# Patient Record
Sex: FEMALE | Race: BLACK OR AFRICAN AMERICAN | Employment: FULL TIME | URBAN - METROPOLITAN AREA
[De-identification: names, ages, dates, MRNs, and addresses within clinical notes are randomized per-mention and may not be internally consistent; named-entity substitution may affect disease eponyms.]

---

## 2017-01-05 ENCOUNTER — APPOINTMENT (OUTPATIENT)
Dept: PHYSICAL THERAPY | Age: 52
End: 2017-01-05

## 2017-03-24 ENCOUNTER — HOSPITAL ENCOUNTER (OUTPATIENT)
Dept: PHYSICAL THERAPY | Age: 52
Discharge: HOME | End: 2017-03-24
Payer: COMMERCIAL

## 2017-03-24 PROCEDURE — 97110 THERAPEUTIC EXERCISES: CPT

## 2017-03-24 PROCEDURE — 97162 PT EVAL MOD COMPLEX 30 MIN: CPT

## 2017-03-24 PROCEDURE — 97016 VASOPNEUMATIC DEVICE THERAPY: CPT

## 2017-03-24 NOTE — PROGRESS NOTES
Ambulatory/Rehab Services H2 Model Falls Risk Assessment    Risk Factor Pts. ·   Confusion/Disorientation/Impulsivity  []    4 ·   Symptomatic Depression  []   2 ·   Altered Elimination  []   1 ·   Dizziness/Vertigo  []   1 ·   Gender (Male)  []   1 ·   Any administered antiepileptics (anticonvulsants):  []   2 ·   Any administered benzodiazepines:  []   1 ·   Visual Impairment (specify):  []   1 ·   Portable Oxygen Use  []   1 ·   Orthostatic ? BP  []   1 ·   History of Recent Falls (within 3 mos.)  []   5     Ability to Rise from Chair (choose one) Pts. ·   Ability to rise in a single movement  []   0 ·   Pushes up, successful in one attempt  [x]   1 ·   Multiple attempts, but successful  []   3 ·   Unable to rise without assistance  []   4   Total: (5 or greater = High Risk) 1     Falls Prevention Plan:   []                Physical Limitations to Exercise (specify):   []                Mobility Assistance Device (type):   []                Exercise/Equipment Adaptation (specify):    ©2010 [facility] [employer] [address] [place] [address] Patent #[id number].  Federal Law prohibits the replication, distribution or use without written permission from [facility] [employer]

## 2017-03-24 NOTE — PROGRESS NOTES
Maria Alejandra Martinez  : 1965 [facility] [address], [address] [place] [address] Street  [phone]   [phone]         OUTPATIENT PHYSICAL THERAPY:Initial Assessment 3/24/2017    ICD-10: Treatment Diagnosis 1: right knee pain (M25.561)              Treatment Diagnosis 2: gait dysfunction (R26.89)              Treatment Diagnosis 3: right knee primary localized osteoarthritis (M17.11)  Precautions: Type II Diabetes  Allergies:   Review of patient's allergies indicates not on file. Fall Risk Score: 1 (? 5 = High Risk)  MD Orders: Evaluate and Treat MEDICAL/REFERRING DIAGNOSIS:  Pain in right knee [M25.561]   DATE OF ONSET: 8/15/2016 when patient fell at work when she tripped over a pallet and landed directly on knee, then underwent right arthroscopic knee surgery on 3/1/2017   REFERRING PHYSICIAN: Kelly Bennett, [address] [facility] [address]: 2017     INITIAL ASSESSMENT:  Ms. Madelin Aase is a 46 y.o. female presenting s/p right arthroscopic knee surgery performed on 3/1/2017 after injuring her knee 2016 when she fell at work and tripped over a pallet and landed directly on her knee. She has reported continued pain in the knee since surgery, as well as weakness and swelling. She is currently full-time, light-duty and is not aware when she is supposed to return full-duty, full-time. She is eager to improve overall strength, ROM, pain, and stamina of the knee with work and with daily activities. She is a good candidate for skilled intervention with services to include manual therapy, modalities as needed, therapeutic exercises, gait/stair/transfer/balance training, activity modification, and return to work simulation  . PROBLEM LIST (Impacting functional limitations):  1. Decreased Strength  2. Decreased ADL/Functional Activities  3. Decreased Transfer Abilities  4. Decreased Ambulation Ability/Technique  5. Decreased Balance  6. Increased Pain  7.  Decreased Activity Tolerance  8. Decreased Pacing Skills  9. Increased Fatigue  10. Decreased Flexibility/Joint Mobility INTERVENTIONS PLANNED:  1. Balance Exercise  2. Cold  3. Cryotherapy  4. Electrical Stimulation  5. Gait Training  6. Heat  7. Home Exercise Program (HEP)  8. Manual Therapy  9. Neuromuscular Re-education/Strengthening  10. Range of Motion (ROM)  11. Therapeutic Exercise/Strengthening  12. Transfer Training   TREATMENT PLAN:  Effective Dates: 3/24/2017 TO 4/21/2017. Frequency/Duration: 3 times a week for 4 weeks  GOALS: (Goals have been discussed and agreed upon with patient.)  Short-Term Functional Goals: Time Frame: 3/24/2017 to 4/7/2017  1. Patient demonstrates independence with home exercise program without verbal cueing provided by therapist.  2. Improve ROM to 0-120 degrees in order to normalize gait, transfers, and stairs. 3. Improve flexibility of gastrocnemius and hamstrings with SLR to 80 degrees in order to decrease pain with sleeping and sitting. Discharge Goals: Time Frame: 3/24/2017 to 4/21/2017  1. Improve pain to 3/10 with work, extended walking, extended weightbearing, squatting, stooping, sleeping, and sitting. 2. Improve gross lower extremity strength to at least 4+/5 in order to return patient to work full-time, full-duty. 3. Improve gait without use of crutches without significant antalgia and appropriate heel to toe gait pattern. 4. Improve stair navigation with reciprocal ascend and descend with use of hand rail. 5. Improve transfers with use of hands 0-50% of the time into and out of a chair. 6. Improve Lower Extremity Functional Index score to 40/80 from 4/80. Rehabilitation Potential For Stated Goals: Good  Regarding Latesha Perea's therapy, I certify that the treatment plan above will be carried out by a therapist or under their direction.   Thank you for this referral,  Artem Olsen, JEFF     Referring Physician Signature: Kelly Bennett,               Date The information in this section was collected on 3/24/2017 (except where otherwise noted). HISTORY:   History of Present Injury/Illness (Reason for Referral):  Ms. Jeffy Hobbs is a 46 y.o. female presenting s/p right arthroscopic knee surgery performed on 3/1/2017 after injuring her knee 8/5/2016 when she fell at work and tripped over a pallet and landed directly on her knee. She has reported continued pain in the knee since surgery, as well as weakness and swelling. She is currently full-time, light-duty and is not aware when she is supposed to return full-duty, full-time. She is eager to improve overall strength, ROM, pain, and stamina of the knee with work and with daily activities. Past Medical History/Comorbidities: Patient reported hypertension that is well controlled with lisinopril, Type II Diabetes controlled with proglitazone, metformin, and glipizide, and she has been taking naproxen for inflammation of the knee. She just had a benign tumor removed from her abdomen which required a hysterectomy in 2016. Social History/Living Environment:    Patient lives at home with sons and reports assistance from them with any heavy household chores, ADLs, heavy lifting, and heavy carrying. Prior Level of Function/Work/Activity:  Independent without dysfunction. Patient is currently light-duty, full-time with her job as a . 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 Dominant Side:         RIGHT  Current Medications:     No current outpatient prescriptions on file. Date Last Reviewed:  3/24/2017   Number of Personal Factors/Comorbidities that affect the Plan of Care (Type II Diabetes and high job demand tasks : 1-2: MODERATE COMPLEXITY   EXAMINATION:     Observation/Postural and Gait Assessment:  Patient ambulates with bilateral crutches with step to gait on her toes.   She is able to ambulate with 1 crutch safely, independently, and with minimal gait deviation with verbal and visual cuing. Gait without crutch antalgic with foot flat throughout stance. Medial and lateral joint line portal holes completely approximated with scar tissue and scabbing. No active drainage is present, as well as no signs or symptoms of infection. Anthropometric measurements (cm) Left Right   Knee joint line 42 43     Palpation: Gross tenderness to palpation of medial and lateral portal holes. Flexibility severely limited of gastrocnemius. Hamstrings limited moderately to 70 degree SLR.    ROM:     AROM(PROM) Left Right   Knee flexion 122° 100°   Knee extension 2° flexion contracture 5° flexion contracture     Passive Accessory Mobility Testing: Mild to moderate limitations of patellofemoral joint in all planes. Strength:     Manual Muscle Test (out of 5) Left Right   Knee extension 5 3+, 0 degree lag with SLR   Knee flexion 5 4   Hip flexion 5 4   Hip extension 3 3   Hip abduction 3 3   Ankle DF 4+ 4   Ankle PF 4+ 4     Special Tests:  No special tests due to acuity of surgery. No signs or symptoms of infection or deep vein thrombosis at this time. Neurological Screen:  Myotomes: Key muscle strength testing for bilateral LE is WNL. Dermatomes: Sensation testing through bilateral LE for light touch is WNL. Functional Mobility:  Patient reports difficulty with ambulation longer than a few minutes but safe and independent with and without crutches with step to gait. Transfers with use of hands 100% of the time into and out of a chair. Stairs performed non-reciprocally with left leg as dominant leg with use of crutches. Body Structures Involved:  1. Bones  2. Joints  3. Muscles Body Functions Affected:  1. Sensory/Pain  2. Neuromusculoskeletal Activities and Participation Affected:  1. General Tasks and Demands  2. Mobility  3.  Ability to work   Number of elements (examined above) that affect the Plan of Care: 3: MODERATE COMPLEXITY   CLINICAL PRESENTATION: Presentation: Evolving clinical presentation with changing clinical characteristics: MODERATE COMPLEXITY   CLINICAL DECISION MAKING:   Outcome Measure: Tool Used: Lower Extremity Functional Scale (LEFS)  Score  Initial: 4/80 Most Recent: /80   Interpretation of Score: 20 questions each scored on a 5 point scale with 0 representing \"extreme difficulty or unable to perform\" and 4 representing \"no difficulty\". The lower the score, the greater the functional disability. 80/80 represents no disability. Minimal detectable change is 9 points. Medical Necessity:   · Patient is expected to demonstrate progress in strength, range of motion, balance, coordination and functional technique to improve tolerance to daily activities, transfers sit to and from stand, ability to walk and stand un-aided for longer periods of time, and ability to work. · Skilled intervention continues to be required due to weakness, decreased ROM, decreased flexibility, pain, and functional limitations. Reason for Services/Other Comments:  · Patient continues to require skilled intervention due to increasing complexity of exercises. Use of outcome tool(s) and clinical judgement create a POC that gives a (due to long standing history of knee pain and goal of return to work with high level job tasks): Questionable prediction of patient's progress: MODERATE COMPLEXITY            TREATMENT:   (In addition to Assessment/Re-Assessment sessions the following treatments were rendered)    Pre-treatment Symptoms/Complaints:  Patient reported ongoing pain in the knee since surgery and is currently light duty, full time at work. Pain: Initial:   Pain Intensity 1: 6  Pain Location 1: Knee  Pain Orientation 1: Right  Post Session:  6/10      Therapeutic Exercise: (15 Minutes):  Exercises per grid below to improve mobility, strength, balance and coordination.   Required minimal visual, verbal and manual cues to promote proper body alignment, promote proper body posture, promote proper body mechanics and promote proper body breathing techniques. Progressed resistance, range, repetitions and complexity of movement as indicated. (used abbreviations: BET-back education training) Date:  3/24/2017 Date:   Date:     Activity/Exercise Parameters Parameters Parameters   Supine knee extension hang 6 minutes     Calf stretch NWB strap 3 x 30 sec     Hamstring stretch Strap 3 x 30 sec     Supine SLR flexion 3 x 10     Heel raises 2 x 10     Heel slides 5 sec x 20           Gait training with 1 crutch step through gait in the left hand as well as gait training without crutches with cuing to minimize antalgia and increased heel to toe gait pattern  Self-scar mobilization with manual instruction to help desensitize the incisions- 5 min    Manual Therapy (     ): improve joint and soft tissue mobility  · None    (Used abbreviations: MET - muscle energy technique; PNF - proprioceptive neuromuscular facilitation; NMR - neuromuscular re-education; a/p - anterior to posterior; p/a - posterior to anterior; NT - not tested)    Therapeutic Modalities: for edema and pain                      Right Knee Cold  Type:  (vasopneumatic)  Duration: 15 minutes  Patient Position: Supine                                                                        HEP: As above; handouts given to patient for all exercises. Treatment/Session Assessment:    · Response to Treatment:  Patient reported status quo pain at the end of session but overall improve mobility at the end of session. She also reported no significant complaints overall with exercises and also reported a good understanding of HEP. · Compliance with Program/Exercises: compliant most of the time. · Recommendations/Intent for next treatment session: \"Next visit will focus on advancements to more challenging activities\".     Total Treatment Duration: 60 minutes; 30 minutes; 15 vasopneumatic, 15 exercises  PT Patient Time In/Time Out  Time In: 9064  Time Out: [address], [place]

## 2017-03-27 ENCOUNTER — HOSPITAL ENCOUNTER (OUTPATIENT)
Dept: PHYSICAL THERAPY | Age: 52
Discharge: HOME | End: 2017-03-27
Payer: COMMERCIAL

## 2017-03-27 PROCEDURE — 97110 THERAPEUTIC EXERCISES: CPT

## 2017-03-27 PROCEDURE — 97016 VASOPNEUMATIC DEVICE THERAPY: CPT

## 2017-03-27 NOTE — PROGRESS NOTES
Marguerite Houser  : 1965 [facility] [place] [facility] [address], Jose torres, [address] [facility]  [phone]   [phone]         OUTPATIENT PHYSICAL THERAPY:Daily Note 3/27/2017    ICD-10: Treatment Diagnosis 1: right knee pain (M25.561)              Treatment Diagnosis 2: gait dysfunction (R26.89)              Treatment Diagnosis 3: right knee primary localized osteoarthritis (M17.11)  Precautions: Type II Diabetes  Allergies:   Review of patient's allergies indicates not on file. Fall Risk Score: 1 (? 5 = High Risk)  MD Orders: Evaluate and Treat MEDICAL/REFERRING DIAGNOSIS:  Pain in right knee [M25.561]   DATE OF ONSET: 8/15/2016 when patient fell at work when she tripped over a pallet and landed directly on knee, then underwent right arthroscopic knee surgery on 3/1/2017   REFERRING PHYSICIAN: Dontae Araujo, [address] [place] [address] Avenue: 2017     INITIAL ASSESSMENT:  Ms. Cesar Marin is a 46 y.o. female presenting s/p right arthroscopic knee surgery performed on 3/1/2017 after injuring her knee 2016 when she fell at work and tripped over a pallet and landed directly on her knee. She has reported continued pain in the knee since surgery, as well as weakness and swelling. She is currently full-time, light-duty and is not aware when she is supposed to return full-duty, full-time. She is eager to improve overall strength, ROM, pain, and stamina of the knee with work and with daily activities. She is a good candidate for skilled intervention with services to include manual therapy, modalities as needed, therapeutic exercises, gait/stair/transfer/balance training, activity modification, and return to work simulation  . PROBLEM LIST (Impacting functional limitations):  1. Decreased Strength  2. Decreased ADL/Functional Activities  3. Decreased Transfer Abilities  4. Decreased Ambulation Ability/Technique  5. Decreased Balance  6. Increased Pain  7.  Decreased Activity Tolerance  8. Decreased Pacing Skills  9. Increased Fatigue  10. Decreased Flexibility/Joint Mobility INTERVENTIONS PLANNED:  1. Balance Exercise  2. Cold  3. Cryotherapy  4. Electrical Stimulation  5. Gait Training  6. Heat  7. Home Exercise Program (HEP)  8. Manual Therapy  9. Neuromuscular Re-education/Strengthening  10. Range of Motion (ROM)  11. Therapeutic Exercise/Strengthening  12. Transfer Training   TREATMENT PLAN:  Effective Dates: 3/24/2017 TO 4/21/2017. Frequency/Duration: 3 times a week for 4 weeks  GOALS: (Goals have been discussed and agreed upon with patient.)  Short-Term Functional Goals: Time Frame: 3/24/2017 to 4/7/2017  1. Patient demonstrates independence with home exercise program without verbal cueing provided by therapist.  2. Improve ROM to 0-120 degrees in order to normalize gait, transfers, and stairs. 3. Improve flexibility of gastrocnemius and hamstrings with SLR to 80 degrees in order to decrease pain with sleeping and sitting. Discharge Goals: Time Frame: 3/24/2017 to 4/21/2017  1. Improve pain to 3/10 with work, extended walking, extended weightbearing, squatting, stooping, sleeping, and sitting. 2. Improve gross lower extremity strength to at least 4+/5 in order to return patient to work full-time, full-duty. 3. Improve gait without use of crutches without significant antalgia and appropriate heel to toe gait pattern. 4. Improve stair navigation with reciprocal ascend and descend with use of hand rail. 5. Improve transfers with use of hands 0-50% of the time into and out of a chair. 6. Improve Lower Extremity Functional Index score to 40/80 from 4/80. Rehabilitation Potential For Stated Goals: Good  Regarding Latesha Eliazar's therapy, I certify that the treatment plan above will be carried out by a therapist or under their direction.   Thank you for this referral,  Olga Juarez, PTA     Referring Physician Signature: Camilo Wilkerson, DO              Date                    The information in this section was collected on 3/24/2017 (except where otherwise noted). HISTORY:   History of Present Injury/Illness (Reason for Referral):  Ms. Manuel Blue is a 46 y.o. female presenting s/p right arthroscopic knee surgery performed on 3/1/2017 after injuring her knee 8/5/2016 when she fell at work and tripped over a pallet and landed directly on her knee. She has reported continued pain in the knee since surgery, as well as weakness and swelling. She is currently full-time, light-duty and is not aware when she is supposed to return full-duty, full-time. She is eager to improve overall strength, ROM, pain, and stamina of the knee with work and with daily activities. Past Medical History/Comorbidities: Patient reported hypertension that is well controlled with lisinopril, Type II Diabetes controlled with proglitazone, metformin, and glipizide, and she has been taking naproxen for inflammation of the knee. She just had a benign tumor removed from her abdomen which required a hysterectomy in 2016. Social History/Living Environment:    Patient lives at home with sons and reports assistance from them with any heavy household chores, ADLs, heavy lifting, and heavy carrying. Prior Level of Function/Work/Activity:  Independent without dysfunction. Patient is currently light-duty, full-time with her job as a . 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 Dominant Side:         RIGHT  Current Medications:     No current outpatient prescriptions on file. Date Last Reviewed:  3/27/2017   Number of Personal Factors/Comorbidities that affect the Plan of Care (Type II Diabetes and high job demand tasks : 1-2: MODERATE COMPLEXITY   EXAMINATION:     Observation/Postural and Gait Assessment:  Patient ambulates with bilateral crutches with step to gait on her toes.   She is able to ambulate with 1 crutch safely, independently, and with minimal gait deviation with verbal and visual cuing. Gait without crutch antalgic with foot flat throughout stance. Medial and lateral joint line portal holes completely approximated with scar tissue and scabbing. No active drainage is present, as well as no signs or symptoms of infection. Anthropometric measurements (cm) Left Right   Knee joint line 42 43     Palpation: Gross tenderness to palpation of medial and lateral portal holes. Flexibility severely limited of gastrocnemius. Hamstrings limited moderately to 70 degree SLR.    ROM:     AROM(PROM) Left Right   Knee flexion 122° 100°   Knee extension 2° flexion contracture 5° flexion contracture     Passive Accessory Mobility Testing: Mild to moderate limitations of patellofemoral joint in all planes. Strength:     Manual Muscle Test (out of 5) Left Right   Knee extension 5 3+, 0 degree lag with SLR   Knee flexion 5 4   Hip flexion 5 4   Hip extension 3 3   Hip abduction 3 3   Ankle DF 4+ 4   Ankle PF 4+ 4     Special Tests:  No special tests due to acuity of surgery. No signs or symptoms of infection or deep vein thrombosis at this time. Neurological Screen:  Myotomes: Key muscle strength testing for bilateral LE is WNL. Dermatomes: Sensation testing through bilateral LE for light touch is WNL. Functional Mobility:  Patient reports difficulty with ambulation longer than a few minutes but safe and independent with and without crutches with step to gait. Transfers with use of hands 100% of the time into and out of a chair. Stairs performed non-reciprocally with left leg as dominant leg with use of crutches. Body Structures Involved:  1. Bones  2. Joints  3. Muscles Body Functions Affected:  1. Sensory/Pain  2. Neuromusculoskeletal Activities and Participation Affected:  1. General Tasks and Demands  2. Mobility  3.  Ability to work   Number of elements (examined above) that affect the Plan of Care: 3: MODERATE COMPLEXITY   CLINICAL PRESENTATION:   Presentation: Evolving clinical presentation with changing clinical characteristics: MODERATE COMPLEXITY   CLINICAL DECISION MAKING:   Outcome Measure: Tool Used: Lower Extremity Functional Scale (LEFS)  Score  Initial: 4/80 Most Recent: /80   Interpretation of Score: 20 questions each scored on a 5 point scale with 0 representing \"extreme difficulty or unable to perform\" and 4 representing \"no difficulty\". The lower the score, the greater the functional disability. 80/80 represents no disability. Minimal detectable change is 9 points. Medical Necessity:   · Patient is expected to demonstrate progress in strength, range of motion, balance, coordination and functional technique to improve tolerance to daily activities, transfers sit to and from stand, ability to walk and stand un-aided for longer periods of time, and ability to work. · Skilled intervention continues to be required due to weakness, decreased ROM, decreased flexibility, pain, and functional limitations. Reason for Services/Other Comments:  · Patient continues to require skilled intervention due to increasing complexity of exercises. Use of outcome tool(s) and clinical judgement create a POC that gives a (due to long standing history of knee pain and goal of return to work with high level job tasks): Questionable prediction of patient's progress: MODERATE COMPLEXITY            TREATMENT:   (In addition to Assessment/Re-Assessment sessions the following treatments were rendered)    Pre-treatment Symptoms/Complaints:  Patient reported trying to ambulate with crutch but stated it was too painful. Pt. Was 10 mins late to therapy today. Pain: Initial:   Pain Intensity 1: 6  Pain Location 1: Knee  Pain Orientation 1: Right  Post Session:  Pt. Reported pain level on the right knee medially were the incision is. Pain level 5/10 after session. Therapeutic Exercise: ( ):x 30 mins Exercises per grid below to improve mobility, strength, balance and coordination. Required minimal visual, verbal and manual cues to promote proper body alignment, promote proper body posture, promote proper body mechanics and promote proper body breathing techniques. Progressed resistance, range, repetitions and complexity of movement as indicated. (used abbreviations: BET-back education training) Date:  3/24/2017 Date:  3/27/17   Date:     Activity/Exercise Parameters Parameters Parameters   Supine knee extension hang 6 minutes X 6 mins with 1/2 roll under right ankle    Calf stretch NWB strap 3 x 30 sec On incline 4x30 sec hold     Hamstring stretch Strap 3 x 30 sec Strap 4x30  Sec hold     Supine SLR flexion 3 x 10 3x10     Heel raises 2 x 10 2x10     Heel slides 5 sec x 20 X 20 reps 5 sec hold     Nu step   Level 4 x 8 mins     Chair slides   X 10 reps 30 sec hold     Heel toe raises  X 20 reps     Standing knee flexion  X 20 reps     Standing hamstring curls   x20 reps    Thigh squeezes   X 10 reps x 10 sec hold yellow ball    bridging  X 10 reps x 5 sec hold           Gait training with single point cane a community distance with verbal cues for proper gait sequencing. Manual Therapy (     ): improve joint and soft tissue mobility  · None    (Used abbreviations: MET - muscle energy technique; PNF - proprioceptive neuromuscular facilitation; NMR - neuromuscular re-education; a/p - anterior to posterior; p/a - posterior to anterior; NT - not tested)    Therapeutic Modalities: for edema and pain Pt. Received vaso pneumatic compression device x 15 mins to right knee in supine with elevation on incline. HEP: As above; handouts given to patient for all exercises. Treatment/Session Assessment:    · Response to Treatment:  Patient was compliant with encouragement. Pt. Ambulated with less antalgic gait and pain.    · Compliance with Program/Exercises: compliant most of the time.  · Recommendations/Intent for next treatment session: \"Next visit will focus on advancements to more challenging activities\".     Total Treatment Duration:   PT Patient Time In/Time Out  Time In: 0810  Time Out: 0900    SHELLY Nunez DPT

## 2017-03-29 ENCOUNTER — HOSPITAL ENCOUNTER (OUTPATIENT)
Dept: PHYSICAL THERAPY | Age: 52
Discharge: HOME | End: 2017-03-29
Payer: COMMERCIAL

## 2017-03-29 PROCEDURE — 97140 MANUAL THERAPY 1/> REGIONS: CPT

## 2017-03-29 PROCEDURE — 97110 THERAPEUTIC EXERCISES: CPT

## 2017-03-29 PROCEDURE — 97016 VASOPNEUMATIC DEVICE THERAPY: CPT

## 2017-03-29 NOTE — PROGRESS NOTES
Walter Al  : 1965 [facility] [place] [address], [address] [place] [address]  [phone]   [phone]         OUTPATIENT PHYSICAL THERAPY:Daily Note 3/29/2017    ICD-10: Treatment Diagnosis 1: right knee pain (M25.561)              Treatment Diagnosis 2: gait dysfunction (R26.89)              Treatment Diagnosis 3: right knee primary localized osteoarthritis (M17.11)  Precautions: Type II Diabetes  Allergies:   Review of patient's allergies indicates not on file. Fall Risk Score: 1 (? 5 = High Risk)  MD Orders: Evaluate and Treat MEDICAL/REFERRING DIAGNOSIS:  Pain in right knee [M25.561]   DATE OF ONSET: 8/15/2016 when patient fell at work when she tripped over a pallet and landed directly on knee, then underwent right arthroscopic knee surgery on 3/1/2017   REFERRING PHYSICIAN: Florecita Balderrama, [address] [place] [address] Avenue: 2017     INITIAL ASSESSMENT:  Ms. Silvia Wade is a 46 y.o. female presenting s/p right arthroscopic knee surgery performed on 3/1/2017 after injuring her knee 2016 when she fell at work and tripped over a pallet and landed directly on her knee. She has reported continued pain in the knee since surgery, as well as weakness and swelling. She is currently full-time, light-duty and is not aware when she is supposed to return full-duty, full-time. She is eager to improve overall strength, ROM, pain, and stamina of the knee with work and with daily activities. She is a good candidate for skilled intervention with services to include manual therapy, modalities as needed, therapeutic exercises, gait/stair/transfer/balance training, activity modification, and return to work simulation  . PROBLEM LIST (Impacting functional limitations):  1. Decreased Strength  2. Decreased ADL/Functional Activities  3. Decreased Transfer Abilities  4. Decreased Ambulation Ability/Technique  5. Decreased Balance  6. Increased Pain  7.  Decreased Activity Tolerance  8. Decreased Pacing Skills  9. Increased Fatigue  10. Decreased Flexibility/Joint Mobility INTERVENTIONS PLANNED:  1. Balance Exercise  2. Cold  3. Cryotherapy  4. Electrical Stimulation  5. Gait Training  6. Heat  7. Home Exercise Program (HEP)  8. Manual Therapy  9. Neuromuscular Re-education/Strengthening  10. Range of Motion (ROM)  11. Therapeutic Exercise/Strengthening  12. Transfer Training   TREATMENT PLAN:  Effective Dates: 3/24/2017 TO 4/21/2017. Frequency/Duration: 3 times a week for 4 weeks  GOALS: (Goals have been discussed and agreed upon with patient.)  Short-Term Functional Goals: Time Frame: 3/24/2017 to 4/7/2017  1. Patient demonstrates independence with home exercise program without verbal cueing provided by therapist. - GOAL MET  2. Improve ROM to 0-120 degrees in order to normalize gait, transfers, and stairs. - ONGOING  3. Improve flexibility of gastrocnemius and hamstrings with SLR to 80 degrees in order to decrease pain with sleeping and sitting. - ONGOING  Discharge Goals: Time Frame: 3/24/2017 to 4/21/2017  1. Improve pain to 3/10 with work, extended walking, extended weightbearing, squatting, stooping, sleeping, and sitting. 2. Improve gross lower extremity strength to at least 4+/5 in order to return patient to work full-time, full-duty. 3. Improve gait without use of crutches without significant antalgia and appropriate heel to toe gait pattern. 4. Improve stair navigation with reciprocal ascend and descend with use of hand rail. 5. Improve transfers with use of hands 0-50% of the time into and out of a chair. 6. Improve Lower Extremity Functional Index score to 40/80 from 4/80. Rehabilitation Potential For Stated Goals: Good  Regarding Latesha Perea's therapy, I certify that the treatment plan above will be carried out by a therapist or under their direction.   Thank you for this referral,  Venita Brenner PT     Referring Physician Signature: Galindo Johnson DO Date                    The information in this section was collected on 3/24/2017 (except where otherwise noted). HISTORY:   History of Present Injury/Illness (Reason for Referral):  Ms. Vero Gifford is a 46 y.o. female presenting s/p right arthroscopic knee surgery performed on 3/1/2017 after injuring her knee 8/5/2016 when she fell at work and tripped over a pallet and landed directly on her knee. She has reported continued pain in the knee since surgery, as well as weakness and swelling. She is currently full-time, light-duty and is not aware when she is supposed to return full-duty, full-time. She is eager to improve overall strength, ROM, pain, and stamina of the knee with work and with daily activities. Past Medical History/Comorbidities: Patient reported hypertension that is well controlled with lisinopril, Type II Diabetes controlled with proglitazone, metformin, and glipizide, and she has been taking naproxen for inflammation of the knee. She just had a benign tumor removed from her abdomen which required a hysterectomy in 2016. Social History/Living Environment:    Patient lives at home with sons and reports assistance from them with any heavy household chores, ADLs, heavy lifting, and heavy carrying. Prior Level of Function/Work/Activity:  Independent without dysfunction. Patient is currently light-duty, full-time with her job as a . 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 Dominant Side:         RIGHT  Current Medications:     No current outpatient prescriptions on file. Date Last Reviewed:  3/29/2017   Number of Personal Factors/Comorbidities that affect the Plan of Care (Type II Diabetes and high job demand tasks : 1-2: MODERATE COMPLEXITY   EXAMINATION:     Observation/Postural and Gait Assessment:  Patient ambulates with bilateral crutches with step to gait on her toes.   She is able to ambulate with 1 crutch safely, independently, and with minimal gait deviation with verbal and visual cuing. Gait without crutch antalgic with foot flat throughout stance. Medial and lateral joint line portal holes completely approximated with scar tissue and scabbing. No active drainage is present, as well as no signs or symptoms of infection. Anthropometric measurements (cm) Left Right   Knee joint line 42 43     Palpation: Gross tenderness to palpation of medial and lateral portal holes. Flexibility severely limited of gastrocnemius. Hamstrings limited moderately to 70 degree SLR.    ROM:     AROM(PROM) Left Right   Knee flexion 122° 110 (From 100°)   Knee extension 2° flexion contracture 0 (From 5° flexion contracture)     Passive Accessory Mobility Testing: Mild to moderate limitations of patellofemoral joint in all planes. Strength:     Manual Muscle Test (out of 5) Left Right   Knee extension 5 3+, 0 degree lag with SLR   Knee flexion 5 4   Hip flexion 5 4   Hip extension 3 3   Hip abduction 3 3   Ankle DF 4+ 4   Ankle PF 4+ 4     Special Tests:  No special tests due to acuity of surgery. No signs or symptoms of infection or deep vein thrombosis at this time. Neurological Screen:  Myotomes: Key muscle strength testing for bilateral LE is WNL. Dermatomes: Sensation testing through bilateral LE for light touch is WNL. Functional Mobility:  Patient reports difficulty with ambulation longer than a few minutes but safe and independent with and without crutches with step to gait. Transfers with use of hands 100% of the time into and out of a chair. Stairs performed non-reciprocally with left leg as dominant leg with use of crutches. Body Structures Involved:  1. Bones  2. Joints  3. Muscles Body Functions Affected:  1. Sensory/Pain  2. Neuromusculoskeletal Activities and Participation Affected:  1. General Tasks and Demands  2. Mobility  3.  Ability to work   Number of elements (examined above) that affect the Plan of Care: 3: MODERATE COMPLEXITY   CLINICAL PRESENTATION:   Presentation: Evolving clinical presentation with changing clinical characteristics: MODERATE COMPLEXITY   CLINICAL DECISION MAKING:   Outcome Measure: Tool Used: Lower Extremity Functional Scale (LEFS)  Score  Initial: 4/80 Most Recent: /80   Interpretation of Score: 20 questions each scored on a 5 point scale with 0 representing \"extreme difficulty or unable to perform\" and 4 representing \"no difficulty\". The lower the score, the greater the functional disability. 80/80 represents no disability. Minimal detectable change is 9 points. Medical Necessity:   · Patient is expected to demonstrate progress in strength, range of motion, balance, coordination and functional technique to improve tolerance to daily activities, transfers sit to and from stand, ability to walk and stand un-aided for longer periods of time, and ability to work. · Skilled intervention continues to be required due to weakness, decreased ROM, decreased flexibility, pain, and functional limitations. Reason for Services/Other Comments:  · Patient continues to require skilled intervention due to increasing complexity of exercises. Use of outcome tool(s) and clinical judgement create a POC that gives a (due to long standing history of knee pain and goal of return to work with high level job tasks): Questionable prediction of patient's progress: MODERATE COMPLEXITY            TREATMENT:   (In addition to Assessment/Re-Assessment sessions the following treatments were rendered)    Pre-treatment Symptoms/Complaints:  Patient reported improvements overall and only intermittent sharp pains with weightbearing. Pain: Initial:   Pain Intensity 1: 4  Pain Location 1: Knee  Pain Orientation 1: Right  Post Session:  4/10       Therapeutic Exercise: (35 Minutes): Exercises per grid below to improve mobility, strength, balance and coordination.   Required minimal visual, verbal and manual cues to promote proper body alignment, promote proper body posture, promote proper body mechanics and promote proper body breathing techniques. Progressed resistance, range, repetitions and complexity of movement as indicated. (used abbreviations: BET-back education training) Date:  3/24/2017 Date:  3/27/17   Date:  3/29/2017   Activity/Exercise Parameters Parameters Parameters   Supine knee extension hang 6 minutes X 6 mins with 1/2 roll under right ankle 6 min   Calf stretch - angle board NWB strap 3 x 30 sec On incline 4x30 sec hold  3 x 30 sec   Hamstring stretch Strap 3 x 30 sec Strap 4x30  Sec hold  3 x 30 sec   Supine SLR flexion 3 x 10 3x10  2 lbs 2 x 10   Heel slides 5 sec x 20 X 20 reps 5 sec hold  20 x 5 sec   Nu step   Level 4 x 8 mins  level 2 10 min   Chair slides   X 10 reps 30 sec hold  5 x 20 sec   Heel toe raises  X 20 reps  3 x 10   Standing knee flexion  X 20 reps  2 x 10   Standing hip abduction --- --- 2 x 10   Standing hamstring curls   x20 reps 2 x 10   bridging  X 10 reps x 5 sec hold  2 x 10         Gait training with no assistive device with verbal cues for proper gait sequencing - 5 min    Manual Therapy (    Soft Tissue Mobilization Duration  Duration: 10 Minutes): improve joint and soft tissue mobility  · Supine deep tissue mobilization of incisions and medial knee joint  (Used abbreviations: MET - muscle energy technique; PNF - proprioceptive neuromuscular facilitation; NMR - neuromuscular re-education; a/p - anterior to posterior; p/a - posterior to anterior; NT - not tested)    Therapeutic Modalities: for edema and pain                      Right Knee Cold  Type:  (vasopneumatic)  Duration: 10 minutes  Patient Position: Supine                                                                        HEP: As above; handouts given to patient for all exercises. Treatment/Session Assessment:    · Response to Treatment:  Patient tolerated session well.   Reported difficulty with standing 3 way kick but she was advised to continue doing it and to take her time and take breaks. Otherwise, progress as tolerated with an emphasis on gait, function, ROM and strengthening. · Compliance with Program/Exercises: compliant most of the time. · Recommendations/Intent for next treatment session: \"Next visit will focus on advancements to more challenging activities\".     Total Treatment Duration: 55 minutes  PT Patient Time In/Time Out  Time In: 0900  Time Out: [address], PT

## 2017-03-30 ENCOUNTER — HOSPITAL ENCOUNTER (OUTPATIENT)
Dept: PHYSICAL THERAPY | Age: 52
Discharge: HOME | End: 2017-03-30
Payer: COMMERCIAL

## 2017-03-30 PROCEDURE — 97140 MANUAL THERAPY 1/> REGIONS: CPT

## 2017-03-30 PROCEDURE — 97016 VASOPNEUMATIC DEVICE THERAPY: CPT

## 2017-03-30 PROCEDURE — 97110 THERAPEUTIC EXERCISES: CPT

## 2017-03-30 NOTE — PROGRESS NOTES
Lynell Kanner  : 1965 [facility] [address], [address] [place] [address] Street  [phone]   [phone]         OUTPATIENT PHYSICAL THERAPY:Daily Note 3/30/2017    ICD-10: Treatment Diagnosis 1: right knee pain (M25.561)              Treatment Diagnosis 2: gait dysfunction (R26.89)              Treatment Diagnosis 3: right knee primary localized osteoarthritis (M17.11)  Precautions: Type II Diabetes  Allergies:   Review of patient's allergies indicates not on file. Fall Risk Score: 1 (? 5 = High Risk)  MD Orders: Evaluate and Treat MEDICAL/REFERRING DIAGNOSIS:  Pain in right knee [M25.561]   DATE OF ONSET: 8/15/2016 when patient fell at work when she tripped over a pallet and landed directly on knee, then underwent right arthroscopic knee surgery on 3/1/2017   REFERRING PHYSICIAN: Shweta Ron, [facility] [address]: 2017     INITIAL ASSESSMENT:  Ms. Alicia Lane is a 46 y.o. female presenting s/p right arthroscopic knee surgery performed on 3/1/2017 after injuring her knee 2016 when she fell at work and tripped over a pallet and landed directly on her knee. She has reported continued pain in the knee since surgery, as well as weakness and swelling. She is currently full-time, light-duty and is not aware when she is supposed to return full-duty, full-time. She is eager to improve overall strength, ROM, pain, and stamina of the knee with work and with daily activities. She is a good candidate for skilled intervention with services to include manual therapy, modalities as needed, therapeutic exercises, gait/stair/transfer/balance training, activity modification, and return to work simulation  . PROBLEM LIST (Impacting functional limitations):  1. Decreased Strength  2. Decreased ADL/Functional Activities  3. Decreased Transfer Abilities  4. Decreased Ambulation Ability/Technique  5. Decreased Balance  6. Increased Pain  7.  Decreased Activity Tolerance  8. Decreased Pacing Skills  9. Increased Fatigue  10. Decreased Flexibility/Joint Mobility INTERVENTIONS PLANNED:  1. Balance Exercise  2. Cold  3. Cryotherapy  4. Electrical Stimulation  5. Gait Training  6. Heat  7. Home Exercise Program (HEP)  8. Manual Therapy  9. Neuromuscular Re-education/Strengthening  10. Range of Motion (ROM)  11. Therapeutic Exercise/Strengthening  12. Transfer Training   TREATMENT PLAN:  Effective Dates: 3/24/2017 TO 4/21/2017. Frequency/Duration: 3 times a week for 4 weeks  GOALS: (Goals have been discussed and agreed upon with patient.)  Short-Term Functional Goals: Time Frame: 3/24/2017 to 4/7/2017  1. Patient demonstrates independence with home exercise program without verbal cueing provided by therapist. - GOAL MET  2. Improve ROM to 0-120 degrees in order to normalize gait, transfers, and stairs. - ONGOING  3. Improve flexibility of gastrocnemius and hamstrings with SLR to 80 degrees in order to decrease pain with sleeping and sitting. - ONGOING  Discharge Goals: Time Frame: 3/24/2017 to 4/21/2017  1. Improve pain to 3/10 with work, extended walking, extended weightbearing, squatting, stooping, sleeping, and sitting. 2. Improve gross lower extremity strength to at least 4+/5 in order to return patient to work full-time, full-duty. 3. Improve gait without use of crutches without significant antalgia and appropriate heel to toe gait pattern. 4. Improve stair navigation with reciprocal ascend and descend with use of hand rail. 5. Improve transfers with use of hands 0-50% of the time into and out of a chair. 6. Improve Lower Extremity Functional Index score to 40/80 from 4/80. Rehabilitation Potential For Stated Goals: Good  Regarding Latesha Perea's therapy, I certify that the treatment plan above will be carried out by a therapist or under their direction.   Thank you for this referral,  Melanie Fernandez PTA     Referring Physician Signature: Chichi Grewal,  Date                    The information in this section was collected on 3/24/2017 (except where otherwise noted). HISTORY:   History of Present Injury/Illness (Reason for Referral):  Ms. Angela Lee is a 46 y.o. female presenting s/p right arthroscopic knee surgery performed on 3/1/2017 after injuring her knee 8/5/2016 when she fell at work and tripped over a pallet and landed directly on her knee. She has reported continued pain in the knee since surgery, as well as weakness and swelling. She is currently full-time, light-duty and is not aware when she is supposed to return full-duty, full-time. She is eager to improve overall strength, ROM, pain, and stamina of the knee with work and with daily activities. Past Medical History/Comorbidities: Patient reported hypertension that is well controlled with lisinopril, Type II Diabetes controlled with proglitazone, metformin, and glipizide, and she has been taking naproxen for inflammation of the knee. She just had a benign tumor removed from her abdomen which required a hysterectomy in 2016. Social History/Living Environment:    Patient lives at home with sons and reports assistance from them with any heavy household chores, ADLs, heavy lifting, and heavy carrying. Prior Level of Function/Work/Activity:  Independent without dysfunction. Patient is currently light-duty, full-time with her job as a . 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 Dominant Side:         RIGHT  Current Medications:     No current outpatient prescriptions on file. Date Last Reviewed:  3/30/2017   Number of Personal Factors/Comorbidities that affect the Plan of Care (Type II Diabetes and high job demand tasks : 1-2: MODERATE COMPLEXITY   EXAMINATION:     Observation/Postural and Gait Assessment:  Patient ambulates with bilateral crutches with step to gait on her toes.   She is able to ambulate with 1 crutch safely, independently, and with minimal gait deviation with verbal and visual cuing. Gait without crutch antalgic with foot flat throughout stance. Medial and lateral joint line portal holes completely approximated with scar tissue and scabbing. No active drainage is present, as well as no signs or symptoms of infection. Anthropometric measurements (cm) Left Right   Knee joint line 42 43     Palpation: Gross tenderness to palpation of medial and lateral portal holes. Flexibility severely limited of gastrocnemius. Hamstrings limited moderately to 70 degree SLR.    ROM:     AROM(PROM) Left Right   Knee flexion 122° 110 (From 100°)   Knee extension 2° flexion contracture 0 (From 5° flexion contracture)     Passive Accessory Mobility Testing: Mild to moderate limitations of patellofemoral joint in all planes. Strength:     Manual Muscle Test (out of 5) Left Right   Knee extension 5 3+, 0 degree lag with SLR   Knee flexion 5 4   Hip flexion 5 4   Hip extension 3 3   Hip abduction 3 3   Ankle DF 4+ 4   Ankle PF 4+ 4     Special Tests:  No special tests due to acuity of surgery. No signs or symptoms of infection or deep vein thrombosis at this time. Neurological Screen:  Myotomes: Key muscle strength testing for bilateral LE is WNL. Dermatomes: Sensation testing through bilateral LE for light touch is WNL. Functional Mobility:  Patient reports difficulty with ambulation longer than a few minutes but safe and independent with and without crutches with step to gait. Transfers with use of hands 100% of the time into and out of a chair. Stairs performed non-reciprocally with left leg as dominant leg with use of crutches. Body Structures Involved:  1. Bones  2. Joints  3. Muscles Body Functions Affected:  1. Sensory/Pain  2. Neuromusculoskeletal Activities and Participation Affected:  1. General Tasks and Demands  2. Mobility  3.  Ability to work   Number of elements (examined above) that affect the Plan of Care: 3: MODERATE COMPLEXITY   CLINICAL PRESENTATION:   Presentation: Evolving clinical presentation with changing clinical characteristics: MODERATE COMPLEXITY   CLINICAL DECISION MAKING:   Outcome Measure: Tool Used: Lower Extremity Functional Scale (LEFS)  Score  Initial: 4/80 Most Recent: /80   Interpretation of Score: 20 questions each scored on a 5 point scale with 0 representing \"extreme difficulty or unable to perform\" and 4 representing \"no difficulty\". The lower the score, the greater the functional disability. 80/80 represents no disability. Minimal detectable change is 9 points. Medical Necessity:   · Patient is expected to demonstrate progress in strength, range of motion, balance, coordination and functional technique to improve tolerance to daily activities, transfers sit to and from stand, ability to walk and stand un-aided for longer periods of time, and ability to work. · Skilled intervention continues to be required due to weakness, decreased ROM, decreased flexibility, pain, and functional limitations. Reason for Services/Other Comments:  · Patient continues to require skilled intervention due to increasing complexity of exercises. Use of outcome tool(s) and clinical judgement create a POC that gives a (due to long standing history of knee pain and goal of return to work with high level job tasks): Questionable prediction of patient's progress: MODERATE COMPLEXITY            TREATMENT:   (In addition to Assessment/Re-Assessment sessions the following treatments were rendered)    Pre-treatment Symptoms/Complaints:  Patient  Stated she was still in a lot of pain and work was hard. Pain: Initial:   Pain Intensity 1: 5  Pain Location 1: Knee  Pain Orientation 1: Right  Post Session:  Pt. Stated the pain level was still 5/10 after session today. Therapeutic Exercise: ( ):x 30 mins  Exercises per grid below to improve mobility, strength, balance and coordination.   Required minimal visual, verbal and manual cues to promote proper body alignment, promote proper body posture, promote proper body mechanics and promote proper body breathing techniques. Progressed resistance, range, repetitions and complexity of movement as indicated. (used abbreviations: BET-back education training) Date:  3/30/17 Date:  3/27/17   Date:  3/29/2017   Activity/Exercise Parameters Parameters Parameters   Supine knee extension hang 6 minutes X 6 mins with 1/2 roll under right ankle 6 min   Calf stretch - angle board 4 x 30 sec On incline 4x30 sec hold  3 x 30 sec   Hamstring stretch Strap 3 x 30 sec Strap 4x30  Sec hold  3 x 30 sec   Supine SLR flexion 3 x 10 3x10  2 lbs 2 x 10   Heel slides 5 x 20 sec hold  X 20 reps 5 sec hold  20 x 5 sec   Nu step  Level 4 x 10 mins  Level 4 x 8 mins  level 2 10 min   Chair slides  5x20 sec hold  X 10 reps 30 sec hold  5 x 20 sec   Heel toe raises X 30 reps  X 20 reps  3 x 10   Standing knee flexion X 20 reps  X 20 reps  2 x 10   Standing hip abduction X 20 reps  --- 2 x 10   Standing hamstring curls  X 20 reps  x20 reps 2 x 10   bridging X 10 reps 5 sec hold  X 10 reps x 5 sec hold  2 x 10         G    Manual Therapy (     ): x 10 mins improve joint and soft tissue mobility  · Supine deep tissue mobilization of incisions and medial knee joint  (Used abbreviations: MET - muscle energy technique; PNF - proprioceptive neuromuscular facilitation; NMR - neuromuscular re-education; a/p - anterior to posterior; p/a - posterior to anterior; NT - not tested)    Therapeutic Modalities: for edema and pain x 15 mins Pt. Received vaso pneumatic compression device to right knee x 15 mins in supine. HEP: As above; handouts given to patient for all exercises. Treatment/Session Assessment:    · Response to Treatment:  Pt. Was compliant with all exercises but still reported pain.    · Compliance with Program/Exercises: compliant most of the time. · Recommendations/Intent for next treatment session: \"Next visit will focus on advancements to more challenging activities\".     Total Treatment Duration: 55 minutes  PT Patient Time In/Time Out  Time In: 0800  Time Out: 0900    SHELLY Adams, NANIT

## 2017-04-03 ENCOUNTER — HOSPITAL ENCOUNTER (OUTPATIENT)
Dept: PHYSICAL THERAPY | Age: 52
Discharge: HOME | End: 2017-04-03
Payer: COMMERCIAL

## 2017-04-03 PROCEDURE — 97016 VASOPNEUMATIC DEVICE THERAPY: CPT

## 2017-04-03 PROCEDURE — 97110 THERAPEUTIC EXERCISES: CPT

## 2017-04-03 NOTE — PROGRESS NOTES
Mariela Winter  : 1965 [facility] [address], [address]  [phone]   [phone]         OUTPATIENT PHYSICAL THERAPY:Daily Note 4/3/2017    ICD-10: Treatment Diagnosis 1: right knee pain (M25.561)              Treatment Diagnosis 2: gait dysfunction (R26.89)              Treatment Diagnosis 3: right knee primary localized osteoarthritis (M17.11)  Precautions: Type II Diabetes  Allergies:   Review of patient's allergies indicates not on file. Fall Risk Score: 1 (? 5 = High Risk)  MD Orders: Evaluate and Treat MEDICAL/REFERRING DIAGNOSIS:  Pain in right knee [M25.561]   DATE OF ONSET: 8/15/2016 when patient fell at work when she tripped over a pallet and landed directly on knee, then underwent right arthroscopic knee surgery on 3/1/2017   REFERRING PHYSICIAN: Charito Mcdaniel, [facility] [address]: 2017     INITIAL ASSESSMENT:  Ms. Ethan Kingsley is a 46 y.o. female presenting s/p right arthroscopic knee surgery performed on 3/1/2017 after injuring her knee 2016 when she fell at work and tripped over a pallet and landed directly on her knee. She has reported continued pain in the knee since surgery, as well as weakness and swelling. She is currently full-time, light-duty and is not aware when she is supposed to return full-duty, full-time. She is eager to improve overall strength, ROM, pain, and stamina of the knee with work and with daily activities. She is a good candidate for skilled intervention with services to include manual therapy, modalities as needed, therapeutic exercises, gait/stair/transfer/balance training, activity modification, and return to work simulation  . PROBLEM LIST (Impacting functional limitations):  1. Decreased Strength  2. Decreased ADL/Functional Activities  3. Decreased Transfer Abilities  4. Decreased Ambulation Ability/Technique  5. Decreased Balance  6. Increased Pain  7.  Decreased Activity Tolerance  8. Decreased Pacing Skills  9. Increased Fatigue  10. Decreased Flexibility/Joint Mobility INTERVENTIONS PLANNED:  1. Balance Exercise  2. Cold  3. Cryotherapy  4. Electrical Stimulation  5. Gait Training  6. Heat  7. Home Exercise Program (HEP)  8. Manual Therapy  9. Neuromuscular Re-education/Strengthening  10. Range of Motion (ROM)  11. Therapeutic Exercise/Strengthening  12. Transfer Training   TREATMENT PLAN:  Effective Dates: 3/24/2017 TO 4/21/2017. Frequency/Duration: 3 times a week for 4 weeks  GOALS: (Goals have been discussed and agreed upon with patient.)  Short-Term Functional Goals: Time Frame: 3/24/2017 to 4/7/2017  1. Patient demonstrates independence with home exercise program without verbal cueing provided by therapist. - GOAL MET  2. Improve ROM to 0-120 degrees in order to normalize gait, transfers, and stairs. - ONGOING  3. Improve flexibility of gastrocnemius and hamstrings with SLR to 80 degrees in order to decrease pain with sleeping and sitting. - ONGOING  Discharge Goals: Time Frame: 3/24/2017 to 4/21/2017  1. Improve pain to 3/10 with work, extended walking, extended weightbearing, squatting, stooping, sleeping, and sitting. 2. Improve gross lower extremity strength to at least 4+/5 in order to return patient to work full-time, full-duty. 3. Improve gait without use of crutches without significant antalgia and appropriate heel to toe gait pattern. 4. Improve stair navigation with reciprocal ascend and descend with use of hand rail. 5. Improve transfers with use of hands 0-50% of the time into and out of a chair. 6. Improve Lower Extremity Functional Index score to 40/80 from 4/80. Rehabilitation Potential For Stated Goals: Good  Regarding Latesha Perea's therapy, I certify that the treatment plan above will be carried out by a therapist or under their direction.   Thank you for this referral,  Edis Chacko PTA     Referring Physician Signature: Deann Persaud DO Date                    The information in this section was collected on 3/24/2017 (except where otherwise noted). HISTORY:   History of Present Injury/Illness (Reason for Referral):  Ms. Alla Mauro is a 46 y.o. female presenting s/p right arthroscopic knee surgery performed on 3/1/2017 after injuring her knee 8/5/2016 when she fell at work and tripped over a pallet and landed directly on her knee. She has reported continued pain in the knee since surgery, as well as weakness and swelling. She is currently full-time, light-duty and is not aware when she is supposed to return full-duty, full-time. She is eager to improve overall strength, ROM, pain, and stamina of the knee with work and with daily activities. Past Medical History/Comorbidities: Patient reported hypertension that is well controlled with lisinopril, Type II Diabetes controlled with proglitazone, metformin, and glipizide, and she has been taking naproxen for inflammation of the knee. She just had a benign tumor removed from her abdomen which required a hysterectomy in 2016. Social History/Living Environment:    Patient lives at home with sons and reports assistance from them with any heavy household chores, ADLs, heavy lifting, and heavy carrying. Prior Level of Function/Work/Activity:  Independent without dysfunction. Patient is currently light-duty, full-time with her job as a . 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 Dominant Side:         RIGHT  Current Medications:     No current outpatient prescriptions on file. Date Last Reviewed:  4/3/2017   Number of Personal Factors/Comorbidities that affect the Plan of Care (Type II Diabetes and high job demand tasks : 1-2: MODERATE COMPLEXITY   EXAMINATION:     Observation/Postural and Gait Assessment:  Patient ambulates with bilateral crutches with step to gait on her toes.   She is able to ambulate with 1 crutch safely, independently, and with minimal gait deviation with verbal and visual cuing. Gait without crutch antalgic with foot flat throughout stance. Medial and lateral joint line portal holes completely approximated with scar tissue and scabbing. No active drainage is present, as well as no signs or symptoms of infection. Anthropometric measurements (cm) Left Right   Knee joint line 42 43     Palpation: Gross tenderness to palpation of medial and lateral portal holes. Flexibility severely limited of gastrocnemius. Hamstrings limited moderately to 70 degree SLR.    ROM:     AROM(PROM) Left Right   Knee flexion 122° 110 (From 100°)   Knee extension 2° flexion contracture 0 (From 5° flexion contracture)     Passive Accessory Mobility Testing: Mild to moderate limitations of patellofemoral joint in all planes. Strength:     Manual Muscle Test (out of 5) Left Right   Knee extension 5 3+, 0 degree lag with SLR   Knee flexion 5 4   Hip flexion 5 4   Hip extension 3 3   Hip abduction 3 3   Ankle DF 4+ 4   Ankle PF 4+ 4     Special Tests:  No special tests due to acuity of surgery. No signs or symptoms of infection or deep vein thrombosis at this time. Neurological Screen:  Myotomes: Key muscle strength testing for bilateral LE is WNL. Dermatomes: Sensation testing through bilateral LE for light touch is WNL. Functional Mobility:  Patient reports difficulty with ambulation longer than a few minutes but safe and independent with and without crutches with step to gait. Transfers with use of hands 100% of the time into and out of a chair. Stairs performed non-reciprocally with left leg as dominant leg with use of crutches. Body Structures Involved:  1. Bones  2. Joints  3. Muscles Body Functions Affected:  1. Sensory/Pain  2. Neuromusculoskeletal Activities and Participation Affected:  1. General Tasks and Demands  2. Mobility  3.  Ability to work   Number of elements (examined above) that affect the Plan of Care: 3: MODERATE COMPLEXITY   CLINICAL PRESENTATION:   Presentation: Evolving clinical presentation with changing clinical characteristics: MODERATE COMPLEXITY   CLINICAL DECISION MAKING:   Outcome Measure: Tool Used: Lower Extremity Functional Scale (LEFS)  Score  Initial: 4/80 Most Recent: /80   Interpretation of Score: 20 questions each scored on a 5 point scale with 0 representing \"extreme difficulty or unable to perform\" and 4 representing \"no difficulty\". The lower the score, the greater the functional disability. 80/80 represents no disability. Minimal detectable change is 9 points. Medical Necessity:   · Patient is expected to demonstrate progress in strength, range of motion, balance, coordination and functional technique to improve tolerance to daily activities, transfers sit to and from stand, ability to walk and stand un-aided for longer periods of time, and ability to work. · Skilled intervention continues to be required due to weakness, decreased ROM, decreased flexibility, pain, and functional limitations. Reason for Services/Other Comments:  · Patient continues to require skilled intervention due to increasing complexity of exercises. Use of outcome tool(s) and clinical judgement create a POC that gives a (due to long standing history of knee pain and goal of return to work with high level job tasks): Questionable prediction of patient's progress: MODERATE COMPLEXITY            TREATMENT:   (In addition to Assessment/Re-Assessment sessions the following treatments were rendered)    Pre-treatment Symptoms/Complaints:  Patient stated her pain was not getting any better and will go to doctor soon and is concerned he will send her back to work too early. Pain: Initial:   Pain Intensity 1: 6  Pain Location 1: Knee  Pain Orientation 1: Right  Post Session:  Pt. Stated the pain level was still 5/10 after session today.        Therapeutic Exercise: ( ):x 45 mins  Exercises per grid below to improve mobility, strength, balance and coordination. Required minimal visual, verbal and manual cues to promote proper body alignment, promote proper body posture, promote proper body mechanics and promote proper body breathing techniques. Progressed resistance, range, repetitions and complexity of movement as indicated. (used abbreviations: BET-back education training) Date:  3/30/17 Date:  4/3/17   Date:  3/29/2017   Activity/Exercise Parameters Parameters Parameters   Supine knee extension hang 6 minutes X 6 mins with 1/2 roll under right ankle 6 min   Calf stretch - angle board 4 x 30 sec On incline 4x30 sec hold  3 x 30 sec   Hamstring stretch Strap 3 x 30 sec Strap 4x30  Sec hold  3 x 30 sec   Supine SLR flexion 3 x 10 3x10  2 lbs 2 x 10   Heel slides 5 x 20 sec hold  X 20 reps 5 sec hold  20 x 5 sec   Nu step  Level 4 x 10 mins  Level 4 x 10 mins  level 2 10 min   Chair slides  5x20 sec hold  X 10 reps 30 sec hold  5 x 20 sec   Heel toe raises X 30 reps  X 20 reps  3 x 10   Standing knee flexion X 20 reps  X 20 reps  2 x 10   Standing hip abduction X 20 reps  X 20 reps  2 x 10   Standing hamstring curls  X 20 reps  x20 reps 2 x 10   bridging X 10 reps 5 sec hold  X 10 reps x 5 sec hold  2 x 10             Manual Therapy (     ): none today  (Used abbreviations: MET - muscle energy technique; PNF - proprioceptive neuromuscular facilitation; NMR - neuromuscular re-education; a/p - anterior to posterior; p/a - posterior to anterior; NT - not tested)    Therapeutic Modalities: for edema and pain x 15 mins Pt. Received vaso pneumatic compression device to right knee x 15 mins in supine. HEP: As above; handouts given to patient for all exercises. Treatment/Session Assessment:    · Response to Treatment:  Pt. Was compliant but continues to complain of pain.    · Compliance with Program/Exercises: compliant most of the time.  · Recommendations/Intent for next treatment session: \"Next visit will focus on advancements to more challenging activities\".     Total Treatment Duration: 60 minutes  PT Patient Time In/Time Out  Time In: 0800  Time Out: 0900    Florrie Leaks, PTA   Voncille Actis, DPT

## 2017-04-04 ENCOUNTER — HOSPITAL ENCOUNTER (OUTPATIENT)
Dept: PHYSICAL THERAPY | Age: 52
Discharge: HOME | End: 2017-04-04
Payer: COMMERCIAL

## 2017-04-04 PROCEDURE — 97110 THERAPEUTIC EXERCISES: CPT

## 2017-04-04 PROCEDURE — 97016 VASOPNEUMATIC DEVICE THERAPY: CPT

## 2017-04-04 NOTE — PROGRESS NOTES
Lillian Sugey  : 1965 [facility] [address], [address] [place] [address] Street  [phone]   [phone]         OUTPATIENT PHYSICAL THERAPY:Daily Note 2017    ICD-10: Treatment Diagnosis 1: right knee pain (M25.561)              Treatment Diagnosis 2: gait dysfunction (R26.89)              Treatment Diagnosis 3: right knee primary localized osteoarthritis (M17.11)  Precautions: Type II Diabetes  Allergies:   Review of patient's allergies indicates not on file. Fall Risk Score: 1 (? 5 = High Risk)  MD Orders: Evaluate and Treat MEDICAL/REFERRING DIAGNOSIS:  Pain in right knee [M25.561]   DATE OF ONSET: 8/15/2016 when patient fell at work when she tripped over a pallet and landed directly on knee, then underwent right arthroscopic knee surgery on 3/1/2017   REFERRING PHYSICIAN: Maile Triplett, [address]: 2017     INITIAL ASSESSMENT:  Ms. Noam Elaine is a 46 y.o. female presenting s/p right arthroscopic knee surgery performed on 3/1/2017 after injuring her knee 2016 when she fell at work and tripped over a pallet and landed directly on her knee. She has reported continued pain in the knee since surgery, as well as weakness and swelling. She is currently full-time, light-duty and is not aware when she is supposed to return full-duty, full-time. She is eager to improve overall strength, ROM, pain, and stamina of the knee with work and with daily activities. She is a good candidate for skilled intervention with services to include manual therapy, modalities as needed, therapeutic exercises, gait/stair/transfer/balance training, activity modification, and return to work simulation  . PROBLEM LIST (Impacting functional limitations):  1. Decreased Strength  2. Decreased ADL/Functional Activities  3. Decreased Transfer Abilities  4. Decreased Ambulation Ability/Technique  5. Decreased Balance  6. Increased Pain  7.  Decreased Activity Tolerance  8. Decreased Pacing Skills  9. Increased Fatigue  10. Decreased Flexibility/Joint Mobility INTERVENTIONS PLANNED:  1. Balance Exercise  2. Cold  3. Cryotherapy  4. Electrical Stimulation  5. Gait Training  6. Heat  7. Home Exercise Program (HEP)  8. Manual Therapy  9. Neuromuscular Re-education/Strengthening  10. Range of Motion (ROM)  11. Therapeutic Exercise/Strengthening  12. Transfer Training   TREATMENT PLAN:  Effective Dates: 3/24/2017 TO 4/21/2017. Frequency/Duration: 3 times a week for 4 weeks  GOALS: (Goals have been discussed and agreed upon with patient.)  Short-Term Functional Goals: Time Frame: 3/24/2017 to 4/7/2017  1. Patient demonstrates independence with home exercise program without verbal cueing provided by therapist. - GOAL MET  2. Improve ROM to 0-120 degrees in order to normalize gait, transfers, and stairs. - ONGOING  3. Improve flexibility of gastrocnemius and hamstrings with SLR to 80 degrees in order to decrease pain with sleeping and sitting. - ONGOING  Discharge Goals: Time Frame: 3/24/2017 to 4/21/2017  1. Improve pain to 3/10 with work, extended walking, extended weightbearing, squatting, stooping, sleeping, and sitting. 2. Improve gross lower extremity strength to at least 4+/5 in order to return patient to work full-time, full-duty. 3. Improve gait without use of crutches without significant antalgia and appropriate heel to toe gait pattern. 4. Improve stair navigation with reciprocal ascend and descend with use of hand rail. 5. Improve transfers with use of hands 0-50% of the time into and out of a chair. 6. Improve Lower Extremity Functional Index score to 40/80 from 4/80. Rehabilitation Potential For Stated Goals: Good  Regarding Latesha Perea's therapy, I certify that the treatment plan above will be carried out by a therapist or under their direction.   Thank you for this referral,  Thea Pompa PTA     Referring Physician Signature: Janene Duran DO Date                    The information in this section was collected on 3/24/2017 (except where otherwise noted). HISTORY:   History of Present Injury/Illness (Reason for Referral):  Ms. Cristi Barney is a 46 y.o. female presenting s/p right arthroscopic knee surgery performed on 3/1/2017 after injuring her knee 8/5/2016 when she fell at work and tripped over a pallet and landed directly on her knee. She has reported continued pain in the knee since surgery, as well as weakness and swelling. She is currently full-time, light-duty and is not aware when she is supposed to return full-duty, full-time. She is eager to improve overall strength, ROM, pain, and stamina of the knee with work and with daily activities. Past Medical History/Comorbidities: Patient reported hypertension that is well controlled with lisinopril, Type II Diabetes controlled with proglitazone, metformin, and glipizide, and she has been taking naproxen for inflammation of the knee. She just had a benign tumor removed from her abdomen which required a hysterectomy in 2016. Social History/Living Environment:    Patient lives at home with sons and reports assistance from them with any heavy household chores, ADLs, heavy lifting, and heavy carrying. Prior Level of Function/Work/Activity:  Independent without dysfunction. Patient is currently light-duty, full-time with her job as a . 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 Dominant Side:         RIGHT  Current Medications:     No current outpatient prescriptions on file. Date Last Reviewed:  4/4/2017   Number of Personal Factors/Comorbidities that affect the Plan of Care (Type II Diabetes and high job demand tasks : 1-2: MODERATE COMPLEXITY   EXAMINATION:     Observation/Postural and Gait Assessment:  Patient ambulates with bilateral crutches with step to gait on her toes.   She is able to ambulate with 1 crutch safely, independently, and with minimal gait deviation with verbal and visual cuing. Gait without crutch antalgic with foot flat throughout stance. Medial and lateral joint line portal holes completely approximated with scar tissue and scabbing. No active drainage is present, as well as no signs or symptoms of infection. Anthropometric measurements (cm) Left Right   Knee joint line 42 43     Palpation: Gross tenderness to palpation of medial and lateral portal holes. Flexibility severely limited of gastrocnemius. Hamstrings limited moderately to 70 degree SLR.    ROM:     AROM(PROM) Left Right   Knee flexion 122° 110 (From 100°)   Knee extension 2° flexion contracture 0 (From 5° flexion contracture)     Passive Accessory Mobility Testing: Mild to moderate limitations of patellofemoral joint in all planes. Strength:     Manual Muscle Test (out of 5) Left Right   Knee extension 5 3+, 0 degree lag with SLR   Knee flexion 5 4   Hip flexion 5 4   Hip extension 3 3   Hip abduction 3 3   Ankle DF 4+ 4   Ankle PF 4+ 4     Special Tests:  No special tests due to acuity of surgery. No signs or symptoms of infection or deep vein thrombosis at this time. Neurological Screen:  Myotomes: Key muscle strength testing for bilateral LE is WNL. Dermatomes: Sensation testing through bilateral LE for light touch is WNL. Functional Mobility:  Patient reports difficulty with ambulation longer than a few minutes but safe and independent with and without crutches with step to gait. Transfers with use of hands 100% of the time into and out of a chair. Stairs performed non-reciprocally with left leg as dominant leg with use of crutches. Body Structures Involved:  1. Bones  2. Joints  3. Muscles Body Functions Affected:  1. Sensory/Pain  2. Neuromusculoskeletal Activities and Participation Affected:  1. General Tasks and Demands  2. Mobility  3.  Ability to work   Number of elements (examined above) that affect the Plan of Care: 3: MODERATE COMPLEXITY   CLINICAL PRESENTATION:   Presentation: Evolving clinical presentation with changing clinical characteristics: MODERATE COMPLEXITY   CLINICAL DECISION MAKING:   Outcome Measure: Tool Used: Lower Extremity Functional Scale (LEFS)  Score  Initial: 4/80 Most Recent: /80   Interpretation of Score: 20 questions each scored on a 5 point scale with 0 representing \"extreme difficulty or unable to perform\" and 4 representing \"no difficulty\". The lower the score, the greater the functional disability. 80/80 represents no disability. Minimal detectable change is 9 points. Medical Necessity:   · Patient is expected to demonstrate progress in strength, range of motion, balance, coordination and functional technique to improve tolerance to daily activities, transfers sit to and from stand, ability to walk and stand un-aided for longer periods of time, and ability to work. · Skilled intervention continues to be required due to weakness, decreased ROM, decreased flexibility, pain, and functional limitations. Reason for Services/Other Comments:  · Patient continues to require skilled intervention due to increasing complexity of exercises. Use of outcome tool(s) and clinical judgement create a POC that gives a (due to long standing history of knee pain and goal of return to work with high level job tasks): Questionable prediction of patient's progress: MODERATE COMPLEXITY            TREATMENT:   (In addition to Assessment/Re-Assessment sessions the following treatments were rendered)    Pre-treatment Symptoms/Complaints:  Patient continues to complain of increased knee pain and still having to use her crutch for stability. Pain: Initial:   Pain Intensity 1: 6  Pain Location 1: Knee  Pain Orientation 1: Right  Post Session:  Pt. Stated the pain level was still 5/10 after session today. Therapeutic Exercise: ( ):x 45 mins  Exercises per grid below to improve mobility, strength, balance and coordination. Required minimal visual, verbal and manual cues to promote proper body alignment, promote proper body posture, promote proper body mechanics and promote proper body breathing techniques. Progressed resistance, range, repetitions and complexity of movement as indicated. (used abbreviations: BET-back education training) Date:  3/30/17 Date:  4/3/17   Date:  4/4/17   Activity/Exercise Parameters Parameters Parameters   Supine knee extension hang 6 minutes X 6 mins with 1/2 roll under right ankle 6 min   Calf stretch - angle board 4 x 30 sec On incline 4x30 sec hold  4 x 30 sec   Hamstring stretch Strap 3 x 30 sec Strap 4x30  Sec hold  3 x 30 sec   Supine SLR flexion 3 x 10 3x10  2 lbs 2 x 10   Heel slides 5 x 20 sec hold  X 20 reps 5 sec hold  20 x 5 sec   Nu step  Level 4 x 10 mins  Level 4 x 10 mins  level 4 x 10 min   Chair slides  5x20 sec hold  X 10 reps 30 sec hold  5 x 20 sec   Heel toe raises X 30 reps  X 20 reps  3 x 10   Standing knee flexion X 20 reps  X 20 reps  X 20 reps 1.5 lb. Wt.s    Standing hip abduction X 20 reps  X 20 reps  X 20 reps 1.5 lb. Wt.s    Standing hamstring curls  X 20 reps  x20 reps X 20 reps 1 .5 wt.s   bridging X 10 reps 5 sec hold  X 10 reps x 5 sec hold  2 x 10   Knee extension   1.5 lb wt. s x 20 reps    Thigh squeezes   Yellow ball x 10 reps x 10 sec hold                          Manual Therapy (     ): none today  (Used abbreviations: MET - muscle energy technique; PNF - proprioceptive neuromuscular facilitation; NMR - neuromuscular re-education; a/p - anterior to posterior; p/a - posterior to anterior; NT - not tested)    Therapeutic Modalities: for edema and pain x 15 mins Pt. Received vaso pneumatic compression device to right knee x 15 mins in supine. HEP: As above; handouts given to patient for all exercises. Treatment/Session Assessment:    · Response to Treatment:  Pt.  Was compliant with verbal and tactile cuing for proper technique and body mechanics. · Compliance with Program/Exercises: compliant most of the time. · Recommendations/Intent for next treatment session: \"Next visit will focus on advancements to more challenging activities\".     Total Treatment Duration: 60 minutes  PT Patient Time In/Time Out  Time In: 0800  Time Out: 0900    SHELLY Huntley DPT

## 2017-04-06 ENCOUNTER — HOSPITAL ENCOUNTER (OUTPATIENT)
Dept: PHYSICAL THERAPY | Age: 52
Discharge: HOME | End: 2017-04-06
Payer: COMMERCIAL

## 2017-04-06 PROCEDURE — 97110 THERAPEUTIC EXERCISES: CPT

## 2017-04-06 PROCEDURE — 97016 VASOPNEUMATIC DEVICE THERAPY: CPT

## 2017-04-06 PROCEDURE — 97140 MANUAL THERAPY 1/> REGIONS: CPT

## 2017-04-06 NOTE — PROGRESS NOTES
Jacob Ocampo  : 1965 [facility] [address], [place], [address] [place] [address] Street  [phone]   [phone]        Outpatient PHYSICAL THERAPY: PHYSICIAN COMMUNICATION    REFERRING PHYSICIAN: DO Storm Terry Physician Appointment: 2017  MEDICAL/REFERRING DIAGNOSIS:  · Pain in right knee [M25.561]  ATTENDANCE: Jacob Ocampo has attended 7 sessions of therapy from 3/24/2017 to 2017 s/p right arthroscopic knee surgery on 3/1/2017    ASSESSMENT:  DATE: 2017    PROGRESS: Jacob Ocampo has made significant progress since the start of therapy. She is now able to ambulate with minimal gait deviation without the crutch, and has been advised to wean off the crutch as much as possible. Her work has requested she still use it there until they are advised otherwise per MD.  ROM 0-110, SLR with 0 degree lag, and she is able to ascend a 6 inch step and perform transfers sit to and from stand with the use of hands without complaints and proper form. RECOMMENDATIONS: Continue therapy until  in order to improve function of the lower extremity and improve patient to full-duty status at work.     Thank you for this referral and please do not hesitate to contact me at the number listed above if you have any questions,    Kimberly Roe, PT    Physical Therapist

## 2017-04-06 NOTE — PROGRESS NOTES
Soham Gonzalez  : 1965 [facility] [address], [place] torres, [address]  [phone]   [phone]         OUTPATIENT PHYSICAL THERAPY:Daily Note and Progress Report 2017    ICD-10: Treatment Diagnosis 1: right knee pain (M25.561)              Treatment Diagnosis 2: gait dysfunction (R26.89)              Treatment Diagnosis 3: right knee primary localized osteoarthritis (M17.11)  Precautions: Type II Diabetes  Allergies:   Review of patient's allergies indicates not on file. Fall Risk Score: 1 (? 5 = High Risk)  MD Orders: Evaluate and Treat MEDICAL/REFERRING DIAGNOSIS:  Pain in right knee [M25.561]   DATE OF ONSET: 8/15/2016 when patient fell at work when she tripped over a pallet and landed directly on knee, then underwent right arthroscopic knee surgery on 3/1/2017   REFERRING PHYSICIAN: Chichi Grewal, [facility] [address]: 2017     INITIAL ASSESSMENT:  Ms. Maxine Marin is a 46 y.o. female presenting s/p right arthroscopic knee surgery performed on 3/1/2017 after injuring her knee 2016 when she fell at work and tripped over a pallet and landed directly on her knee. She has reported continued pain in the knee since surgery, as well as weakness and swelling. She has been seen for 7 sessions of therapy from 3/24/2017 to 2017 with significant success. She reports some improved pain since surgery, and has been able to ambulate without a crutch with minimal to no gait deviation. She is full time but only light duty with her job at this time. She is a good candidate for skilled intervention with services to include manual therapy, modalities as needed, therapeutic exercises, gait/stair/transfer/balance training, activity modification, and return to work simulation  . PROBLEM LIST (Impacting functional limitations):  1. Decreased Strength  2. Decreased ADL/Functional Activities  3. Decreased Transfer Abilities  4.  Decreased Ambulation Ability/Technique  5. Decreased Balance  6. Increased Pain  7. Decreased Activity Tolerance  8. Decreased Pacing Skills  9. Increased Fatigue  10. Decreased Flexibility/Joint Mobility INTERVENTIONS PLANNED:  1. Balance Exercise  2. Cold  3. Cryotherapy  4. Electrical Stimulation  5. Gait Training  6. Heat  7. Home Exercise Program (HEP)  8. Manual Therapy  9. Neuromuscular Re-education/Strengthening  10. Range of Motion (ROM)  11. Therapeutic Exercise/Strengthening  12. Transfer Training   TREATMENT PLAN:  Effective Dates: 3/24/2017 TO 4/21/2017. Frequency/Duration: 3 times a week for 4 weeks  GOALS: (Goals have been discussed and agreed upon with patient.)  Short-Term Functional Goals: Time Frame: 3/24/2017 to 4/7/2017  1. Patient demonstrates independence with home exercise program without verbal cueing provided by therapist. - GOAL MET  2. Improve ROM to 0-120 degrees in order to normalize gait, transfers, and stairs. - ONGOING  3. Improve flexibility of gastrocnemius and hamstrings with SLR to 80 degrees in order to decrease pain with sleeping and sitting. - ONGOING  Discharge Goals: Time Frame: 3/24/2017 to 4/21/2017  1. Improve pain to 3/10 with work, extended walking, extended weightbearing, squatting, stooping, sleeping, and sitting. - ONGOING  2. Improve gross lower extremity strength to at least 4+/5 in order to return patient to work full-time, full-duty. - ONGOING  3. Improve gait without use of crutches without significant antalgia and appropriate heel to toe gait pattern. - ONGOING  4. Improve stair navigation with reciprocal ascend and descend with use of hand rail. - ONGOING  5. Improve transfers with use of hands 0-50% of the time into and out of a chair. - ONGOING  6.  Improve Lower Extremity Functional Index score to 40/80 from 4/80. - ONGOING  Rehabilitation Potential For Stated Goals: Good  Regarding Latesha Perea's therapy, I certify that the treatment plan above will be carried out by a therapist or under their direction. Thank you for this referral,  Norberto Dawson PT     Referring Physician Signature: Fabi Grace DO              Date                    The information in this section was collected on 4/6/2017 (except where otherwise noted). HISTORY:   History of Present Injury/Illness (Reason for Referral):  Ms. Arianne Correa is a 46 y.o. female presenting s/p right arthroscopic knee surgery performed on 3/1/2017 after injuring her knee 8/5/2016 when she fell at work and tripped over a pallet and landed directly on her knee. She has reported continued pain in the knee since surgery, as well as weakness and swelling. She is currently full-time, light-duty and is not aware when she is supposed to return full-duty, full-time. She is eager to improve overall strength, ROM, pain, and stamina of the knee with work and with daily activities. Past Medical History/Comorbidities: Patient reported hypertension that is well controlled with lisinopril, Type II Diabetes controlled with proglitazone, metformin, and glipizide, and she has been taking naproxen for inflammation of the knee. She just had a benign tumor removed from her abdomen which required a hysterectomy in 2016. Social History/Living Environment:    Patient lives at home with sons and reports assistance from them with any heavy household chores, ADLs, heavy lifting, and heavy carrying. Prior Level of Function/Work/Activity:  Independent without dysfunction. Patient is currently light-duty, full-time with her job as a . 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 Dominant Side:         RIGHT  Current Medications:     No current outpatient prescriptions on file.    Date Last Reviewed:  4/6/2017   Number of Personal Factors/Comorbidities that affect the Plan of Care (Type II Diabetes and high job demand tasks : 1-2: MODERATE COMPLEXITY   EXAMINATION:     Observation/Postural and Gait Assessment:  Patient ambulates without crutches with minimal to no gait deviation (From bilateral crutches with step to gait on her toes and is able to ambulate with 1 crutch safely, independently, and with minimal gait deviation with verbal and visual cuing, gait without crutch antalgic with foot flat throughout stance). Medial and lateral joint line portal holes completely approximated with scar tissue (From scar tissue and scabbing). No active drainage is present, as well as no signs or symptoms of infection. Anthropometric measurements (cm) Left Right   Knee joint line 42 43     Palpation: Gross tenderness to palpation of medial and lateral portal holes and medial joint line. Flexibility moderately (From severely) limited of gastrocnemius. Hamstrings limited moderately to 70 degree SLR.    ROM:     AROM(PROM) Left Right   Knee flexion 122° 110 (From 100°)   Knee extension 2° flexion contracture 0 (From 5° flexion contracture)     Passive Accessory Mobility Testing: Mild to moderate limitations of patellofemoral joint in all planes. Strength:     Manual Muscle Test (out of 5) Left Right   Knee extension 5 4 (From 3+), 0 degree lag with SLR   Knee flexion 5 4+ (From 4)   Hip flexion 5 4+ (From 4)   Hip extension 3 3   Hip abduction 3 3   Ankle DF 4+ 4+ (From 4)   Ankle PF 4+ 4+ (From 4)     Special Tests:  No special tests due to acuity of surgery. No signs or symptoms of infection or deep vein thrombosis at this time. Neurological Screen:  Myotomes: Key muscle strength testing for bilateral LE is WNL. Dermatomes: Sensation testing through bilateral LE for light touch is WNL. Functional Mobility:  Patient reports improvements with gait for longer periods of time without use of crutches with minimal gait deviation (From difficulty with ambulation longer than a few minutes but safe and independent with and without crutches with step to gait).   Transfers with use of hands 100% of the time into and out of a chair. Stairs performed reciprocally with ascend and non-reciprocally with descend (From non-reciprocally with left leg as dominant leg with use of crutches). Body Structures Involved:  1. Bones  2. Joints  3. Muscles Body Functions Affected:  1. Sensory/Pain  2. Neuromusculoskeletal Activities and Participation Affected:  1. General Tasks and Demands  2. Mobility  3. Ability to work   Number of elements (examined above) that affect the Plan of Care: 3: MODERATE COMPLEXITY   CLINICAL PRESENTATION:   Presentation: Evolving clinical presentation with changing clinical characteristics: MODERATE COMPLEXITY   CLINICAL DECISION MAKING:   Outcome Measure: Tool Used: Lower Extremity Functional Scale (LEFS)  Score  Initial: 4/80 Most Recent: 35/80 (Date: 4/6/2017)   Interpretation of Score: 20 questions each scored on a 5 point scale with 0 representing \"extreme difficulty or unable to perform\" and 4 representing \"no difficulty\". The lower the score, the greater the functional disability. 80/80 represents no disability. Minimal detectable change is 9 points. Medical Necessity:   · Patient is expected to demonstrate progress in strength, range of motion, balance, coordination and functional technique to improve tolerance to daily activities, transfers sit to and from stand, ability to walk and stand un-aided for longer periods of time, and ability to work. · Skilled intervention continues to be required due to weakness, decreased ROM, decreased flexibility, pain, and functional limitations. Reason for Services/Other Comments:  · Patient continues to require skilled intervention due to increasing complexity of exercises.    Use of outcome tool(s) and clinical judgement create a POC that gives a (due to long standing history of knee pain and goal of return to work with high level job tasks): Questionable prediction of patient's progress: MODERATE COMPLEXITY            TREATMENT:   (In addition to Assessment/Re-Assessment sessions the following treatments were rendered)    Pre-treatment Symptoms/Complaints:  Patient has been able to ambulate without crutch with minimal gait deviation but reports that her work wants her using it at work until she follows up with MD.  Pain: Initial:   Pain Intensity 1: 5  Pain Location 1: Knee  Pain Orientation 1: Right  Post Session:  Pt. Stated the pain level was still 5/10 after session today. Therapeutic Exercise: (35 Minutes):  Exercises per grid below to improve mobility, strength, balance and coordination. Required minimal visual, verbal and manual cues to promote proper body alignment, promote proper body posture, promote proper body mechanics and promote proper body breathing techniques. Progressed resistance, range, repetitions and complexity of movement as indicated. (used abbreviations: BET-back education training) Date:  4/6/17 Date:  4/3/17   Date:  4/4/17   Activity/Exercise Parameters Parameters Parameters   Supine knee extension hang 6 minutes X 6 mins with 1/2 roll under right ankle 6 min   Calf stretch - angle board 3 x 30 sec On incline 4x30 sec hold  4 x 30 sec   Hamstring stretch Strap 3 x 30 sec Strap 4x30  Sec hold  3 x 30 sec   Supine SLR flexion 3 x 10 3x10  2 lbs 2 x 10   Heel slides 5 x 20 sec hold  X 20 reps 5 sec hold  20 x 5 sec   Nu step  Level 4 x 10 mins  Level 4 x 10 mins  level 4 x 10 min   Chair slides  5x20 sec hold  X 10 reps 30 sec hold  5 x 20 sec   Heel toe raises X 30 reps  X 20 reps  3 x 10   Standing knee flexion X 20 reps 3 lbs X 20 reps  X 20 reps 1.5 lb. Wt.s    Standing hip abduction X 20 reps 3 lbs X 20 reps  X 20 reps 1.5 lb. Wt.s    Standing hamstring curls  X 20 reps 3 lbs x20 reps X 20 reps 1 .5 wt.s   bridging X 10 reps 5 sec hold  X 10 reps x 5 sec hold  2 x 10   Knee extension - long arc quad 2 x 10 3 lbs  1.5 lb wt. s x 20 reps    Step up 6 inch 2 x 6     Sit to and from stand 2 x 8 with hands Manual Therapy (    Soft Tissue Mobilization Duration  Duration: 10 Minutes):   · Supine deep tissue mobilization of medial and anterior knee joint  · Patellar mobilizations in all directions in supine Grade III-IV  (Used abbreviations: MET - muscle energy technique; PNF - proprioceptive neuromuscular facilitation; NMR - neuromuscular re-education; a/p - anterior to posterior; p/a - posterior to anterior; NT - not tested)    Therapeutic Modalities: for edema and pain                       Right Knee Cold  Type:  (vasopneumatic)  Duration: 10 minutes  Patient Position: Supine                                                                        HEP: As above; handouts given to patient for all exercises. Treatment/Session Assessment:    · Response to Treatment:  Patient is very hesitant with step ups and transfers but tolerated exercises well with cuing. Continue to progress as tolerated with functional exercises. Seeadwoa CABELLO 4/7/2017. Pain level status quo at the end of session. · Compliance with Program/Exercises: compliant most of the time. · Recommendations/Intent for next treatment session: \"Next visit will focus on advancements to more challenging activities\".     Total Treatment Duration: 55 minutes  PT Patient Time In/Time Out  Time In: 0855  Time Out: 29 [facility], PT

## 2017-04-10 ENCOUNTER — HOSPITAL ENCOUNTER (OUTPATIENT)
Dept: PHYSICAL THERAPY | Age: 52
Discharge: HOME | End: 2017-04-10
Payer: COMMERCIAL

## 2017-04-10 PROCEDURE — 97016 VASOPNEUMATIC DEVICE THERAPY: CPT

## 2017-04-10 PROCEDURE — 97110 THERAPEUTIC EXERCISES: CPT

## 2017-04-10 PROCEDURE — 97140 MANUAL THERAPY 1/> REGIONS: CPT

## 2017-04-10 NOTE — PROGRESS NOTES
Mohit Hernandez  : [address] [facility] [place] [address], Jose torres, [address] [facility]  [phone]   [phone]         OUTPATIENT PHYSICAL THERAPY:Daily Note 4/10/2017    ICD-10: Treatment Diagnosis 1: right knee pain (M25.561)              Treatment Diagnosis 2: gait dysfunction (R26.89)              Treatment Diagnosis 3: right knee primary localized osteoarthritis (M17.11)  Precautions: Type II Diabetes  Allergies:   Review of patient's allergies indicates not on file. Fall Risk Score: 1 (? 5 = High Risk)  MD Orders: Evaluate and Treat MEDICAL/REFERRING DIAGNOSIS:  Pain in right knee [M25.561]   DATE OF ONSET: 8/15/2016 when patient fell at work when she tripped over a pallet and landed directly on knee, then underwent right arthroscopic knee surgery on 3/1/2017   REFERRING PHYSICIAN: Santa Calderon, [address] [place] [address] Avenue: 2017     INITIAL ASSESSMENT:  Ms. Chase Herron is a 46 y.o. female presenting s/p right arthroscopic knee surgery performed on 3/1/2017 after injuring her knee 2016 when she fell at work and tripped over a pallet and landed directly on her knee. She has reported continued pain in the knee since surgery, as well as weakness and swelling. She has been seen for 7 sessions of therapy from 3/24/2017 to 2017 with significant success. She reports some improved pain since surgery, and has been able to ambulate without a crutch with minimal to no gait deviation. She is full time but only light duty with her job at this time. She is a good candidate for skilled intervention with services to include manual therapy, modalities as needed, therapeutic exercises, gait/stair/transfer/balance training, activity modification, and return to work simulation  . PROBLEM LIST (Impacting functional limitations):  1. Decreased Strength  2. Decreased ADL/Functional Activities  3. Decreased Transfer Abilities  4.  Decreased Ambulation Ability/Technique  5. Decreased Balance  6. Increased Pain  7. Decreased Activity Tolerance  8. Decreased Pacing Skills  9. Increased Fatigue  10. Decreased Flexibility/Joint Mobility INTERVENTIONS PLANNED:  1. Balance Exercise  2. Cold  3. Cryotherapy  4. Electrical Stimulation  5. Gait Training  6. Heat  7. Home Exercise Program (HEP)  8. Manual Therapy  9. Neuromuscular Re-education/Strengthening  10. Range of Motion (ROM)  11. Therapeutic Exercise/Strengthening  12. Transfer Training   TREATMENT PLAN:  Effective Dates: 3/24/2017 TO 4/21/2017. Frequency/Duration: 3 times a week for 4 weeks  GOALS: (Goals have been discussed and agreed upon with patient.)  Short-Term Functional Goals: Time Frame: 3/24/2017 to 4/7/2017  1. Patient demonstrates independence with home exercise program without verbal cueing provided by therapist. - GOAL MET  2. Improve ROM to 0-120 degrees in order to normalize gait, transfers, and stairs. - ONGOING  3. Improve flexibility of gastrocnemius and hamstrings with SLR to 80 degrees in order to decrease pain with sleeping and sitting. - ONGOING  Discharge Goals: Time Frame: 3/24/2017 to 4/21/2017  1. Improve pain to 3/10 with work, extended walking, extended weightbearing, squatting, stooping, sleeping, and sitting. - ONGOING  2. Improve gross lower extremity strength to at least 4+/5 in order to return patient to work full-time, full-duty. - ONGOING  3. Improve gait without use of crutches without significant antalgia and appropriate heel to toe gait pattern. - ONGOING  4. Improve stair navigation with reciprocal ascend and descend with use of hand rail. - ONGOING  5. Improve transfers with use of hands 0-50% of the time into and out of a chair. - ONGOING  6.  Improve Lower Extremity Functional Index score to 40/80 from 4/80. - ONGOING  Rehabilitation Potential For Stated Goals: Good  Regarding Latesha Perea's therapy, I certify that the treatment plan above will be carried out by a therapist or under their direction. Thank you for this referral,  Esteban Brizuela PTA     Referring Physician Signature: Whitney Ardon DO              Date                    The information in this section was collected on 4/6/2017 (except where otherwise noted). HISTORY:   History of Present Injury/Illness (Reason for Referral):  Ms. Cristi Barney is a 46 y.o. female presenting s/p right arthroscopic knee surgery performed on 3/1/2017 after injuring her knee 8/5/2016 when she fell at work and tripped over a pallet and landed directly on her knee. She has reported continued pain in the knee since surgery, as well as weakness and swelling. She is currently full-time, light-duty and is not aware when she is supposed to return full-duty, full-time. She is eager to improve overall strength, ROM, pain, and stamina of the knee with work and with daily activities. Past Medical History/Comorbidities: Patient reported hypertension that is well controlled with lisinopril, Type II Diabetes controlled with proglitazone, metformin, and glipizide, and she has been taking naproxen for inflammation of the knee. She just had a benign tumor removed from her abdomen which required a hysterectomy in 2016. Social History/Living Environment:    Patient lives at home with sons and reports assistance from them with any heavy household chores, ADLs, heavy lifting, and heavy carrying. Prior Level of Function/Work/Activity:  Independent without dysfunction. Patient is currently light-duty, full-time with her job as a . 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 Dominant Side:         RIGHT  Current Medications:     No current outpatient prescriptions on file.    Date Last Reviewed:  4/10/2017   Number of Personal Factors/Comorbidities that affect the Plan of Care (Type II Diabetes and high job demand tasks : 1-2: MODERATE COMPLEXITY   EXAMINATION:     Observation/Postural and Gait Assessment: Patient ambulates without crutches with minimal to no gait deviation (From bilateral crutches with step to gait on her toes and is able to ambulate with 1 crutch safely, independently, and with minimal gait deviation with verbal and visual cuing, gait without crutch antalgic with foot flat throughout stance). Medial and lateral joint line portal holes completely approximated with scar tissue (From scar tissue and scabbing). No active drainage is present, as well as no signs or symptoms of infection. Anthropometric measurements (cm) Left Right   Knee joint line 42 43     Palpation: Gross tenderness to palpation of medial and lateral portal holes and medial joint line. Flexibility moderately (From severely) limited of gastrocnemius. Hamstrings limited moderately to 70 degree SLR.    ROM:     AROM(PROM) Left Right   Knee flexion 122° 110 (From 100°)   Knee extension 2° flexion contracture 0 (From 5° flexion contracture)     Passive Accessory Mobility Testing: Mild to moderate limitations of patellofemoral joint in all planes. Strength:     Manual Muscle Test (out of 5) Left Right   Knee extension 5 4 (From 3+), 0 degree lag with SLR   Knee flexion 5 4+ (From 4)   Hip flexion 5 4+ (From 4)   Hip extension 3 3   Hip abduction 3 3   Ankle DF 4+ 4+ (From 4)   Ankle PF 4+ 4+ (From 4)     Special Tests:  No special tests due to acuity of surgery. No signs or symptoms of infection or deep vein thrombosis at this time. Neurological Screen:  Myotomes: Key muscle strength testing for bilateral LE is WNL. Dermatomes: Sensation testing through bilateral LE for light touch is WNL. Functional Mobility:  Patient reports improvements with gait for longer periods of time without use of crutches with minimal gait deviation (From difficulty with ambulation longer than a few minutes but safe and independent with and without crutches with step to gait).   Transfers with use of hands 100% of the time into and out of a chair.  Stairs performed reciprocally with ascend and non-reciprocally with descend (From non-reciprocally with left leg as dominant leg with use of crutches). Body Structures Involved:  1. Bones  2. Joints  3. Muscles Body Functions Affected:  1. Sensory/Pain  2. Neuromusculoskeletal Activities and Participation Affected:  1. General Tasks and Demands  2. Mobility  3. Ability to work   Number of elements (examined above) that affect the Plan of Care: 3: MODERATE COMPLEXITY   CLINICAL PRESENTATION:   Presentation: Evolving clinical presentation with changing clinical characteristics: MODERATE COMPLEXITY   CLINICAL DECISION MAKING:   Outcome Measure: Tool Used: Lower Extremity Functional Scale (LEFS)  Score  Initial: 4/80 Most Recent: 35/80 (Date: 4/6/2017)   Interpretation of Score: 20 questions each scored on a 5 point scale with 0 representing \"extreme difficulty or unable to perform\" and 4 representing \"no difficulty\". The lower the score, the greater the functional disability. 80/80 represents no disability. Minimal detectable change is 9 points. Medical Necessity:   · Patient is expected to demonstrate progress in strength, range of motion, balance, coordination and functional technique to improve tolerance to daily activities, transfers sit to and from stand, ability to walk and stand un-aided for longer periods of time, and ability to work. · Skilled intervention continues to be required due to weakness, decreased ROM, decreased flexibility, pain, and functional limitations. Reason for Services/Other Comments:  · Patient continues to require skilled intervention due to increasing complexity of exercises.    Use of outcome tool(s) and clinical judgement create a POC that gives a (due to long standing history of knee pain and goal of return to work with high level job tasks): Questionable prediction of patient's progress: MODERATE COMPLEXITY            TREATMENT:   (In addition to Assessment/Re-Assessment sessions the following treatments were rendered)    Pre-treatment Symptoms/Complaints:  Patient stated she went to her doctor and saw \"Zeny\"  PA and pt. Stated she was pleased with her progress and wants her to continue therapy 3 to 4 more weeks. Pain: Initial:   Pain Intensity 1: 4  Pain Location 1: Knee  Pain Orientation 1: Right  Post Session: 3/10 pain level       Therapeutic Exercise: ( ):x 30 mins   Exercises per grid below to improve mobility, strength, balance and coordination. Required minimal visual, verbal and manual cues to promote proper body alignment, promote proper body posture, promote proper body mechanics and promote proper body breathing techniques. Progressed resistance, range, repetitions and complexity of movement as indicated. (used abbreviations: BET-back education training) Date:  4/6/17 Date:  4/10/17   Date:  4/4/17   Activity/Exercise Parameters Parameters Parameters   Supine knee extension hang 6 minutes X 6 mins with 1/2 roll under right ankle 6 min   Calf stretch - angle board 3 x 30 sec On incline 4x30 sec hold  4 x 30 sec   Hamstring stretch Strap 3 x 30 sec Strap 4x30  Sec hold  3 x 30 sec   Supine SLR flexion 3 x 10 3x10  2 lbs 2 x 10   Heel slides 5 x 20 sec hold  X 20 reps 5 sec hold  20 x 5 sec   Nu step  Level 4 x 10 mins  Level 5 x 8 mins  level 4 x 10 min   Chair slides  5x20 sec hold  X 10 reps 30 sec hold  5 x 20 sec   Heel toe raises X 30 reps  X 20 reps  3 x 10   Standing knee flexion X 20 reps 3 lbs X 20 reps with 3 lb. Wt.s  X 20 reps 1.5 lb. Wt.s    Standing hip abduction X 20 reps 3 lbs X 20 reps with 3 lb wt. s  X 20 reps 1.5 lb. Wt.s    Standing hamstring curls  X 20 reps 3 lbs x20 reps 3 lb. Wt.s  X 20 reps 1 .5 wt.s   bridging X 10 reps 5 sec hold  X 10 reps x 5 sec hold  2 x 10   Knee extension - long arc quad 2 x 10 3 lbs 2x10 reps 3 lb. Wt.s  1.5 lb wt. s x 20 reps    Step up 6 inch 2 x 6 6 inch x 10 reps     Sit to and from stand 2 x 8 with hands x10 reps no hands with airex              Manual Therapy (     ): x 10 mins   · Supine deep tissue mobilization of medial and anterior knee joint  · Patellar mobilizations in all directions in supine Grade III-IV  (Used abbreviations: MET - muscle energy technique; PNF - proprioceptive neuromuscular facilitation; NMR - neuromuscular re-education; a/p - anterior to posterior; p/a - posterior to anterior; NT - not tested)    Therapeutic Modalities: for edema and pain  Pt. Received vaso pneumatic compression device to right LE in supine with elevation x 15 mins. HEP: As above; handouts given to patient for all exercises. Treatment/Session Assessment:    · Response to Treatment:  Patient's right knee range of motion was 0 - 112 degrees after today's session. Pt. Was compliant with all exercises and had decreased pain at the end of todays session. · Compliance with Program/Exercises: compliant most of the time. · Recommendations/Intent for next treatment session: \"Next visit will focus on advancements to more challenging activities\".     Total Treatment Duration: 55 minutes  PT Patient Time In/Time Out  Time In: 0800  Time Out: 0900    SHELLY Pollock DPT

## 2017-04-11 ENCOUNTER — HOSPITAL ENCOUNTER (OUTPATIENT)
Dept: PHYSICAL THERAPY | Age: 52
Discharge: HOME | End: 2017-04-11
Payer: COMMERCIAL

## 2017-04-11 PROCEDURE — 97110 THERAPEUTIC EXERCISES: CPT

## 2017-04-11 PROCEDURE — 97016 VASOPNEUMATIC DEVICE THERAPY: CPT

## 2017-04-11 PROCEDURE — 97140 MANUAL THERAPY 1/> REGIONS: CPT

## 2017-04-11 NOTE — PROGRESS NOTES
Angy Chao  : 1965 [facility] [address], [place], [address] [facility]  [phone]   [phone]         OUTPATIENT PHYSICAL THERAPY:Daily Note 2017    ICD-10: Treatment Diagnosis 1: right knee pain (M25.561)              Treatment Diagnosis 2: gait dysfunction (R26.89)              Treatment Diagnosis 3: right knee primary localized osteoarthritis (M17.11)  Precautions: Type II Diabetes  Allergies:   Review of patient's allergies indicates not on file. Fall Risk Score: 1 (? 5 = High Risk)  MD Orders: Evaluate and Treat MEDICAL/REFERRING DIAGNOSIS:  Pain in right knee [M25.561]   DATE OF ONSET: 8/15/2016 when patient fell at work when she tripped over a pallet and landed directly on knee, then underwent right arthroscopic knee surgery on 3/1/2017   REFERRING PHYSICIAN: Lucius Sierra, [facility] [address]: 2017     INITIAL ASSESSMENT:  Ms. Leonila Miller is a 46 y.o. female presenting s/p right arthroscopic knee surgery performed on 3/1/2017 after injuring her knee 2016 when she fell at work and tripped over a pallet and landed directly on her knee. She has reported continued pain in the knee since surgery, as well as weakness and swelling. She has been seen for 7 sessions of therapy from 3/24/2017 to 2017 with significant success. She reports some improved pain since surgery, and has been able to ambulate without a crutch with minimal to no gait deviation. She is full time but only light duty with her job at this time. She is a good candidate for skilled intervention with services to include manual therapy, modalities as needed, therapeutic exercises, gait/stair/transfer/balance training, activity modification, and return to work simulation  . PROBLEM LIST (Impacting functional limitations):  1. Decreased Strength  2. Decreased ADL/Functional Activities  3. Decreased Transfer Abilities  4.  Decreased Ambulation Ability/Technique  5. Decreased Balance  6. Increased Pain  7. Decreased Activity Tolerance  8. Decreased Pacing Skills  9. Increased Fatigue  10. Decreased Flexibility/Joint Mobility INTERVENTIONS PLANNED:  1. Balance Exercise  2. Cold  3. Cryotherapy  4. Electrical Stimulation  5. Gait Training  6. Heat  7. Home Exercise Program (HEP)  8. Manual Therapy  9. Neuromuscular Re-education/Strengthening  10. Range of Motion (ROM)  11. Therapeutic Exercise/Strengthening  12. Transfer Training   TREATMENT PLAN:  Effective Dates: 3/24/2017 TO 4/21/2017. Frequency/Duration: 3 times a week for 4 weeks  GOALS: (Goals have been discussed and agreed upon with patient.)  Short-Term Functional Goals: Time Frame: 3/24/2017 to 4/7/2017  1. Patient demonstrates independence with home exercise program without verbal cueing provided by therapist. - GOAL MET  2. Improve ROM to 0-120 degrees in order to normalize gait, transfers, and stairs. - ONGOING  3. Improve flexibility of gastrocnemius and hamstrings with SLR to 80 degrees in order to decrease pain with sleeping and sitting. - ONGOING  Discharge Goals: Time Frame: 3/24/2017 to 4/21/2017  1. Improve pain to 3/10 with work, extended walking, extended weightbearing, squatting, stooping, sleeping, and sitting. - ONGOING  2. Improve gross lower extremity strength to at least 4+/5 in order to return patient to work full-time, full-duty. - ONGOING  3. Improve gait without use of crutches without significant antalgia and appropriate heel to toe gait pattern. - ONGOING  4. Improve stair navigation with reciprocal ascend and descend with use of hand rail. - ONGOING  5. Improve transfers with use of hands 0-50% of the time into and out of a chair. - ONGOING  6.  Improve Lower Extremity Functional Index score to 40/80 from 4/80. - ONGOING  Rehabilitation Potential For Stated Goals: Good  Regarding Latesha Perea's therapy, I certify that the treatment plan above will be carried out by a therapist or under their direction. Thank you for this referral,  Bhanu Roberto PTA     Referring Physician Signature: Elbert Josue,               Date                    The information in this section was collected on 4/6/2017 (except where otherwise noted). HISTORY:   History of Present Injury/Illness (Reason for Referral):  Ms. Najera is a 46 y.o. female presenting s/p right arthroscopic knee surgery performed on 3/1/2017 after injuring her knee 8/5/2016 when she fell at work and tripped over a pallet and landed directly on her knee. She has reported continued pain in the knee since surgery, as well as weakness and swelling. She is currently full-time, light-duty and is not aware when she is supposed to return full-duty, full-time. She is eager to improve overall strength, ROM, pain, and stamina of the knee with work and with daily activities. Past Medical History/Comorbidities: Patient reported hypertension that is well controlled with lisinopril, Type II Diabetes controlled with proglitazone, metformin, and glipizide, and she has been taking naproxen for inflammation of the knee. She just had a benign tumor removed from her abdomen which required a hysterectomy in 2016. Social History/Living Environment:    Patient lives at home with sons and reports assistance from them with any heavy household chores, ADLs, heavy lifting, and heavy carrying. Prior Level of Function/Work/Activity:  Independent without dysfunction. Patient is currently light-duty, full-time with her job as a . 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 Dominant Side:         RIGHT  Current Medications:     No current outpatient prescriptions on file.    Date Last Reviewed:  4/11/2017   Number of Personal Factors/Comorbidities that affect the Plan of Care (Type II Diabetes and high job demand tasks : 1-2: MODERATE COMPLEXITY   EXAMINATION:     Observation/Postural and Gait Assessment: Patient ambulates without crutches with minimal to no gait deviation (From bilateral crutches with step to gait on her toes and is able to ambulate with 1 crutch safely, independently, and with minimal gait deviation with verbal and visual cuing, gait without crutch antalgic with foot flat throughout stance). Medial and lateral joint line portal holes completely approximated with scar tissue (From scar tissue and scabbing). No active drainage is present, as well as no signs or symptoms of infection. Anthropometric measurements (cm) Left Right   Knee joint line 42 43     Palpation: Gross tenderness to palpation of medial and lateral portal holes and medial joint line. Flexibility moderately (From severely) limited of gastrocnemius. Hamstrings limited moderately to 70 degree SLR.    ROM:     AROM(PROM) Left Right   Knee flexion 122° 110 (From 100°)   Knee extension 2° flexion contracture 0 (From 5° flexion contracture)     Passive Accessory Mobility Testing: Mild to moderate limitations of patellofemoral joint in all planes. Strength:     Manual Muscle Test (out of 5) Left Right   Knee extension 5 4 (From 3+), 0 degree lag with SLR   Knee flexion 5 4+ (From 4)   Hip flexion 5 4+ (From 4)   Hip extension 3 3   Hip abduction 3 3   Ankle DF 4+ 4+ (From 4)   Ankle PF 4+ 4+ (From 4)     Special Tests:  No special tests due to acuity of surgery. No signs or symptoms of infection or deep vein thrombosis at this time. Neurological Screen:  Myotomes: Key muscle strength testing for bilateral LE is WNL. Dermatomes: Sensation testing through bilateral LE for light touch is WNL. Functional Mobility:  Patient reports improvements with gait for longer periods of time without use of crutches with minimal gait deviation (From difficulty with ambulation longer than a few minutes but safe and independent with and without crutches with step to gait).   Transfers with use of hands 100% of the time into and out of a chair.  Stairs performed reciprocally with ascend and non-reciprocally with descend (From non-reciprocally with left leg as dominant leg with use of crutches). Body Structures Involved:  1. Bones  2. Joints  3. Muscles Body Functions Affected:  1. Sensory/Pain  2. Neuromusculoskeletal Activities and Participation Affected:  1. General Tasks and Demands  2. Mobility  3. Ability to work   Number of elements (examined above) that affect the Plan of Care: 3: MODERATE COMPLEXITY   CLINICAL PRESENTATION:   Presentation: Evolving clinical presentation with changing clinical characteristics: MODERATE COMPLEXITY   CLINICAL DECISION MAKING:   Outcome Measure: Tool Used: Lower Extremity Functional Scale (LEFS)  Score  Initial: 4/80 Most Recent: 35/80 (Date: 4/6/2017)   Interpretation of Score: 20 questions each scored on a 5 point scale with 0 representing \"extreme difficulty or unable to perform\" and 4 representing \"no difficulty\". The lower the score, the greater the functional disability. 80/80 represents no disability. Minimal detectable change is 9 points. Medical Necessity:   · Patient is expected to demonstrate progress in strength, range of motion, balance, coordination and functional technique to improve tolerance to daily activities, transfers sit to and from stand, ability to walk and stand un-aided for longer periods of time, and ability to work. · Skilled intervention continues to be required due to weakness, decreased ROM, decreased flexibility, pain, and functional limitations. Reason for Services/Other Comments:  · Patient continues to require skilled intervention due to increasing complexity of exercises.    Use of outcome tool(s) and clinical judgement create a POC that gives a (due to long standing history of knee pain and goal of return to work with high level job tasks): Questionable prediction of patient's progress: MODERATE COMPLEXITY            TREATMENT:   (In addition to Assessment/Re-Assessment sessions the following treatments were rendered)    Pre-treatment Symptoms/Complaints:  Patient stated she had increased pain last night. Pain: Initial:   Pain Intensity 1: 4  Pain Location 1: Knee  Pain Orientation 1: Right  Post Session: 4/10 pain level       Therapeutic Exercise: ( ):x 45 mins   Exercises per grid below to improve mobility, strength, balance and coordination. Required minimal visual, verbal and manual cues to promote proper body alignment, promote proper body posture, promote proper body mechanics and promote proper body breathing techniques. Progressed resistance, range, repetitions and complexity of movement as indicated. (used abbreviations: BET-back education training) Date:  4/6/17 Date:  4/10/17   Date:  4/11/17   Activity/Exercise Parameters Parameters Parameters   Supine knee extension hang 6 minutes X 6 mins with 1/2 roll under right ankle 6 min   Calf stretch - angle board 3 x 30 sec On incline 4x30 sec hold  4 x 30 sec   Hamstring stretch Strap 3 x 30 sec Strap 4x30  Sec hold  3 x 30 sec   Supine SLR flexion 3 x 10 3x10  2 lbs 2 x 10   Heel slides 5 x 20 sec hold  X 20 reps 5 sec hold  20 x 5 sec   Nu step  Level 4 x 10 mins  Level 5 x 8 mins  level 5 x 10 min   Chair slides  5x20 sec hold  X 10 reps 30 sec hold  5 x 20 sec   Heel toe raises X 30 reps  X 20 reps  3 x 10   Standing knee flexion X 20 reps 3 lbs X 20 reps with 3 lb. Wt.s  X 20 reps 3 lb. Wt.s    Standing hip abduction X 20 reps 3 lbs X 20 reps with 3 lb wt. s  X 20 reps 3 lb. Wt.s    Standing hamstring curls  X 20 reps 3 lbs x20 reps 3 lb. Wt.s  X 20 reps 3 lb  wt. s   bridging X 10 reps 5 sec hold  X 10 reps x 5 sec hold  2 x 10   Knee extension - long arc quad 2 x 10 3 lbs 2x10 reps 3 lb. Wt.s  3 lb wt. s x 20 reps    Step ups  6 inch 2 x 6 6 inch x 10 reps fwd & lateral  8 inch x 20 reps fwd & lateral    Sit to and from stand 2 x 8 with hands x10 reps no hands with airex X 10 reps with no airex or UE assist    airdyne    Seat #4 x 5 mins       Manual Therapy (     ): x 15 mins   · Supine deep tissue mobilization of medial and anterior knee joint  · Patellar mobilizations in all directions in supine Grade III-IV  (Used abbreviations: MET - muscle energy technique; PNF - proprioceptive neuromuscular facilitation; NMR - neuromuscular re-education; a/p - anterior to posterior; p/a - posterior to anterior; NT - not tested)    Therapeutic Modalities: for edema and pain  Pt. Received vaso pneumatic compression device to right LE in supine with elevation x 15 mins. HEP: As above; handouts given to patient for all exercises. Treatment/Session Assessment:    · Response to Treatment:  Patient's right knee range of motion was 0 - 114 degrees after today's session. Pt. Very motivated to recover. · Compliance with Program/Exercises: compliant most of the time. · Recommendations/Intent for next treatment session: \"Next visit will focus on advancements to more challenging activities\".     Total Treatment Duration: 75 minutes  PT Patient Time In/Time Out  Time In: 0800  Time Out: [address], PTA   Della Mcneil DPT

## 2017-04-12 ENCOUNTER — HOSPITAL ENCOUNTER (OUTPATIENT)
Dept: PHYSICAL THERAPY | Age: 52
Discharge: HOME | End: 2017-04-12
Payer: COMMERCIAL

## 2017-04-12 PROCEDURE — 97140 MANUAL THERAPY 1/> REGIONS: CPT

## 2017-04-12 PROCEDURE — 97016 VASOPNEUMATIC DEVICE THERAPY: CPT

## 2017-04-12 PROCEDURE — 97110 THERAPEUTIC EXERCISES: CPT

## 2017-04-12 NOTE — PROGRESS NOTES
Macrina Woodson  : [address] [facility] [place] [address], [place], [address] [facility]  [phone]   [phone]         OUTPATIENT PHYSICAL THERAPY:Daily Note 2017    ICD-10: Treatment Diagnosis 1: right knee pain (M25.561)              Treatment Diagnosis 2: gait dysfunction (R26.89)              Treatment Diagnosis 3: right knee primary localized osteoarthritis (M17.11)  Precautions: Type II Diabetes  Allergies:   Review of patient's allergies indicates not on file. Fall Risk Score: 1 (? 5 = High Risk)  MD Orders: Evaluate and Treat MEDICAL/REFERRING DIAGNOSIS:  Pain in right knee [M25.561]   DATE OF ONSET: 8/15/2016 when patient fell at work when she tripped over a pallet and landed directly on knee, then underwent right arthroscopic knee surgery on 3/1/2017   REFERRING PHYSICIAN: Dickson Kawasaki, [address] [facility] [address]: 2017     INITIAL ASSESSMENT:  Ms. Tere Hall is a 46 y.o. female presenting s/p right arthroscopic knee surgery performed on 3/1/2017 after injuring her knee 2016 when she fell at work and tripped over a pallet and landed directly on her knee. She has reported continued pain in the knee since surgery, as well as weakness and swelling. She has been seen for 7 sessions of therapy from 3/24/2017 to 2017 with significant success. She reports some improved pain since surgery, and has been able to ambulate without a crutch with minimal to no gait deviation. She is full time but only light duty with her job at this time. She is a good candidate for skilled intervention with services to include manual therapy, modalities as needed, therapeutic exercises, gait/stair/transfer/balance training, activity modification, and return to work simulation  . PROBLEM LIST (Impacting functional limitations):  1. Decreased Strength  2. Decreased ADL/Functional Activities  3. Decreased Transfer Abilities  4.  Decreased Ambulation Ability/Technique  5. Decreased Balance  6. Increased Pain  7. Decreased Activity Tolerance  8. Decreased Pacing Skills  9. Increased Fatigue  10. Decreased Flexibility/Joint Mobility INTERVENTIONS PLANNED:  1. Balance Exercise  2. Cold  3. Cryotherapy  4. Electrical Stimulation  5. Gait Training  6. Heat  7. Home Exercise Program (HEP)  8. Manual Therapy  9. Neuromuscular Re-education/Strengthening  10. Range of Motion (ROM)  11. Therapeutic Exercise/Strengthening  12. Transfer Training   TREATMENT PLAN:  Effective Dates: 3/24/2017 TO 4/21/2017. Frequency/Duration: 3 times a week for 4 weeks  GOALS: (Goals have been discussed and agreed upon with patient.)  Short-Term Functional Goals: Time Frame: 3/24/2017 to 4/7/2017  1. Patient demonstrates independence with home exercise program without verbal cueing provided by therapist. - GOAL MET  2. Improve ROM to 0-120 degrees in order to normalize gait, transfers, and stairs. - ONGOING  3. Improve flexibility of gastrocnemius and hamstrings with SLR to 80 degrees in order to decrease pain with sleeping and sitting. - ONGOING  Discharge Goals: Time Frame: 3/24/2017 to 4/21/2017  1. Improve pain to 3/10 with work, extended walking, extended weightbearing, squatting, stooping, sleeping, and sitting. - ONGOING  2. Improve gross lower extremity strength to at least 4+/5 in order to return patient to work full-time, full-duty. - ONGOING  3. Improve gait without use of crutches without significant antalgia and appropriate heel to toe gait pattern. - ONGOING  4. Improve stair navigation with reciprocal ascend and descend with use of hand rail. - ONGOING  5. Improve transfers with use of hands 0-50% of the time into and out of a chair. - ONGOING  6.  Improve Lower Extremity Functional Index score to 40/80 from 4/80. - ONGOING  Rehabilitation Potential For Stated Goals: Good  Regarding Latesha Perea's therapy, I certify that the treatment plan above will be carried out by a therapist or under their direction. Thank you for this referral,  Chasity Muller PT     Referring Physician Signature: Galindo Mahajan,               Date                    The information in this section was collected on 4/6/2017 (except where otherwise noted). HISTORY:   History of Present Injury/Illness (Reason for Referral):  Ms. Maria L Boswell is a 46 y.o. female presenting s/p right arthroscopic knee surgery performed on 3/1/2017 after injuring her knee 8/5/2016 when she fell at work and tripped over a pallet and landed directly on her knee. She has reported continued pain in the knee since surgery, as well as weakness and swelling. She is currently full-time, light-duty and is not aware when she is supposed to return full-duty, full-time. She is eager to improve overall strength, ROM, pain, and stamina of the knee with work and with daily activities. Past Medical History/Comorbidities: Patient reported hypertension that is well controlled with lisinopril, Type II Diabetes controlled with proglitazone, metformin, and glipizide, and she has been taking naproxen for inflammation of the knee. She just had a benign tumor removed from her abdomen which required a hysterectomy in 2016. Social History/Living Environment:    Patient lives at home with sons and reports assistance from them with any heavy household chores, ADLs, heavy lifting, and heavy carrying. Prior Level of Function/Work/Activity:  Independent without dysfunction. Patient is currently light-duty, full-time with her job as a . 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 Dominant Side:         RIGHT  Current Medications:     No current outpatient prescriptions on file.    Date Last Reviewed:  4/12/2017   Number of Personal Factors/Comorbidities that affect the Plan of Care (Type II Diabetes and high job demand tasks : 1-2: MODERATE COMPLEXITY   EXAMINATION:     Observation/Postural and Gait Assessment:  Patient ambulates without crutches with minimal to no gait deviation (From bilateral crutches with step to gait on her toes and is able to ambulate with 1 crutch safely, independently, and with minimal gait deviation with verbal and visual cuing, gait without crutch antalgic with foot flat throughout stance). Medial and lateral joint line portal holes completely approximated with scar tissue (From scar tissue and scabbing). No active drainage is present, as well as no signs or symptoms of infection. Anthropometric measurements (cm) Left Right   Knee joint line 42 43     Palpation: Gross tenderness to palpation of medial and lateral portal holes and medial joint line. Flexibility moderately (From severely) limited of gastrocnemius. Hamstrings limited moderately to 70 degree SLR.    ROM:     AROM(PROM) Left Right   Knee flexion 122° 110 (From 100°)   Knee extension 2° flexion contracture 0 (From 5° flexion contracture)     Passive Accessory Mobility Testing: Mild to moderate limitations of patellofemoral joint in all planes. Strength:     Manual Muscle Test (out of 5) Left Right   Knee extension 5 4 (From 3+), 0 degree lag with SLR   Knee flexion 5 4+ (From 4)   Hip flexion 5 4+ (From 4)   Hip extension 3 3   Hip abduction 3 3   Ankle DF 4+ 4+ (From 4)   Ankle PF 4+ 4+ (From 4)     Special Tests:  No special tests due to acuity of surgery. No signs or symptoms of infection or deep vein thrombosis at this time. Neurological Screen:  Myotomes: Key muscle strength testing for bilateral LE is WNL. Dermatomes: Sensation testing through bilateral LE for light touch is WNL. Functional Mobility:  Patient reports improvements with gait for longer periods of time without use of crutches with minimal gait deviation (From difficulty with ambulation longer than a few minutes but safe and independent with and without crutches with step to gait).   Transfers with use of hands 100% of the time into and out of a chair. Stairs performed reciprocally with ascend and non-reciprocally with descend (From non-reciprocally with left leg as dominant leg with use of crutches). Body Structures Involved:  1. Bones  2. Joints  3. Muscles Body Functions Affected:  1. Sensory/Pain  2. Neuromusculoskeletal Activities and Participation Affected:  1. General Tasks and Demands  2. Mobility  3. Ability to work   Number of elements (examined above) that affect the Plan of Care: 3: MODERATE COMPLEXITY   CLINICAL PRESENTATION:   Presentation: Evolving clinical presentation with changing clinical characteristics: MODERATE COMPLEXITY   CLINICAL DECISION MAKING:   Outcome Measure: Tool Used: Lower Extremity Functional Scale (LEFS)  Score  Initial: 4/80 Most Recent: 35/80 (Date: 4/6/2017)   Interpretation of Score: 20 questions each scored on a 5 point scale with 0 representing \"extreme difficulty or unable to perform\" and 4 representing \"no difficulty\". The lower the score, the greater the functional disability. 80/80 represents no disability. Minimal detectable change is 9 points. Medical Necessity:   · Patient is expected to demonstrate progress in strength, range of motion, balance, coordination and functional technique to improve tolerance to daily activities, transfers sit to and from stand, ability to walk and stand un-aided for longer periods of time, and ability to work. · Skilled intervention continues to be required due to weakness, decreased ROM, decreased flexibility, pain, and functional limitations. Reason for Services/Other Comments:  · Patient continues to require skilled intervention due to increasing complexity of exercises.    Use of outcome tool(s) and clinical judgement create a POC that gives a (due to long standing history of knee pain and goal of return to work with high level job tasks): Questionable prediction of patient's progress: MODERATE COMPLEXITY            TREATMENT:   (In addition to Assessment/Re-Assessment sessions the following treatments were rendered)    Pre-treatment Symptoms/Complaints:  Patient reported minimal complaints today and improvements overall. Pain: Initial:   Pain Intensity 1: 3  Pain Location 1: Knee  Pain Orientation 1: Right  Post Session: 3/10 pain level       Therapeutic Exercise: (35 Minutes):  Exercises per grid below to improve mobility, strength, balance and coordination. Required minimal visual, verbal and manual cues to promote proper body alignment, promote proper body posture, promote proper body mechanics and promote proper body breathing techniques. Progressed resistance, range, repetitions and complexity of movement as indicated. (used abbreviations: BET-back education training) Date:  4/12/17 Date:  4/10/17   Date:  4/11/17   Activity/Exercise Parameters Parameters Parameters   Supine knee extension hang 6 minutes X 6 mins with 1/2 roll under right ankle 6 min   Calf stretch - angle board 3 x 30 sec On incline 4x30 sec hold  4 x 30 sec   Hamstring stretch Strap 3 x 30 sec Strap 4x30  Sec hold  3 x 30 sec   Supine SLR flexion 3 x 10 3 lbs 3x10  2 lbs 2 x 10   Heel slides 5 x 20 sec hold  X 20 reps 5 sec hold  20 x 5 sec   Nu step  Level 6 x 10 mins  Level 5 x 8 mins  level 5 x 10 min   Chair slides  --- X 10 reps 30 sec hold  5 x 20 sec   Heel toe raises X 30 reps  X 20 reps  3 x 10   Standing knee flexion X 20 reps 3 lbs X 20 reps with 3 lb. Wt.s  X 20 reps 3 lb. Wt.s    Standing hip abduction X 20 reps 3 lbs X 20 reps with 3 lb wt. s  X 20 reps 3 lb. Wt.s    Standing hamstring curls  X 20 reps 3 lbs x20 reps 3 lb. Wt.s  X 20 reps 3 lb  wt. s   bridging 1 X 10 reps  X 10 reps x 5 sec hold  2 x 10   Knee extension - long arc quad 3 x 10 3 lbs 2x10 reps 3 lb. Wt.s  3 lb wt. s x 20 reps    Step ups  8 inch 2 x 10 6 inch x 10 reps fwd & lateral  8 inch x 20 reps fwd & lateral    Sit to and from stand 2 x 10  x10 reps no hands with airex X 10 reps with no airex or UE assist    Sideways step up and over 8 inch 2 x 10     airdyne  --- patient requested not to do bike  Seat #4 x 5 mins       Manual Therapy (    Soft Tissue Mobilization Duration  Duration: 10 Minutes):    · Supine deep tissue mobilization of medial and anterior knee joint  · Patellar mobilizations in all directions in supine Grade III-IV  (Used abbreviations: MET - muscle energy technique; PNF - proprioceptive neuromuscular facilitation; NMR - neuromuscular re-education; a/p - anterior to posterior; p/a - posterior to anterior; NT - not tested)    Therapeutic Modalities: for edema and pain                       Right Knee Cold  Type:  (vasopneumatic)  Duration: 10 minutes  Patient Position: Supine                                                                        HEP: As above; handouts given to patient for all exercises. Treatment/Session Assessment:    · Response to Treatment:  Patient reported minimal complaints of pain today but reported fatigue with exercises. Continue to progress as tolerated with emphasis on strengthening and endurance of the lower extremity. 3/10 pain at the end of session. · Compliance with Program/Exercises: compliant most of the time. · Recommendations/Intent for next treatment session: \"Next visit will focus on advancements to more challenging activities\".     Total Treatment Duration: 55 minutes  PT Patient Time In/Time Out  Time In: 9650  Time Out: [address] [place], [place]

## 2017-04-13 ENCOUNTER — APPOINTMENT (OUTPATIENT)
Dept: PHYSICAL THERAPY | Age: 52
End: 2017-04-13
Payer: COMMERCIAL

## 2017-04-17 ENCOUNTER — HOSPITAL ENCOUNTER (OUTPATIENT)
Dept: PHYSICAL THERAPY | Age: 52
Discharge: HOME | End: 2017-04-17
Payer: COMMERCIAL

## 2017-04-17 PROCEDURE — 97110 THERAPEUTIC EXERCISES: CPT

## 2017-04-17 PROCEDURE — 97016 VASOPNEUMATIC DEVICE THERAPY: CPT

## 2017-04-17 PROCEDURE — 97140 MANUAL THERAPY 1/> REGIONS: CPT

## 2017-04-17 NOTE — PROGRESS NOTES
Soham Gonzalez  : 1965 [facility] [place] [facility] [address], [place], [address] [facility]  [phone]   [phone]         OUTPATIENT PHYSICAL THERAPY:Daily Note 2017    ICD-10: Treatment Diagnosis 1: right knee pain (M25.561)              Treatment Diagnosis 2: gait dysfunction (R26.89)              Treatment Diagnosis 3: right knee primary localized osteoarthritis (M17.11)  Precautions: Type II Diabetes  Allergies:   Review of patient's allergies indicates not on file. Fall Risk Score: 1 (? 5 = High Risk)  MD Orders: Evaluate and Treat MEDICAL/REFERRING DIAGNOSIS:  Pain in right knee [M25.561]   DATE OF ONSET: 8/15/2016 when patient fell at work when she tripped over a pallet and landed directly on knee, then underwent right arthroscopic knee surgery on 3/1/2017   REFERRING PHYSICIAN: Chichi Grewal, [facility] [address]: 2017     INITIAL ASSESSMENT:  Ms. Maxine Marin is a 46 y.o. female presenting s/p right arthroscopic knee surgery performed on 3/1/2017 after injuring her knee 2016 when she fell at work and tripped over a pallet and landed directly on her knee. She has reported continued pain in the knee since surgery, as well as weakness and swelling. She has been seen for 7 sessions of therapy from 3/24/2017 to 2017 with significant success. She reports some improved pain since surgery, and has been able to ambulate without a crutch with minimal to no gait deviation. She is full time but only light duty with her job at this time. She is a good candidate for skilled intervention with services to include manual therapy, modalities as needed, therapeutic exercises, gait/stair/transfer/balance training, activity modification, and return to work simulation  . PROBLEM LIST (Impacting functional limitations):  1. Decreased Strength  2. Decreased ADL/Functional Activities  3. Decreased Transfer Abilities  4.  Decreased Ambulation Ability/Technique  5. Decreased Balance  6. Increased Pain  7. Decreased Activity Tolerance  8. Decreased Pacing Skills  9. Increased Fatigue  10. Decreased Flexibility/Joint Mobility INTERVENTIONS PLANNED:  1. Balance Exercise  2. Cold  3. Cryotherapy  4. Electrical Stimulation  5. Gait Training  6. Heat  7. Home Exercise Program (HEP)  8. Manual Therapy  9. Neuromuscular Re-education/Strengthening  10. Range of Motion (ROM)  11. Therapeutic Exercise/Strengthening  12. Transfer Training   TREATMENT PLAN:  Effective Dates: 3/24/2017 TO 4/21/2017. Frequency/Duration: 3 times a week for 4 weeks  GOALS: (Goals have been discussed and agreed upon with patient.)  Short-Term Functional Goals: Time Frame: 3/24/2017 to 4/7/2017  1. Patient demonstrates independence with home exercise program without verbal cueing provided by therapist. - GOAL MET  2. Improve ROM to 0-120 degrees in order to normalize gait, transfers, and stairs. - ONGOING  3. Improve flexibility of gastrocnemius and hamstrings with SLR to 80 degrees in order to decrease pain with sleeping and sitting. - ONGOING  Discharge Goals: Time Frame: 3/24/2017 to 4/21/2017  1. Improve pain to 3/10 with work, extended walking, extended weightbearing, squatting, stooping, sleeping, and sitting. - ONGOING  2. Improve gross lower extremity strength to at least 4+/5 in order to return patient to work full-time, full-duty. - ONGOING  3. Improve gait without use of crutches without significant antalgia and appropriate heel to toe gait pattern. - ONGOING  4. Improve stair navigation with reciprocal ascend and descend with use of hand rail. - ONGOING  5. Improve transfers with use of hands 0-50% of the time into and out of a chair. - ONGOING  6.  Improve Lower Extremity Functional Index score to 40/80 from 4/80. - ONGOING  Rehabilitation Potential For Stated Goals: Good  Regarding Latesha Perea's therapy, I certify that the treatment plan above will be carried out by a therapist or under their direction. Thank you for this referral,  Luis Antonio López, PT     Referring Physician Signature: Liseth Christopher,               Date                    The information in this section was collected on 4/6/2017 (except where otherwise noted). HISTORY:   History of Present Injury/Illness (Reason for Referral):  Ms. [address]y [address] is a 46 y.o. female presenting s/p right arthroscopic knee surgery performed on 3/1/2017 after injuring her knee 8/5/2016 when she fell at work and tripped over a pallet and landed directly on her knee. She has reported continued pain in the knee since surgery, as well as weakness and swelling. She is currently full-time, light-duty and is not aware when she is supposed to return full-duty, full-time. She is eager to improve overall strength, ROM, pain, and stamina of the knee with work and with daily activities. Past Medical History/Comorbidities: Patient reported hypertension that is well controlled with lisinopril, Type II Diabetes controlled with proglitazone, metformin, and glipizide, and she has been taking naproxen for inflammation of the knee. She just had a benign tumor removed from her abdomen which required a hysterectomy in 2016. Social History/Living Environment:    Patient lives at home with sons and reports assistance from them with any heavy household chores, ADLs, heavy lifting, and heavy carrying. Prior Level of Function/Work/Activity:  Independent without dysfunction. Patient is currently light-duty, full-time with her job as a . 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 Dominant Side:         RIGHT  Current Medications:     No current outpatient prescriptions on file.    Date Last Reviewed:  4/17/2017   Number of Personal Factors/Comorbidities that affect the Plan of Care (Type II Diabetes and high job demand tasks : 1-2: MODERATE COMPLEXITY   EXAMINATION:     Observation/Postural and Gait Assessment:  Patient ambulates without crutches with minimal to no gait deviation (From bilateral crutches with step to gait on her toes and is able to ambulate with 1 crutch safely, independently, and with minimal gait deviation with verbal and visual cuing, gait without crutch antalgic with foot flat throughout stance). Medial and lateral joint line portal holes completely approximated with scar tissue (From scar tissue and scabbing). No active drainage is present, as well as no signs or symptoms of infection. Anthropometric measurements (cm) Left Right   Knee joint line 42 43     Palpation: Gross tenderness to palpation of medial and lateral portal holes and medial joint line. Flexibility moderately (From severely) limited of gastrocnemius. Hamstrings limited moderately to 70 degree SLR.    ROM:     AROM(PROM) Left Right   Knee flexion 122° 110 (From 100°)   Knee extension 2° flexion contracture 0 (From 5° flexion contracture)     Passive Accessory Mobility Testing: Mild to moderate limitations of patellofemoral joint in all planes. Strength:     Manual Muscle Test (out of 5) Left Right   Knee extension 5 4 (From 3+), 0 degree lag with SLR   Knee flexion 5 4+ (From 4)   Hip flexion 5 4+ (From 4)   Hip extension 3 3   Hip abduction 3 3   Ankle DF 4+ 4+ (From 4)   Ankle PF 4+ 4+ (From 4)     Special Tests:  No special tests due to acuity of surgery. No signs or symptoms of infection or deep vein thrombosis at this time. Neurological Screen:  Myotomes: Key muscle strength testing for bilateral LE is WNL. Dermatomes: Sensation testing through bilateral LE for light touch is WNL. Functional Mobility:  Patient reports improvements with gait for longer periods of time without use of crutches with minimal gait deviation (From difficulty with ambulation longer than a few minutes but safe and independent with and without crutches with step to gait).   Transfers with use of hands 100% of the time into and out of a chair. Stairs performed reciprocally with ascend and non-reciprocally with descend (From non-reciprocally with left leg as dominant leg with use of crutches). Body Structures Involved:  1. Bones  2. Joints  3. Muscles Body Functions Affected:  1. Sensory/Pain  2. Neuromusculoskeletal Activities and Participation Affected:  1. General Tasks and Demands  2. Mobility  3. Ability to work   Number of elements (examined above) that affect the Plan of Care: 3: MODERATE COMPLEXITY   CLINICAL PRESENTATION:   Presentation: Evolving clinical presentation with changing clinical characteristics: MODERATE COMPLEXITY   CLINICAL DECISION MAKING:   Outcome Measure: Tool Used: Lower Extremity Functional Scale (LEFS)  Score  Initial: 4/80 Most Recent: 35/80 (Date: 4/6/2017)   Interpretation of Score: 20 questions each scored on a 5 point scale with 0 representing \"extreme difficulty or unable to perform\" and 4 representing \"no difficulty\". The lower the score, the greater the functional disability. 80/80 represents no disability. Minimal detectable change is 9 points. Medical Necessity:   · Patient is expected to demonstrate progress in strength, range of motion, balance, coordination and functional technique to improve tolerance to daily activities, transfers sit to and from stand, ability to walk and stand un-aided for longer periods of time, and ability to work. · Skilled intervention continues to be required due to weakness, decreased ROM, decreased flexibility, pain, and functional limitations. Reason for Services/Other Comments:  · Patient continues to require skilled intervention due to increasing complexity of exercises.    Use of outcome tool(s) and clinical judgement create a POC that gives a (due to long standing history of knee pain and goal of return to work with high level job tasks): Questionable prediction of patient's progress: MODERATE COMPLEXITY            TREATMENT:   (In addition to Assessment/Re-Assessment sessions the following treatments were rendered)    Pre-treatment Symptoms/Complaints:  Patient reported being sore today from walking a lot at work and with [employer] over the weekend. Pain: Initial:   Pain Intensity 1: 5  Pain Location 1: Knee  Pain Orientation 1: Right  Post Session: 5/10 pain level       Therapeutic Exercise: (35 Minutes):  Exercises per grid below to improve mobility, strength, balance and coordination. Required minimal visual, verbal and manual cues to promote proper body alignment, promote proper body posture, promote proper body mechanics and promote proper body breathing techniques. Progressed resistance, range, repetitions and complexity of movement as indicated. (used abbreviations: BET-back education training) Date:  4/12/17 Date:  4/17/17   Date:  4/11/17   Activity/Exercise Parameters Parameters Parameters   Supine knee extension hang 6 minutes X 4 mins  6 min   Calf stretch - angle board 3 x 30 sec On incline 4x30 sec hold  4 x 30 sec   Hamstring stretch Strap 3 x 30 sec Strap 4x30  Sec hold  3 x 30 sec   Supine SLR flexion 3 x 10 3 lbs 3x10 3 lbs 2 lbs 2 x 10   Heel slides 5 x 20 sec hold  X 20 reps 5 sec hold  20 x 5 sec   Nu step  Level 6 x 10 mins  Level 6 x 10 mins  level 5 x 10 min   Chair slides  --- --- 5 x 20 sec   Heel toe raises X 30 reps  X 30 reps  3 x 10   Standing knee flexion X 20 reps 3 lbs X 20 reps with 3 lb. Wt.s  X 20 reps 3 lb. Wt.s    Standing hip abduction X 20 reps 3 lbs X 20 reps with 3 lb wt. s  X 20 reps 3 lb. Wt.s    Standing hamstring curls  X 20 reps 3 lbs x20 reps 3 lb. Wt.s  X 20 reps 3 lb  wt. s   bridging 1 X 10 reps  X 10 reps x 5 sec hold  2 x 10   Knee extension - long arc quad 3 x 10 3 lbs 3x10 reps 3 lb. Wt.s  3 lb wt. s x 20 reps    Step ups  8 inch 2 x 10 8 inch 2 x 10 8 inch x 20 reps fwd & lateral    Sit to and from stand 2 x 10  x10 reps no hands with airex X 10 reps with no airex or UE assist Sideways step up and over 8 inch 2 x 10 8 inch 2 x 10    airdyne  --- patient requested not to do bike --- Seat #4 x 5 mins       Manual Therapy (    Soft Tissue Mobilization Duration  Duration: 10 Minutes):    · Supine deep tissue mobilization of medial and anterior knee joint  · Patellar mobilizations in all directions in supine Grade III-IV  (Used abbreviations: MET - muscle energy technique; PNF - proprioceptive neuromuscular facilitation; NMR - neuromuscular re-education; a/p - anterior to posterior; p/a - posterior to anterior; NT - not tested)    Therapeutic Modalities: for edema and pain                       Right Knee Cold  Type:  (vasopneumatic)  Duration: 10 minutes  Patient Position: Supine                                                                        HEP: As above; handouts given to patient for all exercises. Treatment/Session Assessment:    · Response to Treatment:  Patient reported soreness today of the knee but not more than usual with exercises. Tolerated 8 inch step up without complaints. Pain 5/10 at the end of session. Continue to progress as tolerated. · Compliance with Program/Exercises: compliant most of the time. · Recommendations/Intent for next treatment session: \"Next visit will focus on advancements to more challenging activities\".     Total Treatment Duration: 55 minutes  PT Patient Time In/Time Out  Time In: 1000  Time Out: [address]

## 2017-04-19 ENCOUNTER — HOSPITAL ENCOUNTER (OUTPATIENT)
Dept: PHYSICAL THERAPY | Age: 52
Discharge: HOME | End: 2017-04-19
Payer: COMMERCIAL

## 2017-04-19 PROCEDURE — 97016 VASOPNEUMATIC DEVICE THERAPY: CPT

## 2017-04-19 PROCEDURE — 97110 THERAPEUTIC EXERCISES: CPT

## 2017-04-19 PROCEDURE — 97140 MANUAL THERAPY 1/> REGIONS: CPT

## 2017-04-19 NOTE — PROGRESS NOTES
Jannie Rios  : [address] [facility] [place] [address], [place], [address] [facility]  [phone]   [phone]         OUTPATIENT PHYSICAL THERAPY:Daily Note and Recertification     ICD-10: Treatment Diagnosis 1: right knee pain (M25.561)              Treatment Diagnosis 2: gait dysfunction (R26.89)              Treatment Diagnosis 3: right knee primary localized osteoarthritis (M17.11)  Precautions: Type II Diabetes  Allergies:   Review of patient's allergies indicates not on file. Fall Risk Score: 1 (? 5 = High Risk)  MD Orders: Evaluate and Treat MEDICAL/REFERRING DIAGNOSIS:  Pain in right knee [M25.561]   DATE OF ONSET: 8/15/2016 when patient fell at work when she tripped over a pallet and landed directly on knee, then underwent right arthroscopic knee surgery on 3/1/2017   REFERRING PHYSICIAN: Venita Pascual, [address] [facility] [address]: 2017     INITIAL ASSESSMENT:  Ms. Drake Ventura is a 46 y.o. female presenting s/p right arthroscopic knee surgery performed on 3/1/2017 after injuring her knee 2016 when she fell at work and tripped over a pallet and landed directly on her knee. She has reported continued pain in the knee since surgery, as well as weakness and swelling. She has been seen for 12 sessions of therapy from 3/24/2017 to 2017 with significant success. She reports improvements in ROM, endurance and strength overall. However, she is experiencing increased pain with walking a lot at home and work. She is eager to return to work full time, full duty and without dysfunction or pain. She is a good candidate for continued skilled intervention with services to include manual therapy, modalities as needed, therapeutic exercises, gait/stair/transfer/balance training, activity modification, and return to work simulation  . PROBLEM LIST (Impacting functional limitations):  1. Decreased Strength  2.  Decreased ADL/Functional Activities  3. Decreased Transfer Abilities  4. Decreased Ambulation Ability/Technique  5. Decreased Balance  6. Increased Pain  7. Decreased Activity Tolerance  8. Decreased Pacing Skills  9. Increased Fatigue  10. Decreased Flexibility/Joint Mobility INTERVENTIONS PLANNED:  1. Balance Exercise  2. Cold  3. Cryotherapy  4. Electrical Stimulation  5. Gait Training  6. Heat  7. Home Exercise Program (HEP)  8. Manual Therapy  9. Neuromuscular Re-education/Strengthening  10. Range of Motion (ROM)  11. Therapeutic Exercise/Strengthening  12. Transfer Training   TREATMENT PLAN:  Effective Dates: 4/19/2017 TO 5/17/2017. Frequency/Duration: RE-CERTIFICATION: 3 times a week for 4 weeks  GOALS: (Goals have been discussed and agreed upon with patient.)  Short-Term Functional Goals: Time Frame: 3/24/2017 to 4/7/2017  1. Patient demonstrates independence with home exercise program without verbal cueing provided by therapist. - GOAL MET  2. Improve ROM to 0-120 degrees in order to normalize gait, transfers, and stairs. - GOAL MET  3. Improve flexibility of gastrocnemius and hamstrings with SLR to 80 degrees in order to decrease pain with sleeping and sitting. - GOAL MET  Discharge Goals: Time Frame: 4/19/2017 to 5/17/2017  1. Improve pain to 3/10 with work, extended walking, extended weightbearing, squatting, stooping, sleeping, and sitting. - ONGOING  2. Improve gross lower extremity strength to at least 4+/5 in order to return patient to work full-time, full-duty. - ONGOING  3. Improve gait without use of crutches without significant antalgia and appropriate heel to toe gait pattern. - GOAL MET  4. Improve stair navigation with reciprocal ascend and descend with use of hand rail. - ONGOING  5. Improve transfers with use of hands 0-50% of the time into and out of a chair. - ONGOING  6.  Improve Lower Extremity Functional Index score to 40/80 from 4/80. - ONGOING  Rehabilitation Potential For Stated Goals: Good  Regarding Liset Palmer Eliazar's therapy, I certify that the treatment plan above will be carried out by a therapist or under their direction. Thank you for this referral,  Holland Segura PT     Referring Physician Signature: Kaila King,               Date                    The information in this section was collected on 4/19/2017 (except where otherwise noted). HISTORY:   History of Present Injury/Illness (Reason for Referral):  Ms. Tequila Chi is a 46 y.o. female presenting s/p right arthroscopic knee surgery performed on 3/1/2017 after injuring her knee 8/5/2016 when she fell at work and tripped over a pallet and landed directly on her knee. She has reported continued pain in the knee since surgery, as well as weakness and swelling. She is currently full-time, light-duty and is not aware when she is supposed to return full-duty, full-time. She is eager to improve overall strength, ROM, pain, and stamina of the knee with work and with daily activities. Past Medical History/Comorbidities: Patient reported hypertension that is well controlled with lisinopril, Type II Diabetes controlled with proglitazone, metformin, and glipizide, and she has been taking naproxen for inflammation of the knee. She just had a benign tumor removed from her abdomen which required a hysterectomy in 2016. Social History/Living Environment:    Patient lives at home with sons and reports assistance from them with any heavy household chores, ADLs, heavy lifting, and heavy carrying. Prior Level of Function/Work/Activity:  Independent without dysfunction. Patient is currently light-duty, full-time with her job as a . 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 Dominant Side:         RIGHT  Current Medications:     No current outpatient prescriptions on file.    Date Last Reviewed:  4/19/2017   Number of Personal Factors/Comorbidities that affect the Plan of Care (Type II Diabetes and high job demand tasks : 1-2: MODERATE COMPLEXITY   EXAMINATION:     Observation/Postural and Gait Assessment:  Patient ambulates without crutches with minimal to no gait deviation (From bilateral crutches with step to gait on her toes and is able to ambulate with 1 crutch safely, independently, and with minimal gait deviation with verbal and visual cuing, gait without crutch antalgic with foot flat throughout stance). Medial and lateral joint line portal holes completely approximated with scar tissue (From scar tissue and scabbing). No active drainage is present, as well as no signs or symptoms of infection. Anthropometric measurements (cm) Left Right   Knee joint line 42 42 (from 43)     Palpation: Gross tenderness to palpation of medial and lateral portal holes and medial joint line. Flexibility minimally (From severely) limited of gastrocnemius. Hamstrings limited moderately to 80 (from 70) degree SLR.    ROM:     AROM(PROM) Left Right   Knee flexion 122° 120 (From 100°) - 4/19/2017   Knee extension 2° flexion contracture 2 hyperextension 4/19/2017 (From 5° flexion contracture)     Passive Accessory Mobility Testing: Mild to moderate limitations of patellofemoral joint in all planes. Strength:     Manual Muscle Test (out of 5) Left Right   Knee extension 5 4 (From 3+), 0 degree lag with SLR   Knee flexion 5 4+ (From 4)   Hip flexion 5 4+ (From 4)   Hip extension 3 3+ (From 3)   Hip abduction 3 3+ (From 3)   Ankle DF 4+ 4+ (From 4)   Ankle PF 4+ 4+ (From 4)     Special Tests:  No special tests due to acuity of surgery. No signs or symptoms of infection or deep vein thrombosis at this time. Neurological Screen:  Myotomes: Key muscle strength testing for bilateral LE is WNL. Dermatomes: Sensation testing through bilateral LE for light touch is WNL.      Functional Mobility:  Patient reports improvements with gait for longer periods of time without use of crutches with minimal gait deviation (From difficulty with ambulation longer than a few minutes but safe and independent with and without crutches with step to gait). Transfers with use of hands % of the time into and out of a chair (From 100% of the time). Stairs performed reciprocally with ascend and non-reciprocally with descend (From non-reciprocally with left leg as dominant leg with use of crutches). Body Structures Involved:  1. Bones  2. Joints  3. Muscles Body Functions Affected:  1. Sensory/Pain  2. Neuromusculoskeletal Activities and Participation Affected:  1. General Tasks and Demands  2. Mobility  3. Ability to work   Number of elements (examined above) that affect the Plan of Care: 3: MODERATE COMPLEXITY   CLINICAL PRESENTATION:   Presentation: Evolving clinical presentation with changing clinical characteristics: MODERATE COMPLEXITY   CLINICAL DECISION MAKING:   Outcome Measure: Tool Used: Lower Extremity Functional Scale (LEFS)  Score  Initial: 4/80 Most Recent: 35/80 (Date: 4/6/2017)                           26/80 (Date: 4/19/2017)   Interpretation of Score: 20 questions each scored on a 5 point scale with 0 representing \"extreme difficulty or unable to perform\" and 4 representing \"no difficulty\". The lower the score, the greater the functional disability. 80/80 represents no disability. Minimal detectable change is 9 points. Medical Necessity:   · Patient is expected to demonstrate progress in strength, range of motion, balance, coordination and functional technique to improve tolerance to daily activities, transfers sit to and from stand, ability to walk and stand un-aided for longer periods of time, and ability to work. · Skilled intervention continues to be required due to weakness, decreased ROM, decreased flexibility, pain, and functional limitations. Reason for Services/Other Comments:  · Patient continues to require skilled intervention due to increasing complexity of exercises.    Use of outcome tool(s) and clinical judgement create a POC that gives a (due to long standing history of knee pain and goal of return to work with high level job tasks): Questionable prediction of patient's progress: MODERATE COMPLEXITY            TREATMENT:   (In addition to Assessment/Re-Assessment sessions the following treatments were rendered)    Pre-treatment Symptoms/Complaints:  Patient reported being sore today which might be related to the weather. Pain: Initial:   Pain Intensity 1: 6  Pain Location 1: Knee  Pain Orientation 1: Right  Post Session: 6/10 pain level       Therapeutic Exercise: (35 Minutes):  Exercises per grid below to improve mobility, strength, balance and coordination. Required minimal visual, verbal and manual cues to promote proper body alignment, promote proper body posture, promote proper body mechanics and promote proper body breathing techniques. Progressed resistance, range, repetitions and complexity of movement as indicated. (used abbreviations: BET-back education training) Date:  4/12/17 Date:  4/17/17   Date:  4/19/17   Activity/Exercise Parameters Parameters Parameters   Supine knee extension hang 6 minutes X 4 mins  4 min   Calf stretch - angle board 3 x 30 sec On incline 4x30 sec hold  4 x 30 sec   Hamstring stretch Strap 3 x 30 sec Strap 4x30  Sec hold  3 x 30 sec   Supine SLR flexion 3 x 10 3 lbs 3x10 3 lbs 3 lbs 3 x 10   Heel slides 5 x 20 sec hold  X 20 reps 5 sec hold  20 x 5 sec   Nu step  Level 6 x 10 mins  Level 6 x 10 mins  level 5 x 10 min   Chair slides  --- --- ---   Heel toe raises X 30 reps  X 30 reps  3 x 10   Standing knee flexion X 20 reps 3 lbs X 20 reps with 3 lb. Wt.s  X 20 reps 3 lb. Wt.s    Standing hip abduction X 20 reps 3 lbs X 20 reps with 3 lb wt. s  X 20 reps 3 lb. Wt.s    Standing hamstring curls  X 20 reps 3 lbs x20 reps 3 lb. Wt.s  X 20 reps 3 lb  wt. s   bridging 1 X 10 reps  X 10 reps x 5 sec hold  2 x 10   Knee extension - long arc quad 3 x 10 3 lbs 3x10 reps 3 lb.  Wt.s  3 lb wt.s x 20 reps    Step ups  8 inch 2 x 10 8 inch 2 x 10 8 inch x 20    Sit to and from stand 2 x 10  x10 reps no hands with airex X 10 reps with no airex or UE assist    Sideways step up and over 8 inch 2 x 10 8 inch 2 x 10 8 inch 2 x 10   airdyne  --- patient requested not to do bike --- ---       Manual Therapy (    Soft Tissue Mobilization Duration  Duration: 10 Minutes):    · Supine deep tissue mobilization of medial and anterior knee joint  · Patellar mobilizations in all directions in supine Grade III-IV  (Used abbreviations: MET - muscle energy technique; PNF - proprioceptive neuromuscular facilitation; NMR - neuromuscular re-education; a/p - anterior to posterior; p/a - posterior to anterior; NT - not tested)    Therapeutic Modalities: for edema and pain                       Right Knee Cold  Type:  (vasopneumatic)  Duration: 10 minutes  Patient Position: Supine                                                                        HEP: As above; handouts given to patient for all exercises. Treatment/Session Assessment:    · Response to Treatment:  Patient reported soreness today of the knee but not more than usual with exercises. Tolerated 8 inch step up without complaints. Pain 5/10 at the end of session. Continue to progress as tolerated with emphasis on strengthening. ROM goals have been met. · Compliance with Program/Exercises: compliant most of the time. · Recommendations/Intent for next treatment session: \"Next visit will focus on advancements to more challenging activities\". RE-CERTIFICATION: 3 times a week for 4 weeks from 4/19/2017 to 5/17/2017 in order to meet the above set goals.     Total Treatment Duration: 55 minutes  PT Patient Time In/Time Out  Time In: 3968  Time Out: [address], [place]

## 2017-04-24 ENCOUNTER — HOSPITAL ENCOUNTER (OUTPATIENT)
Dept: PHYSICAL THERAPY | Age: 52
Discharge: HOME | End: 2017-04-24
Payer: COMMERCIAL

## 2017-04-24 PROCEDURE — 97110 THERAPEUTIC EXERCISES: CPT

## 2017-04-24 PROCEDURE — 97016 VASOPNEUMATIC DEVICE THERAPY: CPT

## 2017-04-24 NOTE — PROGRESS NOTES
Linsey Martinez  : 1965 [facility] [place] [address], [place] torres, [address] [facility]  [phone]   [phone]         OUTPATIENT PHYSICAL THERAPY:Daily Note 2017    ICD-10: Treatment Diagnosis 1: right knee pain (M25.561)              Treatment Diagnosis 2: gait dysfunction (R26.89)              Treatment Diagnosis 3: right knee primary localized osteoarthritis (M17.11)  Precautions: Type II Diabetes  Allergies:   Review of patient's allergies indicates not on file. Fall Risk Score: 1 (? 5 = High Risk)  MD Orders: Evaluate and Treat MEDICAL/REFERRING DIAGNOSIS:  Pain in right knee [M25.561]   DATE OF ONSET: 8/15/2016 when patient fell at work when she tripped over a pallet and landed directly on knee, then underwent right arthroscopic knee surgery on 3/1/2017   REFERRING PHYSICIAN: Chava Terrell, [facility] [facility] [address]: 2017     INITIAL ASSESSMENT:  Ms. Lucy Tarango is a 46 y.o. female presenting s/p right arthroscopic knee surgery performed on 3/1/2017 after injuring her knee 2016 when she fell at work and tripped over a pallet and landed directly on her knee. She has reported continued pain in the knee since surgery, as well as weakness and swelling. She has been seen for 12 sessions of therapy from 3/24/2017 to 2017 with significant success. She reports improvements in ROM, endurance and strength overall. However, she is experiencing increased pain with walking a lot at home and work. She is eager to return to work full time, full duty and without dysfunction or pain. She is a good candidate for continued skilled intervention with services to include manual therapy, modalities as needed, therapeutic exercises, gait/stair/transfer/balance training, activity modification, and return to work simulation  . PROBLEM LIST (Impacting functional limitations):  1. Decreased Strength  2. Decreased ADL/Functional Activities  3.  Decreased Transfer Abilities  4. Decreased Ambulation Ability/Technique  5. Decreased Balance  6. Increased Pain  7. Decreased Activity Tolerance  8. Decreased Pacing Skills  9. Increased Fatigue  10. Decreased Flexibility/Joint Mobility INTERVENTIONS PLANNED:  1. Balance Exercise  2. Cold  3. Cryotherapy  4. Electrical Stimulation  5. Gait Training  6. Heat  7. Home Exercise Program (HEP)  8. Manual Therapy  9. Neuromuscular Re-education/Strengthening  10. Range of Motion (ROM)  11. Therapeutic Exercise/Strengthening  12. Transfer Training   TREATMENT PLAN:  Effective Dates: 4/19/2017 TO 5/17/2017. Frequency/Duration: RE-CERTIFICATION: 3 times a week for 4 weeks  GOALS: (Goals have been discussed and agreed upon with patient.)  Short-Term Functional Goals: Time Frame: 3/24/2017 to 4/7/2017  1. Patient demonstrates independence with home exercise program without verbal cueing provided by therapist. - GOAL MET  2. Improve ROM to 0-120 degrees in order to normalize gait, transfers, and stairs. - GOAL MET  3. Improve flexibility of gastrocnemius and hamstrings with SLR to 80 degrees in order to decrease pain with sleeping and sitting. - GOAL MET  Discharge Goals: Time Frame: 4/19/2017 to 5/17/2017  1. Improve pain to 3/10 with work, extended walking, extended weightbearing, squatting, stooping, sleeping, and sitting. - ONGOING  2. Improve gross lower extremity strength to at least 4+/5 in order to return patient to work full-time, full-duty. - ONGOING  3. Improve gait without use of crutches without significant antalgia and appropriate heel to toe gait pattern. - GOAL MET  4. Improve stair navigation with reciprocal ascend and descend with use of hand rail. - ONGOING  5. Improve transfers with use of hands 0-50% of the time into and out of a chair. - ONGOING  6.  Improve Lower Extremity Functional Index score to 40/80 from 4/80. - ONGOING  Rehabilitation Potential For Stated Goals: Good  Regarding Latesha Perea's therapy, I certify that the treatment plan above will be carried out by a therapist or under their direction. Thank you for this referral,  Vinayak London PTA     Referring Physician Signature: Florecita Balderrama DO              Date                    The information in this section was collected on 4/19/2017 (except where otherwise noted). HISTORY:   History of Present Injury/Illness (Reason for Referral):  Ms. Silvia Wade is a 46 y.o. female presenting s/p right arthroscopic knee surgery performed on 3/1/2017 after injuring her knee 8/5/2016 when she fell at work and tripped over a pallet and landed directly on her knee. She has reported continued pain in the knee since surgery, as well as weakness and swelling. She is currently full-time, light-duty and is not aware when she is supposed to return full-duty, full-time. She is eager to improve overall strength, ROM, pain, and stamina of the knee with work and with daily activities. Past Medical History/Comorbidities: Patient reported hypertension that is well controlled with lisinopril, Type II Diabetes controlled with proglitazone, metformin, and glipizide, and she has been taking naproxen for inflammation of the knee. She just had a benign tumor removed from her abdomen which required a hysterectomy in 2016. Social History/Living Environment:    Patient lives at home with sons and reports assistance from them with any heavy household chores, ADLs, heavy lifting, and heavy carrying. Prior Level of Function/Work/Activity:  Independent without dysfunction. Patient is currently light-duty, full-time with her job as a . 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 Dominant Side:         RIGHT  Current Medications:     No current outpatient prescriptions on file.    Date Last Reviewed:  4/24/2017   Number of Personal Factors/Comorbidities that affect the Plan of Care (Type II Diabetes and high job demand tasks : 1-2: MODERATE COMPLEXITY EXAMINATION:     Observation/Postural and Gait Assessment:  Patient ambulates without crutches with minimal to no gait deviation (From bilateral crutches with step to gait on her toes and is able to ambulate with 1 crutch safely, independently, and with minimal gait deviation with verbal and visual cuing, gait without crutch antalgic with foot flat throughout stance). Medial and lateral joint line portal holes completely approximated with scar tissue (From scar tissue and scabbing). No active drainage is present, as well as no signs or symptoms of infection. Anthropometric measurements (cm) Left Right   Knee joint line 42 42 (from 43)     Palpation: Gross tenderness to palpation of medial and lateral portal holes and medial joint line. Flexibility minimally (From severely) limited of gastrocnemius. Hamstrings limited moderately to 80 (from 70) degree SLR.    ROM:     AROM(PROM) Left Right   Knee flexion 122° 120 (From 100°) - 4/19/2017   Knee extension 2° flexion contracture 2 hyperextension 4/19/2017 (From 5° flexion contracture)     Passive Accessory Mobility Testing: Mild to moderate limitations of patellofemoral joint in all planes. Strength:     Manual Muscle Test (out of 5) Left Right   Knee extension 5 4 (From 3+), 0 degree lag with SLR   Knee flexion 5 4+ (From 4)   Hip flexion 5 4+ (From 4)   Hip extension 3 3+ (From 3)   Hip abduction 3 3+ (From 3)   Ankle DF 4+ 4+ (From 4)   Ankle PF 4+ 4+ (From 4)     Special Tests:  No special tests due to acuity of surgery. No signs or symptoms of infection or deep vein thrombosis at this time. Neurological Screen:  Myotomes: Key muscle strength testing for bilateral LE is WNL. Dermatomes: Sensation testing through bilateral LE for light touch is WNL.      Functional Mobility:  Patient reports improvements with gait for longer periods of time without use of crutches with minimal gait deviation (From difficulty with ambulation longer than a few minutes but safe and independent with and without crutches with step to gait). Transfers with use of hands % of the time into and out of a chair (From 100% of the time). Stairs performed reciprocally with ascend and non-reciprocally with descend (From non-reciprocally with left leg as dominant leg with use of crutches). Body Structures Involved:  1. Bones  2. Joints  3. Muscles Body Functions Affected:  1. Sensory/Pain  2. Neuromusculoskeletal Activities and Participation Affected:  1. General Tasks and Demands  2. Mobility  3. Ability to work   Number of elements (examined above) that affect the Plan of Care: 3: MODERATE COMPLEXITY   CLINICAL PRESENTATION:   Presentation: Evolving clinical presentation with changing clinical characteristics: MODERATE COMPLEXITY   CLINICAL DECISION MAKING:   Outcome Measure: Tool Used: Lower Extremity Functional Scale (LEFS)  Score  Initial: 4/80 Most Recent: 35/80 (Date: 4/6/2017)                           26/80 (Date: 4/19/2017)   Interpretation of Score: 20 questions each scored on a 5 point scale with 0 representing \"extreme difficulty or unable to perform\" and 4 representing \"no difficulty\". The lower the score, the greater the functional disability. 80/80 represents no disability. Minimal detectable change is 9 points. Medical Necessity:   · Patient is expected to demonstrate progress in strength, range of motion, balance, coordination and functional technique to improve tolerance to daily activities, transfers sit to and from stand, ability to walk and stand un-aided for longer periods of time, and ability to work. · Skilled intervention continues to be required due to weakness, decreased ROM, decreased flexibility, pain, and functional limitations. Reason for Services/Other Comments:  · Patient continues to require skilled intervention due to increasing complexity of exercises.    Use of outcome tool(s) and clinical judgement create a POC that gives a (due to long standing history of knee pain and goal of return to work with high level job tasks): Questionable prediction of patient's progress: MODERATE COMPLEXITY            TREATMENT:   (In addition to Assessment/Re-Assessment sessions the following treatments were rendered)    Pre-treatment Symptoms/Complaints:  Patient stated she was sore today and did not work. Pain: Initial:   Pain Intensity 1: 5  Pain Location 1: Knee  Pain Orientation 1: Right  Post Session: 3/10 pain level       Therapeutic Exercise: ( ):x 45 mins   Exercises per grid below to improve mobility, strength, balance and coordination. Required minimal visual, verbal and manual cues to promote proper body alignment, promote proper body posture, promote proper body mechanics and promote proper body breathing techniques. Progressed resistance, range, repetitions and complexity of movement as indicated. (used abbreviations: BET-back education training) Date:  4/24 /17 Date:  4/17/17   Date:  4/19/17   Activity/Exercise Parameters Parameters Parameters   Supine knee extension hang 4 minutes X 4 mins  4 min   Calf stretch - angle board 4 x 30 sec On incline 4x30 sec hold  4 x 30 sec   Hamstring stretch Strap 3 x 30 sec Strap 4x30  Sec hold  3 x 30 sec   Supine SLR flexion 3 x 10 3 lbs 3x10 3 lbs 3 lbs 3 x 10   Heel slides 5 x 20 sec hold  X 20 reps 5 sec hold  20 x 5 sec   Nu step  Level 6 x 10 mins  Level 6 x 10 mins  level 5 x 10 min   Chair slides  --- --- ---   Heel toe raises X 30 reps  X 30 reps  3 x 10   Standing knee flexion X 20 reps 3 lbs X 20 reps with 3 lb. Wt.s  X 20 reps 3 lb. Wt.s    Standing hip abduction X 20 reps 3 lbs X 20 reps with 3 lb wt. s  X 20 reps 3 lb. Wt.s    Standing hamstring curls  X 20 reps 3 lbs x20 reps 3 lb. Wt.s  X 20 reps 3 lb  wt. s   bridging 1 X 10 reps  X 10 reps x 5 sec hold  2 x 10   Knee extension - long arc quad 3 x 10 3 lbs 3x10 reps 3 lb. Wt.s  3 lb wt. s x 20 reps    Step ups  8 inch 2 x 10 8 inch 2 x 10 8 inch x 20    Sit to and from stand 2 x 10  x10 reps no hands with airex X 10 reps with no airex or UE assist    Sideways step up and over 8 inch 2 x 10 8 inch 2 x 10 8 inch 2 x 10   airdyne  --- patient requested not to do bike --- ---       Manual Therapy (     ):    · Supine deep tissue mobilization of medial and anterior knee joint  · Patellar mobilizations in all directions in supine Grade III-IV  (Used abbreviations: MET - muscle energy technique; PNF - proprioceptive neuromuscular facilitation; NMR - neuromuscular re-education; a/p - anterior to posterior; p/a - posterior to anterior; NT - not tested)    Therapeutic Modalities: for edema and pain  Pt. Received vaso pneumatic compression device to right knee x 15 mins with incline. HEP: As above; handouts given to patient for all exercises. Treatment/Session Assessment:    · Response to Treatment:  Patient was compliant with all exercises and had decreased pain. · Compliance with Program/Exercises: compliant most of the time. · Recommendations/Intent for next treatment session: \"Next visit will focus on advancements to more challenging activities\". RE-CERTIFICATION: 3 times a week for 4 weeks from 4/19/2017 to 5/17/2017 in order to meet the above set goals.     Total Treatment Duration: 60 minutes  PT Patient Time In/Time Out  Time In: 1530  Time Out: [facility], [place]   Carlos Eric DPT

## 2017-04-26 ENCOUNTER — HOSPITAL ENCOUNTER (OUTPATIENT)
Dept: PHYSICAL THERAPY | Age: 52
Discharge: HOME | End: 2017-04-26
Payer: COMMERCIAL

## 2017-04-26 PROCEDURE — 97016 VASOPNEUMATIC DEVICE THERAPY: CPT

## 2017-04-26 PROCEDURE — 97140 MANUAL THERAPY 1/> REGIONS: CPT

## 2017-04-26 PROCEDURE — 97110 THERAPEUTIC EXERCISES: CPT

## 2017-04-26 NOTE — PROGRESS NOTES
Lillian Sugey  : 1965 [facility] [address], Jose torres, [address] [place] [address]  [phone]   [phone]         OUTPATIENT PHYSICAL THERAPY:Daily Note 2017    ICD-10: Treatment Diagnosis 1: right knee pain (M25.561)              Treatment Diagnosis 2: gait dysfunction (R26.89)              Treatment Diagnosis 3: right knee primary localized osteoarthritis (M17.11)  Precautions: Type II Diabetes  Allergies:   Review of patient's allergies indicates not on file. Fall Risk Score: 1 (? 5 = High Risk)  MD Orders: Evaluate and Treat MEDICAL/REFERRING DIAGNOSIS:  Pain in right knee [M25.561]   DATE OF ONSET: 8/15/2016 when patient fell at work when she tripped over a pallet and landed directly on knee, then underwent right arthroscopic knee surgery on 3/1/2017   REFERRING PHYSICIAN: Maile Triplett, [address] [facility] [address]: 2017     INITIAL ASSESSMENT:  Ms. Noam Elaine is a 46 y.o. female presenting s/p right arthroscopic knee surgery performed on 3/1/2017 after injuring her knee 2016 when she fell at work and tripped over a pallet and landed directly on her knee. She has reported continued pain in the knee since surgery, as well as weakness and swelling. She has been seen for 12 sessions of therapy from 3/24/2017 to 2017 with significant success. She reports improvements in ROM, endurance and strength overall. However, she is experiencing increased pain with walking a lot at home and work. She is eager to return to work full time, full duty and without dysfunction or pain. She is a good candidate for continued skilled intervention with services to include manual therapy, modalities as needed, therapeutic exercises, gait/stair/transfer/balance training, activity modification, and return to work simulation  . PROBLEM LIST (Impacting functional limitations):  1. Decreased Strength  2. Decreased ADL/Functional Activities  3.  Decreased Transfer Abilities  4. Decreased Ambulation Ability/Technique  5. Decreased Balance  6. Increased Pain  7. Decreased Activity Tolerance  8. Decreased Pacing Skills  9. Increased Fatigue  10. Decreased Flexibility/Joint Mobility INTERVENTIONS PLANNED:  1. Balance Exercise  2. Cold  3. Cryotherapy  4. Electrical Stimulation  5. Gait Training  6. Heat  7. Home Exercise Program (HEP)  8. Manual Therapy  9. Neuromuscular Re-education/Strengthening  10. Range of Motion (ROM)  11. Therapeutic Exercise/Strengthening  12. Transfer Training   TREATMENT PLAN:  Effective Dates: 4/19/2017 TO 5/17/2017. Frequency/Duration: RE-CERTIFICATION: 3 times a week for 4 weeks  GOALS: (Goals have been discussed and agreed upon with patient.)  Short-Term Functional Goals: Time Frame: 3/24/2017 to 4/7/2017  1. Patient demonstrates independence with home exercise program without verbal cueing provided by therapist. - GOAL MET  2. Improve ROM to 0-120 degrees in order to normalize gait, transfers, and stairs. - GOAL MET  3. Improve flexibility of gastrocnemius and hamstrings with SLR to 80 degrees in order to decrease pain with sleeping and sitting. - GOAL MET  Discharge Goals: Time Frame: 4/19/2017 to 5/17/2017  1. Improve pain to 3/10 with work, extended walking, extended weightbearing, squatting, stooping, sleeping, and sitting. - ONGOING  2. Improve gross lower extremity strength to at least 4+/5 in order to return patient to work full-time, full-duty. - ONGOING  3. Improve gait without use of crutches without significant antalgia and appropriate heel to toe gait pattern. - GOAL MET  4. Improve stair navigation with reciprocal ascend and descend with use of hand rail. - ONGOING  5. Improve transfers with use of hands 0-50% of the time into and out of a chair. - ONGOING  6.  Improve Lower Extremity Functional Index score to 40/80 from 4/80. - ONGOING  Rehabilitation Potential For Stated Goals: Good  Regarding Latesha Perea's therapy, I certify that the treatment plan above will be carried out by a therapist or under their direction. Thank you for this referral,  Brittny Moreira PT     Referring Physician Signature: Chava Terrell DO              Date                    The information in this section was collected on 4/19/2017 (except where otherwise noted). HISTORY:   History of Present Injury/Illness (Reason for Referral):  Ms. Lucy Tarango is a 46 y.o. female presenting s/p right arthroscopic knee surgery performed on 3/1/2017 after injuring her knee 8/5/2016 when she fell at work and tripped over a pallet and landed directly on her knee. She has reported continued pain in the knee since surgery, as well as weakness and swelling. She is currently full-time, light-duty and is not aware when she is supposed to return full-duty, full-time. She is eager to improve overall strength, ROM, pain, and stamina of the knee with work and with daily activities. Past Medical History/Comorbidities: Patient reported hypertension that is well controlled with lisinopril, Type II Diabetes controlled with proglitazone, metformin, and glipizide, and she has been taking naproxen for inflammation of the knee. She just had a benign tumor removed from her abdomen which required a hysterectomy in 2016. Social History/Living Environment:    Patient lives at home with sons and reports assistance from them with any heavy household chores, ADLs, heavy lifting, and heavy carrying. Prior Level of Function/Work/Activity:  Independent without dysfunction. Patient is currently light-duty, full-time with her job as a . 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 Dominant Side:         RIGHT  Current Medications:     No current outpatient prescriptions on file.    Date Last Reviewed:  4/26/2017   Number of Personal Factors/Comorbidities that affect the Plan of Care (Type II Diabetes and high job demand tasks : 1-2: MODERATE COMPLEXITY EXAMINATION:     Observation/Postural and Gait Assessment:  Patient ambulates without crutches with minimal to no gait deviation (From bilateral crutches with step to gait on her toes and is able to ambulate with 1 crutch safely, independently, and with minimal gait deviation with verbal and visual cuing, gait without crutch antalgic with foot flat throughout stance). Medial and lateral joint line portal holes completely approximated with scar tissue (From scar tissue and scabbing). No active drainage is present, as well as no signs or symptoms of infection. Anthropometric measurements (cm) Left Right   Knee joint line 42 42 (from 43)     Palpation: Gross tenderness to palpation of medial and lateral portal holes and medial joint line. Flexibility minimally (From severely) limited of gastrocnemius. Hamstrings limited moderately to 80 (from 70) degree SLR.    ROM:     AROM(PROM) Left Right   Knee flexion 122° 120 (From 100°) - 4/19/2017   Knee extension 2° flexion contracture 2 hyperextension 4/19/2017 (From 5° flexion contracture)     Passive Accessory Mobility Testing: Mild to moderate limitations of patellofemoral joint in all planes. Strength:     Manual Muscle Test (out of 5) Left Right   Knee extension 5 4 (From 3+), 0 degree lag with SLR   Knee flexion 5 4+ (From 4)   Hip flexion 5 4+ (From 4)   Hip extension 3 3+ (From 3)   Hip abduction 3 3+ (From 3)   Ankle DF 4+ 4+ (From 4)   Ankle PF 4+ 4+ (From 4)     Special Tests:  No special tests due to acuity of surgery. No signs or symptoms of infection or deep vein thrombosis at this time. Neurological Screen:  Myotomes: Key muscle strength testing for bilateral LE is WNL. Dermatomes: Sensation testing through bilateral LE for light touch is WNL.      Functional Mobility:  Patient reports improvements with gait for longer periods of time without use of crutches with minimal gait deviation (From difficulty with ambulation longer than a few minutes but safe and independent with and without crutches with step to gait). Transfers with use of hands % of the time into and out of a chair (From 100% of the time). Stairs performed reciprocally with ascend and non-reciprocally with descend (From non-reciprocally with left leg as dominant leg with use of crutches). Body Structures Involved:  1. Bones  2. Joints  3. Muscles Body Functions Affected:  1. Sensory/Pain  2. Neuromusculoskeletal Activities and Participation Affected:  1. General Tasks and Demands  2. Mobility  3. Ability to work   Number of elements (examined above) that affect the Plan of Care: 3: MODERATE COMPLEXITY   CLINICAL PRESENTATION:   Presentation: Evolving clinical presentation with changing clinical characteristics: MODERATE COMPLEXITY   CLINICAL DECISION MAKING:   Outcome Measure: Tool Used: Lower Extremity Functional Scale (LEFS)  Score  Initial: 4/80 Most Recent: 35/80 (Date: 4/6/2017)                           26/80 (Date: 4/19/2017)   Interpretation of Score: 20 questions each scored on a 5 point scale with 0 representing \"extreme difficulty or unable to perform\" and 4 representing \"no difficulty\". The lower the score, the greater the functional disability. 80/80 represents no disability. Minimal detectable change is 9 points. Medical Necessity:   · Patient is expected to demonstrate progress in strength, range of motion, balance, coordination and functional technique to improve tolerance to daily activities, transfers sit to and from stand, ability to walk and stand un-aided for longer periods of time, and ability to work. · Skilled intervention continues to be required due to weakness, decreased ROM, decreased flexibility, pain, and functional limitations. Reason for Services/Other Comments:  · Patient continues to require skilled intervention due to increasing complexity of exercises.    Use of outcome tool(s) and clinical judgement create a POC that gives a (due to long standing history of knee pain and goal of return to work with high level job tasks): Questionable prediction of patient's progress: MODERATE COMPLEXITY            TREATMENT:   (In addition to Assessment/Re-Assessment sessions the following treatments were rendered)    Pre-treatment Symptoms/Complaints:  Patient reported being sore today but tolerated work without complaints. Pain: Initial:   Pain Intensity 1: 4  Pain Location 1: Knee  Pain Orientation 1: Right  Post Session: 3/10 pain level       Therapeutic Exercise: (35 Minutes):  Exercises per grid below to improve mobility, strength, balance and coordination. Required minimal visual, verbal and manual cues to promote proper body alignment, promote proper body posture, promote proper body mechanics and promote proper body breathing techniques. Progressed resistance, range, repetitions and complexity of movement as indicated. (used abbreviations: BET-back education training) Date:  4/24/17 Date:  4/26/17   Date:  4/19/17   Activity/Exercise Parameters Parameters Parameters   Supine knee extension hang 4 minutes --- 4 min   Calf stretch - angle board 4 x 30 sec On incline 4x30 sec hold  4 x 30 sec   Hamstring stretch Strap 3 x 30 sec Strap 4x30  Sec hold  3 x 30 sec   Supine SLR flexion 3 x 10 3 lbs 3x10 3 lbs 3 lbs 3 x 10   Heel slides 5 x 20 sec hold  X 20 reps 5 sec hold  20 x 5 sec   Nu step  Level 6 x 10 mins  Level 6 x 10 mins  level 5 x 10 min   Chair slides  --- --- ---   Heel toe raises X 30 reps  X 30 reps  3 x 10   Standing knee flexion X 20 reps 3 lbs X 20 reps with 3 lb. Wt.s  X 20 reps 3 lb. Wt.s    Standing hip abduction X 20 reps 3 lbs X 20 reps with 3 lb wt. s  X 20 reps 3 lb. Wt.s    Standing hamstring curls  X 20 reps 3 lbs x20 reps 3 lb. Wt.s  X 20 reps 3 lb  wt. s   bridging 1 X 10 reps  X 10 reps x 5 sec hold  2 x 10   Knee extension - long arc quad 3 x 10 3 lbs 3x10 reps 3 lb. Wt.s  3 lb wt. s x 20 reps    Step ups  8 inch 2 x 10 8 inch 2 x 10 8 inch x 20    Sit to and from stand 2 x 10  2x10 reps no hands with airex X 10 reps with no airex or UE assist    Sideways step up and over 8 inch 2 x 10 8 inch 2 x 10 8 inch 2 x 10       Manual Therapy (    Soft Tissue Mobilization Duration  Duration: 10 Minutes):    · Supine deep tissue mobilization of medial and anterior knee joint  (Used abbreviations: MET - muscle energy technique; PNF - proprioceptive neuromuscular facilitation; NMR - neuromuscular re-education; a/p - anterior to posterior; p/a - posterior to anterior; NT - not tested)    Therapeutic Modalities: for edema and pain                       Right Knee Cold  Type:  (vasopneumatic)  Duration: 10 minutes  Patient Position: Supine                                                                        HEP: As above; handouts given to patient for all exercises. Treatment/Session Assessment:    · Response to Treatment:  Patient was compliant with all exercises and had decreased pain at the end of session. Continue to progress strengthening and endurance as tolerated. · Compliance with Program/Exercises: compliant most of the time. · Recommendations/Intent for next treatment session: \"Next visit will focus on advancements to more challenging activities\". RE-CERTIFICATION: 3 times a week for 4 weeks from 4/19/2017 to 5/17/2017 in order to meet the above set goals.     Total Treatment Duration: 55 minutes  PT Patient Time In/Time Out  Time In: 1530  Time Out: 1200 [facility],

## 2017-04-28 ENCOUNTER — HOSPITAL ENCOUNTER (OUTPATIENT)
Dept: PHYSICAL THERAPY | Age: 52
Discharge: HOME | End: 2017-04-28
Payer: COMMERCIAL

## 2017-04-28 PROCEDURE — 97016 VASOPNEUMATIC DEVICE THERAPY: CPT

## 2017-04-28 PROCEDURE — 97110 THERAPEUTIC EXERCISES: CPT

## 2017-04-28 NOTE — PROGRESS NOTES
Fatou Martinez  : [facility] [address], [address] [place] [address] Street  [phone]   [phone]         OUTPATIENT PHYSICAL THERAPY:Daily Note 2017    ICD-10: Treatment Diagnosis 1: right knee pain (M25.561)              Treatment Diagnosis 2: gait dysfunction (R26.89)              Treatment Diagnosis 3: right knee primary localized osteoarthritis (M17.11)  Precautions: Type II Diabetes  Allergies:   Review of patient's allergies indicates not on file. Fall Risk Score: 1 (? 5 = High Risk)  MD Orders: Evaluate and Treat MEDICAL/REFERRING DIAGNOSIS:  Pain in right knee [M25.561]   DATE OF ONSET: 8/15/2016 when patient fell at work when she tripped over a pallet and landed directly on knee, then underwent right arthroscopic knee surgery on 3/1/2017   REFERRING PHYSICIAN: Sourav Aleman, Formerly named [facility] [address]: 2017     INITIAL ASSESSMENT:  Ms. Aline Alcala is a 46 y.o. female presenting s/p right arthroscopic knee surgery performed on 3/1/2017 after injuring her knee 2016 when she fell at work and tripped over a pallet and landed directly on her knee. She has reported continued pain in the knee since surgery, as well as weakness and swelling. She has been seen for 12 sessions of therapy from 3/24/2017 to 2017 with significant success. She reports improvements in ROM, endurance and strength overall. However, she is experiencing increased pain with walking a lot at home and work. She is eager to return to work full time, full duty and without dysfunction or pain. She is a good candidate for continued skilled intervention with services to include manual therapy, modalities as needed, therapeutic exercises, gait/stair/transfer/balance training, activity modification, and return to work simulation  . PROBLEM LIST (Impacting functional limitations):  1. Decreased Strength  2. Decreased ADL/Functional Activities  3.  Decreased Transfer Abilities  4. Decreased Ambulation Ability/Technique  5. Decreased Balance  6. Increased Pain  7. Decreased Activity Tolerance  8. Decreased Pacing Skills  9. Increased Fatigue  10. Decreased Flexibility/Joint Mobility INTERVENTIONS PLANNED:  1. Balance Exercise  2. Cold  3. Cryotherapy  4. Electrical Stimulation  5. Gait Training  6. Heat  7. Home Exercise Program (HEP)  8. Manual Therapy  9. Neuromuscular Re-education/Strengthening  10. Range of Motion (ROM)  11. Therapeutic Exercise/Strengthening  12. Transfer Training   TREATMENT PLAN:  Effective Dates: 4/19/2017 TO 5/17/2017. Frequency/Duration: RE-CERTIFICATION: 3 times a week for 4 weeks  GOALS: (Goals have been discussed and agreed upon with patient.)  Short-Term Functional Goals: Time Frame: 3/24/2017 to 4/7/2017  1. Patient demonstrates independence with home exercise program without verbal cueing provided by therapist. - GOAL MET  2. Improve ROM to 0-120 degrees in order to normalize gait, transfers, and stairs. - GOAL MET  3. Improve flexibility of gastrocnemius and hamstrings with SLR to 80 degrees in order to decrease pain with sleeping and sitting. - GOAL MET  Discharge Goals: Time Frame: 4/19/2017 to 5/17/2017  1. Improve pain to 3/10 with work, extended walking, extended weightbearing, squatting, stooping, sleeping, and sitting. - ONGOING  2. Improve gross lower extremity strength to at least 4+/5 in order to return patient to work full-time, full-duty. - ONGOING  3. Improve gait without use of crutches without significant antalgia and appropriate heel to toe gait pattern. - GOAL MET  4. Improve stair navigation with reciprocal ascend and descend with use of hand rail. - ONGOING  5. Improve transfers with use of hands 0-50% of the time into and out of a chair. - ONGOING  6.  Improve Lower Extremity Functional Index score to 40/80 from 4/80. - ONGOING  Rehabilitation Potential For Stated Goals: Good  Regarding Latesha Perea's therapy, I certify that the treatment plan above will be carried out by a therapist or under their direction. Thank you for this referral,  Mariola Herman PTA     Referring Physician Signature: Maliha Amato DO              Date                    The information in this section was collected on 4/19/2017 (except where otherwise noted). HISTORY:   History of Present Injury/Illness (Reason for Referral):  Ms. Hailey Roa is a 46 y.o. female presenting s/p right arthroscopic knee surgery performed on 3/1/2017 after injuring her knee 8/5/2016 when she fell at work and tripped over a pallet and landed directly on her knee. She has reported continued pain in the knee since surgery, as well as weakness and swelling. She is currently full-time, light-duty and is not aware when she is supposed to return full-duty, full-time. She is eager to improve overall strength, ROM, pain, and stamina of the knee with work and with daily activities. Past Medical History/Comorbidities: Patient reported hypertension that is well controlled with lisinopril, Type II Diabetes controlled with proglitazone, metformin, and glipizide, and she has been taking naproxen for inflammation of the knee. She just had a benign tumor removed from her abdomen which required a hysterectomy in 2016. Social History/Living Environment:    Patient lives at home with sons and reports assistance from them with any heavy household chores, ADLs, heavy lifting, and heavy carrying. Prior Level of Function/Work/Activity:  Independent without dysfunction. Patient is currently light-duty, full-time with her job as a . 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 Dominant Side:         RIGHT  Current Medications:     No current outpatient prescriptions on file.    Date Last Reviewed:  4/28/2017   Number of Personal Factors/Comorbidities that affect the Plan of Care (Type II Diabetes and high job demand tasks : 1-2: MODERATE COMPLEXITY EXAMINATION:     Observation/Postural and Gait Assessment:  Patient ambulates without crutches with minimal to no gait deviation (From bilateral crutches with step to gait on her toes and is able to ambulate with 1 crutch safely, independently, and with minimal gait deviation with verbal and visual cuing, gait without crutch antalgic with foot flat throughout stance). Medial and lateral joint line portal holes completely approximated with scar tissue (From scar tissue and scabbing). No active drainage is present, as well as no signs or symptoms of infection. Anthropometric measurements (cm) Left Right   Knee joint line 42 42 (from 43)     Palpation: Gross tenderness to palpation of medial and lateral portal holes and medial joint line. Flexibility minimally (From severely) limited of gastrocnemius. Hamstrings limited moderately to 80 (from 70) degree SLR.    ROM:     AROM(PROM) Left Right   Knee flexion 122° 120 (From 100°) - 4/19/2017   Knee extension 2° flexion contracture 2 hyperextension 4/19/2017 (From 5° flexion contracture)     Passive Accessory Mobility Testing: Mild to moderate limitations of patellofemoral joint in all planes. Strength:     Manual Muscle Test (out of 5) Left Right   Knee extension 5 4 (From 3+), 0 degree lag with SLR   Knee flexion 5 4+ (From 4)   Hip flexion 5 4+ (From 4)   Hip extension 3 3+ (From 3)   Hip abduction 3 3+ (From 3)   Ankle DF 4+ 4+ (From 4)   Ankle PF 4+ 4+ (From 4)     Special Tests:  No special tests due to acuity of surgery. No signs or symptoms of infection or deep vein thrombosis at this time. Neurological Screen:  Myotomes: Key muscle strength testing for bilateral LE is WNL. Dermatomes: Sensation testing through bilateral LE for light touch is WNL.      Functional Mobility:  Patient reports improvements with gait for longer periods of time without use of crutches with minimal gait deviation (From difficulty with ambulation longer than a few minutes but safe and independent with and without crutches with step to gait). Transfers with use of hands % of the time into and out of a chair (From 100% of the time). Stairs performed reciprocally with ascend and non-reciprocally with descend (From non-reciprocally with left leg as dominant leg with use of crutches). Body Structures Involved:  1. Bones  2. Joints  3. Muscles Body Functions Affected:  1. Sensory/Pain  2. Neuromusculoskeletal Activities and Participation Affected:  1. General Tasks and Demands  2. Mobility  3. Ability to work   Number of elements (examined above) that affect the Plan of Care: 3: MODERATE COMPLEXITY   CLINICAL PRESENTATION:   Presentation: Evolving clinical presentation with changing clinical characteristics: MODERATE COMPLEXITY   CLINICAL DECISION MAKING:   Outcome Measure: Tool Used: Lower Extremity Functional Scale (LEFS)  Score  Initial: 4/80 Most Recent: 35/80 (Date: 4/6/2017)                           26/80 (Date: 4/19/2017)   Interpretation of Score: 20 questions each scored on a 5 point scale with 0 representing \"extreme difficulty or unable to perform\" and 4 representing \"no difficulty\". The lower the score, the greater the functional disability. 80/80 represents no disability. Minimal detectable change is 9 points. Medical Necessity:   · Patient is expected to demonstrate progress in strength, range of motion, balance, coordination and functional technique to improve tolerance to daily activities, transfers sit to and from stand, ability to walk and stand un-aided for longer periods of time, and ability to work. · Skilled intervention continues to be required due to weakness, decreased ROM, decreased flexibility, pain, and functional limitations. Reason for Services/Other Comments:  · Patient continues to require skilled intervention due to increasing complexity of exercises.    Use of outcome tool(s) and clinical judgement create a POC that gives a (due to long standing history of knee pain and goal of return to work with high level job tasks): Questionable prediction of patient's progress: MODERATE COMPLEXITY            TREATMENT:   (In addition to Assessment/Re-Assessment sessions the following treatments were rendered)    Pre-treatment Symptoms/Complaints:  Patient reported working on the floor today and stated \"It was rough but I did it. \"  Pain: Initial:   Pain Intensity 1: 3  Pain Location 1: Knee  Pain Orientation 1: Right  Post Session: 2/10 pain level. Therapeutic Exercise: ( ): x 45 mins  Exercises per grid below to improve mobility, strength, balance and coordination. Required minimal visual, verbal and manual cues to promote proper body alignment, promote proper body posture, promote proper body mechanics and promote proper body breathing techniques. Progressed resistance, range, repetitions and complexity of movement as indicated. (used abbreviations: BET-back education training) Date:  4/24/17 Date:  4/26/17   Date:  4/28/17   Activity/Exercise Parameters Parameters Parameters   Supine knee extension hang 4 minutes --- ---------------   Calf stretch - angle board 4 x 30 sec On incline 4x30 sec hold  4 x 30 sec   Hamstring stretch Strap 3 x 30 sec Strap 4x30  Sec hold  3 x 30 sec   Supine SLR flexion 3 x 10 3 lbs 3x10 3 lbs 4 lbs 3 x 10   Heel slides 5 x 20 sec hold  X 20 reps 5 sec hold  20 x 5 sec   Nu step  Level 6 x 10 mins  Level 6 x 10 mins  level 6 x 10 min   Heel toe raises X 30 reps  X 30 reps  3 x 10   Standing knee flexion X 20 reps 3 lbs X 20 reps with 3 lb. Wt.s  X 20 reps 4 lb. Wt.s    Standing hip abduction X 20 reps 3 lbs X 20 reps with 3 lb wt. s  X 20 reps 4 lb. Wt.s    Standing hamstring curls  X 20 reps 3 lbs x20 reps 3 lb. Wt.s  X 20 reps 4 lb  wt. s   bridging 1 X 10 reps  X 10 reps x 5 sec hold  2 x 10   Knee extension - long arc quad 3 x 10 3 lbs 3x10 reps 3 lb. Wt.s  4 lb wt. s x 20 reps    Step ups  8 inch 2 x 10 8 inch 2 x 10 8 inch x 20    Sit to and from stand 2 x 10  2x10 reps no hands with airex    Sideways step up and over 8 inch 2 x 10 8 inch 2 x 10 8 inch 2 x 10       Manual Therapy (     ):    · Supine deep tissue mobilization of medial and anterior knee joint  (Used abbreviations: MET - muscle energy technique; PNF - proprioceptive neuromuscular facilitation; NMR - neuromuscular re-education; a/p - anterior to posterior; p/a - posterior to anterior; NT - not tested)    Therapeutic Modalities: for edema and pain  Pt. Received vaso pneumatic compression device to right knee with elevation on incline x 15 mins. HEP: As above; handouts given to patient for all exercises. Treatment/Session Assessment:    · Response to Treatment:  Patient continues to be compliant with all exercises. · Compliance with Program/Exercises: compliant most of the time. · Recommendations/Intent for next treatment session: \"Next visit will focus on advancements to more challenging activities\". RE-CERTIFICATION: 3 times a week for 4 weeks from 4/19/2017 to 5/17/2017 in order to meet the above set goals.     Total Treatment Duration: 60 minutes  PT Patient Time In/Time Out  Time In: 1525  Time Out: [address], PTA   Bruce Perez DPT

## 2017-05-01 ENCOUNTER — HOSPITAL ENCOUNTER (OUTPATIENT)
Dept: PHYSICAL THERAPY | Age: 52
Discharge: HOME | End: 2017-05-01
Payer: COMMERCIAL

## 2017-05-01 PROCEDURE — 97110 THERAPEUTIC EXERCISES: CPT

## 2017-05-01 PROCEDURE — 97016 VASOPNEUMATIC DEVICE THERAPY: CPT

## 2017-05-01 NOTE — PROGRESS NOTES
Walter Al  : 1965 [facility] [address], Jose torres, [address] [place] [address]  [phone]   [phone]         OUTPATIENT PHYSICAL THERAPY:Daily Note 2017    ICD-10: Treatment Diagnosis 1: right knee pain (M25.561)              Treatment Diagnosis 2: gait dysfunction (R26.89)              Treatment Diagnosis 3: right knee primary localized osteoarthritis (M17.11)  Precautions: Type II Diabetes  Allergies:   Review of patient's allergies indicates not on file. Fall Risk Score: 1 (? 5 = High Risk)  MD Orders: Evaluate and Treat MEDICAL/REFERRING DIAGNOSIS:  Pain in right knee [M25.561]   DATE OF ONSET: 8/15/2016 when patient fell at work when she tripped over a pallet and landed directly on knee, then underwent right arthroscopic knee surgery on 3/1/2017   REFERRING PHYSICIAN: Florecita Balderrama, [facility] [facility] [address]: 2017     INITIAL ASSESSMENT:  Ms. Silvia Wade is a 46 y.o. female presenting s/p right arthroscopic knee surgery performed on 3/1/2017 after injuring her knee 2016 when she fell at work and tripped over a pallet and landed directly on her knee. She has reported continued pain in the knee since surgery, as well as weakness and swelling. She has been seen for 12 sessions of therapy from 3/24/2017 to 2017 with significant success. She reports improvements in ROM, endurance and strength overall. However, she is experiencing increased pain with walking a lot at home and work. She is eager to return to work full time, full duty and without dysfunction or pain. She is a good candidate for continued skilled intervention with services to include manual therapy, modalities as needed, therapeutic exercises, gait/stair/transfer/balance training, activity modification, and return to work simulation  . PROBLEM LIST (Impacting functional limitations):  1. Decreased Strength  2. Decreased ADL/Functional Activities  3.  Decreased Transfer Abilities  4. Decreased Ambulation Ability/Technique  5. Decreased Balance  6. Increased Pain  7. Decreased Activity Tolerance  8. Decreased Pacing Skills  9. Increased Fatigue  10. Decreased Flexibility/Joint Mobility INTERVENTIONS PLANNED:  1. Balance Exercise  2. Cold  3. Cryotherapy  4. Electrical Stimulation  5. Gait Training  6. Heat  7. Home Exercise Program (HEP)  8. Manual Therapy  9. Neuromuscular Re-education/Strengthening  10. Range of Motion (ROM)  11. Therapeutic Exercise/Strengthening  12. Transfer Training   TREATMENT PLAN:  Effective Dates: 4/19/2017 TO 5/17/2017. Frequency/Duration: RE-CERTIFICATION: 3 times a week for 4 weeks  GOALS: (Goals have been discussed and agreed upon with patient.)  Short-Term Functional Goals: Time Frame: 3/24/2017 to 4/7/2017  1. Patient demonstrates independence with home exercise program without verbal cueing provided by therapist. - GOAL MET  2. Improve ROM to 0-120 degrees in order to normalize gait, transfers, and stairs. - GOAL MET  3. Improve flexibility of gastrocnemius and hamstrings with SLR to 80 degrees in order to decrease pain with sleeping and sitting. - GOAL MET  Discharge Goals: Time Frame: 4/19/2017 to 5/17/2017  1. Improve pain to 3/10 with work, extended walking, extended weightbearing, squatting, stooping, sleeping, and sitting. - ONGOING  2. Improve gross lower extremity strength to at least 4+/5 in order to return patient to work full-time, full-duty. - ONGOING  3. Improve gait without use of crutches without significant antalgia and appropriate heel to toe gait pattern. - GOAL MET  4. Improve stair navigation with reciprocal ascend and descend with use of hand rail. - ONGOING  5. Improve transfers with use of hands 0-50% of the time into and out of a chair. - ONGOING  6.  Improve Lower Extremity Functional Index score to 40/80 from 4/80. - ONGOING  Rehabilitation Potential For Stated Goals: Good  Regarding Latesha Perea's therapy, I certify that the treatment plan above will be carried out by a therapist or under their direction. Thank you for this referral,  Edis Chacko PTA     Referring Physician Signature: Deann Persaud DO              Date                    The information in this section was collected on 4/19/2017 (except where otherwise noted). HISTORY:   History of Present Injury/Illness (Reason for Referral):  Ms. Benito Celestin is a 46 y.o. female presenting s/p right arthroscopic knee surgery performed on 3/1/2017 after injuring her knee 8/5/2016 when she fell at work and tripped over a pallet and landed directly on her knee. She has reported continued pain in the knee since surgery, as well as weakness and swelling. She is currently full-time, light-duty and is not aware when she is supposed to return full-duty, full-time. She is eager to improve overall strength, ROM, pain, and stamina of the knee with work and with daily activities. Past Medical History/Comorbidities: Patient reported hypertension that is well controlled with lisinopril, Type II Diabetes controlled with proglitazone, metformin, and glipizide, and she has been taking naproxen for inflammation of the knee. She just had a benign tumor removed from her abdomen which required a hysterectomy in 2016. Social History/Living Environment:    Patient lives at home with sons and reports assistance from them with any heavy household chores, ADLs, heavy lifting, and heavy carrying. Prior Level of Function/Work/Activity:  Independent without dysfunction. Patient is currently light-duty, full-time with her job as a . 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 Dominant Side:         RIGHT  Current Medications:     No current outpatient prescriptions on file.    Date Last Reviewed:  5/1/2017   Number of Personal Factors/Comorbidities that affect the Plan of Care (Type II Diabetes and high job demand tasks : 1-2: MODERATE COMPLEXITY EXAMINATION:     Observation/Postural and Gait Assessment:  Patient ambulates without crutches with minimal to no gait deviation (From bilateral crutches with step to gait on her toes and is able to ambulate with 1 crutch safely, independently, and with minimal gait deviation with verbal and visual cuing, gait without crutch antalgic with foot flat throughout stance). Medial and lateral joint line portal holes completely approximated with scar tissue (From scar tissue and scabbing). No active drainage is present, as well as no signs or symptoms of infection. Anthropometric measurements (cm) Left Right   Knee joint line 42 42 (from 43)     Palpation: Gross tenderness to palpation of medial and lateral portal holes and medial joint line. Flexibility minimally (From severely) limited of gastrocnemius. Hamstrings limited moderately to 80 (from 70) degree SLR.    ROM:     AROM(PROM) Left Right   Knee flexion 122° 120 (From 100°) - 4/19/2017   Knee extension 2° flexion contracture 2 hyperextension 4/19/2017 (From 5° flexion contracture)     Passive Accessory Mobility Testing: Mild to moderate limitations of patellofemoral joint in all planes. Strength:     Manual Muscle Test (out of 5) Left Right   Knee extension 5 4 (From 3+), 0 degree lag with SLR   Knee flexion 5 4+ (From 4)   Hip flexion 5 4+ (From 4)   Hip extension 3 3+ (From 3)   Hip abduction 3 3+ (From 3)   Ankle DF 4+ 4+ (From 4)   Ankle PF 4+ 4+ (From 4)     Special Tests:  No special tests due to acuity of surgery. No signs or symptoms of infection or deep vein thrombosis at this time. Neurological Screen:  Myotomes: Key muscle strength testing for bilateral LE is WNL. Dermatomes: Sensation testing through bilateral LE for light touch is WNL.      Functional Mobility:  Patient reports improvements with gait for longer periods of time without use of crutches with minimal gait deviation (From difficulty with ambulation longer than a few minutes but safe and independent with and without crutches with step to gait). Transfers with use of hands % of the time into and out of a chair (From 100% of the time). Stairs performed reciprocally with ascend and non-reciprocally with descend (From non-reciprocally with left leg as dominant leg with use of crutches). Body Structures Involved:  1. Bones  2. Joints  3. Muscles Body Functions Affected:  1. Sensory/Pain  2. Neuromusculoskeletal Activities and Participation Affected:  1. General Tasks and Demands  2. Mobility  3. Ability to work   Number of elements (examined above) that affect the Plan of Care: 3: MODERATE COMPLEXITY   CLINICAL PRESENTATION:   Presentation: Evolving clinical presentation with changing clinical characteristics: MODERATE COMPLEXITY   CLINICAL DECISION MAKING:   Outcome Measure: Tool Used: Lower Extremity Functional Scale (LEFS)  Score  Initial: 4/80 Most Recent: 35/80 (Date: 4/6/2017)                           26/80 (Date: 4/19/2017)   Interpretation of Score: 20 questions each scored on a 5 point scale with 0 representing \"extreme difficulty or unable to perform\" and 4 representing \"no difficulty\". The lower the score, the greater the functional disability. 80/80 represents no disability. Minimal detectable change is 9 points. Medical Necessity:   · Patient is expected to demonstrate progress in strength, range of motion, balance, coordination and functional technique to improve tolerance to daily activities, transfers sit to and from stand, ability to walk and stand un-aided for longer periods of time, and ability to work. · Skilled intervention continues to be required due to weakness, decreased ROM, decreased flexibility, pain, and functional limitations. Reason for Services/Other Comments:  · Patient continues to require skilled intervention due to increasing complexity of exercises.    Use of outcome tool(s) and clinical judgement create a POC that gives a (due to long standing history of knee pain and goal of return to work with high level job tasks): Questionable prediction of patient's progress: MODERATE COMPLEXITY            TREATMENT:   (In addition to Assessment/Re-Assessment sessions the following treatments were rendered)    Pre-treatment Symptoms/Complaints:  Patient reported working on the floor and walking a lot today and had to take a pain pill befroe therapy. Pain: Initial:   Pain Intensity 1: 4  Pain Location 1: Knee  Pain Orientation 1: Right  Post Session: 2/10 pain level. Therapeutic Exercise: ( ): x 45 mins  Exercises per grid below to improve mobility, strength, balance and coordination. Required minimal visual, verbal and manual cues to promote proper body alignment, promote proper body posture, promote proper body mechanics and promote proper body breathing techniques. Progressed resistance, range, repetitions and complexity of movement as indicated. (used abbreviations: BET-back education training) Date:  5/1/17 Date:  4/26/17   Date:  4/28/17   Activity/Exercise Parameters Parameters Parameters   Supine knee extension hang 4 minutes --- ---------------   Calf stretch - angle board 4 x 30 sec On incline 4x30 sec hold  4 x 30 sec   Hamstring stretch Strap 3 x 30 sec Strap 4x30  Sec hold  3 x 30 sec   Supine SLR flexion 3 x 10 4 lbs 3x10 3 lbs 4 lbs 3 x 10   Heel slides 5 x 20 sec hold  X 20 reps 5 sec hold  20 x 5 sec   Nu step  Level 6 x 10 mins  Level 6 x 10 mins  level 6 x 10 min   Heel toe raises X 30 reps  X 30 reps  3 x 10   Standing knee flexion X 20 reps43 lbs X 20 reps with 3 lb. Wt.s  X 20 reps 4 lb. Wt.s    Standing hip abduction X 20 reps 4 lbs X 20 reps with 3 lb wt. s  X 20 reps 4 lb. Wt.s    Standing hamstring curls  X 20 reps 4 lbs x20 reps 3 lb. Wt.s  X 20 reps 4 lb  wt. s   bridging 2 X 10 reps  X 10 reps x 5 sec hold  2 x 10   Knee extension - long arc quad 3 x 10 3 lbs 3x10 reps 3 lb. Wt.s  4 lb wt. s x 20 reps    Step ups  8 inch 2 x 10 8 inch 2 x 10 8 inch x 20    Sit to and from stand 2 x 10  2x10 reps no hands with airex    Sideways step up and over 8 inch 2 x 10 8 inch 2 x 10 8 inch 2 x 10       Manual Therapy (     ):  None today. · Supine deep tissue mobilization of medial and anterior knee joint  (Used abbreviations: MET - muscle energy technique; PNF - proprioceptive neuromuscular facilitation; NMR - neuromuscular re-education; a/p - anterior to posterior; p/a - posterior to anterior; NT - not tested)    Therapeutic Modalities: for edema and pain  Pt. Received vaso pneumatic compression device to right knee with elevation on incline x 15 mins. HEP: As above; handouts given to patient for all exercises. Treatment/Session Assessment:    · Response to Treatment:  Patient is demonstrating increased range of motion and strength. · Compliance with Program/Exercises: compliant most of the time. · Recommendations/Intent for next treatment session: \"Next visit will focus on advancements to more challenging activities\". RE-CERTIFICATION: 3 times a week for 4 weeks from 4/19/2017 to 5/17/2017 in order to meet the above set goals.     Total Treatment Duration: 60 minutes  PT Patient Time In/Time Out  Time In: 1530  Time Out: [address]   Carlos Eric DPT

## 2017-05-03 ENCOUNTER — HOSPITAL ENCOUNTER (OUTPATIENT)
Dept: PHYSICAL THERAPY | Age: 52
Discharge: HOME | End: 2017-05-03
Payer: COMMERCIAL

## 2017-05-03 PROCEDURE — 97110 THERAPEUTIC EXERCISES: CPT

## 2017-05-03 PROCEDURE — 97140 MANUAL THERAPY 1/> REGIONS: CPT

## 2017-05-03 PROCEDURE — 97016 VASOPNEUMATIC DEVICE THERAPY: CPT

## 2017-05-03 NOTE — PROGRESS NOTES
Padmini Liz  : 1965 [facility] [address] [facility]  [phone]   [phone]         OUTPATIENT PHYSICAL THERAPY:Daily Note 5/3/2017    ICD-10: Treatment Diagnosis 1: right knee pain (M25.561)              Treatment Diagnosis 2: gait dysfunction (R26.89)              Treatment Diagnosis 3: right knee primary localized osteoarthritis (M17.11)  Precautions: Type II Diabetes  Allergies:   Review of patient's allergies indicates not on file. Fall Risk Score: 1 (? 5 = High Risk)  MD Orders: Evaluate and Treat MEDICAL/REFERRING DIAGNOSIS:  Pain in right knee [M25.561]   DATE OF ONSET: 8/15/2016 when patient fell at work when she tripped over a pallet and landed directly on knee, then underwent right arthroscopic knee surgery on 3/1/2017   REFERRING PHYSICIAN: Trell Dowling, [facility] [facility] [address]: 2017     INITIAL ASSESSMENT:  Ms. Jennifer Warner is a 46 y.o. female presenting s/p right arthroscopic knee surgery performed on 3/1/2017 after injuring her knee 2016 when she fell at work and tripped over a pallet and landed directly on her knee. She has reported continued pain in the knee since surgery, as well as weakness and swelling. She has been seen for 12 sessions of therapy from 3/24/2017 to 2017 with significant success. She reports improvements in ROM, endurance and strength overall. However, she is experiencing increased pain with walking a lot at home and work. She is eager to return to work full time, full duty and without dysfunction or pain. She is a good candidate for continued skilled intervention with services to include manual therapy, modalities as needed, therapeutic exercises, gait/stair/transfer/balance training, activity modification, and return to work simulation  . PROBLEM LIST (Impacting functional limitations):  1. Decreased Strength  2. Decreased ADL/Functional Activities  3.  Decreased Transfer Abilities  4. Decreased Ambulation Ability/Technique  5. Decreased Balance  6. Increased Pain  7. Decreased Activity Tolerance  8. Decreased Pacing Skills  9. Increased Fatigue  10. Decreased Flexibility/Joint Mobility INTERVENTIONS PLANNED:  1. Balance Exercise  2. Cold  3. Cryotherapy  4. Electrical Stimulation  5. Gait Training  6. Heat  7. Home Exercise Program (HEP)  8. Manual Therapy  9. Neuromuscular Re-education/Strengthening  10. Range of Motion (ROM)  11. Therapeutic Exercise/Strengthening  12. Transfer Training   TREATMENT PLAN:  Effective Dates: 4/19/2017 TO 5/17/2017. Frequency/Duration: RE-CERTIFICATION: 3 times a week for 4 weeks  GOALS: (Goals have been discussed and agreed upon with patient.)  Short-Term Functional Goals: Time Frame: 3/24/2017 to 4/7/2017  1. Patient demonstrates independence with home exercise program without verbal cueing provided by therapist. - GOAL MET  2. Improve ROM to 0-120 degrees in order to normalize gait, transfers, and stairs. - GOAL MET  3. Improve flexibility of gastrocnemius and hamstrings with SLR to 80 degrees in order to decrease pain with sleeping and sitting. - GOAL MET  Discharge Goals: Time Frame: 4/19/2017 to 5/17/2017  1. Improve pain to 3/10 with work, extended walking, extended weightbearing, squatting, stooping, sleeping, and sitting. - ONGOING  2. Improve gross lower extremity strength to at least 4+/5 in order to return patient to work full-time, full-duty. - ONGOING  3. Improve gait without use of crutches without significant antalgia and appropriate heel to toe gait pattern. - GOAL MET  4. Improve stair navigation with reciprocal ascend and descend with use of hand rail. - ONGOING  5. Improve transfers with use of hands 0-50% of the time into and out of a chair. - ONGOING  6.  Improve Lower Extremity Functional Index score to 40/80 from 4/80. - ONGOING  Rehabilitation Potential For Stated Goals: Good  Regarding Latesha Perea's therapy, I certify that the treatment plan above will be carried out by a therapist or under their direction. Thank you for this referral,  Tawny Kaiser PT     Referring Physician Signature: Janene Duran,               Date                    The information in this section was collected on 4/19/2017 (except where otherwise noted). HISTORY:   History of Present Injury/Illness (Reason for Referral):  Ms. Sera Ventura is a 46 y.o. female presenting s/p right arthroscopic knee surgery performed on 3/1/2017 after injuring her knee 8/5/2016 when she fell at work and tripped over a pallet and landed directly on her knee. She has reported continued pain in the knee since surgery, as well as weakness and swelling. She is currently full-time, light-duty and is not aware when she is supposed to return full-duty, full-time. She is eager to improve overall strength, ROM, pain, and stamina of the knee with work and with daily activities. Past Medical History/Comorbidities: Patient reported hypertension that is well controlled with lisinopril, Type II Diabetes controlled with proglitazone, metformin, and glipizide, and she has been taking naproxen for inflammation of the knee. She just had a benign tumor removed from her abdomen which required a hysterectomy in 2016. Social History/Living Environment:    Patient lives at home with sons and reports assistance from them with any heavy household chores, ADLs, heavy lifting, and heavy carrying. Prior Level of Function/Work/Activity:  Independent without dysfunction. Patient is currently light-duty, full-time with her job as a . 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 Dominant Side:         RIGHT  Current Medications:     No current outpatient prescriptions on file.    Date Last Reviewed:  5/3/2017   Number of Personal Factors/Comorbidities that affect the Plan of Care (Type II Diabetes and high job demand tasks : 1-2: MODERATE COMPLEXITY EXAMINATION:     Observation/Postural and Gait Assessment:  Patient ambulates without crutches with minimal to no gait deviation (From bilateral crutches with step to gait on her toes and is able to ambulate with 1 crutch safely, independently, and with minimal gait deviation with verbal and visual cuing, gait without crutch antalgic with foot flat throughout stance). Medial and lateral joint line portal holes completely approximated with scar tissue (From scar tissue and scabbing). No active drainage is present, as well as no signs or symptoms of infection. Anthropometric measurements (cm) Left Right   Knee joint line 42 42 (from 43)     Palpation: Gross tenderness to palpation of medial and lateral portal holes and medial joint line. Flexibility minimally (From severely) limited of gastrocnemius. Hamstrings limited moderately to 80 (from 70) degree SLR.    ROM:     AROM(PROM) Left Right   Knee flexion 122° 120 (From 100°) - 4/19/2017   Knee extension 2° flexion contracture 2 hyperextension 4/19/2017 (From 5° flexion contracture)     Passive Accessory Mobility Testing: Mild to moderate limitations of patellofemoral joint in all planes. Strength:     Manual Muscle Test (out of 5) Left Right   Knee extension 5 4 (From 3+), 0 degree lag with SLR   Knee flexion 5 4+ (From 4)   Hip flexion 5 4+ (From 4)   Hip extension 3 3+ (From 3)   Hip abduction 3 3+ (From 3)   Ankle DF 4+ 4+ (From 4)   Ankle PF 4+ 4+ (From 4)     Special Tests:  No special tests due to acuity of surgery. No signs or symptoms of infection or deep vein thrombosis at this time. Neurological Screen:  Myotomes: Key muscle strength testing for bilateral LE is WNL. Dermatomes: Sensation testing through bilateral LE for light touch is WNL.      Functional Mobility:  Patient reports improvements with gait for longer periods of time without use of crutches with minimal gait deviation (From difficulty with ambulation longer than a few minutes but safe and independent with and without crutches with step to gait). Transfers with use of hands % of the time into and out of a chair (From 100% of the time). Stairs performed reciprocally with ascend and non-reciprocally with descend (From non-reciprocally with left leg as dominant leg with use of crutches). Body Structures Involved:  1. Bones  2. Joints  3. Muscles Body Functions Affected:  1. Sensory/Pain  2. Neuromusculoskeletal Activities and Participation Affected:  1. General Tasks and Demands  2. Mobility  3. Ability to work   Number of elements (examined above) that affect the Plan of Care: 3: MODERATE COMPLEXITY   CLINICAL PRESENTATION:   Presentation: Evolving clinical presentation with changing clinical characteristics: MODERATE COMPLEXITY   CLINICAL DECISION MAKING:   Outcome Measure: Tool Used: Lower Extremity Functional Scale (LEFS)  Score  Initial: 4/80 Most Recent: 35/80 (Date: 4/6/2017)                           26/80 (Date: 4/19/2017)   Interpretation of Score: 20 questions each scored on a 5 point scale with 0 representing \"extreme difficulty or unable to perform\" and 4 representing \"no difficulty\". The lower the score, the greater the functional disability. 80/80 represents no disability. Minimal detectable change is 9 points. Medical Necessity:   · Patient is expected to demonstrate progress in strength, range of motion, balance, coordination and functional technique to improve tolerance to daily activities, transfers sit to and from stand, ability to walk and stand un-aided for longer periods of time, and ability to work. · Skilled intervention continues to be required due to weakness, decreased ROM, decreased flexibility, pain, and functional limitations. Reason for Services/Other Comments:  · Patient continues to require skilled intervention due to increasing complexity of exercises.    Use of outcome tool(s) and clinical judgement create a POC that gives a (due to long standing history of knee pain and goal of return to work with high level job tasks): Questionable prediction of patient's progress: MODERATE COMPLEXITY            TREATMENT:   (In addition to Assessment/Re-Assessment sessions the following treatments were rendered)    Pre-treatment Symptoms/Complaints:  Patient reported being very upset today because her work wanted her to work a full 4 hours on the floor even though she was really sore and couldn't tolerate it today. She contacted her MD office for further clarification. Pain: Initial:   Pain Intensity 1: 5  Pain Location 1: Knee  Pain Orientation 1: Right  Post Session: 2/10 pain level. Therapeutic Exercise: (35 Minutes):  Exercises per grid below to improve mobility, strength, balance and coordination. Required minimal visual, verbal and manual cues to promote proper body alignment, promote proper body posture, promote proper body mechanics and promote proper body breathing techniques. Progressed resistance, range, repetitions and complexity of movement as indicated. (used abbreviations: BET-back education training) Date:  5/1/17 Date:  5/3/17   Date:  4/28/17   Activity/Exercise Parameters Parameters Parameters   Supine knee extension hang 4 minutes --- ---------------   Calf stretch - angle board 4 x 30 sec On incline 3x30 sec hold  4 x 30 sec   Hamstring stretch Strap 3 x 30 sec ---  3 x 30 sec   Supine SLR flexion 3 x 10 4 lbs --- 4 lbs 3 x 10   Heel slides 5 x 20 sec hold  X 20 reps 5 sec hold  20 x 5 sec   Nu step  Level 6 x 10 mins  Level 6 x 10 mins  level 6 x 10 min   Heel toe raises X 30 reps  X 30 reps  3 x 10   Standing knee flexion X 20 reps43 lbs X 20 reps with 4 lb. Wt.s  X 20 reps 4 lb. Wt.s    Standing hip abduction X 20 reps 4 lbs X 20 reps with 4 lb wt. s  X 20 reps 4 lb. Wt.s    Standing hamstring curls  X 20 reps 4 lbs x20 reps 4 lb. Wt.s  X 20 reps 4 lb  wt. s   bridging 2 X 10 reps  X 10 reps x 5 sec hold  2 x 10   Knee extension - long arc quad 3 x 10 3 lbs 3x10 reps 3 lb. Wt.s  4 lb wt. s x 20 reps    Step ups  8 inch 2 x 10 8 inch 2 x 10 8 inch x 20    Sit to and from stand 2 x 10  2x10 reps     Sideways step up and over 8 inch 2 x 10 8 inch 2 x 10 8 inch 2 x 10       Manual Therapy (    Soft Tissue Mobilization Duration  Duration: 10 Minutes):    · Supine deep tissue mobilization of medial and anterior knee joint  (Used abbreviations: MET - muscle energy technique; PNF - proprioceptive neuromuscular facilitation; NMR - neuromuscular re-education; a/p - anterior to posterior; p/a - posterior to anterior; NT - not tested)    Therapeutic Modalities: for edema and pain                       Right Knee Cold  Type:  (vasopneumatic)  Duration: 10 minutes  Patient Position: Supine                                                                        HEP: As above; handouts given to patient for all exercises. Treatment/Session Assessment:    · Response to Treatment:  Patient was very tearful regarding pain today. She contacted MD office about restrictions at work. She is eager to improve pain overall and is very frustrated with work related restrictions. · Compliance with Program/Exercises: compliant most of the time. · Recommendations/Intent for next treatment session: \"Next visit will focus on advancements to more challenging activities\". RE-CERTIFICATION: 3 times a week for 4 weeks from 4/19/2017 to 5/17/2017 in order to meet the above set goals.     Total Treatment Duration: 55 minutes  PT Patient Time In/Time Out  Time In: 9850  Time Out: Noris Rivero 210   Ramiro Lorenzo DPT

## 2017-05-05 ENCOUNTER — APPOINTMENT (OUTPATIENT)
Dept: PHYSICAL THERAPY | Age: 52
End: 2017-05-05
Payer: COMMERCIAL

## 2017-05-08 ENCOUNTER — HOSPITAL ENCOUNTER (OUTPATIENT)
Dept: PHYSICAL THERAPY | Age: 52
Discharge: HOME | End: 2017-05-08
Payer: COMMERCIAL

## 2017-05-08 PROCEDURE — 97110 THERAPEUTIC EXERCISES: CPT

## 2017-05-08 PROCEDURE — 97016 VASOPNEUMATIC DEVICE THERAPY: CPT

## 2017-05-08 NOTE — PROGRESS NOTES
Linsey Martinez  : 1965 [facility] [address], [place], [address] [facility]  [phone]   [phone]         OUTPATIENT PHYSICAL THERAPY:Daily Note 2017    ICD-10: Treatment Diagnosis 1: right knee pain (M25.561)              Treatment Diagnosis 2: gait dysfunction (R26.89)              Treatment Diagnosis 3: right knee primary localized osteoarthritis (M17.11)  Precautions: Type II Diabetes  Allergies:   Review of patient's allergies indicates not on file. Fall Risk Score: 1 (? 5 = High Risk)  MD Orders: Evaluate and Treat MEDICAL/REFERRING DIAGNOSIS:  Pain in right knee [M25.561]   DATE OF ONSET: 8/15/2016 when patient fell at work when she tripped over a pallet and landed directly on knee, then underwent right arthroscopic knee surgery on 3/1/2017   REFERRING PHYSICIAN: Chava Terrell, [facility] [facility] [address]: 2017     INITIAL ASSESSMENT:  Ms. Lucy Tarango is a 46 y.o. female presenting s/p right arthroscopic knee surgery performed on 3/1/2017 after injuring her knee 2016 when she fell at work and tripped over a pallet and landed directly on her knee. She has reported continued pain in the knee since surgery, as well as weakness and swelling. She has been seen for 12 sessions of therapy from 3/24/2017 to 2017 with significant success. She reports improvements in ROM, endurance and strength overall. However, she is experiencing increased pain with walking a lot at home and work. She is eager to return to work full time, full duty and without dysfunction or pain. She is a good candidate for continued skilled intervention with services to include manual therapy, modalities as needed, therapeutic exercises, gait/stair/transfer/balance training, activity modification, and return to work simulation  . PROBLEM LIST (Impacting functional limitations):  1. Decreased Strength  2. Decreased ADL/Functional Activities  3.  Decreased Transfer Abilities  4. Decreased Ambulation Ability/Technique  5. Decreased Balance  6. Increased Pain  7. Decreased Activity Tolerance  8. Decreased Pacing Skills  9. Increased Fatigue  10. Decreased Flexibility/Joint Mobility INTERVENTIONS PLANNED:  1. Balance Exercise  2. Cold  3. Cryotherapy  4. Electrical Stimulation  5. Gait Training  6. Heat  7. Home Exercise Program (HEP)  8. Manual Therapy  9. Neuromuscular Re-education/Strengthening  10. Range of Motion (ROM)  11. Therapeutic Exercise/Strengthening  12. Transfer Training   TREATMENT PLAN:  Effective Dates: 4/19/2017 TO 5/17/2017. Frequency/Duration: RE-CERTIFICATION: 3 times a week for 4 weeks  GOALS: (Goals have been discussed and agreed upon with patient.)  Short-Term Functional Goals: Time Frame: 3/24/2017 to 4/7/2017  1. Patient demonstrates independence with home exercise program without verbal cueing provided by therapist. - GOAL MET  2. Improve ROM to 0-120 degrees in order to normalize gait, transfers, and stairs. - GOAL MET  3. Improve flexibility of gastrocnemius and hamstrings with SLR to 80 degrees in order to decrease pain with sleeping and sitting. - GOAL MET  Discharge Goals: Time Frame: 4/19/2017 to 5/17/2017  1. Improve pain to 3/10 with work, extended walking, extended weightbearing, squatting, stooping, sleeping, and sitting. - ONGOING  2. Improve gross lower extremity strength to at least 4+/5 in order to return patient to work full-time, full-duty. - ONGOING  3. Improve gait without use of crutches without significant antalgia and appropriate heel to toe gait pattern. - GOAL MET  4. Improve stair navigation with reciprocal ascend and descend with use of hand rail. - ONGOING  5. Improve transfers with use of hands 0-50% of the time into and out of a chair. - ONGOING  6.  Improve Lower Extremity Functional Index score to 40/80 from 4/80. - ONGOING  Rehabilitation Potential For Stated Goals: Good  Regarding Latesha Perea's therapy, I certify that the treatment plan above will be carried out by a therapist or under their direction. Thank you for this referral,  Mariola Herman PTA     Referring Physician Signature: Maliha Amato DO              Date                    The information in this section was collected on 4/19/2017 (except where otherwise noted). HISTORY:   History of Present Injury/Illness (Reason for Referral):  Ms. Hailey Roa is a 46 y.o. female presenting s/p right arthroscopic knee surgery performed on 3/1/2017 after injuring her knee 8/5/2016 when she fell at work and tripped over a pallet and landed directly on her knee. She has reported continued pain in the knee since surgery, as well as weakness and swelling. She is currently full-time, light-duty and is not aware when she is supposed to return full-duty, full-time. She is eager to improve overall strength, ROM, pain, and stamina of the knee with work and with daily activities. Past Medical History/Comorbidities: Patient reported hypertension that is well controlled with lisinopril, Type II Diabetes controlled with proglitazone, metformin, and glipizide, and she has been taking naproxen for inflammation of the knee. She just had a benign tumor removed from her abdomen which required a hysterectomy in 2016. Social History/Living Environment:    Patient lives at home with sons and reports assistance from them with any heavy household chores, ADLs, heavy lifting, and heavy carrying. Prior Level of Function/Work/Activity:  Independent without dysfunction. Patient is currently light-duty, full-time with her job as a . 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 Dominant Side:         RIGHT  Current Medications:     No current outpatient prescriptions on file.    Date Last Reviewed:  5/8/2017   Number of Personal Factors/Comorbidities that affect the Plan of Care (Type II Diabetes and high job demand tasks : 1-2: MODERATE COMPLEXITY EXAMINATION:     Observation/Postural and Gait Assessment:  Patient ambulates without crutches with minimal to no gait deviation (From bilateral crutches with step to gait on her toes and is able to ambulate with 1 crutch safely, independently, and with minimal gait deviation with verbal and visual cuing, gait without crutch antalgic with foot flat throughout stance). Medial and lateral joint line portal holes completely approximated with scar tissue (From scar tissue and scabbing). No active drainage is present, as well as no signs or symptoms of infection. Anthropometric measurements (cm) Left Right   Knee joint line 42 42 (from 43)     Palpation: Gross tenderness to palpation of medial and lateral portal holes and medial joint line. Flexibility minimally (From severely) limited of gastrocnemius. Hamstrings limited moderately to 80 (from 70) degree SLR.    ROM:     AROM(PROM) Left Right   Knee flexion 122° 120 (From 100°) - 4/19/2017   Knee extension 2° flexion contracture 2 hyperextension 4/19/2017 (From 5° flexion contracture)     Passive Accessory Mobility Testing: Mild to moderate limitations of patellofemoral joint in all planes. Strength:     Manual Muscle Test (out of 5) Left Right   Knee extension 5 4 (From 3+), 0 degree lag with SLR   Knee flexion 5 4+ (From 4)   Hip flexion 5 4+ (From 4)   Hip extension 3 3+ (From 3)   Hip abduction 3 3+ (From 3)   Ankle DF 4+ 4+ (From 4)   Ankle PF 4+ 4+ (From 4)     Special Tests:  No special tests due to acuity of surgery. No signs or symptoms of infection or deep vein thrombosis at this time. Neurological Screen:  Myotomes: Key muscle strength testing for bilateral LE is WNL. Dermatomes: Sensation testing through bilateral LE for light touch is WNL.      Functional Mobility:  Patient reports improvements with gait for longer periods of time without use of crutches with minimal gait deviation (From difficulty with ambulation longer than a few minutes but safe and independent with and without crutches with step to gait). Transfers with use of hands % of the time into and out of a chair (From 100% of the time). Stairs performed reciprocally with ascend and non-reciprocally with descend (From non-reciprocally with left leg as dominant leg with use of crutches). Body Structures Involved:  1. Bones  2. Joints  3. Muscles Body Functions Affected:  1. Sensory/Pain  2. Neuromusculoskeletal Activities and Participation Affected:  1. General Tasks and Demands  2. Mobility  3. Ability to work   Number of elements (examined above) that affect the Plan of Care: 3: MODERATE COMPLEXITY   CLINICAL PRESENTATION:   Presentation: Evolving clinical presentation with changing clinical characteristics: MODERATE COMPLEXITY   CLINICAL DECISION MAKING:   Outcome Measure: Tool Used: Lower Extremity Functional Scale (LEFS)  Score  Initial: 4/80 Most Recent: 35/80 (Date: 4/6/2017)                           26/80 (Date: 4/19/2017)   Interpretation of Score: 20 questions each scored on a 5 point scale with 0 representing \"extreme difficulty or unable to perform\" and 4 representing \"no difficulty\". The lower the score, the greater the functional disability. 80/80 represents no disability. Minimal detectable change is 9 points. Medical Necessity:   · Patient is expected to demonstrate progress in strength, range of motion, balance, coordination and functional technique to improve tolerance to daily activities, transfers sit to and from stand, ability to walk and stand un-aided for longer periods of time, and ability to work. · Skilled intervention continues to be required due to weakness, decreased ROM, decreased flexibility, pain, and functional limitations. Reason for Services/Other Comments:  · Patient continues to require skilled intervention due to increasing complexity of exercises.    Use of outcome tool(s) and clinical judgement create a POC that gives a (due to long standing history of knee pain and goal of return to work with high level job tasks): Questionable prediction of patient's progress: MODERATE COMPLEXITY            TREATMENT:   (In addition to Assessment/Re-Assessment sessions the following treatments were rendered)    Pre-treatment Symptoms/Complaints:  Patient stated she had a better day at work today. Pain: Initial:   Pain Intensity 1: 4  Pain Location 1: Knee  Pain Orientation 1: Right  Post Session: 3/10 pain level. Therapeutic Exercise: ( ): x 45 mins Exercises per grid below to improve mobility, strength, balance and coordination. Required minimal visual, verbal and manual cues to promote proper body alignment, promote proper body posture, promote proper body mechanics and promote proper body breathing techniques. Progressed resistance, range, repetitions and complexity of movement as indicated. (used abbreviations: BET-back education training) Date:  5/1/17 Date:  5/3/17   Date:  5/8/17   Activity/Exercise Parameters Parameters Parameters   Supine knee extension hang 4 minutes --- ---------------   Calf stretch - angle board 4 x 30 sec On incline 3x30 sec hold  4 x 30 sec   Hamstring stretch Strap 3 x 30 sec ---  3 x 30 sec   Supine SLR flexion 3 x 10 4 lbs --- 4 lbs 3 x 10   Heel slides 5 x 20 sec hold  X 20 reps 5 sec hold  20 x 5 sec   Nu step  Level 6 x 10 mins  Level 6 x 10 mins  level 7 x 10 min   Heel toe raises X 30 reps  X 30 reps  3 x 10   Standing knee flexion X 20 reps43 lbs X 20 reps with 4 lb. Wt.s  X 20 reps 4 lb. Wt.s    Standing hip abduction X 20 reps 4 lbs X 20 reps with 4 lb wt. s  X 20 reps 4 lb. Wt.s    Standing hamstring curls  X 20 reps 4 lbs x20 reps 4 lb. Wt.s  X 20 reps 4 lb  wt. s   bridging 2 X 10 reps  X 10 reps x 5 sec hold  2 x 10   Knee extension - long arc quad 3 x 10 3 lbs 3x10 reps 3 lb.  Wt.s  -------   Step ups  8 inch 2 x 10 8 inch 2 x 10 8 inch x 20 fwd & side stepping    Sit to and from stand 2 x 10  2x10 reps 2x10 reps    Sideways step up and over 8 inch 2 x 10 8 inch 2 x 10 8 inch 2 x 10       Manual Therapy (     ):    · Supine deep tissue mobilization of medial and anterior knee joint  (Used abbreviations: MET - muscle energy technique; PNF - proprioceptive neuromuscular facilitation; NMR - neuromuscular re-education; a/p - anterior to posterior; p/a - posterior to anterior; NT - not tested)    Therapeutic Modalities: for edema and pain  X 15 mins Pt. Received vaso pneumatic compression device x 15 mins to right knee in supine with BLE's on incline. HEP: As above; handouts given to patient for all exercises. Treatment/Session Assessment:    · Response to Treatment:  Patient was compliant with all exercises and able to handle more reps and weight with exercises. · Compliance with Program/Exercises: compliant most of the time. · Recommendations/Intent for next treatment session: \"Next visit will focus on advancements to more challenging activities\". RE-CERTIFICATION: 3 times a week for 4 weeks from 4/19/2017 to 5/17/2017 in order to meet the above set goals.     Total Treatment Duration: 60 minutes  PT Patient Time In/Time Out  Time In: 1530  Time Out: [facility] [place] [place]   Krysta De La Rosa DPT

## 2017-05-10 ENCOUNTER — HOSPITAL ENCOUNTER (OUTPATIENT)
Dept: PHYSICAL THERAPY | Age: 52
Discharge: HOME | End: 2017-05-10
Payer: COMMERCIAL

## 2017-05-10 PROCEDURE — 97140 MANUAL THERAPY 1/> REGIONS: CPT

## 2017-05-10 PROCEDURE — 97016 VASOPNEUMATIC DEVICE THERAPY: CPT

## 2017-05-10 PROCEDURE — 97110 THERAPEUTIC EXERCISES: CPT

## 2017-05-10 NOTE — PROGRESS NOTES
Sergo Carlson  : 1965 [facility] [address], Jose torres, [address]  [phone]   [phone]         OUTPATIENT PHYSICAL THERAPY:Daily Note and Progress Report 5/10/2017    ICD-10: Treatment Diagnosis 1: right knee pain (M25.561)              Treatment Diagnosis 2: gait dysfunction (R26.89)              Treatment Diagnosis 3: right knee primary localized osteoarthritis (M17.11)  Precautions: Type II Diabetes  Allergies:   Review of patient's allergies indicates not on file. Fall Risk Score: 1 (? 5 = High Risk)  MD Orders: Evaluate and Treat MEDICAL/REFERRING DIAGNOSIS:  Pain in right knee [M25.561]   DATE OF ONSET: 8/15/2016 when patient fell at work when she tripped over a pallet and landed directly on knee, then underwent right arthroscopic knee surgery on 3/1/2017   REFERRING PHYSICIAN: Su Leyva, [address] [place] [address] Avenue: 2017     INITIAL ASSESSMENT:  Ms. Garrick Vivas is a 46 y.o. female presenting s/p right arthroscopic knee surgery performed on 3/1/2017 after injuring her knee 2016 when she fell at work and tripped over a pallet and landed directly on her knee. She has reported continued pain in the knee since surgery, as well as weakness and swelling. She has been seen for 19 sessions of therapy from 3/24/2017 to 5/10/2017 with significant success. She reports her knee feels at least 70-75% better overall since surgery, and that she does get sore after stooping, lifting, standing, and walking for long periods of time. She is eager to return to work full duty, and resume all normal activities without limitations and minimal pain. She is a good candidate for continued skilled intervention with services to include manual therapy, modalities as needed, therapeutic exercises, gait/stair/transfer/balance training, activity modification, and return to work simulation  .    PROBLEM LIST (Impacting functional limitations):  1. Decreased Strength  2. Decreased ADL/Functional Activities  3. Decreased Transfer Abilities  4. Decreased Ambulation Ability/Technique  5. Decreased Balance  6. Increased Pain  7. Decreased Activity Tolerance  8. Decreased Pacing Skills  9. Increased Fatigue  10. Decreased Flexibility/Joint Mobility INTERVENTIONS PLANNED:  1. Balance Exercise  2. Cold  3. Cryotherapy  4. Electrical Stimulation  5. Gait Training  6. Heat  7. Home Exercise Program (HEP)  8. Manual Therapy  9. Neuromuscular Re-education/Strengthening  10. Range of Motion (ROM)  11. Therapeutic Exercise/Strengthening  12. Transfer Training   TREATMENT PLAN:  Effective Dates: 4/19/2017 TO 5/17/2017. Frequency/Duration: RE-CERTIFICATION: 3 times a week for 4 weeks  GOALS: (Goals have been discussed and agreed upon with patient.)  Short-Term Functional Goals: Time Frame: 3/24/2017 to 4/7/2017  1. Patient demonstrates independence with home exercise program without verbal cueing provided by therapist. - GOAL MET  2. Improve ROM to 0-120 degrees in order to normalize gait, transfers, and stairs. - GOAL MET  3. Improve flexibility of gastrocnemius and hamstrings with SLR to 80 degrees in order to decrease pain with sleeping and sitting. - GOAL MET  Discharge Goals: Time Frame: 4/19/2017 to 5/17/2017  1. Improve pain to 3/10 with work, extended walking, extended weightbearing, squatting, stooping, sleeping, and sitting. - ONGOING  2. Improve gross lower extremity strength to at least 4+/5 in order to return patient to work full-time, full-duty. - ONGOING  3. Improve gait without use of crutches without significant antalgia and appropriate heel to toe gait pattern. - GOAL MET  4. Improve stair navigation with reciprocal ascend and descend with use of hand rail. - GOAL MET  5. Improve transfers with use of hands 0-50% of the time into and out of a chair. - GOAL MET  6.  Improve Lower Extremity Functional Index score to 40/80 from 4/80. - ONGOING  Rehabilitation Potential For Stated Goals: Good  Regarding Latesha Perea's therapy, I certify that the treatment plan above will be carried out by a therapist or under their direction. Thank you for this referral,  Bruna Rolle, PT     Referring Physician Signature: Maliha Amato,               Date                    The information in this section was collected on 5/10/2017 (except where otherwise noted). HISTORY:   History of Present Injury/Illness (Reason for Referral):  Ms. Hailey Roa is a 46 y.o. female presenting s/p right arthroscopic knee surgery performed on 3/1/2017 after injuring her knee 8/5/2016 when she fell at work and tripped over a pallet and landed directly on her knee. She has reported continued pain in the knee since surgery, as well as weakness and swelling. She is currently full-time, light-duty and is not aware when she is supposed to return full-duty, full-time. She is eager to improve overall strength, ROM, pain, and stamina of the knee with work and with daily activities. Past Medical History/Comorbidities: Patient reported hypertension that is well controlled with lisinopril, Type II Diabetes controlled with proglitazone, metformin, and glipizide, and she has been taking naproxen for inflammation of the knee. She just had a benign tumor removed from her abdomen which required a hysterectomy in 2016. Social History/Living Environment:    Patient lives at home with sons and reports assistance from them with any heavy household chores, ADLs, heavy lifting, and heavy carrying. Prior Level of Function/Work/Activity:  Independent without dysfunction. Patient is currently light-duty, full-time with her job as a . 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 Dominant Side:         RIGHT  Current Medications:     No current outpatient prescriptions on file.    Date Last Reviewed:  5/10/2017   Number of Personal Factors/Comorbidities that affect the Plan of Care (Type II Diabetes and high job demand tasks : 1-2: MODERATE COMPLEXITY   EXAMINATION:     Observation/Postural and Gait Assessment:  Patient ambulates without crutches with minimal to no gait deviation (From bilateral crutches with step to gait on her toes and is able to ambulate with 1 crutch safely, independently, and with minimal gait deviation with verbal and visual cuing, gait without crutch antalgic with foot flat throughout stance). Medial and lateral joint line portal holes completely approximated with scar tissue (From scar tissue and scabbing). No active drainage is present, as well as no signs or symptoms of infection. Anthropometric measurements (cm) Left Right   Knee joint line 42 42 (from 43)     Palpation: Gross tenderness to palpation of medial and lateral portal holes and medial joint line. Flexibility minimally (From severely) limited of gastrocnemius. Hamstrings limited moderately to 80 (from 70) degree SLR.    ROM:     AROM(PROM) Left Right   Knee flexion 122° 120 (From 100°) - 4/19/2017   Knee extension 2° flexion contracture 2 hyperextension 4/19/2017 (From 5° flexion contracture)     Passive Accessory Mobility Testing: Mild to moderate limitations of patellofemoral joint in all planes. Strength:     Manual Muscle Test (out of 5) Left Right   Knee extension 5 4+ (From 3+), 0 degree lag with SLR   Knee flexion 5 4+ (From 4)   Hip flexion 5 4+ (From 4)   Hip extension 4 (from 3) 4 (From 3)   Hip abduction 4 (From 3) 4 (From 3)   Ankle DF 4+ 4+ (From 4)   Ankle PF 4+ 4+ (From 4)     Special Tests:  No special tests due to acuity of surgery. No signs or symptoms of infection or deep vein thrombosis at this time. Neurological Screen:  Myotomes: Key muscle strength testing for bilateral LE is WNL. Dermatomes: Sensation testing through bilateral LE for light touch is WNL.      Functional Mobility:  Patient reports improvements with gait for longer periods of time without use of crutches with minimal gait deviation (From difficulty with ambulation longer than a few minutes but safe and independent with and without crutches with step to gait). Transfers with use of hands 0-50% of the time into and out of a chair (From 100% of the time). Stairs performed reciprocally with ascend and with descend (From non-reciprocally with left leg as dominant leg with use of crutches). Body Structures Involved:  1. Bones  2. Joints  3. Muscles Body Functions Affected:  1. Sensory/Pain  2. Neuromusculoskeletal Activities and Participation Affected:  1. General Tasks and Demands  2. Mobility  3. Ability to work   Number of elements (examined above) that affect the Plan of Care: 3: MODERATE COMPLEXITY   CLINICAL PRESENTATION:   Presentation: Evolving clinical presentation with changing clinical characteristics: MODERATE COMPLEXITY   CLINICAL DECISION MAKING:   Outcome Measure: Tool Used: Lower Extremity Functional Scale (LEFS)  Score  Initial: 4/80 Most Recent: 35/80 (Date: 4/6/2017)                           26/80 (Date: 4/19/2017)                          48/80 (Date: 5/10/2017)   Interpretation of Score: 20 questions each scored on a 5 point scale with 0 representing \"extreme difficulty or unable to perform\" and 4 representing \"no difficulty\". The lower the score, the greater the functional disability. 80/80 represents no disability. Minimal detectable change is 9 points. Medical Necessity:   · Patient is expected to demonstrate progress in strength, range of motion, balance, coordination and functional technique to improve tolerance to daily activities, transfers sit to and from stand, ability to walk and stand un-aided for longer periods of time, and ability to work. · Skilled intervention continues to be required due to weakness, decreased ROM, decreased flexibility, pain, and functional limitations.   Reason for Services/Other Comments:  · Patient continues to require skilled intervention due to increasing complexity of exercises. Use of outcome tool(s) and clinical judgement create a POC that gives a (due to long standing history of knee pain and goal of return to work with high level job tasks): Questionable prediction of patient's progress: MODERATE COMPLEXITY            TREATMENT:   (In addition to Assessment/Re-Assessment sessions the following treatments were rendered)    Pre-treatment Symptoms/Complaints:  Patient reported having to work on the floor today for 8 hours despite MD recommendation to work 4 hours on the floor and 4 hours light duty. She reported that she is planning to call her  about the inconsistency. Pain: Initial:   Pain Intensity 1: 3  Pain Location 1: Knee  Pain Orientation 1: Right  Post Session: 2/10 pain level. Therapeutic Exercise: (35 Minutes): Exercises per grid below to improve mobility, strength, balance and coordination. Required minimal visual, verbal and manual cues to promote proper body alignment, promote proper body posture, promote proper body mechanics and promote proper body breathing techniques. Progressed resistance, range, repetitions and complexity of movement as indicated. (used abbreviations: BET-back education training) Date:  5/10/17 Date:  5/3/17   Date:  5/8/17   Activity/Exercise Parameters Parameters Parameters   Supine knee extension hang 4 minutes --- ---------------   Calf stretch - angle board 4 x 30 sec On incline 3x30 sec hold  4 x 30 sec   Hamstring stretch Strap 3 x 30 sec ---  3 x 30 sec   Supine SLR flexion 3 x 10 4 lbs --- 4 lbs 3 x 10   Heel slides 20 x 5 sec X 20 reps 5 sec hold  20 x 5 sec   Nu step  Level 6 x 10 mins  Level 6 x 10 mins  level 7 x 10 min   Heel toe raises X 30 reps  X 30 reps  3 x 10   Standing knee flexion X 20 reps43 lbs X 20 reps with 4 lb. Wt.s  X 20 reps 4 lb. Wt.s    Standing hip abduction X 20 reps 4 lbs X 20 reps with 4 lb wt. s  X 20 reps 4 lb. Wt.s    Standing hamstring curls  X 20 reps 4 lbs x20 reps 4 lb. Wt.s  X 20 reps 4 lb  wt. s   bridging 2 X 10 reps  X 10 reps x 5 sec hold  2 x 10   Knee extension - long arc quad 3 x 10 3 lbs 3x10 reps 3 lb. Wt.s  -------   Step ups  8 inch 2 x 10 8 inch 2 x 10 8 inch x 20 fwd & side stepping    Sit to and from stand 2 x 10  2x10 reps  2x10 reps    Sideways step up and over 8 inch 2 x 10 8 inch 2 x 10 8 inch 2 x 10       Manual Therapy (    Soft Tissue Mobilization Duration  Duration: 10 Minutes):    · Supine deep tissue mobilization of medial and anterior knee joint  (Used abbreviations: MET - muscle energy technique; PNF - proprioceptive neuromuscular facilitation; NMR - neuromuscular re-education; a/p - anterior to posterior; p/a - posterior to anterior; NT - not tested)    Therapeutic Modalities: for edema and pain                       Right Knee Cold  Type:  (vasopneumatic)  Duration: 10 minutes  Patient Position: Supine                                                                        HEP: As above; handouts given to patient for all exercises. Treatment/Session Assessment:    · Response to Treatment:  Patient tolerated session well with less complaints of pain. Will continue to progress as tolerated with a focus on exercises to return patient to full duty. · Compliance with Program/Exercises: compliant most of the time. · Recommendations/Intent for next treatment session: \"Next visit will focus on advancements to more challenging activities\". RE-CERTIFICATION: 3 times a week for 4 weeks from 4/19/2017 to 5/17/2017 in order to meet the above set goals.     Total Treatment Duration: 55 minutes  PT Patient Time In/Time Out  Time In: 4580  Time Out: [address] [facility]   Teagan Lange, DPT

## 2017-05-12 ENCOUNTER — HOSPITAL ENCOUNTER (OUTPATIENT)
Dept: PHYSICAL THERAPY | Age: 52
Discharge: HOME | End: 2017-05-12
Payer: COMMERCIAL

## 2017-05-12 PROCEDURE — 97016 VASOPNEUMATIC DEVICE THERAPY: CPT

## 2017-05-12 PROCEDURE — 97110 THERAPEUTIC EXERCISES: CPT

## 2017-05-12 PROCEDURE — 97140 MANUAL THERAPY 1/> REGIONS: CPT

## 2017-05-12 NOTE — PROGRESS NOTES
Liz Deshpande  : 1965 [facility] [place] [address], [address] [facility]  [phone]   [phone]         OUTPATIENT PHYSICAL THERAPY:Daily Note 2017    ICD-10: Treatment Diagnosis 1: right knee pain (M25.561)              Treatment Diagnosis 2: gait dysfunction (R26.89)              Treatment Diagnosis 3: right knee primary localized osteoarthritis (M17.11)  Precautions: Type II Diabetes  Allergies:   Review of patient's allergies indicates not on file. Fall Risk Score: 1 (? 5 = High Risk)  MD Orders: Evaluate and Treat MEDICAL/REFERRING DIAGNOSIS:  Pain in right knee [M25.561]   DATE OF ONSET: 8/15/2016 when patient fell at work when she tripped over a pallet and landed directly on knee, then underwent right arthroscopic knee surgery on 3/1/2017   REFERRING PHYSICIAN: Janene Duran, [facility] [facility] [address]: 2017     INITIAL ASSESSMENT:  Ms. Sera Ventura is a 46 y.o. female presenting s/p right arthroscopic knee surgery performed on 3/1/2017 after injuring her knee 2016 when she fell at work and tripped over a pallet and landed directly on her knee. She has reported continued pain in the knee since surgery, as well as weakness and swelling. She has been seen for 19 sessions of therapy from 3/24/2017 to 5/10/2017 with significant success. She reports her knee feels at least 70-75% better overall since surgery, and that she does get sore after stooping, lifting, standing, and walking for long periods of time. She is eager to return to work full duty, and resume all normal activities without limitations and minimal pain. She is a good candidate for continued skilled intervention with services to include manual therapy, modalities as needed, therapeutic exercises, gait/stair/transfer/balance training, activity modification, and return to work simulation  . PROBLEM LIST (Impacting functional limitations):  1.  Decreased Strength  2. Decreased ADL/Functional Activities  3. Decreased Transfer Abilities  4. Decreased Ambulation Ability/Technique  5. Decreased Balance  6. Increased Pain  7. Decreased Activity Tolerance  8. Decreased Pacing Skills  9. Increased Fatigue  10. Decreased Flexibility/Joint Mobility INTERVENTIONS PLANNED:  1. Balance Exercise  2. Cold  3. Cryotherapy  4. Electrical Stimulation  5. Gait Training  6. Heat  7. Home Exercise Program (HEP)  8. Manual Therapy  9. Neuromuscular Re-education/Strengthening  10. Range of Motion (ROM)  11. Therapeutic Exercise/Strengthening  12. Transfer Training   TREATMENT PLAN:  Effective Dates: 4/19/2017 TO 5/17/2017. Frequency/Duration: RE-CERTIFICATION: 3 times a week for 4 weeks  GOALS: (Goals have been discussed and agreed upon with patient.)  Short-Term Functional Goals: Time Frame: 3/24/2017 to 4/7/2017  1. Patient demonstrates independence with home exercise program without verbal cueing provided by therapist. - GOAL MET  2. Improve ROM to 0-120 degrees in order to normalize gait, transfers, and stairs. - GOAL MET  3. Improve flexibility of gastrocnemius and hamstrings with SLR to 80 degrees in order to decrease pain with sleeping and sitting. - GOAL MET  Discharge Goals: Time Frame: 4/19/2017 to 5/17/2017  1. Improve pain to 3/10 with work, extended walking, extended weightbearing, squatting, stooping, sleeping, and sitting. - ONGOING  2. Improve gross lower extremity strength to at least 4+/5 in order to return patient to work full-time, full-duty. - ONGOING  3. Improve gait without use of crutches without significant antalgia and appropriate heel to toe gait pattern. - GOAL MET  4. Improve stair navigation with reciprocal ascend and descend with use of hand rail. - GOAL MET  5. Improve transfers with use of hands 0-50% of the time into and out of a chair. - GOAL MET  6.  Improve Lower Extremity Functional Index score to 40/80 from 4/80. - ONGOING  Rehabilitation Potential For Stated Goals: Good  Regarding Edilsoncoral Perea's therapy, I certify that the treatment plan above will be carried out by a therapist or under their direction. Thank you for this referral,  Kay Aly PTA     Referring Physician Signature: Yefri Warner DO              Date                    The information in this section was collected on 5/10/2017 (except where otherwise noted). HISTORY:   History of Present Injury/Illness (Reason for Referral):  Ms. Yosef Ricks is a 46 y.o. female presenting s/p right arthroscopic knee surgery performed on 3/1/2017 after injuring her knee 8/5/2016 when she fell at work and tripped over a pallet and landed directly on her knee. She has reported continued pain in the knee since surgery, as well as weakness and swelling. She is currently full-time, light-duty and is not aware when she is supposed to return full-duty, full-time. She is eager to improve overall strength, ROM, pain, and stamina of the knee with work and with daily activities. Past Medical History/Comorbidities: Patient reported hypertension that is well controlled with lisinopril, Type II Diabetes controlled with proglitazone, metformin, and glipizide, and she has been taking naproxen for inflammation of the knee. She just had a benign tumor removed from her abdomen which required a hysterectomy in 2016. Social History/Living Environment:    Patient lives at home with sons and reports assistance from them with any heavy household chores, ADLs, heavy lifting, and heavy carrying. Prior Level of Function/Work/Activity:  Independent without dysfunction. Patient is currently light-duty, full-time with her job as a . 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 Dominant Side:         RIGHT  Current Medications:     No current outpatient prescriptions on file.    Date Last Reviewed:  5/12/2017   Number of Personal Factors/Comorbidities that affect the Plan of Care (Type II Diabetes and high job demand tasks : 1-2: MODERATE COMPLEXITY   EXAMINATION:     Observation/Postural and Gait Assessment:  Patient ambulates without crutches with minimal to no gait deviation (From bilateral crutches with step to gait on her toes and is able to ambulate with 1 crutch safely, independently, and with minimal gait deviation with verbal and visual cuing, gait without crutch antalgic with foot flat throughout stance). Medial and lateral joint line portal holes completely approximated with scar tissue (From scar tissue and scabbing). No active drainage is present, as well as no signs or symptoms of infection. Anthropometric measurements (cm) Left Right   Knee joint line 42 42 (from 43)     Palpation: Gross tenderness to palpation of medial and lateral portal holes and medial joint line. Flexibility minimally (From severely) limited of gastrocnemius. Hamstrings limited moderately to 80 (from 70) degree SLR.    ROM:     AROM(PROM) Left Right   Knee flexion 122° 120 (From 100°) - 4/19/2017   Knee extension 2° flexion contracture 2 hyperextension 4/19/2017 (From 5° flexion contracture)     Passive Accessory Mobility Testing: Mild to moderate limitations of patellofemoral joint in all planes. Strength:     Manual Muscle Test (out of 5) Left Right   Knee extension 5 4+ (From 3+), 0 degree lag with SLR   Knee flexion 5 4+ (From 4)   Hip flexion 5 4+ (From 4)   Hip extension 4 (from 3) 4 (From 3)   Hip abduction 4 (From 3) 4 (From 3)   Ankle DF 4+ 4+ (From 4)   Ankle PF 4+ 4+ (From 4)     Special Tests:  No special tests due to acuity of surgery. No signs or symptoms of infection or deep vein thrombosis at this time. Neurological Screen:  Myotomes: Key muscle strength testing for bilateral LE is WNL. Dermatomes: Sensation testing through bilateral LE for light touch is WNL.      Functional Mobility:  Patient reports improvements with gait for longer periods of time without use of crutches with minimal gait deviation (From difficulty with ambulation longer than a few minutes but safe and independent with and without crutches with step to gait). Transfers with use of hands 0-50% of the time into and out of a chair (From 100% of the time). Stairs performed reciprocally with ascend and with descend (From non-reciprocally with left leg as dominant leg with use of crutches). Body Structures Involved:  1. Bones  2. Joints  3. Muscles Body Functions Affected:  1. Sensory/Pain  2. Neuromusculoskeletal Activities and Participation Affected:  1. General Tasks and Demands  2. Mobility  3. Ability to work   Number of elements (examined above) that affect the Plan of Care: 3: MODERATE COMPLEXITY   CLINICAL PRESENTATION:   Presentation: Evolving clinical presentation with changing clinical characteristics: MODERATE COMPLEXITY   CLINICAL DECISION MAKING:   Outcome Measure: Tool Used: Lower Extremity Functional Scale (LEFS)  Score  Initial: 4/80 Most Recent: 35/80 (Date: 4/6/2017)                           26/80 (Date: 4/19/2017)                          48/80 (Date: 5/10/2017)   Interpretation of Score: 20 questions each scored on a 5 point scale with 0 representing \"extreme difficulty or unable to perform\" and 4 representing \"no difficulty\". The lower the score, the greater the functional disability. 80/80 represents no disability. Minimal detectable change is 9 points. Medical Necessity:   · Patient is expected to demonstrate progress in strength, range of motion, balance, coordination and functional technique to improve tolerance to daily activities, transfers sit to and from stand, ability to walk and stand un-aided for longer periods of time, and ability to work. · Skilled intervention continues to be required due to weakness, decreased ROM, decreased flexibility, pain, and functional limitations.   Reason for Services/Other Comments:  · Patient continues to require skilled intervention due to increasing complexity of exercises. Use of outcome tool(s) and clinical judgement create a POC that gives a (due to long standing history of knee pain and goal of return to work with high level job tasks): Questionable prediction of patient's progress: MODERATE COMPLEXITY            TREATMENT:   (In addition to Assessment/Re-Assessment sessions the following treatments were rendered)    Pre-treatment Symptoms/Complaints:  Reports increased right knee pain and soreness which she attributes to working on the concrete floor at work  Pain: Initial:   Pain Intensity 1: 5  Pain Location 1: Knee  Pain Orientation 1: Right  Post Session: 3/10 pain level. Therapeutic Exercise: (35 Minutes): Exercises per grid below to improve mobility, strength, balance and coordination. Required minimal visual, verbal and manual cues to promote proper body alignment, promote proper body posture, promote proper body mechanics and promote proper body breathing techniques. Progressed resistance, range, repetitions and complexity of movement as indicated. (used abbreviations: BET-back education training) Date:  5/10/17 Date:  5/12/17   Date:  5/8/17   Activity/Exercise Parameters Parameters Parameters   Supine knee extension hang 4 minutes --- ---------------   Calf stretch - angle board 4 x 30 sec On incline 3x30 sec hold  4 x 30 sec   Hamstring stretch Strap 3 x 30 sec ---  3 x 30 sec   Supine SLR flexion 3 x 10 4 lbs 2 lbs 2 x 10 4 lbs 3 x 10   Heel slides 20 x 5 sec  20 x 5 sec   Nu step  Level 6 x 10 mins  Level 4 x 10 mins  level 7 x 10 min   Heel toe raises X 30 reps  2 x 15 reps  3 x 10   Standing knee flexion X 20 reps43 lbs 2 x 10 reps with 4 lb. Wt.s  X 20 reps 4 lb. Wt.s    Standing hip abduction X 20 reps 4 lbs 2 x 10 reps with 4 lb wt. s  X 20 reps 4 lb. Wt.s    Standing hamstring curls  X 20 reps 4 lbs 2 x 10 reps 4 lb. Wt.s  X 20 reps 4 lb  wt. s   bridging 2 X 10 reps  X 10 reps x 5 sec hold  2 x 10   Knee extension - long arc quad 3 x 10 3 lbs 3x10 reps 4 lb. Wt.s  -------   Step ups  8 inch 2 x 10 6 inch 2 x 10  Decreased height due to pain 8 inch x 20 fwd & side stepping    Sit to and from stand 2 x 10  2  x10 reps  2x10 reps    Sideways step up and over 8 inch 2 x 10 6 inch 2 x 10 8 inch 2 x 10       Manual Therapy (    Soft Tissue Mobilization Duration  Duration: 10 Minutes):    · Supine deep tissue mobilization of medial and anterior knee joint  (Used abbreviations: MET - muscle energy technique; PNF - proprioceptive neuromuscular facilitation; NMR - neuromuscular re-education; a/p - anterior to posterior; p/a - posterior to anterior; NT - not tested)    Therapeutic Modalities: for edema and pain                       Right Knee Cold  Type:  (vaso pneumatic compression)  Duration: 10 minutes  Patient Position: Supine                                                                        HEP: As above; handouts given to patient for all exercises. Treatment/Session Assessment:    · Response to Treatment:  Increased pain with exercises today. Reports some relief with vaso pneumatic compression and manual therapy. · Compliance with Program/Exercises: compliant most of the time. · Recommendations/Intent for next treatment session: \"Next visit will focus on advancements to more challenging activities\". RE-CERTIFICATION: 3 times a week for 4 weeks from 4/19/2017 to 5/17/2017 in order to meet the above set goals.     Total Treatment Duration: 55 minutes  PT Patient Time In/Time Out  Time In: 1530  Time Out: [address], PTA   Jess Hillman, DPT

## 2017-05-15 ENCOUNTER — HOSPITAL ENCOUNTER (OUTPATIENT)
Dept: PHYSICAL THERAPY | Age: 52
Discharge: HOME | End: 2017-05-15
Payer: COMMERCIAL

## 2017-05-15 PROCEDURE — 97016 VASOPNEUMATIC DEVICE THERAPY: CPT

## 2017-05-15 PROCEDURE — 97110 THERAPEUTIC EXERCISES: CPT

## 2017-05-15 NOTE — PROGRESS NOTES
Mariela Winter  : 1965 [facility] [place] [address], [place], [address]  [phone]   [phone]         OUTPATIENT PHYSICAL THERAPY:Daily Note 5/15/2017    ICD-10: Treatment Diagnosis 1: right knee pain (M25.561)              Treatment Diagnosis 2: gait dysfunction (R26.89)              Treatment Diagnosis 3: right knee primary localized osteoarthritis (M17.11)  Precautions: Type II Diabetes  Allergies:   Review of patient's allergies indicates not on file. Fall Risk Score: 1 (? 5 = High Risk)  MD Orders: Evaluate and Treat MEDICAL/REFERRING DIAGNOSIS:  Pain in right knee [M25.561]   DATE OF ONSET: 8/15/2016 when patient fell at work when she tripped over a pallet and landed directly on knee, then underwent right arthroscopic knee surgery on 3/1/2017   REFERRING PHYSICIAN: Charito Mcdaniel, [facility] [address]: 2017     INITIAL ASSESSMENT:  Ms. Ethan Kingsley is a 46 y.o. female presenting s/p right arthroscopic knee surgery performed on 3/1/2017 after injuring her knee 2016 when she fell at work and tripped over a pallet and landed directly on her knee. She has reported continued pain in the knee since surgery, as well as weakness and swelling. She has been seen for 19 sessions of therapy from 3/24/2017 to 5/10/2017 with significant success. She reports her knee feels at least 70-75% better overall since surgery, and that she does get sore after stooping, lifting, standing, and walking for long periods of time. She is eager to return to work full duty, and resume all normal activities without limitations and minimal pain. She is a good candidate for continued skilled intervention with services to include manual therapy, modalities as needed, therapeutic exercises, gait/stair/transfer/balance training, activity modification, and return to work simulation  . PROBLEM LIST (Impacting functional limitations):  1.  Decreased Strength  2. Decreased ADL/Functional Activities  3. Decreased Transfer Abilities  4. Decreased Ambulation Ability/Technique  5. Decreased Balance  6. Increased Pain  7. Decreased Activity Tolerance  8. Decreased Pacing Skills  9. Increased Fatigue  10. Decreased Flexibility/Joint Mobility INTERVENTIONS PLANNED:  1. Balance Exercise  2. Cold  3. Cryotherapy  4. Electrical Stimulation  5. Gait Training  6. Heat  7. Home Exercise Program (HEP)  8. Manual Therapy  9. Neuromuscular Re-education/Strengthening  10. Range of Motion (ROM)  11. Therapeutic Exercise/Strengthening  12. Transfer Training   TREATMENT PLAN:  Effective Dates: 4/19/2017 TO 5/17/2017. Frequency/Duration: RE-CERTIFICATION: 3 times a week for 4 weeks  GOALS: (Goals have been discussed and agreed upon with patient.)  Short-Term Functional Goals: Time Frame: 3/24/2017 to 4/7/2017  1. Patient demonstrates independence with home exercise program without verbal cueing provided by therapist. - GOAL MET  2. Improve ROM to 0-120 degrees in order to normalize gait, transfers, and stairs. - GOAL MET  3. Improve flexibility of gastrocnemius and hamstrings with SLR to 80 degrees in order to decrease pain with sleeping and sitting. - GOAL MET  Discharge Goals: Time Frame: 4/19/2017 to 5/17/2017  1. Improve pain to 3/10 with work, extended walking, extended weightbearing, squatting, stooping, sleeping, and sitting. - ONGOING  2. Improve gross lower extremity strength to at least 4+/5 in order to return patient to work full-time, full-duty. - ONGOING  3. Improve gait without use of crutches without significant antalgia and appropriate heel to toe gait pattern. - GOAL MET  4. Improve stair navigation with reciprocal ascend and descend with use of hand rail. - GOAL MET  5. Improve transfers with use of hands 0-50% of the time into and out of a chair. - GOAL MET  6.  Improve Lower Extremity Functional Index score to 40/80 from 4/80. - ONGOING  Rehabilitation Potential For Stated Goals: Good  Regarding Lizzy Antoinette Eliazar's therapy, I certify that the treatment plan above will be carried out by a therapist or under their direction. Thank you for this referral,  Alphonso Hankins PTA     Referring Physician Signature: Deena Sepulveda,               Date                    The information in this section was collected on 5/10/2017 (except where otherwise noted). HISTORY:   History of Present Injury/Illness (Reason for Referral):  Ms. Angela Lee is a 46 y.o. female presenting s/p right arthroscopic knee surgery performed on 3/1/2017 after injuring her knee 8/5/2016 when she fell at work and tripped over a pallet and landed directly on her knee. She has reported continued pain in the knee since surgery, as well as weakness and swelling. She is currently full-time, light-duty and is not aware when she is supposed to return full-duty, full-time. She is eager to improve overall strength, ROM, pain, and stamina of the knee with work and with daily activities. Past Medical History/Comorbidities: Patient reported hypertension that is well controlled with lisinopril, Type II Diabetes controlled with proglitazone, metformin, and glipizide, and she has been taking naproxen for inflammation of the knee. She just had a benign tumor removed from her abdomen which required a hysterectomy in 2016. Social History/Living Environment:    Patient lives at home with sons and reports assistance from them with any heavy household chores, ADLs, heavy lifting, and heavy carrying. Prior Level of Function/Work/Activity:  Independent without dysfunction. Patient is currently light-duty, full-time with her job as a . 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 Dominant Side:         RIGHT  Current Medications:     No current outpatient prescriptions on file.    Date Last Reviewed:  5/15/2017   Number of Personal Factors/Comorbidities that affect the Plan of Care (Type II Diabetes and high job demand tasks : 1-2: MODERATE COMPLEXITY   EXAMINATION:     Observation/Postural and Gait Assessment:  Patient ambulates without crutches with minimal to no gait deviation (From bilateral crutches with step to gait on her toes and is able to ambulate with 1 crutch safely, independently, and with minimal gait deviation with verbal and visual cuing, gait without crutch antalgic with foot flat throughout stance). Medial and lateral joint line portal holes completely approximated with scar tissue (From scar tissue and scabbing). No active drainage is present, as well as no signs or symptoms of infection. Anthropometric measurements (cm) Left Right   Knee joint line 42 42 (from 43)     Palpation: Gross tenderness to palpation of medial and lateral portal holes and medial joint line. Flexibility minimally (From severely) limited of gastrocnemius. Hamstrings limited moderately to 80 (from 70) degree SLR.    ROM:     AROM(PROM) Left Right   Knee flexion 122° 120 (From 100°) - 4/19/2017   Knee extension 2° flexion contracture 2 hyperextension 4/19/2017 (From 5° flexion contracture)     Passive Accessory Mobility Testing: Mild to moderate limitations of patellofemoral joint in all planes. Strength:     Manual Muscle Test (out of 5) Left Right   Knee extension 5 4+ (From 3+), 0 degree lag with SLR   Knee flexion 5 4+ (From 4)   Hip flexion 5 4+ (From 4)   Hip extension 4 (from 3) 4 (From 3)   Hip abduction 4 (From 3) 4 (From 3)   Ankle DF 4+ 4+ (From 4)   Ankle PF 4+ 4+ (From 4)     Special Tests:  No special tests due to acuity of surgery. No signs or symptoms of infection or deep vein thrombosis at this time. Neurological Screen:  Myotomes: Key muscle strength testing for bilateral LE is WNL. Dermatomes: Sensation testing through bilateral LE for light touch is WNL.      Functional Mobility:  Patient reports improvements with gait for longer periods of time without use of crutches with minimal gait deviation (From difficulty with ambulation longer than a few minutes but safe and independent with and without crutches with step to gait). Transfers with use of hands 0-50% of the time into and out of a chair (From 100% of the time). Stairs performed reciprocally with ascend and with descend (From non-reciprocally with left leg as dominant leg with use of crutches). Body Structures Involved:  1. Bones  2. Joints  3. Muscles Body Functions Affected:  1. Sensory/Pain  2. Neuromusculoskeletal Activities and Participation Affected:  1. General Tasks and Demands  2. Mobility  3. Ability to work   Number of elements (examined above) that affect the Plan of Care: 3: MODERATE COMPLEXITY   CLINICAL PRESENTATION:   Presentation: Evolving clinical presentation with changing clinical characteristics: MODERATE COMPLEXITY   CLINICAL DECISION MAKING:   Outcome Measure: Tool Used: Lower Extremity Functional Scale (LEFS)  Score  Initial: 4/80 Most Recent: 35/80 (Date: 4/6/2017)                           26/80 (Date: 4/19/2017)                          48/80 (Date: 5/10/2017)   Interpretation of Score: 20 questions each scored on a 5 point scale with 0 representing \"extreme difficulty or unable to perform\" and 4 representing \"no difficulty\". The lower the score, the greater the functional disability. 80/80 represents no disability. Minimal detectable change is 9 points. Medical Necessity:   · Patient is expected to demonstrate progress in strength, range of motion, balance, coordination and functional technique to improve tolerance to daily activities, transfers sit to and from stand, ability to walk and stand un-aided for longer periods of time, and ability to work. · Skilled intervention continues to be required due to weakness, decreased ROM, decreased flexibility, pain, and functional limitations.   Reason for Services/Other Comments:  · Patient continues to require skilled intervention due to increasing complexity of exercises. Use of outcome tool(s) and clinical judgement create a POC that gives a (due to long standing history of knee pain and goal of return to work with high level job tasks): Questionable prediction of patient's progress: MODERATE COMPLEXITY            TREATMENT:   (In addition to Assessment/Re-Assessment sessions the following treatments were rendered)    Pre-treatment Symptoms/Complaints:  Reports her knee is not feeling right and keeps her awake at night. Pain: Initial:   Pain Intensity 1: 4  Pain Location 1: Knee  Pain Orientation 1: Right  Post Session: 4 /10 no change      Therapeutic Exercise: ( ):45 mins  Exercises per grid below to improve mobility, strength, balance and coordination. Required minimal visual, verbal and manual cues to promote proper body alignment, promote proper body posture, promote proper body mechanics and promote proper body breathing techniques. Progressed resistance, range, repetitions and complexity of movement as indicated. (used abbreviations: BET-back education training) Date:  5/10/17 Date:  5/12/17   Date:  5/15/17   Activity/Exercise Parameters Parameters Parameters   Supine knee extension hang 4 minutes --- ---------------   Calf stretch - angle board 4 x 30 sec On incline 3x30 sec hold  4 x 30 sec   Hamstring stretch Strap 3 x 30 sec ---  3 x 30 sec   Supine SLR flexion 3 x 10 4 lbs 2 lbs 2 x 10 4 lbs 3 x 10   Heel slides 20 x 5 sec  20 x 5 sec   Nu step  Level 6 x 10 mins  Level 4 x 10 mins  level 5 x 10 min   Heel toe raises X 30 reps  2 x 15 reps  3 x 10   Standing knee flexion X 20 reps43 lbs 2 x 10 reps with 4 lb. Wt.s  X 20 reps 4 lb. Wt.s    Standing hip abduction X 20 reps 4 lbs 2 x 10 reps with 4 lb wt. s  X 20 reps 4 lb. Wt.s    Standing hamstring curls  X 20 reps 4 lbs 2 x 10 reps 4 lb. Wt.s  X 20 reps 4 lb  wt. s   bridging 2 X 10 reps  X 10 reps x 5 sec hold  2 x 10   Knee extension - long arc quad 3 x 10 3 lbs 3x10 reps 4 lb. Wt.s  -------   Step ups  8 inch 2 x 10 6 inch 2 x 10  Decreased height due to pain 8 inch x 20 fwd & side stepping    Sit to and from stand 2 x 10  2  x10 reps  2x10 reps    Sideways step up and over 8 inch 2 x 10 6 inch 2 x 10 8 inch 2 x 10       Manual Therapy (     ):    · Supine deep tissue mobilization of medial and anterior knee joint  (Used abbreviations: MET - muscle energy technique; PNF - proprioceptive neuromuscular facilitation; NMR - neuromuscular re-education; a/p - anterior to posterior; p/a - posterior to anterior; NT - not tested)    Therapeutic Modalities: for edema and pain  Pt. Received vaso pneumatic compression device to right knee x 15 mins with elevation. HEP: As above; handouts given to patient for all exercises. Treatment/Session Assessment:    · Response to Treatment:  Pt. Was compliant with all exercises but continues to complain of tightness in the right  posterior knee. · Compliance with Program/Exercises: compliant most of the time. · Recommendations/Intent for next treatment session: \"Next visit will focus on advancements to more challenging activities\". RE-CERTIFICATION: 3 times a week for 4 weeks from 4/19/2017 to 5/17/2017 in order to meet the above set goals.     Total Treatment Duration: 55 minutes  PT Patient Time In/Time Out  Time In: 3600  Time Out: [address], [place]   Elfego Beltrán DPT

## 2017-05-17 ENCOUNTER — HOSPITAL ENCOUNTER (OUTPATIENT)
Dept: PHYSICAL THERAPY | Age: 52
Discharge: HOME | End: 2017-05-17
Payer: COMMERCIAL

## 2017-05-17 PROCEDURE — 97110 THERAPEUTIC EXERCISES: CPT

## 2017-05-17 PROCEDURE — 97140 MANUAL THERAPY 1/> REGIONS: CPT

## 2017-05-17 NOTE — PROGRESS NOTES
Fatou Martinez  : 1965 [facility] [place] [address], [place] [address], [address] [facility]  [phone]   [phone]         OUTPATIENT PHYSICAL THERAPY:Daily Note 2017    ICD-10: Treatment Diagnosis 1: right knee pain (M25.561)              Treatment Diagnosis 2: gait dysfunction (R26.89)              Treatment Diagnosis 3: right knee primary localized osteoarthritis (M17.11)  Precautions: Type II Diabetes  Allergies:   Review of patient's allergies indicates not on file. Fall Risk Score: 1 (? 5 = High Risk)  MD Orders: Evaluate and Treat MEDICAL/REFERRING DIAGNOSIS:  Pain in right knee [M25.561]   DATE OF ONSET: 8/15/2016 when patient fell at work when she tripped over a pallet and landed directly on knee, then underwent right arthroscopic knee surgery on 3/1/2017   REFERRING PHYSICIAN: Sourav Aleman, [facility] [facility] [address]: 2017     INITIAL ASSESSMENT:  Ms. Aline Alcala is a 46 y.o. female presenting s/p right arthroscopic knee surgery performed on 3/1/2017 after injuring her knee 2016 when she fell at work and tripped over a pallet and landed directly on her knee. She has reported continued pain in the knee since surgery, as well as weakness and swelling. She has been seen for 19 sessions of therapy from 3/24/2017 to 5/10/2017 with significant success. She reports her knee feels at least 70-75% better overall since surgery, and that she does get sore after stooping, lifting, standing, and walking for long periods of time. She is eager to return to work full duty, and resume all normal activities without limitations and minimal pain. She is a good candidate for continued skilled intervention with services to include manual therapy, modalities as needed, therapeutic exercises, gait/stair/transfer/balance training, activity modification, and return to work simulation  . PROBLEM LIST (Impacting functional limitations):  1.  Decreased Strength  2. Decreased ADL/Functional Activities  3. Decreased Transfer Abilities  4. Decreased Ambulation Ability/Technique  5. Decreased Balance  6. Increased Pain  7. Decreased Activity Tolerance  8. Decreased Pacing Skills  9. Increased Fatigue  10. Decreased Flexibility/Joint Mobility INTERVENTIONS PLANNED:  1. Balance Exercise  2. Cold  3. Cryotherapy  4. Electrical Stimulation  5. Gait Training  6. Heat  7. Home Exercise Program (HEP)  8. Manual Therapy  9. Neuromuscular Re-education/Strengthening  10. Range of Motion (ROM)  11. Therapeutic Exercise/Strengthening  12. Transfer Training   TREATMENT PLAN:  Effective Dates: 4/19/2017 TO 5/17/2017. Frequency/Duration: RE-CERTIFICATION: 3 times a week for 4 weeks  GOALS: (Goals have been discussed and agreed upon with patient.)  Short-Term Functional Goals: Time Frame: 3/24/2017 to 4/7/2017  1. Patient demonstrates independence with home exercise program without verbal cueing provided by therapist. - GOAL MET  2. Improve ROM to 0-120 degrees in order to normalize gait, transfers, and stairs. - GOAL MET  3. Improve flexibility of gastrocnemius and hamstrings with SLR to 80 degrees in order to decrease pain with sleeping and sitting. - GOAL MET  Discharge Goals: Time Frame: 4/19/2017 to 5/17/2017  1. Improve pain to 3/10 with work, extended walking, extended weightbearing, squatting, stooping, sleeping, and sitting. - ONGOING  2. Improve gross lower extremity strength to at least 4+/5 in order to return patient to work full-time, full-duty. - ONGOING  3. Improve gait without use of crutches without significant antalgia and appropriate heel to toe gait pattern. - GOAL MET  4. Improve stair navigation with reciprocal ascend and descend with use of hand rail. - GOAL MET  5. Improve transfers with use of hands 0-50% of the time into and out of a chair. - GOAL MET  6.  Improve Lower Extremity Functional Index score to 40/80 from 4/80. - ONGOING  Rehabilitation Potential For Stated Goals: Good  Regarding Neelima Perea's therapy, I certify that the treatment plan above will be carried out by a therapist or under their direction. Thank you for this referral,  Deyanira Cornejo, PT     Referring Physician Signature: Gwen Manley, DO              Date                    The information in this section was collected on 5/10/2017 (except where otherwise noted). HISTORY:   History of Present Injury/Illness (Reason for Referral):  Ms. Yessenia Marie is a 46 y.o. female presenting s/p right arthroscopic knee surgery performed on 3/1/2017 after injuring her knee 8/5/2016 when she fell at work and tripped over a pallet and landed directly on her knee. She has reported continued pain in the knee since surgery, as well as weakness and swelling. She is currently full-time, light-duty and is not aware when she is supposed to return full-duty, full-time. She is eager to improve overall strength, ROM, pain, and stamina of the knee with work and with daily activities. Past Medical History/Comorbidities: Patient reported hypertension that is well controlled with lisinopril, Type II Diabetes controlled with proglitazone, metformin, and glipizide, and she has been taking naproxen for inflammation of the knee. She just had a benign tumor removed from her abdomen which required a hysterectomy in 2016. Social History/Living Environment:    Patient lives at home with sons and reports assistance from them with any heavy household chores, ADLs, heavy lifting, and heavy carrying. Prior Level of Function/Work/Activity:  Independent without dysfunction. Patient is currently light-duty, full-time with her job as a . 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 Dominant Side:         RIGHT  Current Medications:     No current outpatient prescriptions on file.    Date Last Reviewed:  5/17/2017   Number of Personal Factors/Comorbidities that affect the Plan of Care (Type II Diabetes and high job demand tasks : 1-2: MODERATE COMPLEXITY   EXAMINATION:     Observation/Postural and Gait Assessment:  Patient ambulates without crutches with minimal to no gait deviation (From bilateral crutches with step to gait on her toes and is able to ambulate with 1 crutch safely, independently, and with minimal gait deviation with verbal and visual cuing, gait without crutch antalgic with foot flat throughout stance). Medial and lateral joint line portal holes completely approximated with scar tissue (From scar tissue and scabbing). No active drainage is present, as well as no signs or symptoms of infection. Anthropometric measurements (cm) Left Right   Knee joint line 42 42 (from 43)     Palpation: Gross tenderness to palpation of medial and lateral portal holes and medial joint line. Flexibility minimally (From severely) limited of gastrocnemius. Hamstrings limited moderately to 80 (from 70) degree SLR.    ROM:     AROM(PROM) Left Right   Knee flexion 122° 120 (From 100°) - 4/19/2017   Knee extension 2° flexion contracture 2 hyperextension 4/19/2017 (From 5° flexion contracture)     Passive Accessory Mobility Testing: Mild to moderate limitations of patellofemoral joint in all planes. Strength:     Manual Muscle Test (out of 5) Left Right   Knee extension 5 4+ (From 3+), 0 degree lag with SLR   Knee flexion 5 4+ (From 4)   Hip flexion 5 4+ (From 4)   Hip extension 4 (from 3) 4 (From 3)   Hip abduction 4 (From 3) 4 (From 3)   Ankle DF 4+ 4+ (From 4)   Ankle PF 4+ 4+ (From 4)     Special Tests:  No special tests due to acuity of surgery. No signs or symptoms of infection or deep vein thrombosis at this time. Neurological Screen:  Myotomes: Key muscle strength testing for bilateral LE is WNL. Dermatomes: Sensation testing through bilateral LE for light touch is WNL.      Functional Mobility:  Patient reports improvements with gait for longer periods of time without use of crutches with minimal gait deviation (From difficulty with ambulation longer than a few minutes but safe and independent with and without crutches with step to gait). Transfers with use of hands 0-50% of the time into and out of a chair (From 100% of the time). Stairs performed reciprocally with ascend and with descend (From non-reciprocally with left leg as dominant leg with use of crutches). Body Structures Involved:  1. Bones  2. Joints  3. Muscles Body Functions Affected:  1. Sensory/Pain  2. Neuromusculoskeletal Activities and Participation Affected:  1. General Tasks and Demands  2. Mobility  3. Ability to work   Number of elements (examined above) that affect the Plan of Care: 3: MODERATE COMPLEXITY   CLINICAL PRESENTATION:   Presentation: Evolving clinical presentation with changing clinical characteristics: MODERATE COMPLEXITY   CLINICAL DECISION MAKING:   Outcome Measure: Tool Used: Lower Extremity Functional Scale (LEFS)  Score  Initial: 4/80 Most Recent: 35/80 (Date: 4/6/2017)                           26/80 (Date: 4/19/2017)                          48/80 (Date: 5/10/2017)                          46/80 (Date: 5/17/2017)   Interpretation of Score: 20 questions each scored on a 5 point scale with 0 representing \"extreme difficulty or unable to perform\" and 4 representing \"no difficulty\". The lower the score, the greater the functional disability. 80/80 represents no disability. Minimal detectable change is 9 points. Medical Necessity:   · Patient is expected to demonstrate progress in strength, range of motion, balance, coordination and functional technique to improve tolerance to daily activities, transfers sit to and from stand, ability to walk and stand un-aided for longer periods of time, and ability to work. · Skilled intervention continues to be required due to weakness, decreased ROM, decreased flexibility, pain, and functional limitations.   Reason for Services/Other Comments:  · Patient continues to require skilled intervention due to increasing complexity of exercises. Use of outcome tool(s) and clinical judgement create a POC that gives a (due to long standing history of knee pain and goal of return to work with high level job tasks): Questionable prediction of patient's progress: MODERATE COMPLEXITY            TREATMENT:   (In addition to Assessment/Re-Assessment sessions the following treatments were rendered)    Pre-treatment Symptoms/Complaints:  Patient sees the MD tomorrow and still has 2 visits authorized. Will do a re-certification if MD would like additional therapy. Pain: Initial:   Pain Intensity 1: 3  Pain Location 1: Knee  Pain Orientation 1: Right  Post Session: 3/10       Therapeutic Exercise: (35 Minutes):  Exercises per grid below to improve mobility, strength, balance and coordination. Required minimal visual, verbal and manual cues to promote proper body alignment, promote proper body posture, promote proper body mechanics and promote proper body breathing techniques. Progressed resistance, range, repetitions and complexity of movement as indicated. (used abbreviations: BET-back education training) Date:  5/17/17 Date:  5/12/17   Date:  5/15/17   Activity/Exercise Parameters Parameters Parameters   Supine knee extension hang --- --- ---------------   Calf stretch - angle board 4 x 30 sec On incline 3x30 sec hold  4 x 30 sec   Hamstring stretch Strap 3 x 30 sec ---  3 x 30 sec   Supine SLR flexion --- 2 lbs 2 x 10 4 lbs 3 x 10   Heel slides ---  20 x 5 sec   Nu step  Level 6 x5 mins  Level 4 x 10 mins  level 5 x 10 min   Heel toe raises X 30 reps  2 x 15 reps  3 x 10   Standing knee flexion X 20 reps 4 lbs 2 x 10 reps with 4 lb. Wt.s  X 20 reps 4 lb. Wt.s    Standing hip abduction X 20 reps 4 lbs 2 x 10 reps with 4 lb wt. s  X 20 reps 4 lb. Wt.s    Standing hamstring curls  X 20 reps 4 lbs 2 x 10 reps 4 lb. Wt.s  X 20 reps 4 lb  wt. s   bridging --- X 10 reps x 5 sec hold  2 x 10   Knee extension - long arc quad 3 x 10 4 lbs 3x10 reps 4 lb. Wt.s  -------   Step ups  8 inch 2 x 10 6 inch 2 x 10  Decreased height due to pain 8 inch x 20 fwd & side stepping    Sit to and from stand 2 x 10  2  x10 reps  2x10 reps    Sideways step up and over 8 inch 2 x 10 6 inch 2 x 10 8 inch 2 x 10       Manual Therapy (    Soft Tissue Mobilization Duration  Duration: 10 Minutes):    · Supine deep tissue mobilization of medial and anterior knee joint  (Used abbreviations: MET - muscle energy technique; PNF - proprioceptive neuromuscular facilitation; NMR - neuromuscular re-education; a/p - anterior to posterior; p/a - posterior to anterior; NT - not tested)    Therapeutic Modalities: for edema and pain                                                                                                HEP: As above; handouts given to patient for all exercises. Treatment/Session Assessment:    · Response to Treatment:  Patient was late today so session was cut short due to traffic. Tolerated session well and was advised to ice at home. She follows up with MD tomorrow. Will put patient on hold until then. · Compliance with Program/Exercises: compliant most of the time. · Recommendations/Intent for next treatment session: \"Next visit will focus on advancements to more challenging activities\". RE-CERTIFICATION: 3 times a week for 4 weeks from 4/19/2017 to 5/17/2017 in order to meet the above set goals.     Total Treatment Duration: 45 minutes  PT Patient Time In/Time Out  Time In: 1550  Time Out: Nahun Escobedo DPT

## 2017-05-17 NOTE — PROGRESS NOTES
Ezekiel Enriquez  : 1965 [facility] [address], [place], [address] [place] [address] Street  [phone]   [phone]        Outpatient PHYSICAL THERAPY: PHYSICIAN COMMUNICATION    REFERRING PHYSICIAN: Carina Turner DO  Return Physician Appointment: 2017  MEDICAL/REFERRING DIAGNOSIS:  · Pain in right knee [M25.561]  ATTENDANCE: Ezekiel Enriquez has attended 25 visits of therapy from 3/24/2017 to 2017 s/p right knee arthroscopy on 3/1/2017    ASSESSMENT:  DATE: 2017    PROGRESS: Ezekiel Enriquez has made significant progress since the start of therapy. She reports feeling at least 70% better overall since surgery, and is able to tolerate 8 inch step ups and sit to stand with minimal complaints. However, she still reports difficulty and pain with standing and walking for longs periods of time and squatting/bending. She is eager to reduce her overall pain and return to work full duty without restrictions but is nervous about returning to work full duty at this time due to current dysfunction and pain. RECOMMENDATIONS: Please advise regarding continuing therapy and return to work restrictions at this time. She has been pleasant to work with and has been compliant with therapy thus far.       Thank you for this referral and please do not hesitate to contact us at the number listed above if you have any questions,    Keyon Guevara, PT   Physical Therapist

## 2017-05-19 ENCOUNTER — APPOINTMENT (OUTPATIENT)
Dept: PHYSICAL THERAPY | Age: 52
End: 2017-05-19
Payer: COMMERCIAL

## 2017-05-22 ENCOUNTER — APPOINTMENT (OUTPATIENT)
Dept: PHYSICAL THERAPY | Age: 52
End: 2017-05-22
Payer: COMMERCIAL

## 2017-05-25 ENCOUNTER — APPOINTMENT (OUTPATIENT)
Dept: PHYSICAL THERAPY | Age: 52
End: 2017-05-25
Payer: COMMERCIAL

## 2017-06-02 ENCOUNTER — HOSPITAL ENCOUNTER (OUTPATIENT)
Dept: PHYSICAL THERAPY | Age: 52
Discharge: HOME | End: 2017-06-02
Payer: COMMERCIAL

## 2017-06-02 PROCEDURE — 97016 VASOPNEUMATIC DEVICE THERAPY: CPT

## 2017-06-02 PROCEDURE — 97140 MANUAL THERAPY 1/> REGIONS: CPT

## 2017-06-02 PROCEDURE — 97110 THERAPEUTIC EXERCISES: CPT

## 2017-06-02 NOTE — PROGRESS NOTES
Jannie Rios  : 1965 [facility] [address], [address]  [phone]   [phone]         OUTPATIENT PHYSICAL THERAPY:Daily Note and Recertification     ICD-10: Treatment Diagnosis 1: right knee pain (M25.561)              Treatment Diagnosis 2: gait dysfunction (R26.89)              Treatment Diagnosis 3: right knee primary localized osteoarthritis (M17.11)  Precautions: Type II Diabetes  Allergies:   Review of patient's allergies indicates not on file. Fall Risk Score: 1 (? 5 = High Risk)  MD Orders: Evaluate and Treat MEDICAL/REFERRING DIAGNOSIS:  Pain in right knee [M25.561]   DATE OF ONSET: 8/15/2016 when patient fell at work when she tripped over a pallet and landed directly on knee, then underwent right arthroscopic knee surgery on 3/1/2017   REFERRING PHYSICIAN: Venita Pascual, [address] [facility] [address]: 2017     INITIAL ASSESSMENT:  Ms. Drake Ventura is a 46 y.o. female presenting s/p right arthroscopic knee surgery performed on 3/1/2017 after injuring her knee 2016 when she fell at work and tripped over a pallet and landed directly on her knee. She has reported continued pain in the knee since surgery, as well as weakness and swelling. She has been seen for 23 sessions of therapy from 3/24/2017 to 2017 with significant success. Patient arrived into clinic on 2017 reporting that she was in the ER 2017 because she was full duty on the floor yesterday against MD recommendations. She reports severe pain and swelling. She is eager to continue therapy, but reports a significant set back at this time. Will continue therapy with an emphasis on returning patient full time, full duty to work.   She is a good candidate for continued skilled intervention with services to include manual therapy, modalities as needed, therapeutic exercises, gait/stair/transfer/balance training, activity modification, and return to work simulation  . PROBLEM LIST (Impacting functional limitations):  1. Decreased Strength  2. Decreased ADL/Functional Activities  3. Decreased Transfer Abilities  4. Decreased Ambulation Ability/Technique  5. Decreased Balance  6. Increased Pain  7. Decreased Activity Tolerance  8. Decreased Pacing Skills  9. Increased Fatigue  10. Decreased Flexibility/Joint Mobility INTERVENTIONS PLANNED:  1. Balance Exercise  2. Cold  3. Cryotherapy  4. Electrical Stimulation  5. Gait Training  6. Heat  7. Home Exercise Program (HEP)  8. Manual Therapy  9. Neuromuscular Re-education/Strengthening  10. Range of Motion (ROM)  11. Therapeutic Exercise/Strengthening  12. Transfer Training   TREATMENT PLAN:  Effective Dates: 6/2/2017 TO 6/30/2017. Frequency/Duration: RE-CERTIFICATION: 3 times a week for 4 weeks  GOALS: (Goals have been discussed and agreed upon with patient.)  Short-Term Functional Goals: Time Frame: 3/24/2017 to 4/7/2017  1. Patient demonstrates independence with home exercise program without verbal cueing provided by therapist. - GOAL MET  2. Improve ROM to 0-120 degrees in order to normalize gait, transfers, and stairs. - GOAL MET  3. Improve flexibility of gastrocnemius and hamstrings with SLR to 80 degrees in order to decrease pain with sleeping and sitting. - GOAL MET  Discharge Goals: Time Frame: 6/2/2017 to 6/30/2017   1. Improve pain to 3/10 with work, extended walking, extended weightbearing, squatting, stooping, sleeping, and sitting. - ONGOING  2. Improve gross lower extremity strength to at least 4+/5 in order to return patient to work full-time, full-duty. - ONGOING  3. Improve gait without use of crutches without significant antalgia and appropriate heel to toe gait pattern. - GOAL MET  4. Improve stair navigation with reciprocal ascend and descend with use of hand rail. - GOAL MET  5. Improve transfers with use of hands 0-50% of the time into and out of a chair. - GOAL MET  6.  Return patient to full time, full duty work as a . - NEW GOAL  7. Improve Lower Extremity Functional Index score to 40/80 from 4/80. - ONGOING  Rehabilitation Potential For Stated Goals: Good  Regarding Latesha Perea's therapy, I certify that the treatment plan above will be carried out by a therapist or under their direction. Thank you for this referral,  Norberto Dawson, PT     Referring Physician Signature: Fabi Grace DO              Date                    The information in this section was collected on 6/2/2017 (except where otherwise noted). HISTORY:   History of Present Injury/Illness (Reason for Referral):  Ms. Arianne Correa is a 46 y.o. female presenting s/p right arthroscopic knee surgery performed on 3/1/2017 after injuring her knee 8/5/2016 when she fell at work and tripped over a pallet and landed directly on her knee. She has reported continued pain in the knee since surgery, as well as weakness and swelling. She is currently full-time, light-duty and is not aware when she is supposed to return full-duty, full-time. She is eager to improve overall strength, ROM, pain, and stamina of the knee with work and with daily activities. Past Medical History/Comorbidities: Patient reported hypertension that is well controlled with lisinopril, Type II Diabetes controlled with proglitazone, metformin, and glipizide, and she has been taking naproxen for inflammation of the knee. She just had a benign tumor removed from her abdomen which required a hysterectomy in 2016. Social History/Living Environment:    Patient lives at home with sons and reports assistance from them with any heavy household chores, ADLs, heavy lifting, and heavy carrying. Prior Level of Function/Work/Activity:  Independent without dysfunction. Patient is currently light-duty, full-time with her job as a . 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   Dominant Side: RIGHT  Current Medications:     No current outpatient prescriptions on file. Date Last Reviewed:  6/2/2017   Number of Personal Factors/Comorbidities that affect the Plan of Care (Type II Diabetes and high job demand tasks : 1-2: MODERATE COMPLEXITY   EXAMINATION:     Observation/Postural and Gait Assessment:  Patient ambulates without crutches with minimal to no gait deviation usually but came into clinic on 6/2/2017 with significant gait deviation (From bilateral crutches with step to gait on her toes and is able to ambulate with 1 crutch safely, independently, and with minimal gait deviation with verbal and visual cuing, gait without crutch antalgic with foot flat throughout stance). Medial and lateral joint line portal holes completely approximated with scar tissue (From scar tissue and scabbing). No active drainage is present, as well as no signs or symptoms of infection. Anthropometric measurements (cm) Left Right   Knee joint line 42 42 (from 43)     Palpation: Gross tenderness to palpation of medial and lateral portal holes and medial joint line. Flexibility minimally (From severely) limited of gastrocnemius. Hamstrings limited moderately to 80 (from 70) degree SLR.    ROM:     AROM(PROM) Left Right   Knee flexion 122° 120 (From 100°) - 4/19/2017   Knee extension 2° flexion contracture 2 hyperextension 4/19/2017 (From 5° flexion contracture)     Passive Accessory Mobility Testing: Mild to moderate limitations of patellofemoral joint in all planes. Strength:   Significant dysfunction on 6/2/2017 from set back 6/1/2017 with pain and ER   Manual Muscle Test (out of 5) Left Right   Knee extension 5 4+ (From 3+), 0 degree lag with SLR   Knee flexion 5 4+ (From 4)   Hip flexion 5 4+ (From 4)   Hip extension 4 (from 3) 4 (From 3)   Hip abduction 4 (From 3) 4 (From 3)   Ankle DF 4+ 4+ (From 4)   Ankle PF 4+ 4+ (From 4)     Special Tests:  No special tests due to acuity of surgery.   No signs or symptoms of infection or deep vein thrombosis at this time. Neurological Screen:  Myotomes: Key muscle strength testing for bilateral LE is WNL. Dermatomes: Sensation testing through bilateral LE for light touch is WNL. Functional Mobility:  Patient reports improvements with gait for longer periods of time without use of crutches with minimal gait deviation (From difficulty with ambulation longer than a few minutes but safe and independent with and without crutches with step to gait). Transfers with use of hands 0-50% of the time into and out of a chair (From 100% of the time). Stairs performed reciprocally with ascend and with descend (From non-reciprocally with left leg as dominant leg with use of crutches). Body Structures Involved:  1. Bones  2. Joints  3. Muscles Body Functions Affected:  1. Sensory/Pain  2. Neuromusculoskeletal Activities and Participation Affected:  1. General Tasks and Demands  2. Mobility  3. Ability to work   Number of elements (examined above) that affect the Plan of Care: 3: MODERATE COMPLEXITY   CLINICAL PRESENTATION:   Presentation: Evolving clinical presentation with changing clinical characteristics: MODERATE COMPLEXITY   CLINICAL DECISION MAKING:   Outcome Measure: Tool Used: Lower Extremity Functional Scale (LEFS)  Score  Initial: 4/80 Most Recent: 35/80 (Date: 4/6/2017)                           26/80 (Date: 4/19/2017)                          48/80 (Date: 5/10/2017)                          46/80 (Date: 5/17/2017)   Interpretation of Score: 20 questions each scored on a 5 point scale with 0 representing \"extreme difficulty or unable to perform\" and 4 representing \"no difficulty\". The lower the score, the greater the functional disability. 80/80 represents no disability. Minimal detectable change is 9 points.     Medical Necessity:   · Patient is expected to demonstrate progress in strength, range of motion, balance, coordination and functional technique to improve tolerance to daily activities, transfers sit to and from stand, ability to walk and stand un-aided for longer periods of time, and ability to work. · Skilled intervention continues to be required due to weakness, decreased ROM, decreased flexibility, pain, and functional limitations. Reason for Services/Other Comments:  · Patient continues to require skilled intervention due to increasing complexity of exercises. Use of outcome tool(s) and clinical judgement create a POC that gives a (due to long standing history of knee pain and goal of return to work with high level job tasks): Questionable prediction of patient's progress: MODERATE COMPLEXITY            TREATMENT:   (In addition to Assessment/Re-Assessment sessions the following treatments were rendered)    Pre-treatment Symptoms/Complaints:  Patient reported having to go to the ER last night because she was made to work full duty all day yesterday. Pain: Initial:   Pain Intensity 1: 7  Pain Location 1: Knee  Pain Orientation 1: Right  Post Session: 6/10      Therapeutic Exercise: (20 Minutes):  Exercises per grid below to improve mobility, strength, balance and coordination. Required minimal visual, verbal and manual cues to promote proper body alignment, promote proper body posture, promote proper body mechanics and promote proper body breathing techniques. Progressed resistance, range, repetitions and complexity of movement as indicated.     (used abbreviations: BET-back education training) Date:  5/17/17 Date:  6/2/17   Date:  5/15/17   Activity/Exercise Parameters Parameters Parameters   Supine knee extension hang --- --- ---------------   Calf stretch - angle board 4 x 30 sec On incline 3x30 sec hold  4 x 30 sec   Hamstring stretch Strap 3 x 30 sec Strap 3 x 30 sec 3 x 30 sec   Supine SLR flexion --- --- 4 lbs 3 x 10   Heel slides --- --- 20 x 5 sec   Nu step  Level 6 x5 mins  Level 4 x 10 mins  level 5 x 10 min   Heel toe raises X 30 reps  2 x 15 reps  3 x 10   Standing knee flexion X 20 reps 4 lbs ---  X 20 reps 4 lb. Wt.s    Standing hip abduction X 20 reps 4 lbs ---  X 20 reps 4 lb. Wt.s    Standing hamstring curls  X 20 reps 4 lbs ---  X 20 reps 4 lb  wt. s   bridging --- ---  2 x 10   Knee extension - long arc quad 3 x 10 4 lbs --- -------   Step ups  8 inch 2 x 10 --- 8 inch x 20 fwd & side stepping    Sit to and from stand 2 x 10  --- 2x10 reps    Sideways step up and over 8 inch 2 x 10 --- 8 inch 2 x 10       Manual Therapy (    Soft Tissue Mobilization Duration  Duration: 20 Minutes):    · Supine deep tissue mobilization of medial and anterior knee joint  · Kinesiology taping of anterior knee joint with oval formation  (Used abbreviations: MET - muscle energy technique; PNF - proprioceptive neuromuscular facilitation; NMR - neuromuscular re-education; a/p - anterior to posterior; p/a - posterior to anterior; NT - not tested)    Therapeutic Modalities: for edema and pain                       Right Knee Cold  Type:  (vasopneumatic)  Duration: 15 minutes  Patient Position: Supine                                                                        HEP: As above; handouts given to patient for all exercises. Treatment/Session Assessment:    · Response to Treatment:  Patient reported severe pain with exercises today so session was abbreviated with exercises. More manual therapy and ice time today. Will progress patient as tolerated with emphasis on returning patient to full time, full duty work as a . Patient cried throughout session today due to the pain and consented to all treatments verbally. Patient also was issued a HEP on paper for the weekend per her request (none of the exercises were new). · Compliance with Program/Exercises: compliant most of the time. · Recommendations/Intent for next treatment session: \"Next visit will focus on advancements to more challenging activities\".     RE-CERTIFICATION: 3 times a week for 4 weeks from 6/2/2017 to 6/30/2017 in order to meet the above set goals.     Total Treatment Duration: 55 minutes  PT Patient Time In/Time Out  Time In: 0905  Time Out: [address] Nahun Christian DPT

## 2017-06-05 ENCOUNTER — HOSPITAL ENCOUNTER (OUTPATIENT)
Dept: PHYSICAL THERAPY | Age: 52
Discharge: HOME | End: 2017-06-05
Payer: COMMERCIAL

## 2017-06-05 PROCEDURE — 97140 MANUAL THERAPY 1/> REGIONS: CPT

## 2017-06-05 PROCEDURE — 97110 THERAPEUTIC EXERCISES: CPT

## 2017-06-05 NOTE — PROGRESS NOTES
Emilee Eliu  : [address] [facility] [place] [address], Jose torres, [address] [facility]  [phone]   [phone]         OUTPATIENT PHYSICAL THERAPY:Daily Note 2017    ICD-10: Treatment Diagnosis 1: right knee pain (M25.561)              Treatment Diagnosis 2: gait dysfunction (R26.89)              Treatment Diagnosis 3: right knee primary localized osteoarthritis (M17.11)  Precautions: Type II Diabetes  Allergies:   Review of patient's allergies indicates not on file. Fall Risk Score: 1 (? 5 = High Risk)  MD Orders: Evaluate and Treat MEDICAL/REFERRING DIAGNOSIS:  Pain in right knee [M25.561]   DATE OF ONSET: 8/15/2016 when patient fell at work when she tripped over a pallet and landed directly on knee, then underwent right arthroscopic knee surgery on 3/1/2017   REFERRING PHYSICIAN: Fabi Grace, [address] [place] [address] Avenue: 2017     INITIAL ASSESSMENT:  Ms. Arianne Correa is a 46 y.o. female presenting s/p right arthroscopic knee surgery performed on 3/1/2017 after injuring her knee 2016 when she fell at work and tripped over a pallet and landed directly on her knee. She has reported continued pain in the knee since surgery, as well as weakness and swelling. She has been seen for 23 sessions of therapy from 3/24/2017 to 2017 with significant success. Patient arrived into clinic on 2017 reporting that she was in the ER 2017 because she was full duty on the floor yesterday against MD recommendations. She reports severe pain and swelling. She is eager to continue therapy, but reports a significant set back at this time. Will continue therapy with an emphasis on returning patient full time, full duty to work.   She is a good candidate for continued skilled intervention with services to include manual therapy, modalities as needed, therapeutic exercises, gait/stair/transfer/balance training, activity modification, and return to work simulation  . PROBLEM LIST (Impacting functional limitations):  1. Decreased Strength  2. Decreased ADL/Functional Activities  3. Decreased Transfer Abilities  4. Decreased Ambulation Ability/Technique  5. Decreased Balance  6. Increased Pain  7. Decreased Activity Tolerance  8. Decreased Pacing Skills  9. Increased Fatigue  10. Decreased Flexibility/Joint Mobility INTERVENTIONS PLANNED:  1. Balance Exercise  2. Cold  3. Cryotherapy  4. Electrical Stimulation  5. Gait Training  6. Heat  7. Home Exercise Program (HEP)  8. Manual Therapy  9. Neuromuscular Re-education/Strengthening  10. Range of Motion (ROM)  11. Therapeutic Exercise/Strengthening  12. Transfer Training   TREATMENT PLAN:  Effective Dates: 6/2/2017 TO 6/30/2017. Frequency/Duration: RE-CERTIFICATION: 3 times a week for 4 weeks  GOALS: (Goals have been discussed and agreed upon with patient.)  Short-Term Functional Goals: Time Frame: 3/24/2017 to 4/7/2017  1. Patient demonstrates independence with home exercise program without verbal cueing provided by therapist. - GOAL MET  2. Improve ROM to 0-120 degrees in order to normalize gait, transfers, and stairs. - GOAL MET  3. Improve flexibility of gastrocnemius and hamstrings with SLR to 80 degrees in order to decrease pain with sleeping and sitting. - GOAL MET  Discharge Goals: Time Frame: 6/2/2017 to 6/30/2017   1. Improve pain to 3/10 with work, extended walking, extended weightbearing, squatting, stooping, sleeping, and sitting. - ONGOING  2. Improve gross lower extremity strength to at least 4+/5 in order to return patient to work full-time, full-duty. - ONGOING  3. Improve gait without use of crutches without significant antalgia and appropriate heel to toe gait pattern. - GOAL MET  4. Improve stair navigation with reciprocal ascend and descend with use of hand rail. - GOAL MET  5. Improve transfers with use of hands 0-50% of the time into and out of a chair. - GOAL MET  6.  Return patient to full time, full duty work as a . - NEW GOAL  7. Improve Lower Extremity Functional Index score to 40/80 from 4/80. - ONGOING  Rehabilitation Potential For Stated Goals: Good  Regarding Latesha Perea's therapy, I certify that the treatment plan above will be carried out by a therapist or under their direction. Thank you for this referral,  Aracelis Paul PTA     Referring Physician Signature: Keyla Benitez,               Date                    The information in this section was collected on 6/2/2017 (except where otherwise noted). HISTORY:   History of Present Injury/Illness (Reason for Referral):  Ms. Mela Mary is a 46 y.o. female presenting s/p right arthroscopic knee surgery performed on 3/1/2017 after injuring her knee 8/5/2016 when she fell at work and tripped over a pallet and landed directly on her knee. She has reported continued pain in the knee since surgery, as well as weakness and swelling. She is currently full-time, light-duty and is not aware when she is supposed to return full-duty, full-time. She is eager to improve overall strength, ROM, pain, and stamina of the knee with work and with daily activities. Past Medical History/Comorbidities: Patient reported hypertension that is well controlled with lisinopril, Type II Diabetes controlled with proglitazone, metformin, and glipizide, and she has been taking naproxen for inflammation of the knee. She just had a benign tumor removed from her abdomen which required a hysterectomy in 2016. Social History/Living Environment:    Patient lives at home with sons and reports assistance from them with any heavy household chores, ADLs, heavy lifting, and heavy carrying. Prior Level of Function/Work/Activity:  Independent without dysfunction. Patient is currently light-duty, full-time with her job as a . 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   Dominant Side:         RIGHT  Current Medications:     No current outpatient prescriptions on file. Date Last Reviewed:  6/5/2017   Number of Personal Factors/Comorbidities that affect the Plan of Care (Type II Diabetes and high job demand tasks : 1-2: MODERATE COMPLEXITY   EXAMINATION:     Observation/Postural and Gait Assessment:  Patient ambulates without crutches with minimal to no gait deviation usually but came into clinic on 6/2/2017 with significant gait deviation (From bilateral crutches with step to gait on her toes and is able to ambulate with 1 crutch safely, independently, and with minimal gait deviation with verbal and visual cuing, gait without crutch antalgic with foot flat throughout stance). Medial and lateral joint line portal holes completely approximated with scar tissue (From scar tissue and scabbing). No active drainage is present, as well as no signs or symptoms of infection. Anthropometric measurements (cm) Left Right   Knee joint line 42 42 (from 43)     Palpation: Gross tenderness to palpation of medial and lateral portal holes and medial joint line. Flexibility minimally (From severely) limited of gastrocnemius. Hamstrings limited moderately to 80 (from 70) degree SLR.    ROM:     AROM(PROM) Left Right   Knee flexion 122° 120 (From 100°) - 4/19/2017   Knee extension 2° flexion contracture 2 hyperextension 4/19/2017 (From 5° flexion contracture)     Passive Accessory Mobility Testing: Mild to moderate limitations of patellofemoral joint in all planes. Strength:   Significant dysfunction on 6/2/2017 from set back 6/1/2017 with pain and ER   Manual Muscle Test (out of 5) Left Right   Knee extension 5 4+ (From 3+), 0 degree lag with SLR   Knee flexion 5 4+ (From 4)   Hip flexion 5 4+ (From 4)   Hip extension 4 (from 3) 4 (From 3)   Hip abduction 4 (From 3) 4 (From 3)   Ankle DF 4+ 4+ (From 4)   Ankle PF 4+ 4+ (From 4)     Special Tests:  No special tests due to acuity of surgery.   No signs or symptoms of infection or deep vein thrombosis at this time. Neurological Screen:  Myotomes: Key muscle strength testing for bilateral LE is WNL. Dermatomes: Sensation testing through bilateral LE for light touch is WNL. Functional Mobility:  Patient reports improvements with gait for longer periods of time without use of crutches with minimal gait deviation (From difficulty with ambulation longer than a few minutes but safe and independent with and without crutches with step to gait). Transfers with use of hands 0-50% of the time into and out of a chair (From 100% of the time). Stairs performed reciprocally with ascend and with descend (From non-reciprocally with left leg as dominant leg with use of crutches). Body Structures Involved:  1. Bones  2. Joints  3. Muscles Body Functions Affected:  1. Sensory/Pain  2. Neuromusculoskeletal Activities and Participation Affected:  1. General Tasks and Demands  2. Mobility  3. Ability to work   Number of elements (examined above) that affect the Plan of Care: 3: MODERATE COMPLEXITY   CLINICAL PRESENTATION:   Presentation: Evolving clinical presentation with changing clinical characteristics: MODERATE COMPLEXITY   CLINICAL DECISION MAKING:   Outcome Measure: Tool Used: Lower Extremity Functional Scale (LEFS)  Score  Initial: 4/80 Most Recent: 35/80 (Date: 4/6/2017)                           26/80 (Date: 4/19/2017)                          48/80 (Date: 5/10/2017)                          46/80 (Date: 5/17/2017)   Interpretation of Score: 20 questions each scored on a 5 point scale with 0 representing \"extreme difficulty or unable to perform\" and 4 representing \"no difficulty\". The lower the score, the greater the functional disability. 80/80 represents no disability. Minimal detectable change is 9 points.     Medical Necessity:   · Patient is expected to demonstrate progress in strength, range of motion, balance, coordination and functional technique to improve tolerance to daily activities, transfers sit to and from stand, ability to walk and stand un-aided for longer periods of time, and ability to work. · Skilled intervention continues to be required due to weakness, decreased ROM, decreased flexibility, pain, and functional limitations. Reason for Services/Other Comments:  · Patient continues to require skilled intervention due to increasing complexity of exercises. Use of outcome tool(s) and clinical judgement create a POC that gives a (due to long standing history of knee pain and goal of return to work with high level job tasks): Questionable prediction of patient's progress: MODERATE COMPLEXITY            TREATMENT:   (In addition to Assessment/Re-Assessment sessions the following treatments were rendered)    Pre-treatment Symptoms/Complaints:  Patient reported feeling better today since she had been off. Pt. Stated she had swelling over the weekend and pain in the popliteal region of right knee. Pain: Initial:   Pain Intensity 1: 5  Pain Location 1: Knee  Pain Orientation 1: Right  Post Session: 3/10 after session. Therapeutic Exercise: ( ): x 45 mins  Exercises per grid below to improve mobility, strength, balance and coordination. Required minimal visual, verbal and manual cues to promote proper body alignment, promote proper body posture, promote proper body mechanics and promote proper body breathing techniques. Progressed resistance, range, repetitions and complexity of movement as indicated. (used abbreviations: BET-back education training) Date:  5/17/17 Date:  6/2/17   Date:  6/5/17   Activity/Exercise Parameters Parameters Parameters   Calf stretch - angle board 4 x 30 sec On incline 3x30 sec hold  4 x 30 sec   Hamstring stretch Strap 3 x 30 sec Strap 3 x 30 sec 3 x 30 sec   Nu step  Level 6 x5 mins  Level 4 x 10 mins  level 5 x 10 min   Heel toe raises X 30 reps  2 x 15 reps  3 x 10   Standing knee flexion X 20 reps 4 lbs X 25 reps 4 lb.  Wt.  X 20 reps 4 lb. Wt.s    Standing hip abduction X 20 reps 4 lbs X 25 reps 4 lb. Wt.s  X 20 reps 4 lb. Wt.s    Standing hamstring curls  X 20 reps 4 lbs X 25 reps 4 lb Wts X 20 reps 4 lb  wt. s   Step ups  8 inch 2 x 10 8 inch x 20 reps fwd & lateral 8 inch x 20 fwd & side stepping    Sit to and from stand 2 x 10  2x10 UE assist 2x10 reps    Sideways step up and over 8 inch 2 x 10 8 inch 2x10  8 inch 2 x 10       Manual Therapy (     ):  X 15 mins   · Supine deep tissue mobilization of medial and anterior knee joint  · Kinesiology taping of anterior knee joint with oval formation  (Used abbreviations: MET - muscle energy technique; PNF - proprioceptive neuromuscular facilitation; NMR - neuromuscular re-education; a/p - anterior to posterior; p/a - posterior to anterior; NT - not tested)    Therapeutic Modalities: for edema and pain  X 10 mins Pt. Received ice pack to right knee with elevation. HEP: As above; handouts given to patient for all exercises. Treatment/Session Assessment:    · Response to Treatment:  Patient was compliant with all exercises. Pt. Had decreased pain and swelling today. · Compliance with Program/Exercises: compliant most of the time. · Recommendations/Intent for next treatment session: \"Next visit will focus on advancements to more challenging activities\". RE-CERTIFICATION: 3 times a week for 4 weeks from 6/2/2017 to 6/30/2017 in order to meet the above set goals.     Total Treatment Duration: 70 minutes  PT Patient Time In/Time Out  Time In: 0800  Time Out: Leo Sarah, PTA   Fredy Covert, DPT

## 2017-06-07 ENCOUNTER — HOSPITAL ENCOUNTER (OUTPATIENT)
Dept: PHYSICAL THERAPY | Age: 52
Discharge: HOME | End: 2017-06-07
Payer: COMMERCIAL

## 2017-06-07 PROCEDURE — 97110 THERAPEUTIC EXERCISES: CPT

## 2017-06-07 PROCEDURE — 97140 MANUAL THERAPY 1/> REGIONS: CPT

## 2017-06-07 PROCEDURE — 97016 VASOPNEUMATIC DEVICE THERAPY: CPT

## 2017-06-07 NOTE — PROGRESS NOTES
Jeannie Moritz  : 1965 [facility] [place] [address], [place] torres, [address] [facility]  [phone]   [phone]         OUTPATIENT PHYSICAL THERAPY:Daily Note 2017    ICD-10: Treatment Diagnosis 1: right knee pain (M25.561)              Treatment Diagnosis 2: gait dysfunction (R26.89)              Treatment Diagnosis 3: right knee primary localized osteoarthritis (M17.11)  Precautions: Type II Diabetes  Allergies:   Review of patient's allergies indicates not on file. Fall Risk Score: 1 (? 5 = High Risk)  MD Orders: Evaluate and Treat MEDICAL/REFERRING DIAGNOSIS:  Pain in right knee [M25.561]   DATE OF ONSET: 8/15/2016 when patient fell at work when she tripped over a pallet and landed directly on knee, then underwent right arthroscopic knee surgery on 3/1/2017   REFERRING PHYSICIAN: Galindo Mahajan, [facility] [address]: 2017     INITIAL ASSESSMENT:  Ms. Maria L Boswell is a 46 y.o. female presenting s/p right arthroscopic knee surgery performed on 3/1/2017 after injuring her knee 2016 when she fell at work and tripped over a pallet and landed directly on her knee. She has reported continued pain in the knee since surgery, as well as weakness and swelling. She has been seen for 23 sessions of therapy from 3/24/2017 to 2017 with significant success. Patient arrived into clinic on 2017 reporting that she was in the ER 2017 because she was full duty on the floor yesterday against MD recommendations. She reports severe pain and swelling. She is eager to continue therapy, but reports a significant set back at this time. Will continue therapy with an emphasis on returning patient full time, full duty to work.   She is a good candidate for continued skilled intervention with services to include manual therapy, modalities as needed, therapeutic exercises, gait/stair/transfer/balance training, activity modification, and return to work simulation  . PROBLEM LIST (Impacting functional limitations):  1. Decreased Strength  2. Decreased ADL/Functional Activities  3. Decreased Transfer Abilities  4. Decreased Ambulation Ability/Technique  5. Decreased Balance  6. Increased Pain  7. Decreased Activity Tolerance  8. Decreased Pacing Skills  9. Increased Fatigue  10. Decreased Flexibility/Joint Mobility INTERVENTIONS PLANNED:  1. Balance Exercise  2. Cold  3. Cryotherapy  4. Electrical Stimulation  5. Gait Training  6. Heat  7. Home Exercise Program (HEP)  8. Manual Therapy  9. Neuromuscular Re-education/Strengthening  10. Range of Motion (ROM)  11. Therapeutic Exercise/Strengthening  12. Transfer Training   TREATMENT PLAN:  Effective Dates: 6/2/2017 TO 6/30/2017. Frequency/Duration: RE-CERTIFICATION: 3 times a week for 4 weeks  GOALS: (Goals have been discussed and agreed upon with patient.)  Short-Term Functional Goals: Time Frame: 3/24/2017 to 4/7/2017  1. Patient demonstrates independence with home exercise program without verbal cueing provided by therapist. - GOAL MET  2. Improve ROM to 0-120 degrees in order to normalize gait, transfers, and stairs. - GOAL MET  3. Improve flexibility of gastrocnemius and hamstrings with SLR to 80 degrees in order to decrease pain with sleeping and sitting. - GOAL MET  Discharge Goals: Time Frame: 6/2/2017 to 6/30/2017   1. Improve pain to 3/10 with work, extended walking, extended weightbearing, squatting, stooping, sleeping, and sitting. - ONGOING  2. Improve gross lower extremity strength to at least 4+/5 in order to return patient to work full-time, full-duty. - ONGOING  3. Improve gait without use of crutches without significant antalgia and appropriate heel to toe gait pattern. - GOAL MET  4. Improve stair navigation with reciprocal ascend and descend with use of hand rail. - GOAL MET  5. Improve transfers with use of hands 0-50% of the time into and out of a chair. - GOAL MET  6.  Return patient to full time, full duty work as a . - NEW GOAL  7. Improve Lower Extremity Functional Index score to 40/80 from 4/80. - ONGOING  Rehabilitation Potential For Stated Goals: Good  Regarding Latesha Perea's therapy, I certify that the treatment plan above will be carried out by a therapist or under their direction. Thank you for this referral,  Rocael Daley PT     Referring Physician Signature: Keyla Benitez,               Date                    The information in this section was collected on 6/2/2017 (except where otherwise noted). HISTORY:   History of Present Injury/Illness (Reason for Referral):  Ms. Mela Mary is a 46 y.o. female presenting s/p right arthroscopic knee surgery performed on 3/1/2017 after injuring her knee 8/5/2016 when she fell at work and tripped over a pallet and landed directly on her knee. She has reported continued pain in the knee since surgery, as well as weakness and swelling. She is currently full-time, light-duty and is not aware when she is supposed to return full-duty, full-time. She is eager to improve overall strength, ROM, pain, and stamina of the knee with work and with daily activities. Past Medical History/Comorbidities: Patient reported hypertension that is well controlled with lisinopril, Type II Diabetes controlled with proglitazone, metformin, and glipizide, and she has been taking naproxen for inflammation of the knee. She just had a benign tumor removed from her abdomen which required a hysterectomy in 2016. Social History/Living Environment:    Patient lives at home with sons and reports assistance from them with any heavy household chores, ADLs, heavy lifting, and heavy carrying. Prior Level of Function/Work/Activity:  Independent without dysfunction. Patient is currently light-duty, full-time with her job as a . 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   Dominant Side:         RIGHT  Current Medications:     No current outpatient prescriptions on file. Date Last Reviewed:  6/7/2017   Number of Personal Factors/Comorbidities that affect the Plan of Care (Type II Diabetes and high job demand tasks : 1-2: MODERATE COMPLEXITY   EXAMINATION:     Observation/Postural and Gait Assessment:  Patient ambulates without crutches with minimal to no gait deviation usually but came into clinic on 6/2/2017 with significant gait deviation (From bilateral crutches with step to gait on her toes and is able to ambulate with 1 crutch safely, independently, and with minimal gait deviation with verbal and visual cuing, gait without crutch antalgic with foot flat throughout stance). Medial and lateral joint line portal holes completely approximated with scar tissue (From scar tissue and scabbing). No active drainage is present, as well as no signs or symptoms of infection. Anthropometric measurements (cm) Left Right   Knee joint line 42 42 (from 43)     Palpation: Gross tenderness to palpation of medial and lateral portal holes and medial joint line. Flexibility minimally (From severely) limited of gastrocnemius. Hamstrings limited moderately to 80 (from 70) degree SLR.    ROM:     AROM(PROM) Left Right   Knee flexion 122° 120 (From 100°) - 4/19/2017   Knee extension 2° flexion contracture 2 hyperextension 4/19/2017 (From 5° flexion contracture)     Passive Accessory Mobility Testing: Mild to moderate limitations of patellofemoral joint in all planes. Strength:   Significant dysfunction on 6/2/2017 from set back 6/1/2017 with pain and ER   Manual Muscle Test (out of 5) Left Right   Knee extension 5 4+ (From 3+), 0 degree lag with SLR   Knee flexion 5 4+ (From 4)   Hip flexion 5 4+ (From 4)   Hip extension 4 (from 3) 4 (From 3)   Hip abduction 4 (From 3) 4 (From 3)   Ankle DF 4+ 4+ (From 4)   Ankle PF 4+ 4+ (From 4)     Special Tests:  No special tests due to acuity of surgery.   No signs or symptoms of infection or deep vein thrombosis at this time. Neurological Screen:  Myotomes: Key muscle strength testing for bilateral LE is WNL. Dermatomes: Sensation testing through bilateral LE for light touch is WNL. Functional Mobility:  Patient reports improvements with gait for longer periods of time without use of crutches with minimal gait deviation (From difficulty with ambulation longer than a few minutes but safe and independent with and without crutches with step to gait). Transfers with use of hands 0-50% of the time into and out of a chair (From 100% of the time). Stairs performed reciprocally with ascend and with descend (From non-reciprocally with left leg as dominant leg with use of crutches). Body Structures Involved:  1. Bones  2. Joints  3. Muscles Body Functions Affected:  1. Sensory/Pain  2. Neuromusculoskeletal Activities and Participation Affected:  1. General Tasks and Demands  2. Mobility  3. Ability to work   Number of elements (examined above) that affect the Plan of Care: 3: MODERATE COMPLEXITY   CLINICAL PRESENTATION:   Presentation: Evolving clinical presentation with changing clinical characteristics: MODERATE COMPLEXITY   CLINICAL DECISION MAKING:   Outcome Measure: Tool Used: Lower Extremity Functional Scale (LEFS)  Score  Initial: 4/80 Most Recent: 35/80 (Date: 4/6/2017)                           26/80 (Date: 4/19/2017)                          48/80 (Date: 5/10/2017)                          46/80 (Date: 5/17/2017)   Interpretation of Score: 20 questions each scored on a 5 point scale with 0 representing \"extreme difficulty or unable to perform\" and 4 representing \"no difficulty\". The lower the score, the greater the functional disability. 80/80 represents no disability. Minimal detectable change is 9 points.     Medical Necessity:   · Patient is expected to demonstrate progress in strength, range of motion, balance, coordination and functional technique to improve tolerance to daily activities, transfers sit to and from stand, ability to walk and stand un-aided for longer periods of time, and ability to work. · Skilled intervention continues to be required due to weakness, decreased ROM, decreased flexibility, pain, and functional limitations. Reason for Services/Other Comments:  · Patient continues to require skilled intervention due to increasing complexity of exercises. Use of outcome tool(s) and clinical judgement create a POC that gives a (due to long standing history of knee pain and goal of return to work with high level job tasks): Questionable prediction of patient's progress: MODERATE COMPLEXITY            TREATMENT:   (In addition to Assessment/Re-Assessment sessions the following treatments were rendered)    Pre-treatment Symptoms/Complaints:  Patient reported less pain today but she had to work a machine all day and was almost late for therapy because her shift ran over. Pain: Initial:   Pain Intensity 1: 3  Pain Location 1: Knee  Pain Orientation 1: Right  Post Session: 3/10 after session. Therapeutic Exercise: (30 Minutes):  Exercises per grid below to improve mobility, strength, balance and coordination. Required minimal visual, verbal and manual cues to promote proper body alignment, promote proper body posture, promote proper body mechanics and promote proper body breathing techniques. Progressed resistance, range, repetitions and complexity of movement as indicated. (used abbreviations: BET-back education training) Date:  6/7/17 Date:  6/2/17   Date:  6/5/17   Activity/Exercise Parameters Parameters Parameters   Calf stretch - angle board 4 x 30 sec On incline 3x30 sec hold  4 x 30 sec   Hamstring stretch Strap 3 x 30 sec Strap 3 x 30 sec 3 x 30 sec   Nu step  Level 6 x 10 mins  Level 4 x 10 mins  level 5 x 10 min   Heel toe raises X 30 reps  2 x 15 reps  3 x 10   Standing knee flexion X 20 reps 4 lbs X 25 reps 4 lb. Wt.  X 20 reps 4 lb.  Wt.s    Standing hip abduction X 20 reps 4 lbs X 25 reps 4 lb. Wt.s  X 20 reps 4 lb. Wt.s    Standing hamstring curls  X 20 reps 4 lbs X 25 reps 4 lb Wts X 20 reps 4 lb  wt. s   Step ups  8 inch 2 x 10 8 inch x 20 reps fwd & lateral 8 inch x 20 fwd & side stepping    Sit to and from stand 2 x 10  2x10 UE assist 2x10 reps    Sideways step up and over 8 inch 2 x 10 8 inch 2x10  8 inch 2 x 10       Manual Therapy (    Soft Tissue Mobilization Duration  Duration: 15 Minutes): to improve joint and soft tissue mobility  · Supine deep tissue mobilization of medial and anterior knee joint  · Kinesiology taping of anterior knee joint with oval formation for swelling  (Used abbreviations: MET - muscle energy technique; PNF - proprioceptive neuromuscular facilitation; NMR - neuromuscular re-education; a/p - anterior to posterior; p/a - posterior to anterior; NT - not tested)    Therapeutic Modalities: for edema and pain                      Right Knee Cold  Type:  (vasopneumatic)  Duration: 15 minutes  Patient Position: Supine                                                                        HEP: As above; handouts given to patient for all exercises. Treatment/Session Assessment:    · Response to Treatment:  Patient reported less pain and improvements overall at the end of session. Tolerated exercises with minimal complaints today. Very tight medial and posterior knee. · Compliance with Program/Exercises: compliant most of the time. · Recommendations/Intent for next treatment session: \"Next visit will focus on advancements to more challenging activities\". RE-CERTIFICATION: 3 times a week for 4 weeks from 6/2/2017 to 6/30/2017 in order to meet the above set goals.     Total Treatment Duration: 60 minutes  PT Patient Time In/Time Out  Time In: 1430  Time Out: 3168 Hugo Negrete DPT

## 2017-06-09 ENCOUNTER — HOSPITAL ENCOUNTER (OUTPATIENT)
Dept: PHYSICAL THERAPY | Age: 52
Discharge: HOME | End: 2017-06-09
Payer: COMMERCIAL

## 2017-06-09 PROCEDURE — 97110 THERAPEUTIC EXERCISES: CPT

## 2017-06-09 PROCEDURE — 97016 VASOPNEUMATIC DEVICE THERAPY: CPT

## 2017-06-09 PROCEDURE — 97140 MANUAL THERAPY 1/> REGIONS: CPT

## 2017-06-09 NOTE — PROGRESS NOTES
Mohit Hernandez  : 1965 [facility] [address], [facility], [address] [place] [address]  [phone]   [phone]         OUTPATIENT PHYSICAL THERAPY:Daily Note 2017    ICD-10: Treatment Diagnosis 1: right knee pain (M25.561)              Treatment Diagnosis 2: gait dysfunction (R26.89)              Treatment Diagnosis 3: right knee primary localized osteoarthritis (M17.11)  Precautions: Type II Diabetes  Allergies:   Review of patient's allergies indicates not on file. Fall Risk Score: 1 (? 5 = High Risk)  MD Orders: Evaluate and Treat MEDICAL/REFERRING DIAGNOSIS:  Pain in right knee [M25.561]   DATE OF ONSET: 8/15/2016 when patient fell at work when she tripped over a pallet and landed directly on knee, then underwent right arthroscopic knee surgery on 3/1/2017   REFERRING PHYSICIAN: Santa Calderon, [address] [place] [address] Avenue: 2017     INITIAL ASSESSMENT:  Ms. Chase Herron is a 46 y.o. female presenting s/p right arthroscopic knee surgery performed on 3/1/2017 after injuring her knee 2016 when she fell at work and tripped over a pallet and landed directly on her knee. She has reported continued pain in the knee since surgery, as well as weakness and swelling. She has been seen for 23 sessions of therapy from 3/24/2017 to 2017 with significant success. Patient arrived into clinic on 2017 reporting that she was in the ER 2017 because she was full duty on the floor yesterday against MD recommendations. She reports severe pain and swelling. She is eager to continue therapy, but reports a significant set back at this time. Will continue therapy with an emphasis on returning patient full time, full duty to work.   She is a good candidate for continued skilled intervention with services to include manual therapy, modalities as needed, therapeutic exercises, gait/stair/transfer/balance training, activity modification, and return to work simulation  . PROBLEM LIST (Impacting functional limitations):  1. Decreased Strength  2. Decreased ADL/Functional Activities  3. Decreased Transfer Abilities  4. Decreased Ambulation Ability/Technique  5. Decreased Balance  6. Increased Pain  7. Decreased Activity Tolerance  8. Decreased Pacing Skills  9. Increased Fatigue  10. Decreased Flexibility/Joint Mobility INTERVENTIONS PLANNED:  1. Balance Exercise  2. Cold  3. Cryotherapy  4. Electrical Stimulation  5. Gait Training  6. Heat  7. Home Exercise Program (HEP)  8. Manual Therapy  9. Neuromuscular Re-education/Strengthening  10. Range of Motion (ROM)  11. Therapeutic Exercise/Strengthening  12. Transfer Training   TREATMENT PLAN:  Effective Dates: 6/2/2017 TO 6/30/2017. Frequency/Duration: RE-CERTIFICATION: 3 times a week for 4 weeks  GOALS: (Goals have been discussed and agreed upon with patient.)  Short-Term Functional Goals: Time Frame: 3/24/2017 to 4/7/2017  1. Patient demonstrates independence with home exercise program without verbal cueing provided by therapist. - GOAL MET  2. Improve ROM to 0-120 degrees in order to normalize gait, transfers, and stairs. - GOAL MET  3. Improve flexibility of gastrocnemius and hamstrings with SLR to 80 degrees in order to decrease pain with sleeping and sitting. - GOAL MET  Discharge Goals: Time Frame: 6/2/2017 to 6/30/2017   1. Improve pain to 3/10 with work, extended walking, extended weightbearing, squatting, stooping, sleeping, and sitting. - ONGOING  2. Improve gross lower extremity strength to at least 4+/5 in order to return patient to work full-time, full-duty. - ONGOING  3. Improve gait without use of crutches without significant antalgia and appropriate heel to toe gait pattern. - GOAL MET  4. Improve stair navigation with reciprocal ascend and descend with use of hand rail. - GOAL MET  5. Improve transfers with use of hands 0-50% of the time into and out of a chair. - GOAL MET  6.  Return patient to full time, full duty work as a . - NEW GOAL  7. Improve Lower Extremity Functional Index score to 40/80 from 4/80. - ONGOING  Rehabilitation Potential For Stated Goals: Good  Regarding Latesha Perea's therapy, I certify that the treatment plan above will be carried out by a therapist or under their direction. Thank you for this referral,  Nida Saravia PTA     Referring Physician Signature: Galindo Johnson,               Date                    The information in this section was collected on 6/2/2017 (except where otherwise noted). HISTORY:   History of Present Injury/Illness (Reason for Referral):  Ms. Allie Davila is a 46 y.o. female presenting s/p right arthroscopic knee surgery performed on 3/1/2017 after injuring her knee 8/5/2016 when she fell at work and tripped over a pallet and landed directly on her knee. She has reported continued pain in the knee since surgery, as well as weakness and swelling. She is currently full-time, light-duty and is not aware when she is supposed to return full-duty, full-time. She is eager to improve overall strength, ROM, pain, and stamina of the knee with work and with daily activities. Past Medical History/Comorbidities: Patient reported hypertension that is well controlled with lisinopril, Type II Diabetes controlled with proglitazone, metformin, and glipizide, and she has been taking naproxen for inflammation of the knee. She just had a benign tumor removed from her abdomen which required a hysterectomy in 2016. Social History/Living Environment:    Patient lives at home with sons and reports assistance from them with any heavy household chores, ADLs, heavy lifting, and heavy carrying. Prior Level of Function/Work/Activity:  Independent without dysfunction. Patient is currently light-duty, full-time with her job as a . 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   Dominant Side:         RIGHT  Current Medications:     No current outpatient prescriptions on file. Date Last Reviewed:  6/9/2017   Number of Personal Factors/Comorbidities that affect the Plan of Care (Type II Diabetes and high job demand tasks : 1-2: MODERATE COMPLEXITY   EXAMINATION:     Observation/Postural and Gait Assessment:  Patient ambulates without crutches with minimal to no gait deviation usually but came into clinic on 6/2/2017 with significant gait deviation (From bilateral crutches with step to gait on her toes and is able to ambulate with 1 crutch safely, independently, and with minimal gait deviation with verbal and visual cuing, gait without crutch antalgic with foot flat throughout stance). Medial and lateral joint line portal holes completely approximated with scar tissue (From scar tissue and scabbing). No active drainage is present, as well as no signs or symptoms of infection. Anthropometric measurements (cm) Left Right   Knee joint line 42 42 (from 43)     Palpation: Gross tenderness to palpation of medial and lateral portal holes and medial joint line. Flexibility minimally (From severely) limited of gastrocnemius. Hamstrings limited moderately to 80 (from 70) degree SLR.    ROM:     AROM(PROM) Left Right   Knee flexion 122° 120 (From 100°) - 4/19/2017   Knee extension 2° flexion contracture 2 hyperextension 4/19/2017 (From 5° flexion contracture)     Passive Accessory Mobility Testing: Mild to moderate limitations of patellofemoral joint in all planes. Strength:   Significant dysfunction on 6/2/2017 from set back 6/1/2017 with pain and ER   Manual Muscle Test (out of 5) Left Right   Knee extension 5 4+ (From 3+), 0 degree lag with SLR   Knee flexion 5 4+ (From 4)   Hip flexion 5 4+ (From 4)   Hip extension 4 (from 3) 4 (From 3)   Hip abduction 4 (From 3) 4 (From 3)   Ankle DF 4+ 4+ (From 4)   Ankle PF 4+ 4+ (From 4)     Special Tests:  No special tests due to acuity of surgery.   No signs or symptoms of infection or deep vein thrombosis at this time. Neurological Screen:  Myotomes: Key muscle strength testing for bilateral LE is WNL. Dermatomes: Sensation testing through bilateral LE for light touch is WNL. Functional Mobility:  Patient reports improvements with gait for longer periods of time without use of crutches with minimal gait deviation (From difficulty with ambulation longer than a few minutes but safe and independent with and without crutches with step to gait). Transfers with use of hands 0-50% of the time into and out of a chair (From 100% of the time). Stairs performed reciprocally with ascend and with descend (From non-reciprocally with left leg as dominant leg with use of crutches). Body Structures Involved:  1. Bones  2. Joints  3. Muscles Body Functions Affected:  1. Sensory/Pain  2. Neuromusculoskeletal Activities and Participation Affected:  1. General Tasks and Demands  2. Mobility  3. Ability to work   Number of elements (examined above) that affect the Plan of Care: 3: MODERATE COMPLEXITY   CLINICAL PRESENTATION:   Presentation: Evolving clinical presentation with changing clinical characteristics: MODERATE COMPLEXITY   CLINICAL DECISION MAKING:   Outcome Measure: Tool Used: Lower Extremity Functional Scale (LEFS)  Score  Initial: 4/80 Most Recent: 35/80 (Date: 4/6/2017)                           26/80 (Date: 4/19/2017)                          48/80 (Date: 5/10/2017)                          46/80 (Date: 5/17/2017)   Interpretation of Score: 20 questions each scored on a 5 point scale with 0 representing \"extreme difficulty or unable to perform\" and 4 representing \"no difficulty\". The lower the score, the greater the functional disability. 80/80 represents no disability. Minimal detectable change is 9 points.     Medical Necessity:   · Patient is expected to demonstrate progress in strength, range of motion, balance, coordination and functional technique to improve tolerance to daily activities, transfers sit to and from stand, ability to walk and stand un-aided for longer periods of time, and ability to work. · Skilled intervention continues to be required due to weakness, decreased ROM, decreased flexibility, pain, and functional limitations. Reason for Services/Other Comments:  · Patient continues to require skilled intervention due to increasing complexity of exercises. Use of outcome tool(s) and clinical judgement create a POC that gives a (due to long standing history of knee pain and goal of return to work with high level job tasks): Questionable prediction of patient's progress: MODERATE COMPLEXITY            TREATMENT:   (In addition to Assessment/Re-Assessment sessions the following treatments were rendered)    Pre-treatment Symptoms/Complaints:  Patient reports kinesio tape is really helping. Continues to C/O posterior knee pain and intermittent sharp pain with certain movements and standing/walking on concrete floor at work. Pain: Initial:   Pain Intensity 1: 3  Pain Location 1: Knee  Pain Orientation 1: Right  Post Session: 210 after session. Therapeutic Exercise: (35 Minutes):  Exercises per grid below to improve mobility, strength, balance and coordination. Required minimal visual, verbal and manual cues to promote proper body alignment, promote proper body posture, promote proper body mechanics and promote proper body breathing techniques. Progressed resistance, range, repetitions and complexity of movement as indicated.     (used abbreviations: BET-back education training) Date:  6/7/17 Date:  6/9/17   Date:  6/5/17   Activity/Exercise Parameters Parameters Parameters   Calf stretch - angle board 4 x 30 sec On incline 3x30 sec hold  4 x 30 sec   Hamstring stretch Strap 3 x 30 sec setaed 3 x 30 sec 3 x 30 sec   Nu step  Level 6 x 10 mins  Level 4 x 12 mins  level 5 x 10 min   Heel toe raises X 30 reps  2 x 15 reps  3 x 10   Standing knee flexion X 20 reps 4 lbs 4 lbs 3 x 10 X 20 reps 4 lb. Wt.s    Standing hip abduction X 20 reps 4 lbs 4 lbs 3 x 10 X 20 reps 4 lb. Wt.s    Standing hamstring curls  X 20 reps 4 lbs 4 lbs 3 x 10 X 20 reps 4 lb  wt. s   Step ups  8 inch 2 x 10 8 inch x 20  8 inch x 20 fwd & side stepping    Sit to and from stand 2 x 10  2 x 10 UE assist 2x10 reps    Sideways step up and over 8 inch 2 x 10 8 inch 2 x 10  8 inch 2 x 10       Manual Therapy (    Soft Tissue Mobilization Duration  Duration: 10 Minutes): to improve joint and soft tissue mobility  · prone deep tissue mobilization of posterior knee    (Used abbreviations: MET - muscle energy technique; PNF - proprioceptive neuromuscular facilitation; NMR - neuromuscular re-education; a/p - anterior to posterior; p/a - posterior to anterior; NT - not tested)    Therapeutic Modalities: for edema and pain                      Right Knee Cold  Type:  (vaso pneumatic compression)  Duration: 15 minutes  Patient Position: Supine                                                                        HEP: As above; handouts given to patient for all exercises. Treatment/Session Assessment:    · Response to Treatment:  Decreased pain and tenderness popliteal space after manual therapy  · .    · Compliance with Program/Exercises: compliant most of the time. · Recommendations/Intent for next treatment session: \"Next visit will focus on advancements to more challenging activities\". RE-CERTIFICATION: 3 times a week for 4 weeks from 6/2/2017 to 6/30/2017 in order to meet the above set goals.     Total Treatment Duration: 60 minutes  PT Patient Time In/Time Out  Time In: 6684  Time Out: [address] [facility],[address], [facility]   Osmani Del Castillo DPT

## 2017-06-12 ENCOUNTER — HOSPITAL ENCOUNTER (OUTPATIENT)
Dept: PHYSICAL THERAPY | Age: 52
Discharge: HOME | End: 2017-06-12
Payer: COMMERCIAL

## 2017-06-12 PROCEDURE — 97110 THERAPEUTIC EXERCISES: CPT

## 2017-06-12 PROCEDURE — 97016 VASOPNEUMATIC DEVICE THERAPY: CPT

## 2017-06-12 NOTE — PROGRESS NOTES
Martin King  : 1965 [facility] [place] [address], Jose torres, 83 [facility]  [phone]   [phone]         OUTPATIENT PHYSICAL THERAPY:Daily Note 2017    ICD-10: Treatment Diagnosis 1: right knee pain (M25.561)              Treatment Diagnosis 2: gait dysfunction (R26.89)              Treatment Diagnosis 3: right knee primary localized osteoarthritis (M17.11)  Precautions: Type II Diabetes  Allergies:   Review of patient's allergies indicates not on file. Fall Risk Score: 1 (? 5 = High Risk)  MD Orders: Evaluate and Treat MEDICAL/REFERRING DIAGNOSIS:  Pain in right knee [M25.561]   DATE OF ONSET: 8/15/2016 when patient fell at work when she tripped over a pallet and landed directly on knee, then underwent right arthroscopic knee surgery on 3/1/2017   REFERRING PHYSICIAN: Hernandez Benavidez, [facility] [address]: 2017     INITIAL ASSESSMENT:  Ms. Marianela Dan is a 46 y.o. female presenting s/p right arthroscopic knee surgery performed on 3/1/2017 after injuring her knee 2016 when she fell at work and tripped over a pallet and landed directly on her knee. She has reported continued pain in the knee since surgery, as well as weakness and swelling. She has been seen for 23 sessions of therapy from 3/24/2017 to 2017 with significant success. Patient arrived into clinic on 2017 reporting that she was in the ER 2017 because she was full duty on the floor yesterday against MD recommendations. She reports severe pain and swelling. She is eager to continue therapy, but reports a significant set back at this time. Will continue therapy with an emphasis on returning patient full time, full duty to work.   She is a good candidate for continued skilled intervention with services to include manual therapy, modalities as needed, therapeutic exercises, gait/stair/transfer/balance training, activity modification, and return to work simulation  . PROBLEM LIST (Impacting functional limitations):  1. Decreased Strength  2. Decreased ADL/Functional Activities  3. Decreased Transfer Abilities  4. Decreased Ambulation Ability/Technique  5. Decreased Balance  6. Increased Pain  7. Decreased Activity Tolerance  8. Decreased Pacing Skills  9. Increased Fatigue  10. Decreased Flexibility/Joint Mobility INTERVENTIONS PLANNED:  1. Balance Exercise  2. Cold  3. Cryotherapy  4. Electrical Stimulation  5. Gait Training  6. Heat  7. Home Exercise Program (HEP)  8. Manual Therapy  9. Neuromuscular Re-education/Strengthening  10. Range of Motion (ROM)  11. Therapeutic Exercise/Strengthening  12. Transfer Training   TREATMENT PLAN:  Effective Dates: 6/2/2017 TO 6/30/2017. Frequency/Duration: RE-CERTIFICATION: 3 times a week for 4 weeks  GOALS: (Goals have been discussed and agreed upon with patient.)  Short-Term Functional Goals: Time Frame: 3/24/2017 to 4/7/2017  1. Patient demonstrates independence with home exercise program without verbal cueing provided by therapist. - GOAL MET  2. Improve ROM to 0-120 degrees in order to normalize gait, transfers, and stairs. - GOAL MET  3. Improve flexibility of gastrocnemius and hamstrings with SLR to 80 degrees in order to decrease pain with sleeping and sitting. - GOAL MET  Discharge Goals: Time Frame: 6/2/2017 to 6/30/2017   1. Improve pain to 3/10 with work, extended walking, extended weightbearing, squatting, stooping, sleeping, and sitting. - ONGOING  2. Improve gross lower extremity strength to at least 4+/5 in order to return patient to work full-time, full-duty. - ONGOING  3. Improve gait without use of crutches without significant antalgia and appropriate heel to toe gait pattern. - GOAL MET  4. Improve stair navigation with reciprocal ascend and descend with use of hand rail. - GOAL MET  5. Improve transfers with use of hands 0-50% of the time into and out of a chair. - GOAL MET  6.  Return patient to full time, full duty work as a . - NEW GOAL  7. Improve Lower Extremity Functional Index score to 40/80 from 4/80. - ONGOING  Rehabilitation Potential For Stated Goals: Good  Regarding Latesha Perea's therapy, I certify that the treatment plan above will be carried out by a therapist or under their direction. Thank you for this referral,  Aracelis Paul PTA     Referring Physician Signature: Keyla Benitez,               Date                    The information in this section was collected on 6/2/2017 (except where otherwise noted). HISTORY:   History of Present Injury/Illness (Reason for Referral):  Ms. Mela Mary is a 46 y.o. female presenting s/p right arthroscopic knee surgery performed on 3/1/2017 after injuring her knee 8/5/2016 when she fell at work and tripped over a pallet and landed directly on her knee. She has reported continued pain in the knee since surgery, as well as weakness and swelling. She is currently full-time, light-duty and is not aware when she is supposed to return full-duty, full-time. She is eager to improve overall strength, ROM, pain, and stamina of the knee with work and with daily activities. Past Medical History/Comorbidities: Patient reported hypertension that is well controlled with lisinopril, Type II Diabetes controlled with proglitazone, metformin, and glipizide, and she has been taking naproxen for inflammation of the knee. She just had a benign tumor removed from her abdomen which required a hysterectomy in 2016. Social History/Living Environment:    Patient lives at home with sons and reports assistance from them with any heavy household chores, ADLs, heavy lifting, and heavy carrying. Prior Level of Function/Work/Activity:  Independent without dysfunction. Patient is currently light-duty, full-time with her job as a . 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   Dominant Side:         RIGHT  Current Medications:     No current outpatient prescriptions on file. Date Last Reviewed:  6/22/2017   Number of Personal Factors/Comorbidities that affect the Plan of Care (Type II Diabetes and high job demand tasks : 1-2: MODERATE COMPLEXITY   EXAMINATION:     Observation/Postural and Gait Assessment:  Patient ambulates without crutches with minimal to no gait deviation usually but came into clinic on 6/2/2017 with significant gait deviation (From bilateral crutches with step to gait on her toes and is able to ambulate with 1 crutch safely, independently, and with minimal gait deviation with verbal and visual cuing, gait without crutch antalgic with foot flat throughout stance). Medial and lateral joint line portal holes completely approximated with scar tissue (From scar tissue and scabbing). No active drainage is present, as well as no signs or symptoms of infection. Anthropometric measurements (cm) Left Right   Knee joint line 42 42 (from 43)     Palpation: Gross tenderness to palpation of medial and lateral portal holes and medial joint line. Flexibility minimally (From severely) limited of gastrocnemius. Hamstrings limited moderately to 80 (from 70) degree SLR.    ROM:     AROM(PROM) Left Right   Knee flexion 122° 120 (From 100°) - 4/19/2017   Knee extension 2° flexion contracture 2 hyperextension 4/19/2017 (From 5° flexion contracture)     Passive Accessory Mobility Testing: Mild to moderate limitations of patellofemoral joint in all planes. Strength:   Significant dysfunction on 6/2/2017 from set back 6/1/2017 with pain and ER   Manual Muscle Test (out of 5) Left Right   Knee extension 5 4+ (From 3+), 0 degree lag with SLR   Knee flexion 5 4+ (From 4)   Hip flexion 5 4+ (From 4)   Hip extension 4 (from 3) 4 (From 3)   Hip abduction 4 (From 3) 4 (From 3)   Ankle DF 4+ 4+ (From 4)   Ankle PF 4+ 4+ (From 4)     Special Tests:  No special tests due to acuity of surgery.   No signs or symptoms of infection or deep vein thrombosis at this time. Neurological Screen:  Myotomes: Key muscle strength testing for bilateral LE is WNL. Dermatomes: Sensation testing through bilateral LE for light touch is WNL. Functional Mobility:  Patient reports improvements with gait for longer periods of time without use of crutches with minimal gait deviation (From difficulty with ambulation longer than a few minutes but safe and independent with and without crutches with step to gait). Transfers with use of hands 0-50% of the time into and out of a chair (From 100% of the time). Stairs performed reciprocally with ascend and with descend (From non-reciprocally with left leg as dominant leg with use of crutches). Body Structures Involved:  1. Bones  2. Joints  3. Muscles Body Functions Affected:  1. Sensory/Pain  2. Neuromusculoskeletal Activities and Participation Affected:  1. General Tasks and Demands  2. Mobility  3. Ability to work   Number of elements (examined above) that affect the Plan of Care: 3: MODERATE COMPLEXITY   CLINICAL PRESENTATION:   Presentation: Evolving clinical presentation with changing clinical characteristics: MODERATE COMPLEXITY   CLINICAL DECISION MAKING:   Outcome Measure: Tool Used: Lower Extremity Functional Scale (LEFS)  Score  Initial: 4/80 Most Recent: 35/80 (Date: 4/6/2017)                           26/80 (Date: 4/19/2017)                          48/80 (Date: 5/10/2017)                          46/80 (Date: 5/17/2017)   Interpretation of Score: 20 questions each scored on a 5 point scale with 0 representing \"extreme difficulty or unable to perform\" and 4 representing \"no difficulty\". The lower the score, the greater the functional disability. 80/80 represents no disability. Minimal detectable change is 9 points.     Medical Necessity:   · Patient is expected to demonstrate progress in strength, range of motion, balance, coordination and functional technique to improve tolerance to daily activities, transfers sit to and from stand, ability to walk and stand un-aided for longer periods of time, and ability to work. · Skilled intervention continues to be required due to weakness, decreased ROM, decreased flexibility, pain, and functional limitations. Reason for Services/Other Comments:  · Patient continues to require skilled intervention due to increasing complexity of exercises. Use of outcome tool(s) and clinical judgement create a POC that gives a (due to long standing history of knee pain and goal of return to work with high level job tasks): Questionable prediction of patient's progress: MODERATE COMPLEXITY            TREATMENT:   (In addition to Assessment/Re-Assessment sessions the following treatments were rendered)    Pre-treatment Symptoms/Complaints:  Patient continue to complain of pain in her right knee and swelling especially posteriorly. Pain: Initial:   Pain Intensity 1: 3  Pain Location 1: Knee  Pain Orientation 1: Right  Post Session: 210 after session. Therapeutic Exercise: ( ):x 45 mins   Exercises per grid below to improve mobility, strength, balance and coordination. Required minimal visual, verbal and manual cues to promote proper body alignment, promote proper body posture, promote proper body mechanics and promote proper body breathing techniques. Progressed resistance, range, repetitions and complexity of movement as indicated. (used abbreviations: BET-back education training) Date:  6/7/17 Date:  6/9/17   Date:  6/12/17   Activity/Exercise Parameters Parameters Parameters   Calf stretch - angle board 4 x 30 sec On incline 3x30 sec hold  4 x 30 sec   Hamstring stretch Strap 3 x 30 sec setaed 3 x 30 sec 3 x 30 sec   Nu step  Level 6 x 10 mins  Level 4 x 12 mins  level 5 x 10 min   Heel toe raises X 30 reps  2 x 15 reps  3 x 10   Standing knee flexion X 20 reps 4 lbs 4 lbs 3 x 10 X 30 reps 4 lb.  Wt.s    Standing hip abduction X 20 reps 4 lbs 4 lbs 3 x 10 X 30 reps 4 lb. Wt.s    Standing hamstring curls  X 20 reps 4 lbs 4 lbs 3 x 10 X 30 reps 4 lb  wt. s   Step ups  8 inch 2 x 10 8 inch x 20  8 inch x 20 fwd & side stepping    Sit to and from stand 2 x 10  2 x 10 UE assist 2x10 reps    Sideways step up and over 8 inch 2 x 10 8 inch 2 x 10  8 inch 2 x 10       Manual Therapy (     ):  None today  to improve joint and soft tissue mobility  · prone deep tissue mobilization of posterior knee    (Used abbreviations: MET - muscle energy technique; PNF - proprioceptive neuromuscular facilitation; NMR - neuromuscular re-education; a/p - anterior to posterior; p/a - posterior to anterior; NT - not tested)    Therapeutic Modalities: for edema and pain Pt. Received vaso pneumatic compression device to right knee on incline x 15 mins,                                                                                               HEP: As above; handouts given to patient for all exercises. Treatment/Session Assessment:    · Response to Treatment:  Decreased pain and tenderness popliteal space after manual therapy  · .    · Compliance with Program/Exercises: compliant most of the time. · Recommendations/Intent for next treatment session: \"Next visit will focus on advancements to more challenging activities\". RE-CERTIFICATION: 3 times a week for 4 weeks from 6/2/2017 to 6/30/2017 in order to meet the above set goals.     Total Treatment Duration: 60 minutes  PT Patient Time In/Time Out  Time In: 7390  Time Out: [address] [place], [place]   Roxie Chi DPT

## 2017-06-14 ENCOUNTER — HOSPITAL ENCOUNTER (OUTPATIENT)
Dept: PHYSICAL THERAPY | Age: 52
Discharge: HOME | End: 2017-06-14
Payer: COMMERCIAL

## 2017-06-14 PROCEDURE — 97110 THERAPEUTIC EXERCISES: CPT

## 2017-06-14 PROCEDURE — 97016 VASOPNEUMATIC DEVICE THERAPY: CPT

## 2017-06-14 PROCEDURE — 97140 MANUAL THERAPY 1/> REGIONS: CPT

## 2017-06-14 NOTE — PROGRESS NOTES
Horace Dong  : [address] [facility] [address], [place] torres, [address] [facility] [address]  [phone]   [phone]         OUTPATIENT PHYSICAL THERAPY:Daily Note and Progress Report 2017    ICD-10: Treatment Diagnosis 1: right knee pain (M25.561)              Treatment Diagnosis 2: gait dysfunction (R26.89)              Treatment Diagnosis 3: right knee primary localized osteoarthritis (M17.11)  Precautions: Type II Diabetes  Allergies:   Review of patient's allergies indicates not on file. Fall Risk Score: 1 (? 5 = High Risk)  MD Orders: Evaluate and Treat MEDICAL/REFERRING DIAGNOSIS:  Pain in right knee [M25.561]   DATE OF ONSET: 8/15/2016 when patient fell at work when she tripped over a pallet and landed directly on knee, then underwent right arthroscopic knee surgery on 3/1/2017   REFERRING PHYSICIAN: Keyla Benitez, [address] [place] [address] Avenue: 2017     INITIAL ASSESSMENT:  Ms. Mela Mary is a 46 y.o. female presenting s/p right arthroscopic knee surgery performed on 3/1/2017 after injuring her knee 2016 when she fell at work and tripped over a pallet and landed directly on her knee. She has reported continued pain in the knee since surgery, as well as weakness and swelling. She has been seen for 27 sessions of therapy from 3/24/2017 to 2017 with significant success. Patient reports overall improvements lately in the knee but is struggling to work the floor for a full 8 hour shift due to pain and dysfunction. She is eager to return to work not limited but her knee full time, full duty. She is a good candidate for continued skilled intervention with services to include manual therapy, modalities as needed, therapeutic exercises, gait/stair/transfer/balance training, activity modification, and return to work simulation  . PROBLEM LIST (Impacting functional limitations):  1. Decreased Strength  2.  Decreased ADL/Functional Activities  3. Decreased Transfer Abilities  4. Decreased Ambulation Ability/Technique  5. Decreased Balance  6. Increased Pain  7. Decreased Activity Tolerance  8. Decreased Pacing Skills  9. Increased Fatigue  10. Decreased Flexibility/Joint Mobility INTERVENTIONS PLANNED:  1. Balance Exercise  2. Cold  3. Cryotherapy  4. Electrical Stimulation  5. Gait Training  6. Heat  7. Home Exercise Program (HEP)  8. Manual Therapy  9. Neuromuscular Re-education/Strengthening  10. Range of Motion (ROM)  11. Therapeutic Exercise/Strengthening  12. Transfer Training   TREATMENT PLAN:  Effective Dates: 6/2/2017 TO 6/30/2017. Frequency/Duration: RE-CERTIFICATION: 3 times a week for 4 weeks  GOALS: (Goals have been discussed and agreed upon with patient.)  Short-Term Functional Goals: Time Frame: 3/24/2017 to 4/7/2017  1. Patient demonstrates independence with home exercise program without verbal cueing provided by therapist. - GOAL MET  2. Improve ROM to 0-120 degrees in order to normalize gait, transfers, and stairs. - GOAL MET  3. Improve flexibility of gastrocnemius and hamstrings with SLR to 80 degrees in order to decrease pain with sleeping and sitting. - GOAL MET  Discharge Goals: Time Frame: 6/2/2017 to 6/30/2017   1. Improve pain to 3/10 with work, extended walking, extended weightbearing, squatting, stooping, sleeping, and sitting. - ONGOING  2. Improve gross lower extremity strength to at least 4+/5 in order to return patient to work full-time, full-duty. - ONGOING  3. Improve gait without use of crutches without significant antalgia and appropriate heel to toe gait pattern. - GOAL MET  4. Improve stair navigation with reciprocal ascend and descend with use of hand rail. - GOAL MET  5. Improve transfers with use of hands 0-50% of the time into and out of a chair. - GOAL MET  6. Return patient to full time, full duty work as a . - ONGOING  7.  Improve Lower Extremity Functional Index score to 40/80 from 4/80. - GOAL MET  Rehabilitation Potential For Stated Goals: Good  Regarding Koki Perea's therapy, I certify that the treatment plan above will be carried out by a therapist or under their direction. Thank you for this referral,  Rocael Daley PT     Referring Physician Signature: Keyla Benitez DO              Date                    The information in this section was collected on 6/14/2017 (except where otherwise noted). HISTORY:   History of Present Injury/Illness (Reason for Referral):  Ms. Mela Mary is a 46 y.o. female presenting s/p right arthroscopic knee surgery performed on 3/1/2017 after injuring her knee 8/5/2016 when she fell at work and tripped over a pallet and landed directly on her knee. She has reported continued pain in the knee since surgery, as well as weakness and swelling. She is currently full-time, light-duty and is not aware when she is supposed to return full-duty, full-time. She is eager to improve overall strength, ROM, pain, and stamina of the knee with work and with daily activities. Past Medical History/Comorbidities: Patient reported hypertension that is well controlled with lisinopril, Type II Diabetes controlled with proglitazone, metformin, and glipizide, and she has been taking naproxen for inflammation of the knee. She just had a benign tumor removed from her abdomen which required a hysterectomy in 2016. Social History/Living Environment:    Patient lives at home with sons and reports assistance from them with any heavy household chores, ADLs, heavy lifting, and heavy carrying. Prior Level of Function/Work/Activity:  Independent without dysfunction. Patient is currently light-duty, full-time with her job as a . 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 Dominant Side:         RIGHT  Current Medications:     No current outpatient prescriptions on file.    Date Last Reviewed:  6/14/2017   Number of Personal Factors/Comorbidities that affect the Plan of Care (Type II Diabetes and high job demand tasks : 1-2: MODERATE COMPLEXITY   EXAMINATION:     Observation/Postural and Gait Assessment:  Patient ambulates without crutches with minimal to no gait deviation usually but came into clinic on 6/2/2017 with significant gait deviation (From bilateral crutches with step to gait on her toes and is able to ambulate with 1 crutch safely, independently, and with minimal gait deviation with verbal and visual cuing, gait without crutch antalgic with foot flat throughout stance). Medial and lateral joint line portal holes completely approximated with scar tissue (From scar tissue and scabbing). No active drainage is present, as well as no signs or symptoms of infection. Anthropometric measurements (cm) Left Right   Knee joint line 42 42 (from 43)     Palpation: Gross tenderness to palpation of medial and lateral portal holes and medial joint line. Flexibility minimally (From severely) limited of gastrocnemius. Hamstrings limited moderately to 80 (from 70) degree SLR.    ROM:     AROM(PROM) Left Right   Knee flexion 122° 120 (From 100°) - 4/19/2017   Knee extension 2° flexion contracture 2 hyperextension 4/19/2017 (From 5° flexion contracture)     Passive Accessory Mobility Testing: Mild to moderate limitations of patellofemoral joint in all planes. Strength:   Significant dysfunction on 6/2/2017 from set back 6/1/2017 with pain and ER   Manual Muscle Test (out of 5) Left Right   Knee extension 5 4+ (From 3+), 0 degree lag with SLR   Knee flexion 5 4+ (From 4)   Hip flexion 5 4+ (From 4)   Hip extension 4 (from 3) 4 (From 3)   Hip abduction 4 (From 3) 4 (From 3)   Ankle DF 4+ 4+ (From 4)   Ankle PF 4+ 4+ (From 4)     Special Tests:  No special tests due to acuity of surgery. No signs or symptoms of infection or deep vein thrombosis at this time.     Neurological Screen:  Myotomes: Key muscle strength testing for bilateral LE is WNL. Dermatomes: Sensation testing through bilateral LE for light touch is WNL. Functional Mobility:  Patient reports improvements with gait for longer periods of time without use of crutches with minimal gait deviation (From difficulty with ambulation longer than a few minutes but safe and independent with and without crutches with step to gait). Transfers with use of hands 0 (From 0-50%) of the time into and out of a chair (From 100% of the time). Stairs performed reciprocally with ascend and with descend (From non-reciprocally with left leg as dominant leg with use of crutches). Body Structures Involved:  1. Bones  2. Joints  3. Muscles Body Functions Affected:  1. Sensory/Pain  2. Neuromusculoskeletal Activities and Participation Affected:  1. General Tasks and Demands  2. Mobility  3. Ability to work   Number of elements (examined above) that affect the Plan of Care: 3: MODERATE COMPLEXITY   CLINICAL PRESENTATION:   Presentation: Evolving clinical presentation with changing clinical characteristics: MODERATE COMPLEXITY   CLINICAL DECISION MAKING:   Outcome Measure: Tool Used: Lower Extremity Functional Scale (LEFS)  Score  Initial: 4/80 Most Recent: 35/80 (Date: 4/6/2017)                           26/80 (Date: 4/19/2017)                          48/80 (Date: 5/10/2017)                          46/80 (Date: 5/17/2017)                          54/80 (Date: 6/14/2017)   Interpretation of Score: 20 questions each scored on a 5 point scale with 0 representing \"extreme difficulty or unable to perform\" and 4 representing \"no difficulty\". The lower the score, the greater the functional disability. 80/80 represents no disability. Minimal detectable change is 9 points.     Medical Necessity:   · Patient is expected to demonstrate progress in strength, range of motion, balance, coordination and functional technique to improve tolerance to daily activities, transfers sit to and from stand, ability to walk and stand un-aided for longer periods of time, and ability to work. · Skilled intervention continues to be required due to weakness, decreased ROM, decreased flexibility, pain, and functional limitations. Reason for Services/Other Comments:  · Patient continues to require skilled intervention due to increasing complexity of exercises. Use of outcome tool(s) and clinical judgement create a POC that gives a (due to long standing history of knee pain and goal of return to work with high level job tasks): Questionable prediction of patient's progress: MODERATE COMPLEXITY            TREATMENT:   (In addition to Assessment/Re-Assessment sessions the following treatments were rendered)    Pre-treatment Symptoms/Complaints:  Patient reported she is sore today from working the whole day on the floor. Pain: Initial:   Pain Intensity 1: 2  Pain Location 1: Knee  Pain Orientation 1: Right  Post Session: 2/10 after session. Therapeutic Exercise: (35 Minutes):  Exercises per grid below to improve mobility, strength, balance and coordination. Required minimal visual, verbal and manual cues to promote proper body alignment, promote proper body posture, promote proper body mechanics and promote proper body breathing techniques. Progressed resistance, range, repetitions and complexity of movement as indicated. (used abbreviations: BET-back education training) Date:  6/7/17 Date:  6/9/17   Date:  6/14/17   Activity/Exercise Parameters Parameters Parameters   Calf stretch - angle board 4 x 30 sec On incline 3x30 sec hold  4 x 30 sec   Hamstring stretch Strap 3 x 30 sec setaed 3 x 30 sec 3 x 30 sec   Nu step  Level 6 x 10 mins  Level 4 x 12 mins  level 6 x 10 min   Heel raises - off angle board X 30 reps  2 x 15 reps  3 x 10 elevated off angle board   Standing knee flexion X 20 reps 4 lbs 4 lbs 3 x 10 X 20 reps 4 lb. Wt.s    Standing hip abduction X 20 reps 4 lbs 4 lbs 3 x 10 X 20 reps 4 lb.  Wt.s    Standing hamstring curls  X 20 reps 4 lbs 4 lbs 3 x 10 X 20 reps 4 lb  wts   Step ups  8 inch 2 x 10 8 inch x 20  8 inch x 30 fwd & side stepping    Sit to and from stand 2 x 10  2 x 10 UE assist 3x10 reps    Sideways step up and over 8 inch 2 x 10 8 inch 2 x 10  8 inch 3 x 10   Mini squats -- --- 3 x 10   Long arc quad --- --- 4 lbs 3 x 10       Manual Therapy (    Soft Tissue Mobilization Duration  Duration: 10 Minutes): to improve joint and soft tissue mobility  · prone deep tissue mobilization of posterior knee  · Kinesiology taping of right knee with oval formation to reduce swelling  (Used abbreviations: MET - muscle energy technique; PNF - proprioceptive neuromuscular facilitation; NMR - neuromuscular re-education; a/p - anterior to posterior; p/a - posterior to anterior; NT - not tested)    Therapeutic Modalities: for edema and pain                      Right Knee Cold  Type:  (vasopneumatic)  Duration: 10 minutes  Patient Position: Supine                                                                        HEP: As above; handouts given to patient for all exercises. Treatment/Session Assessment:    · Response to Treatment:  Patient reported no complaints with exercises. Follows up with MD on Friday. Continue to progress as tolerated with emphasis on strengthening and endurance. · Compliance with Program/Exercises: compliant most of the time. · Recommendations/Intent for next treatment session: \"Next visit will focus on advancements to more challenging activities\". RE-CERTIFICATION: 3 times a week for 4 weeks from 6/2/2017 to 6/30/2017 in order to meet the above set goals.     Total Treatment Duration: 55 minutes  PT Patient Time In/Time Out  Time In: 1466  Time Out: 1791 Nahun Cardenas DPT

## 2017-06-16 ENCOUNTER — HOSPITAL ENCOUNTER (OUTPATIENT)
Dept: PHYSICAL THERAPY | Age: 52
Discharge: HOME | End: 2017-06-16
Payer: COMMERCIAL

## 2017-06-16 PROCEDURE — 97110 THERAPEUTIC EXERCISES: CPT

## 2017-06-16 PROCEDURE — 97140 MANUAL THERAPY 1/> REGIONS: CPT

## 2017-06-16 NOTE — PROGRESS NOTES
Lew Charles  : 1965 [facility] [place] [address], [place], [address] [facility]  [phone]   [phone]         OUTPATIENT PHYSICAL THERAPY:Daily Note 2017    ICD-10: Treatment Diagnosis 1: right knee pain (M25.561)              Treatment Diagnosis 2: gait dysfunction (R26.89)              Treatment Diagnosis 3: right knee primary localized osteoarthritis (M17.11)  Precautions: Type II Diabetes  Allergies:   Review of patient's allergies indicates not on file. Fall Risk Score: 1 (? 5 = High Risk)  MD Orders: Evaluate and Treat MEDICAL/REFERRING DIAGNOSIS:  Pain in right knee [M25.561]   DATE OF ONSET: 8/15/2016 when patient fell at work when she tripped over a pallet and landed directly on knee, then underwent right arthroscopic knee surgery on 3/1/2017   REFERRING PHYSICIAN: Whitney Ardon, [facility] [address]: 2017     INITIAL ASSESSMENT:  Ms. Cristi Barney is a 46 y.o. female presenting s/p right arthroscopic knee surgery performed on 3/1/2017 after injuring her knee 2016 when she fell at work and tripped over a pallet and landed directly on her knee. She has reported continued pain in the knee since surgery, as well as weakness and swelling. She has been seen for 27 sessions of therapy from 3/24/2017 to 2017 with significant success. Patient reports overall improvements lately in the knee but is struggling to work the floor for a full 8 hour shift due to pain and dysfunction. She is eager to return to work not limited but her knee full time, full duty. She is a good candidate for continued skilled intervention with services to include manual therapy, modalities as needed, therapeutic exercises, gait/stair/transfer/balance training, activity modification, and return to work simulation  . PROBLEM LIST (Impacting functional limitations):  1. Decreased Strength  2. Decreased ADL/Functional Activities  3.  Decreased Transfer Abilities  4. Decreased Ambulation Ability/Technique  5. Decreased Balance  6. Increased Pain  7. Decreased Activity Tolerance  8. Decreased Pacing Skills  9. Increased Fatigue  10. Decreased Flexibility/Joint Mobility INTERVENTIONS PLANNED:  1. Balance Exercise  2. Cold  3. Cryotherapy  4. Electrical Stimulation  5. Gait Training  6. Heat  7. Home Exercise Program (HEP)  8. Manual Therapy  9. Neuromuscular Re-education/Strengthening  10. Range of Motion (ROM)  11. Therapeutic Exercise/Strengthening  12. Transfer Training   TREATMENT PLAN:  Effective Dates: 6/2/2017 TO 6/30/2017. Frequency/Duration: RE-CERTIFICATION: 3 times a week for 4 weeks  GOALS: (Goals have been discussed and agreed upon with patient.)  Short-Term Functional Goals: Time Frame: 3/24/2017 to 4/7/2017  1. Patient demonstrates independence with home exercise program without verbal cueing provided by therapist. - GOAL MET  2. Improve ROM to 0-120 degrees in order to normalize gait, transfers, and stairs. - GOAL MET  3. Improve flexibility of gastrocnemius and hamstrings with SLR to 80 degrees in order to decrease pain with sleeping and sitting. - GOAL MET  Discharge Goals: Time Frame: 6/2/2017 to 6/30/2017   1. Improve pain to 3/10 with work, extended walking, extended weightbearing, squatting, stooping, sleeping, and sitting. - ONGOING  2. Improve gross lower extremity strength to at least 4+/5 in order to return patient to work full-time, full-duty. - ONGOING  3. Improve gait without use of crutches without significant antalgia and appropriate heel to toe gait pattern. - GOAL MET  4. Improve stair navigation with reciprocal ascend and descend with use of hand rail. - GOAL MET  5. Improve transfers with use of hands 0-50% of the time into and out of a chair. - GOAL MET  6. Return patient to full time, full duty work as a . - ONGOING  7.  Improve Lower Extremity Functional Index score to 40/80 from 4/80. - GOAL MET  Rehabilitation Potential For Stated Goals: Good  Regarding Ally Perea's therapy, I certify that the treatment plan above will be carried out by a therapist or under their direction. Thank you for this referral,  Rama Lugo PTA     Referring Physician Signature: Linda Hanna,               Date                    The information in this section was collected on 6/14/2017 (except where otherwise noted). HISTORY:   History of Present Injury/Illness (Reason for Referral):  Ms. Corrinne Maine is a 46 y.o. female presenting s/p right arthroscopic knee surgery performed on 3/1/2017 after injuring her knee 8/5/2016 when she fell at work and tripped over a pallet and landed directly on her knee. She has reported continued pain in the knee since surgery, as well as weakness and swelling. She is currently full-time, light-duty and is not aware when she is supposed to return full-duty, full-time. She is eager to improve overall strength, ROM, pain, and stamina of the knee with work and with daily activities. Past Medical History/Comorbidities: Patient reported hypertension that is well controlled with lisinopril, Type II Diabetes controlled with proglitazone, metformin, and glipizide, and she has been taking naproxen for inflammation of the knee. She just had a benign tumor removed from her abdomen which required a hysterectomy in 2016. Social History/Living Environment:    Patient lives at home with sons and reports assistance from them with any heavy household chores, ADLs, heavy lifting, and heavy carrying. Prior Level of Function/Work/Activity:  Independent without dysfunction. Patient is currently light-duty, full-time with her job as a . 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 Dominant Side:         RIGHT  Current Medications:     No current outpatient prescriptions on file.    Date Last Reviewed:  6/16/2017   Number of Personal Factors/Comorbidities that affect the Plan of Care (Type II Diabetes and high job demand tasks : 1-2: MODERATE COMPLEXITY   EXAMINATION:     Observation/Postural and Gait Assessment:  Patient ambulates without crutches with minimal to no gait deviation usually but came into clinic on 6/2/2017 with significant gait deviation (From bilateral crutches with step to gait on her toes and is able to ambulate with 1 crutch safely, independently, and with minimal gait deviation with verbal and visual cuing, gait without crutch antalgic with foot flat throughout stance). Medial and lateral joint line portal holes completely approximated with scar tissue (From scar tissue and scabbing). No active drainage is present, as well as no signs or symptoms of infection. Anthropometric measurements (cm) Left Right   Knee joint line 42 42 (from 43)     Palpation: Gross tenderness to palpation of medial and lateral portal holes and medial joint line. Flexibility minimally (From severely) limited of gastrocnemius. Hamstrings limited moderately to 80 (from 70) degree SLR.    ROM:     AROM(PROM) Left Right   Knee flexion 122° 120 (From 100°) - 4/19/2017   Knee extension 2° flexion contracture 2 hyperextension 4/19/2017 (From 5° flexion contracture)     Passive Accessory Mobility Testing: Mild to moderate limitations of patellofemoral joint in all planes. Strength:   Significant dysfunction on 6/2/2017 from set back 6/1/2017 with pain and ER   Manual Muscle Test (out of 5) Left Right   Knee extension 5 4+ (From 3+), 0 degree lag with SLR   Knee flexion 5 4+ (From 4)   Hip flexion 5 4+ (From 4)   Hip extension 4 (from 3) 4 (From 3)   Hip abduction 4 (From 3) 4 (From 3)   Ankle DF 4+ 4+ (From 4)   Ankle PF 4+ 4+ (From 4)     Special Tests:  No special tests due to acuity of surgery. No signs or symptoms of infection or deep vein thrombosis at this time.     Neurological Screen:  Myotomes: Key muscle strength testing for bilateral LE is WNL. Dermatomes: Sensation testing through bilateral LE for light touch is WNL. Functional Mobility:  Patient reports improvements with gait for longer periods of time without use of crutches with minimal gait deviation (From difficulty with ambulation longer than a few minutes but safe and independent with and without crutches with step to gait). Transfers with use of hands 0 (From 0-50%) of the time into and out of a chair (From 100% of the time). Stairs performed reciprocally with ascend and with descend (From non-reciprocally with left leg as dominant leg with use of crutches). Body Structures Involved:  1. Bones  2. Joints  3. Muscles Body Functions Affected:  1. Sensory/Pain  2. Neuromusculoskeletal Activities and Participation Affected:  1. General Tasks and Demands  2. Mobility  3. Ability to work   Number of elements (examined above) that affect the Plan of Care: 3: MODERATE COMPLEXITY   CLINICAL PRESENTATION:   Presentation: Evolving clinical presentation with changing clinical characteristics: MODERATE COMPLEXITY   CLINICAL DECISION MAKING:   Outcome Measure: Tool Used: Lower Extremity Functional Scale (LEFS)  Score  Initial: 4/80 Most Recent: 35/80 (Date: 4/6/2017)                           26/80 (Date: 4/19/2017)                          48/80 (Date: 5/10/2017)                          46/80 (Date: 5/17/2017)                          54/80 (Date: 6/14/2017)   Interpretation of Score: 20 questions each scored on a 5 point scale with 0 representing \"extreme difficulty or unable to perform\" and 4 representing \"no difficulty\". The lower the score, the greater the functional disability. 80/80 represents no disability. Minimal detectable change is 9 points.     Medical Necessity:   · Patient is expected to demonstrate progress in strength, range of motion, balance, coordination and functional technique to improve tolerance to daily activities, transfers sit to and from stand, ability to walk and stand un-aided for longer periods of time, and ability to work. · Skilled intervention continues to be required due to weakness, decreased ROM, decreased flexibility, pain, and functional limitations. Reason for Services/Other Comments:  · Patient continues to require skilled intervention due to increasing complexity of exercises. Use of outcome tool(s) and clinical judgement create a POC that gives a (due to long standing history of knee pain and goal of return to work with high level job tasks): Questionable prediction of patient's progress: MODERATE COMPLEXITY            TREATMENT:   (In addition to Assessment/Re-Assessment sessions the following treatments were rendered)    Pre-treatment Symptoms/Complaints:  Patient continues to report increased knee pain with working on concrete floors 8 hours. Pain: Initial:   Pain Intensity 1: 3  Pain Location 1: Knee  Pain Orientation 1: Right  Post Session: 3/10 after session. Therapeutic Exercise: (40 Minutes):  Exercises per grid below to improve mobility, strength, balance and coordination. Required minimal visual, verbal and manual cues to promote proper body alignment, promote proper body posture, promote proper body mechanics and promote proper body breathing techniques. Progressed resistance, range, repetitions and complexity of movement as indicated. (used abbreviations: BET-back education training) Date:  6/16/17 Date:  6/9/17   Date:  6/14/17   Activity/Exercise Parameters Parameters Parameters   Calf stretch - angle board 4 x 30 sec On incline 3x30 sec hold  4 x 30 sec   Hamstring stretch Strap 3 x 30 sec setaed 3 x 30 sec 3 x 30 sec   Nu step  Level 3 x 12 mins  Level 4 x 12 mins  level 6 x 10 min   Heel raises - off angle board 3 x 10 2 x 15 reps  3 x 10 elevated off angle board   Standing knee flexion  4 lbs 3 x 10 4 lbs 3 x 10 X 20 reps 4 lb. Wt.s    Standing hip abduction  4 lbs 3 x 10 4 lbs 3 x 10 X 20 reps 4 lb.  Wt.s    Standing hamstring curls   4 lbs 3 x 10 4 lbs 3 x 10 X 20 reps 4 lb  wts   Step ups  8 inch 2 x 15 8 inch x 20  8 inch x 30 fwd & side stepping    Sit to and from stand 3 x 10  2 x 10 UE assist 3x10 reps    Sideways step up and over 8 inch 1 x 20 8 inch 2 x 10  8 inch 3 x 10   Mini squats -- --- 3 x 10   Long arc quad 4 lbs 3 x 10 --- 4 lbs 3 x 10       Manual Therapy (    Soft Tissue Mobilization Duration  Duration: 15 Minutes): to improve joint and soft tissue mobility  · prone deep tissue mobilization of posterior knee  · Kinesiology taping of right knee with oval formation to reduce swelling  (Used abbreviations: MET - muscle energy technique; PNF - proprioceptive neuromuscular facilitation; NMR - neuromuscular re-education; a/p - anterior to posterior; p/a - posterior to anterior; NT - not tested)    Therapeutic Modalities: for edema and pain     Pt declined ice or vaso pneumatic compression today                                                                                                HEP: As above; handouts given to patient for all exercises. Treatment/Session Assessment:    · Response to Treatment: some increased knee pain with knee extension exercises and standing exercises when weight bearing through right lower extremity. · Compliance with Program/Exercises: compliant most of the time. · Recommendations/Intent for next treatment session: \"Next visit will focus on advancements to more challenging activities\". RE-CERTIFICATION: 3 times a week for 4 weeks from 6/2/2017 to 6/30/2017 in order to meet the above set goals.     Total Treatment Duration: 55 minutes  PT Patient Time In/Time Out  Time In: 1590  Time Out: SHELLY Olivarez ErmelindaISABEL damico

## 2017-06-19 ENCOUNTER — HOSPITAL ENCOUNTER (OUTPATIENT)
Dept: PHYSICAL THERAPY | Age: 52
Discharge: HOME | End: 2017-06-19
Payer: COMMERCIAL

## 2017-06-19 PROCEDURE — 97110 THERAPEUTIC EXERCISES: CPT

## 2017-06-19 PROCEDURE — 97016 VASOPNEUMATIC DEVICE THERAPY: CPT

## 2017-06-19 NOTE — PROGRESS NOTES
Zoie Gibbons  : [address] [facility] [place] [address], [place], [address] [facility]  [phone]   [phone]         OUTPATIENT PHYSICAL THERAPY:Daily Note 2017    ICD-10: Treatment Diagnosis 1: right knee pain (M25.561)              Treatment Diagnosis 2: gait dysfunction (R26.89)              Treatment Diagnosis 3: right knee primary localized osteoarthritis (M17.11)  Precautions: Type II Diabetes  Allergies:   Review of patient's allergies indicates not on file. Fall Risk Score: 1 (? 5 = High Risk)  MD Orders: Evaluate and Treat MEDICAL/REFERRING DIAGNOSIS:  Pain in right knee [M25.561]   DATE OF ONSET: 8/15/2016 when patient fell at work when she tripped over a pallet and landed directly on knee, then underwent right arthroscopic knee surgery on 3/1/2017   REFERRING PHYSICIAN: Douglas Benton, [facility] [address]: 2017     INITIAL ASSESSMENT:  Ms. Emeka Adam is a 46 y.o. female presenting s/p right arthroscopic knee surgery performed on 3/1/2017 after injuring her knee 2016 when she fell at work and tripped over a pallet and landed directly on her knee. She has reported continued pain in the knee since surgery, as well as weakness and swelling. She has been seen for 27 sessions of therapy from 3/24/2017 to 2017 with significant success. Patient reports overall improvements lately in the knee but is struggling to work the floor for a full 8 hour shift due to pain and dysfunction. She is eager to return to work not limited but her knee full time, full duty. She is a good candidate for continued skilled intervention with services to include manual therapy, modalities as needed, therapeutic exercises, gait/stair/transfer/balance training, activity modification, and return to work simulation  . PROBLEM LIST (Impacting functional limitations):  1. Decreased Strength  2. Decreased ADL/Functional Activities  3.  Decreased Transfer Abilities  4. Decreased Ambulation Ability/Technique  5. Decreased Balance  6. Increased Pain  7. Decreased Activity Tolerance  8. Decreased Pacing Skills  9. Increased Fatigue  10. Decreased Flexibility/Joint Mobility INTERVENTIONS PLANNED:  1. Balance Exercise  2. Cold  3. Cryotherapy  4. Electrical Stimulation  5. Gait Training  6. Heat  7. Home Exercise Program (HEP)  8. Manual Therapy  9. Neuromuscular Re-education/Strengthening  10. Range of Motion (ROM)  11. Therapeutic Exercise/Strengthening  12. Transfer Training   TREATMENT PLAN:  Effective Dates: 6/2/2017 TO 6/30/2017. Frequency/Duration: RE-CERTIFICATION: 3 times a week for 4 weeks  GOALS: (Goals have been discussed and agreed upon with patient.)  Short-Term Functional Goals: Time Frame: 3/24/2017 to 4/7/2017  1. Patient demonstrates independence with home exercise program without verbal cueing provided by therapist. - GOAL MET  2. Improve ROM to 0-120 degrees in order to normalize gait, transfers, and stairs. - GOAL MET  3. Improve flexibility of gastrocnemius and hamstrings with SLR to 80 degrees in order to decrease pain with sleeping and sitting. - GOAL MET  Discharge Goals: Time Frame: 6/2/2017 to 6/30/2017   1. Improve pain to 3/10 with work, extended walking, extended weightbearing, squatting, stooping, sleeping, and sitting. - ONGOING  2. Improve gross lower extremity strength to at least 4+/5 in order to return patient to work full-time, full-duty. - ONGOING  3. Improve gait without use of crutches without significant antalgia and appropriate heel to toe gait pattern. - GOAL MET  4. Improve stair navigation with reciprocal ascend and descend with use of hand rail. - GOAL MET  5. Improve transfers with use of hands 0-50% of the time into and out of a chair. - GOAL MET  6. Return patient to full time, full duty work as a . - ONGOING  7.  Improve Lower Extremity Functional Index score to 40/80 from 4/80. - GOAL MET  Rehabilitation Potential For Stated Goals: Good  Regarding Ilya Perea's therapy, I certify that the treatment plan above will be carried out by a therapist or under their direction. Thank you for this referral,  Deejay Mccarthy PTA     Referring Physician Signature: Carina Turner,               Date                    The information in this section was collected on 6/14/2017 (except where otherwise noted). HISTORY:   History of Present Injury/Illness (Reason for Referral):  Ms. Vero Gifford is a 46 y.o. female presenting s/p right arthroscopic knee surgery performed on 3/1/2017 after injuring her knee 8/5/2016 when she fell at work and tripped over a pallet and landed directly on her knee. She has reported continued pain in the knee since surgery, as well as weakness and swelling. She is currently full-time, light-duty and is not aware when she is supposed to return full-duty, full-time. She is eager to improve overall strength, ROM, pain, and stamina of the knee with work and with daily activities. Past Medical History/Comorbidities: Patient reported hypertension that is well controlled with lisinopril, Type II Diabetes controlled with proglitazone, metformin, and glipizide, and she has been taking naproxen for inflammation of the knee. She just had a benign tumor removed from her abdomen which required a hysterectomy in 2016. Social History/Living Environment:    Patient lives at home with sons and reports assistance from them with any heavy household chores, ADLs, heavy lifting, and heavy carrying. Prior Level of Function/Work/Activity:  Independent without dysfunction. Patient is currently light-duty, full-time with her job as a . 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 Dominant Side:         RIGHT  Current Medications:     No current outpatient prescriptions on file.    Date Last Reviewed:  6/19/2017   Number of Personal Factors/Comorbidities that affect the Plan of Care (Type II Diabetes and high job demand tasks : 1-2: MODERATE COMPLEXITY   EXAMINATION:     Observation/Postural and Gait Assessment:  Patient ambulates without crutches with minimal to no gait deviation usually but came into clinic on 6/2/2017 with significant gait deviation (From bilateral crutches with step to gait on her toes and is able to ambulate with 1 crutch safely, independently, and with minimal gait deviation with verbal and visual cuing, gait without crutch antalgic with foot flat throughout stance). Medial and lateral joint line portal holes completely approximated with scar tissue (From scar tissue and scabbing). No active drainage is present, as well as no signs or symptoms of infection. Anthropometric measurements (cm) Left Right   Knee joint line 42 42 (from 43)     Palpation: Gross tenderness to palpation of medial and lateral portal holes and medial joint line. Flexibility minimally (From severely) limited of gastrocnemius. Hamstrings limited moderately to 80 (from 70) degree SLR.    ROM:     AROM(PROM) Left Right   Knee flexion 122° 120 (From 100°) - 4/19/2017   Knee extension 2° flexion contracture 2 hyperextension 4/19/2017 (From 5° flexion contracture)     Passive Accessory Mobility Testing: Mild to moderate limitations of patellofemoral joint in all planes. Strength:   Significant dysfunction on 6/2/2017 from set back 6/1/2017 with pain and ER   Manual Muscle Test (out of 5) Left Right   Knee extension 5 4+ (From 3+), 0 degree lag with SLR   Knee flexion 5 4+ (From 4)   Hip flexion 5 4+ (From 4)   Hip extension 4 (from 3) 4 (From 3)   Hip abduction 4 (From 3) 4 (From 3)   Ankle DF 4+ 4+ (From 4)   Ankle PF 4+ 4+ (From 4)     Special Tests:  No special tests due to acuity of surgery. No signs or symptoms of infection or deep vein thrombosis at this time.     Neurological Screen:  Myotomes: Key muscle strength testing for bilateral LE is WNL. Dermatomes: Sensation testing through bilateral LE for light touch is WNL. Functional Mobility:  Patient reports improvements with gait for longer periods of time without use of crutches with minimal gait deviation (From difficulty with ambulation longer than a few minutes but safe and independent with and without crutches with step to gait). Transfers with use of hands 0 (From 0-50%) of the time into and out of a chair (From 100% of the time). Stairs performed reciprocally with ascend and with descend (From non-reciprocally with left leg as dominant leg with use of crutches). Body Structures Involved:  1. Bones  2. Joints  3. Muscles Body Functions Affected:  1. Sensory/Pain  2. Neuromusculoskeletal Activities and Participation Affected:  1. General Tasks and Demands  2. Mobility  3. Ability to work   Number of elements (examined above) that affect the Plan of Care: 3: MODERATE COMPLEXITY   CLINICAL PRESENTATION:   Presentation: Evolving clinical presentation with changing clinical characteristics: MODERATE COMPLEXITY   CLINICAL DECISION MAKING:   Outcome Measure: Tool Used: Lower Extremity Functional Scale (LEFS)  Score  Initial: 4/80 Most Recent: 35/80 (Date: 4/6/2017)                           26/80 (Date: 4/19/2017)                          48/80 (Date: 5/10/2017)                          46/80 (Date: 5/17/2017)                          54/80 (Date: 6/14/2017)   Interpretation of Score: 20 questions each scored on a 5 point scale with 0 representing \"extreme difficulty or unable to perform\" and 4 representing \"no difficulty\". The lower the score, the greater the functional disability. 80/80 represents no disability. Minimal detectable change is 9 points.     Medical Necessity:   · Patient is expected to demonstrate progress in strength, range of motion, balance, coordination and functional technique to improve tolerance to daily activities, transfers sit to and from stand, ability to walk and stand un-aided for longer periods of time, and ability to work. · Skilled intervention continues to be required due to weakness, decreased ROM, decreased flexibility, pain, and functional limitations. Reason for Services/Other Comments:  · Patient continues to require skilled intervention due to increasing complexity of exercises. Use of outcome tool(s) and clinical judgement create a POC that gives a (due to long standing history of knee pain and goal of return to work with high level job tasks): Questionable prediction of patient's progress: MODERATE COMPLEXITY            TREATMENT:   (In addition to Assessment/Re-Assessment sessions the following treatments were rendered)    Pre-treatment Symptoms/Complaints:  Patient continues to report increased knee pain with prolonged standing and increased activities . Pain: Initial: 4/10     Post Session: 3/10 after session. Therapeutic Exercise: (40 Minutes):  Exercises per grid below to improve mobility, strength, balance and coordination. Required minimal visual, verbal and manual cues to promote proper body alignment, promote proper body posture, promote proper body mechanics and promote proper body breathing techniques. Progressed resistance, range, repetitions and complexity of movement as indicated. (used abbreviations: BET-back education training) Date:  6/16/17 Date:  6/19/17   Date:  6/14/17   Activity/Exercise Parameters Parameters Parameters   Calf stretch - angle board 4 x 30 sec On incline 3x30 sec hold  4 x 30 sec   Hamstring stretch Strap 3 x 30 sec setaed 3 x 30 sec 3 x 30 sec   Nu step  Level 3 x 12 mins  Level 4 x 10 mins  level 6 x 10 min   Heel raises - off angle board 3 x 10 3 x 10 reps  3 x 10 elevated off angle board   Standing knee flexion  4 lbs 3 x 10 3 lbs 3 x 10 X 20 reps 4 lb. Wt.s    Standing hip abduction  4 lbs 3 x 10 3 lbs 3 x 10 X 20 reps 4 lb.  Wt.s    Standing hamstring curls   4 lbs 3 x 10 3 lbs 3 x 10 X 20 reps 4 lb wts   Step ups  8 inch 2 x 15 8 inch x 20  8 inch x 30 fwd & side stepping    Sit to and from stand 3 x 10  2 x 10 UE assist 3x10 reps    Sideways step up and over 8 inch 1 x 20 8 inch 2 x 10  8 inch 3 x 10   Mini squats -- --- 3 x 10   Long arc quad 4 lbs 3 x 10 3 lbs 3 x 10- 4 lbs 3 x 10       Manual Therapy (     ): to improve joint and soft tissue mobility  ·   · Kinesiology taping of right knee with oval formation to reduce swelling  (Used abbreviations: MET - muscle energy technique; PNF - proprioceptive neuromuscular facilitation; NMR - neuromuscular re-education; a/p - anterior to posterior; p/a - posterior to anterior; NT - not tested)    Therapeutic Modalities: for edema and pain                        Right Knee Cold  Type:  (vaso pneumatic copmression)  Duration: 15 minutes  Patient Position: Supine                                                                        HEP: As above; handouts given to patient for all exercises. Treatment/Session Assessment:    · Response to Treatment: decreased pain after session. · Compliance with Program/Exercises: compliant most of the time. · Recommendations/Intent for next treatment session: \"Next visit will focus on advancements to more challenging activities\". RE-CERTIFICATION: 3 times a week for 4 weeks from 6/2/2017 to 6/30/2017 in order to meet the above set goals.     Total Treatment Duration: 55 minutes  PT Patient Time In/Time Out  Time In: 2731  Time Out: [address] [facility],[address], PTA   Mooar Covert, DPT

## 2017-06-22 ENCOUNTER — HOSPITAL ENCOUNTER (OUTPATIENT)
Dept: PHYSICAL THERAPY | Age: 52
Discharge: HOME | End: 2017-06-22
Payer: COMMERCIAL

## 2017-06-22 PROCEDURE — 97016 VASOPNEUMATIC DEVICE THERAPY: CPT

## 2017-06-22 PROCEDURE — 97140 MANUAL THERAPY 1/> REGIONS: CPT

## 2017-06-22 PROCEDURE — 97110 THERAPEUTIC EXERCISES: CPT

## 2017-06-22 NOTE — PROGRESS NOTES
Tyrell Schwab  : [address] [facility] [address], [place], [address] [facility]  [phone]   [phone]         OUTPATIENT PHYSICAL THERAPY:Daily Note 2017    ICD-10: Treatment Diagnosis 1: right knee pain (M25.561)              Treatment Diagnosis 2: gait dysfunction (R26.89)              Treatment Diagnosis 3: right knee primary localized osteoarthritis (M17.11)  Precautions: Type II Diabetes  Allergies:   Review of patient's allergies indicates not on file. Fall Risk Score: 1 (? 5 = High Risk)  MD Orders: Evaluate and Treat MEDICAL/REFERRING DIAGNOSIS:  Pain in right knee [M25.561]   DATE OF ONSET: 8/15/2016 when patient fell at work when she tripped over a pallet and landed directly on knee, then underwent right arthroscopic knee surgery on 3/1/2017   REFERRING PHYSICIAN: Claudetta Crisp, [facility] [address]: 2017     INITIAL ASSESSMENT:  Ms. Fidel Guevara is a 46 y.o. female presenting s/p right arthroscopic knee surgery performed on 3/1/2017 after injuring her knee 2016 when she fell at work and tripped over a pallet and landed directly on her knee. She has reported continued pain in the knee since surgery, as well as weakness and swelling. She has been seen for 27 sessions of therapy from 3/24/2017 to 2017 with significant success. Patient reports overall improvements lately in the knee but is struggling to work the floor for a full 8 hour shift due to pain and dysfunction. She is eager to return to work not limited but her knee full time, full duty. She is a good candidate for continued skilled intervention with services to include manual therapy, modalities as needed, therapeutic exercises, gait/stair/transfer/balance training, activity modification, and return to work simulation  . PROBLEM LIST (Impacting functional limitations):  1. Decreased Strength  2. Decreased ADL/Functional Activities  3.  Decreased Transfer Abilities  4. Decreased Ambulation Ability/Technique  5. Decreased Balance  6. Increased Pain  7. Decreased Activity Tolerance  8. Decreased Pacing Skills  9. Increased Fatigue  10. Decreased Flexibility/Joint Mobility INTERVENTIONS PLANNED:  1. Balance Exercise  2. Cold  3. Cryotherapy  4. Electrical Stimulation  5. Gait Training  6. Heat  7. Home Exercise Program (HEP)  8. Manual Therapy  9. Neuromuscular Re-education/Strengthening  10. Range of Motion (ROM)  11. Therapeutic Exercise/Strengthening  12. Transfer Training   TREATMENT PLAN:  Effective Dates: 6/2/2017 TO 6/30/2017. Frequency/Duration: RE-CERTIFICATION: 3 times a week for 4 weeks  GOALS: (Goals have been discussed and agreed upon with patient.)  Short-Term Functional Goals: Time Frame: 3/24/2017 to 4/7/2017  1. Patient demonstrates independence with home exercise program without verbal cueing provided by therapist. - GOAL MET  2. Improve ROM to 0-120 degrees in order to normalize gait, transfers, and stairs. - GOAL MET  3. Improve flexibility of gastrocnemius and hamstrings with SLR to 80 degrees in order to decrease pain with sleeping and sitting. - GOAL MET  Discharge Goals: Time Frame: 6/2/2017 to 6/30/2017   1. Improve pain to 3/10 with work, extended walking, extended weightbearing, squatting, stooping, sleeping, and sitting. - ONGOING  2. Improve gross lower extremity strength to at least 4+/5 in order to return patient to work full-time, full-duty. - ONGOING  3. Improve gait without use of crutches without significant antalgia and appropriate heel to toe gait pattern. - GOAL MET  4. Improve stair navigation with reciprocal ascend and descend with use of hand rail. - GOAL MET  5. Improve transfers with use of hands 0-50% of the time into and out of a chair. - GOAL MET  6. Return patient to full time, full duty work as a . - ONGOING  7.  Improve Lower Extremity Functional Index score to 40/80 from 4/80. - GOAL MET  Rehabilitation Potential For Stated Goals: Good  Regarding Anish Perea's therapy, I certify that the treatment plan above will be carried out by a therapist or under their direction. Thank you for this referral,  Thea Pompa PTA     Referring Physician Signature: Janene Duran, DO              Date                    The information in this section was collected on 6/14/2017 (except where otherwise noted). HISTORY:   History of Present Injury/Illness (Reason for Referral):  Ms. Sera Ventura is a 46 y.o. female presenting s/p right arthroscopic knee surgery performed on 3/1/2017 after injuring her knee 8/5/2016 when she fell at work and tripped over a pallet and landed directly on her knee. She has reported continued pain in the knee since surgery, as well as weakness and swelling. She is currently full-time, light-duty and is not aware when she is supposed to return full-duty, full-time. She is eager to improve overall strength, ROM, pain, and stamina of the knee with work and with daily activities. Past Medical History/Comorbidities: Patient reported hypertension that is well controlled with lisinopril, Type II Diabetes controlled with proglitazone, metformin, and glipizide, and she has been taking naproxen for inflammation of the knee. She just had a benign tumor removed from her abdomen which required a hysterectomy in 2016. Social History/Living Environment:    Patient lives at home with sons and reports assistance from them with any heavy household chores, ADLs, heavy lifting, and heavy carrying. Prior Level of Function/Work/Activity:  Independent without dysfunction. Patient is currently light-duty, full-time with her job as a . 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 Dominant Side:         RIGHT  Current Medications:     No current outpatient prescriptions on file.    Date Last Reviewed:  6/26/2017   Number of Personal Factors/Comorbidities that affect the Plan of Care (Type II Diabetes and high job demand tasks : 1-2: MODERATE COMPLEXITY   EXAMINATION:     Observation/Postural and Gait Assessment:  Patient ambulates without crutches with minimal to no gait deviation usually but came into clinic on 6/2/2017 with significant gait deviation (From bilateral crutches with step to gait on her toes and is able to ambulate with 1 crutch safely, independently, and with minimal gait deviation with verbal and visual cuing, gait without crutch antalgic with foot flat throughout stance). Medial and lateral joint line portal holes completely approximated with scar tissue (From scar tissue and scabbing). No active drainage is present, as well as no signs or symptoms of infection. Anthropometric measurements (cm) Left Right   Knee joint line 42 42 (from 43)     Palpation: Gross tenderness to palpation of medial and lateral portal holes and medial joint line. Flexibility minimally (From severely) limited of gastrocnemius. Hamstrings limited moderately to 80 (from 70) degree SLR.    ROM:     AROM(PROM) Left Right   Knee flexion 122° 120 (From 100°) - 4/19/2017   Knee extension 2° flexion contracture 2 hyperextension 4/19/2017 (From 5° flexion contracture)     Passive Accessory Mobility Testing: Mild to moderate limitations of patellofemoral joint in all planes. Strength:   Significant dysfunction on 6/2/2017 from set back 6/1/2017 with pain and ER   Manual Muscle Test (out of 5) Left Right   Knee extension 5 4+ (From 3+), 0 degree lag with SLR   Knee flexion 5 4+ (From 4)   Hip flexion 5 4+ (From 4)   Hip extension 4 (from 3) 4 (From 3)   Hip abduction 4 (From 3) 4 (From 3)   Ankle DF 4+ 4+ (From 4)   Ankle PF 4+ 4+ (From 4)     Special Tests:  No special tests due to acuity of surgery. No signs or symptoms of infection or deep vein thrombosis at this time.     Neurological Screen:  Myotomes: Key muscle strength testing for bilateral LE is WNL. Dermatomes: Sensation testing through bilateral LE for light touch is WNL. Functional Mobility:  Patient reports improvements with gait for longer periods of time without use of crutches with minimal gait deviation (From difficulty with ambulation longer than a few minutes but safe and independent with and without crutches with step to gait). Transfers with use of hands 0 (From 0-50%) of the time into and out of a chair (From 100% of the time). Stairs performed reciprocally with ascend and with descend (From non-reciprocally with left leg as dominant leg with use of crutches). Body Structures Involved:  1. Bones  2. Joints  3. Muscles Body Functions Affected:  1. Sensory/Pain  2. Neuromusculoskeletal Activities and Participation Affected:  1. General Tasks and Demands  2. Mobility  3. Ability to work   Number of elements (examined above) that affect the Plan of Care: 3: MODERATE COMPLEXITY   CLINICAL PRESENTATION:   Presentation: Evolving clinical presentation with changing clinical characteristics: MODERATE COMPLEXITY   CLINICAL DECISION MAKING:   Outcome Measure: Tool Used: Lower Extremity Functional Scale (LEFS)  Score  Initial: 4/80 Most Recent: 35/80 (Date: 4/6/2017)                           26/80 (Date: 4/19/2017)                          48/80 (Date: 5/10/2017)                          46/80 (Date: 5/17/2017)                          54/80 (Date: 6/14/2017)   Interpretation of Score: 20 questions each scored on a 5 point scale with 0 representing \"extreme difficulty or unable to perform\" and 4 representing \"no difficulty\". The lower the score, the greater the functional disability. 80/80 represents no disability. Minimal detectable change is 9 points.     Medical Necessity:   · Patient is expected to demonstrate progress in strength, range of motion, balance, coordination and functional technique to improve tolerance to daily activities, transfers sit to and from stand, ability to walk and stand un-aided for longer periods of time, and ability to work. · Skilled intervention continues to be required due to weakness, decreased ROM, decreased flexibility, pain, and functional limitations. Reason for Services/Other Comments:  · Patient continues to require skilled intervention due to increasing complexity of exercises. Use of outcome tool(s) and clinical judgement create a POC that gives a (due to long standing history of knee pain and goal of return to work with high level job tasks): Questionable prediction of patient's progress: MODERATE COMPLEXITY            TREATMENT:   (In addition to Assessment/Re-Assessment sessions the following treatments were rendered)    Pre-treatment Symptoms/Complaints:  Patient stated she had increased pain in her right posterior knee today. Pain: Initial: 4/10  Pain Intensity 1: 4  Pain Location 1: Knee  Pain Orientation 1: Right  Post Session:  5/10 pain level      Therapeutic Exercise: ( ): 30 mins  Exercises per grid below to improve mobility, strength, balance and coordination. Required minimal visual, verbal and manual cues to promote proper body alignment, promote proper body posture, promote proper body mechanics and promote proper body breathing techniques. Progressed resistance, range, repetitions and complexity of movement as indicated. (used abbreviations: BET-back education training) Date:  6/16/17 Date:  6/19/17   Date:  6/22/17   Activity/Exercise Parameters Parameters Parameters   Calf stretch - angle board 4 x 30 sec On incline 3x30 sec hold  4 x 30 sec   Hamstring stretch Strap 3 x 30 sec setaed 3 x 30 sec 3 x 30 sec   Nu step  Level 3 x 12 mins  Level 4 x 10 mins  level 5 x 10 min   Heel raises - off angle board 3 x 10 3 x 10 reps  3 x 10 elevated off angle board   Standing knee flexion  4 lbs 3 x 10 3 lbs 3 x 10 X 20 reps 4 lb. Wt.s    Standing hip abduction  4 lbs 3 x 10 3 lbs 3 x 10 X 20 reps 4 lb.  Wt.s    Standing hamstring curls   4 lbs 3 x 10 3 lbs 3 x 10 X 20 reps 4 lb  wts   Step ups  8 inch 2 x 15 8 inch x 20  8 inch x 30 fwd & side stepping    Sit to and from stand 3 x 10  2 x 10 UE assist 3x10 reps    Sideways step up and over 8 inch 1 x 20 8 inch 2 x 10  8 inch 3 x 10   Mini squats -- --- 3 x 10   Long arc quad 4 lbs 3 x 10 3 lbs 3 x 10- 4 lbs 3 x 10       Manual Therapy (     ): to improve joint and soft tissue mobility Pt. Received soft tissue mobilization to right posterior knee in prone x 15 mins to decrease pain and spasms. (Used abbreviations: MET - muscle energy technique; PNF - proprioceptive neuromuscular facilitation; NMR - neuromuscular re-education; a/p - anterior to posterior; p/a - posterior to anterior; NT - not tested)    Therapeutic Modalities: for edema and pain x 15 mins Pt. Received vaso pneumatic compression on right knee  with elevation on incline. HEP: As above; handouts given to patient for all exercises. Treatment/Session Assessment:    · Response to Treatment: Pt. Was very sensitive with her right posterior knee today. Pt. Appeared very quiet and in a lot of pain today with therapy. · Compliance with Program/Exercises: compliant most of the time. · Recommendations/Intent for next treatment session: \"Next visit will focus on advancements to more challenging activities\". RE-CERTIFICATION: 3 times a week for 4 weeks from 6/2/2017 to 6/30/2017 in order to meet the above set goals.     Total Treatment Duration: 60 minutes  PT Patient Time In/Time Out  Time In: 1530  Time Out: Lora [facility], [place]   Patience Hoyos DPT

## 2017-06-23 ENCOUNTER — HOSPITAL ENCOUNTER (OUTPATIENT)
Dept: PHYSICAL THERAPY | Age: 52
Discharge: HOME | End: 2017-06-23
Payer: COMMERCIAL

## 2017-06-23 PROCEDURE — 97140 MANUAL THERAPY 1/> REGIONS: CPT

## 2017-06-23 PROCEDURE — 97110 THERAPEUTIC EXERCISES: CPT

## 2017-06-23 PROCEDURE — 97016 VASOPNEUMATIC DEVICE THERAPY: CPT

## 2017-06-23 NOTE — PROGRESS NOTES
Lew Charles  : 1965 [facility] [address], [place], [address] [facility]  [phone]   [phone]         OUTPATIENT PHYSICAL THERAPY:Daily Note 2017    ICD-10: Treatment Diagnosis 1: right knee pain (M25.561)              Treatment Diagnosis 2: gait dysfunction (R26.89)              Treatment Diagnosis 3: right knee primary localized osteoarthritis (M17.11)  Precautions: Type II Diabetes  Allergies:   Review of patient's allergies indicates not on file. Fall Risk Score: 1 (? 5 = High Risk)  MD Orders: Evaluate and Treat MEDICAL/REFERRING DIAGNOSIS:  Pain in right knee [M25.561]   DATE OF ONSET: 8/15/2016 when patient fell at work when she tripped over a pallet and landed directly on knee, then underwent right arthroscopic knee surgery on 3/1/2017   REFERRING PHYSICIAN: Whitney Ardon, [address] [place] [address] Avenue: 2017     INITIAL ASSESSMENT:  Ms. Cristi Barney is a 46 y.o. female presenting s/p right arthroscopic knee surgery performed on 3/1/2017 after injuring her knee 2016 when she fell at work and tripped over a pallet and landed directly on her knee. She has reported continued pain in the knee since surgery, as well as weakness and swelling. She has been seen for 27 sessions of therapy from 3/24/2017 to 2017 with significant success. Patient reports overall improvements lately in the knee but is struggling to work the floor for a full 8 hour shift due to pain and dysfunction. She is eager to return to work not limited but her knee full time, full duty. She is a good candidate for continued skilled intervention with services to include manual therapy, modalities as needed, therapeutic exercises, gait/stair/transfer/balance training, activity modification, and return to work simulation  . PROBLEM LIST (Impacting functional limitations):  1. Decreased Strength  2. Decreased ADL/Functional Activities  3.  Decreased Transfer Abilities  4. Decreased Ambulation Ability/Technique  5. Decreased Balance  6. Increased Pain  7. Decreased Activity Tolerance  8. Decreased Pacing Skills  9. Increased Fatigue  10. Decreased Flexibility/Joint Mobility INTERVENTIONS PLANNED:  1. Balance Exercise  2. Cold  3. Cryotherapy  4. Electrical Stimulation  5. Gait Training  6. Heat  7. Home Exercise Program (HEP)  8. Manual Therapy  9. Neuromuscular Re-education/Strengthening  10. Range of Motion (ROM)  11. Therapeutic Exercise/Strengthening  12. Transfer Training   TREATMENT PLAN:  Effective Dates: 6/2/2017 TO 6/30/2017. Frequency/Duration: RE-CERTIFICATION: 3 times a week for 4 weeks  GOALS: (Goals have been discussed and agreed upon with patient.)  Short-Term Functional Goals: Time Frame: 3/24/2017 to 4/7/2017  1. Patient demonstrates independence with home exercise program without verbal cueing provided by therapist. - GOAL MET  2. Improve ROM to 0-120 degrees in order to normalize gait, transfers, and stairs. - GOAL MET  3. Improve flexibility of gastrocnemius and hamstrings with SLR to 80 degrees in order to decrease pain with sleeping and sitting. - GOAL MET  Discharge Goals: Time Frame: 6/2/2017 to 6/30/2017   1. Improve pain to 3/10 with work, extended walking, extended weightbearing, squatting, stooping, sleeping, and sitting. - ONGOING  2. Improve gross lower extremity strength to at least 4+/5 in order to return patient to work full-time, full-duty. - ONGOING  3. Improve gait without use of crutches without significant antalgia and appropriate heel to toe gait pattern. - GOAL MET  4. Improve stair navigation with reciprocal ascend and descend with use of hand rail. - GOAL MET  5. Improve transfers with use of hands 0-50% of the time into and out of a chair. - GOAL MET  6. Return patient to full time, full duty work as a . - ONGOING  7.  Improve Lower Extremity Functional Index score to 40/80 from 4/80. - GOAL MET  Rehabilitation Potential For Stated Goals: Good  Regarding Neelima Perea's therapy, I certify that the treatment plan above will be carried out by a therapist or under their direction. Thank you for this referral,  King Leilani PTA     Referring Physician Signature: Gwen Manley, DO              Date                    The information in this section was collected on 6/14/2017 (except where otherwise noted). HISTORY:   History of Present Injury/Illness (Reason for Referral):  Ms. Yessenia Marie is a 46 y.o. female presenting s/p right arthroscopic knee surgery performed on 3/1/2017 after injuring her knee 8/5/2016 when she fell at work and tripped over a pallet and landed directly on her knee. She has reported continued pain in the knee since surgery, as well as weakness and swelling. She is currently full-time, light-duty and is not aware when she is supposed to return full-duty, full-time. She is eager to improve overall strength, ROM, pain, and stamina of the knee with work and with daily activities. Past Medical History/Comorbidities: Patient reported hypertension that is well controlled with lisinopril, Type II Diabetes controlled with proglitazone, metformin, and glipizide, and she has been taking naproxen for inflammation of the knee. She just had a benign tumor removed from her abdomen which required a hysterectomy in 2016. Social History/Living Environment:    Patient lives at home with sons and reports assistance from them with any heavy household chores, ADLs, heavy lifting, and heavy carrying. Prior Level of Function/Work/Activity:  Independent without dysfunction. Patient is currently light-duty, full-time with her job as a . 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 Dominant Side:         RIGHT  Current Medications:     No current outpatient prescriptions on file.    Date Last Reviewed:  6/23/2017   Number of Personal Factors/Comorbidities that affect the Plan of Care (Type II Diabetes and high job demand tasks : 1-2: MODERATE COMPLEXITY   EXAMINATION:     Observation/Postural and Gait Assessment:  Patient ambulates without crutches with minimal to no gait deviation usually but came into clinic on 6/2/2017 with significant gait deviation (From bilateral crutches with step to gait on her toes and is able to ambulate with 1 crutch safely, independently, and with minimal gait deviation with verbal and visual cuing, gait without crutch antalgic with foot flat throughout stance). Medial and lateral joint line portal holes completely approximated with scar tissue (From scar tissue and scabbing). No active drainage is present, as well as no signs or symptoms of infection. Anthropometric measurements (cm) Left Right   Knee joint line 42 42 (from 43)     Palpation: Gross tenderness to palpation of medial and lateral portal holes and medial joint line. Flexibility minimally (From severely) limited of gastrocnemius. Hamstrings limited moderately to 80 (from 70) degree SLR.    ROM:     AROM(PROM) Left Right   Knee flexion 122° 120 (From 100°) - 4/19/2017   Knee extension 2° flexion contracture 2 hyperextension 4/19/2017 (From 5° flexion contracture)     Passive Accessory Mobility Testing: Mild to moderate limitations of patellofemoral joint in all planes. Strength:   Significant dysfunction on 6/2/2017 from set back 6/1/2017 with pain and ER   Manual Muscle Test (out of 5) Left Right   Knee extension 5 4+ (From 3+), 0 degree lag with SLR   Knee flexion 5 4+ (From 4)   Hip flexion 5 4+ (From 4)   Hip extension 4 (from 3) 4 (From 3)   Hip abduction 4 (From 3) 4 (From 3)   Ankle DF 4+ 4+ (From 4)   Ankle PF 4+ 4+ (From 4)     Special Tests:  No special tests due to acuity of surgery. No signs or symptoms of infection or deep vein thrombosis at this time.     Neurological Screen:  Myotomes: Key muscle strength testing for bilateral LE is WNL. Dermatomes: Sensation testing through bilateral LE for light touch is WNL. Functional Mobility:  Patient reports improvements with gait for longer periods of time without use of crutches with minimal gait deviation (From difficulty with ambulation longer than a few minutes but safe and independent with and without crutches with step to gait). Transfers with use of hands 0 (From 0-50%) of the time into and out of a chair (From 100% of the time). Stairs performed reciprocally with ascend and with descend (From non-reciprocally with left leg as dominant leg with use of crutches). Body Structures Involved:  1. Bones  2. Joints  3. Muscles Body Functions Affected:  1. Sensory/Pain  2. Neuromusculoskeletal Activities and Participation Affected:  1. General Tasks and Demands  2. Mobility  3. Ability to work   Number of elements (examined above) that affect the Plan of Care: 3: MODERATE COMPLEXITY   CLINICAL PRESENTATION:   Presentation: Evolving clinical presentation with changing clinical characteristics: MODERATE COMPLEXITY   CLINICAL DECISION MAKING:   Outcome Measure: Tool Used: Lower Extremity Functional Scale (LEFS)  Score  Initial: 4/80 Most Recent: 35/80 (Date: 4/6/2017)                           26/80 (Date: 4/19/2017)                          48/80 (Date: 5/10/2017)                          46/80 (Date: 5/17/2017)                          54/80 (Date: 6/14/2017)   Interpretation of Score: 20 questions each scored on a 5 point scale with 0 representing \"extreme difficulty or unable to perform\" and 4 representing \"no difficulty\". The lower the score, the greater the functional disability. 80/80 represents no disability. Minimal detectable change is 9 points.     Medical Necessity:   · Patient is expected to demonstrate progress in strength, range of motion, balance, coordination and functional technique to improve tolerance to daily activities, transfers sit to and from stand, ability to walk and stand un-aided for longer periods of time, and ability to work. · Skilled intervention continues to be required due to weakness, decreased ROM, decreased flexibility, pain, and functional limitations. Reason for Services/Other Comments:  · Patient continues to require skilled intervention due to increasing complexity of exercises. Use of outcome tool(s) and clinical judgement create a POC that gives a (due to long standing history of knee pain and goal of return to work with high level job tasks): Questionable prediction of patient's progress: MODERATE COMPLEXITY            TREATMENT:   (In addition to Assessment/Re-Assessment sessions the following treatments were rendered)    Pre-treatment Symptoms/Complaints:  Patient C/O significantly increased right anterior and posterior knee pain past few days which she attributes to standing and working on concrete floors for 8 hours. Pain: Initial:   Pain Intensity 1: 5  Pain Location 1: Knee  Pain Orientation 1: Right  Post Session: 4/10 after session. Therapeutic Exercise: (35 Minutes):  Exercises per grid below to improve mobility, strength, balance and coordination. Required minimal visual, verbal and manual cues to promote proper body alignment, promote proper body posture, promote proper body mechanics and promote proper body breathing techniques. Progressed resistance, range, repetitions and complexity of movement as indicated.     (used abbreviations: BET-back education training) Date:  6/16/17 Date:  6/19/17   Date:  6/23/17   Activity/Exercise Parameters Parameters Parameters   Calf stretch - angle board 4 x 30 sec On incline 3x30 sec hold  4 x 30 sec   Hamstring stretch Strap 3 x 30 sec setaed 3 x 30 sec 3 x 30 sec   Nu step  Level 3 x 12 mins  Level 4 x 10 mins  level 6 x 10 min   Heel raises - off angle board 3 x 10 3 x 10 reps  3 x 10    Standing knee flexion  4 lbs 3 x 10 3 lbs 3 x 10 3 lbs 2 x 10    Standing hip abduction  4 lbs 3 x 10 3 lbs 3 x 10    Standing hamstring curls   4 lbs 3 x 10 3 lbs 3 x 10 3 lbs 2 x 10   Step ups  8 inch 2 x 15 8 inch x 20      Sit to and from stand 3 x 10  2 x 10 UE assist 2 x 10 reps    Sideways step up and over 8 inch 1 x 20 8 inch 2 x 10     Mini squats -- ---    Long arc quad 4 lbs 3 x 10 3 lbs 3 x 10- 3 lbs 2 x 15       Manual Therapy (    Soft Tissue Mobilization Duration  Duration: 15 Minutes): to improve joint and soft tissue mobility  · Soft issue mobilization to anterior,medial and lateral knee in supine followed by soft tissue mobilization to posterior knee in prone  (Used abbreviations: MET - muscle energy technique; PNF - proprioceptive neuromuscular facilitation; NMR - neuromuscular re-education; a/p - anterior to posterior; p/a - posterior to anterior; NT - not tested)    Therapeutic Modalities: for edema and pain                        Right Knee Cold  Type:  (vaso pneumatic compression)  Duration: 10 minutes  Patient Position: Supine                                                                        HEP: As above; handouts given to patient for all exercises. Treatment/Session Assessment:    · Response to Treatment: some relief with manual therapy however exercises and movement increased knee pain  · Compliance with Program/Exercises: compliant most of the time. · Recommendations/Intent for next treatment session: \"Next visit will focus on advancements to more challenging activities\". RE-CERTIFICATION: 3 times a week for 4 weeks from 6/2/2017 to 6/30/2017 in order to meet the above set goals.     Total Treatment Duration: 60 minutes  PT Patient Time In/Time Out  Time In: 1399  Time Out: [address], [facility]   Alana Foss DPT

## 2017-06-26 ENCOUNTER — HOSPITAL ENCOUNTER (OUTPATIENT)
Dept: PHYSICAL THERAPY | Age: 52
Discharge: HOME | End: 2017-06-26
Payer: COMMERCIAL

## 2017-06-26 PROCEDURE — 97110 THERAPEUTIC EXERCISES: CPT

## 2017-06-26 PROCEDURE — 97016 VASOPNEUMATIC DEVICE THERAPY: CPT

## 2017-06-26 PROCEDURE — 97140 MANUAL THERAPY 1/> REGIONS: CPT

## 2017-06-28 ENCOUNTER — HOSPITAL ENCOUNTER (OUTPATIENT)
Dept: PHYSICAL THERAPY | Age: 52
Discharge: HOME | End: 2017-06-28
Payer: COMMERCIAL

## 2017-06-28 PROCEDURE — 97140 MANUAL THERAPY 1/> REGIONS: CPT

## 2017-06-28 PROCEDURE — 97016 VASOPNEUMATIC DEVICE THERAPY: CPT

## 2017-06-28 PROCEDURE — 97110 THERAPEUTIC EXERCISES: CPT

## 2017-06-28 NOTE — PROGRESS NOTES
Juan John  : 1965 [facility] [place] [address], [place], [address] [facility]  [phone]   [phone]         OUTPATIENT PHYSICAL THERAPY:Daily Note 2017    ICD-10: Treatment Diagnosis 1: right knee pain (M25.561)              Treatment Diagnosis 2: gait dysfunction (R26.89)              Treatment Diagnosis 3: right knee primary localized osteoarthritis (M17.11)  Precautions: Type II Diabetes  Allergies:   Review of patient's allergies indicates not on file. Fall Risk Score: 1 (? 5 = High Risk)  MD Orders: Evaluate and Treat MEDICAL/REFERRING DIAGNOSIS:  Pain in right knee [M25.561]   DATE OF ONSET: 8/15/2016 when patient fell at work when she tripped over a pallet and landed directly on knee, then underwent right arthroscopic knee surgery on 3/1/2017   REFERRING PHYSICIAN: Jennifer Trivedi, [facility] [facility] [address]: 2017     INITIAL ASSESSMENT:  Ms. Rosemary Pascual is a 46 y.o. female presenting s/p right arthroscopic knee surgery performed on 3/1/2017 after injuring her knee 2016 when she fell at work and tripped over a pallet and landed directly on her knee. She has reported continued pain in the knee since surgery, as well as weakness and swelling. She has been seen for 27 sessions of therapy from 3/24/2017 to 2017 with significant success. Patient reports overall improvements lately in the knee but is struggling to work the floor for a full 8 hour shift due to pain and dysfunction. She is eager to return to work not limited but her knee full time, full duty. She is a good candidate for continued skilled intervention with services to include manual therapy, modalities as needed, therapeutic exercises, gait/stair/transfer/balance training, activity modification, and return to work simulation  . PROBLEM LIST (Impacting functional limitations):  1. Decreased Strength  2. Decreased ADL/Functional Activities  3.  Decreased Transfer Abilities  4. Decreased Ambulation Ability/Technique  5. Decreased Balance  6. Increased Pain  7. Decreased Activity Tolerance  8. Decreased Pacing Skills  9. Increased Fatigue  10. Decreased Flexibility/Joint Mobility INTERVENTIONS PLANNED:  1. Balance Exercise  2. Cold  3. Cryotherapy  4. Electrical Stimulation  5. Gait Training  6. Heat  7. Home Exercise Program (HEP)  8. Manual Therapy  9. Neuromuscular Re-education/Strengthening  10. Range of Motion (ROM)  11. Therapeutic Exercise/Strengthening  12. Transfer Training   TREATMENT PLAN:  Effective Dates: 6/2/2017 TO 6/30/2017. Frequency/Duration: RE-CERTIFICATION: 3 times a week for 4 weeks  GOALS: (Goals have been discussed and agreed upon with patient.)  Short-Term Functional Goals: Time Frame: 3/24/2017 to 4/7/2017  1. Patient demonstrates independence with home exercise program without verbal cueing provided by therapist. - GOAL MET  2. Improve ROM to 0-120 degrees in order to normalize gait, transfers, and stairs. - GOAL MET  3. Improve flexibility of gastrocnemius and hamstrings with SLR to 80 degrees in order to decrease pain with sleeping and sitting. - GOAL MET  Discharge Goals: Time Frame: 6/2/2017 to 6/30/2017   1. Improve pain to 3/10 with work, extended walking, extended weightbearing, squatting, stooping, sleeping, and sitting. - ONGOING  2. Improve gross lower extremity strength to at least 4+/5 in order to return patient to work full-time, full-duty. - ONGOING  3. Improve gait without use of crutches without significant antalgia and appropriate heel to toe gait pattern. - GOAL MET  4. Improve stair navigation with reciprocal ascend and descend with use of hand rail. - GOAL MET  5. Improve transfers with use of hands 0-50% of the time into and out of a chair. - GOAL MET  6. Return patient to full time, full duty work as a . - ONGOING  7.  Improve Lower Extremity Functional Index score to 40/80 from 4/80. - GOAL MET  Rehabilitation Potential For Stated Goals: Good  Regarding Klaus Gordo Perea's therapy, I certify that the treatment plan above will be carried out by a therapist or under their direction. Thank you for this referral,  Amanda Duke PT     Referring Physician Signature: Mera Roque,               Date                    The information in this section was collected on 6/14/2017 (except where otherwise noted). HISTORY:   History of Present Injury/Illness (Reason for Referral):  Ms. Vineet Diaz is a 46 y.o. female presenting s/p right arthroscopic knee surgery performed on 3/1/2017 after injuring her knee 8/5/2016 when she fell at work and tripped over a pallet and landed directly on her knee. She has reported continued pain in the knee since surgery, as well as weakness and swelling. She is currently full-time, light-duty and is not aware when she is supposed to return full-duty, full-time. She is eager to improve overall strength, ROM, pain, and stamina of the knee with work and with daily activities. Past Medical History/Comorbidities: Patient reported hypertension that is well controlled with lisinopril, Type II Diabetes controlled with proglitazone, metformin, and glipizide, and she has been taking naproxen for inflammation of the knee. She just had a benign tumor removed from her abdomen which required a hysterectomy in 2016. Social History/Living Environment:    Patient lives at home with sons and reports assistance from them with any heavy household chores, ADLs, heavy lifting, and heavy carrying. Prior Level of Function/Work/Activity:  Independent without dysfunction. Patient is currently light-duty, full-time with her job as a . 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 Dominant Side:         RIGHT  Current Medications:     No current outpatient prescriptions on file.    Date Last Reviewed:  6/28/2017   Number of Personal Factors/Comorbidities that affect the Plan of Care (Type II Diabetes and high job demand tasks : 1-2: MODERATE COMPLEXITY   EXAMINATION:     Observation/Postural and Gait Assessment:  Patient ambulates without crutches with minimal to no gait deviation usually but came into clinic on 6/2/2017 with significant gait deviation (From bilateral crutches with step to gait on her toes and is able to ambulate with 1 crutch safely, independently, and with minimal gait deviation with verbal and visual cuing, gait without crutch antalgic with foot flat throughout stance). Medial and lateral joint line portal holes completely approximated with scar tissue (From scar tissue and scabbing). No active drainage is present, as well as no signs or symptoms of infection. Anthropometric measurements (cm) Left Right   Knee joint line 42 42 (from 43)     Palpation: Gross tenderness to palpation of medial and lateral portal holes and medial joint line. Flexibility minimally (From severely) limited of gastrocnemius. Hamstrings limited moderately to 80 (from 70) degree SLR.    ROM:     AROM(PROM) Left Right   Knee flexion 122° 120 (From 100°) - 4/19/2017   Knee extension 2° flexion contracture 2 hyperextension 4/19/2017 (From 5° flexion contracture)     Passive Accessory Mobility Testing: Mild to moderate limitations of patellofemoral joint in all planes. Strength:   Significant dysfunction on 6/2/2017 from set back 6/1/2017 with pain and ER   Manual Muscle Test (out of 5) Left Right   Knee extension 5 4+ (From 3+), 0 degree lag with SLR   Knee flexion 5 4+ (From 4)   Hip flexion 5 4+ (From 4)   Hip extension 4 (from 3) 4 (From 3)   Hip abduction 4 (From 3) 4 (From 3)   Ankle DF 4+ 4+ (From 4)   Ankle PF 4+ 4+ (From 4)     Special Tests:  No special tests due to acuity of surgery. No signs or symptoms of infection or deep vein thrombosis at this time.     Neurological Screen:  Myotomes: Key muscle strength testing for bilateral LE is WNL. Dermatomes: Sensation testing through bilateral LE for light touch is WNL. Functional Mobility:  Patient reports improvements with gait for longer periods of time without use of crutches with minimal gait deviation (From difficulty with ambulation longer than a few minutes but safe and independent with and without crutches with step to gait). Transfers with use of hands 0 (From 0-50%) of the time into and out of a chair (From 100% of the time). Stairs performed reciprocally with ascend and with descend (From non-reciprocally with left leg as dominant leg with use of crutches). Body Structures Involved:  1. Bones  2. Joints  3. Muscles Body Functions Affected:  1. Sensory/Pain  2. Neuromusculoskeletal Activities and Participation Affected:  1. General Tasks and Demands  2. Mobility  3. Ability to work   Number of elements (examined above) that affect the Plan of Care: 3: MODERATE COMPLEXITY   CLINICAL PRESENTATION:   Presentation: Evolving clinical presentation with changing clinical characteristics: MODERATE COMPLEXITY   CLINICAL DECISION MAKING:   Outcome Measure: Tool Used: Lower Extremity Functional Scale (LEFS)  Score  Initial: 4/80 Most Recent: 35/80 (Date: 4/6/2017)                           26/80 (Date: 4/19/2017)                          48/80 (Date: 5/10/2017)                          46/80 (Date: 5/17/2017)                          54/80 (Date: 6/14/2017)   Interpretation of Score: 20 questions each scored on a 5 point scale with 0 representing \"extreme difficulty or unable to perform\" and 4 representing \"no difficulty\". The lower the score, the greater the functional disability. 80/80 represents no disability. Minimal detectable change is 9 points.     Medical Necessity:   · Patient is expected to demonstrate progress in strength, range of motion, balance, coordination and functional technique to improve tolerance to daily activities, transfers sit to and from stand, ability to walk and stand un-aided for longer periods of time, and ability to work. · Skilled intervention continues to be required due to weakness, decreased ROM, decreased flexibility, pain, and functional limitations. Reason for Services/Other Comments:  · Patient continues to require skilled intervention due to increasing complexity of exercises. Use of outcome tool(s) and clinical judgement create a POC that gives a (due to long standing history of knee pain and goal of return to work with high level job tasks): Questionable prediction of patient's progress: MODERATE COMPLEXITY            TREATMENT:   (In addition to Assessment/Re-Assessment sessions the following treatments were rendered)    Pre-treatment Symptoms/Complaints:  Patient reported no new complaints today. Pain: Initial:   Pain Intensity 1: 4  Pain Location 1: Knee  Pain Orientation 1: Right  Post Session: 2/10 after session      Therapeutic Exercise: (35 Minutes): Exercises per grid below to improve mobility, strength, balance and coordination. Required minimal visual, verbal and manual cues to promote proper body alignment, promote proper body posture, promote proper body mechanics and promote proper body breathing techniques. Progressed resistance, range, repetitions and complexity of movement as indicated.     (used abbreviations: BET-back education training) Date:  6/26/17 Date:  6/28/17   Date:  6/23/17   Activity/Exercise Parameters Parameters Parameters   Calf stretch - angle board 4 x 30 sec On incline 3x30 sec hold  4 x 30 sec   Hamstring stretch Strap 3 x 30 sec supine 3 x 30 sec 3 x 30 sec   Nu step  Level 6 x 10 mins  Level 4 x 10 mins  level 6 x 10 min   Heel raises - off angle board 3 x 10 3 x 10 reps  3 x 10    Standing knee flexion  4 lbs 3 x 10 4 lbs 3 x 10 3 lbs 2 x 10    Standing hip abduction  4 lbs 3 x 10 4 lbs 3 x 10    Standing hamstring curls   4 lbs 3 x 10 4 lbs 3 x 10 3 lbs 2 x 10   Step ups  Hold due to increased pain ---     Sit to and from stand 3 x 10  2 x 10 UE assist 2 x 10 reps    Sideways step up and over Hold due to increased pain  ---    Mini squats 20 reps  2 x 10    Long arc quad 4 lbs 3 x 10 4 lbs 3 x 10 3 lbs 2 x 15       Manual Therapy (    Soft Tissue Mobilization Duration  Duration: 15 Minutes):  to improve joint and soft tissue mobility  · Soft issue mobilization to anterior,medial and lateral knee in supine followed by soft tissue mobilization to posterior knee in prone  (Used abbreviations: MET - muscle energy technique; PNF - proprioceptive neuromuscular facilitation; NMR - neuromuscular re-education; a/p - anterior to posterior; p/a - posterior to anterior; NT - not tested)    Therapeutic Modalities: for edema and pain Pt. Received vaso pneumatic compression device to right knee with elevation on incline x 15 mins. Right Knee Cold  Type:  (vasopneumatic)  Duration: 10 minutes  Patient Position: Supine                                                                        HEP: As above; handouts given to patient for all exercises. Treatment/Session Assessment:    · Response to Treatment: Patient reported less pain at the end of session to 2/10. Will plan for next session to be the last.  · Compliance with Program/Exercises: compliant most of the time. · Recommendations/Intent for next treatment session: \"Next visit will focus on advancements to more challenging activities\". RE-CERTIFICATION: 3 times a week for 4 weeks from 6/2/2017 to 6/30/2017 in order to meet the above set goals.     Total Treatment Duration: 60 minutes  PT Patient Time In/Time Out  Time In: 1530  Time Out: 1860 VADIM Whelan DPT

## 2017-06-30 ENCOUNTER — HOSPITAL ENCOUNTER (OUTPATIENT)
Dept: PHYSICAL THERAPY | Age: 52
Discharge: HOME | End: 2017-06-30
Payer: COMMERCIAL

## 2017-06-30 PROCEDURE — 97110 THERAPEUTIC EXERCISES: CPT

## 2017-06-30 PROCEDURE — 97016 VASOPNEUMATIC DEVICE THERAPY: CPT

## 2017-06-30 PROCEDURE — 97140 MANUAL THERAPY 1/> REGIONS: CPT

## 2017-06-30 NOTE — PROGRESS NOTES
Onel Cobb  : 1965 [facility] [address], Jose torres, [address] [place] [address]  [phone]   [phone]         OUTPATIENT PHYSICAL THERAPY:Daily Note and Discharge 2017    ICD-10: Treatment Diagnosis 1: right knee pain (M25.561)              Treatment Diagnosis 2: gait dysfunction (R26.89)              Treatment Diagnosis 3: right knee primary localized osteoarthritis (M17.11)  Precautions: Type II Diabetes  Allergies:   Review of patient's allergies indicates not on file. Fall Risk Score: 1 (? 5 = High Risk)  MD Orders: Evaluate and Treat MEDICAL/REFERRING DIAGNOSIS:  Pain in right knee [M25.561]   DATE OF ONSET: 8/15/2016 when patient fell at work when she tripped over a pallet and landed directly on knee, then underwent right arthroscopic knee surgery on 3/1/2017   REFERRING PHYSICIAN: Stephanie Patten, [id number] [address]: 2017     INITIAL ASSESSMENT:  Ms. Garland Sharp is a 46 y.o. female presenting s/p right arthroscopic knee surgery performed on 3/1/2017 after injuring her knee 2016 when she fell at work and tripped over a pallet and landed directly on her knee. She has reported continued pain in the knee since surgery, as well as weakness and swelling. She has been seen for 34 sessions of therapy from 3/24/2017 to 2017 with significant success. Patient reports feeling at least 70% better since the start of therapy. Patient reports intermittent swelling and pain but overall improvements functionally. She has met most preset goals, is independent with HEP, and is discharged at this time. [place] PROBLEM LIST (Impacting functional limitations):  1. Decreased Strength  2. Decreased ADL/Functional Activities  3. Decreased Transfer Abilities  4. Decreased Ambulation Ability/Technique  5. Decreased Balance  6. Increased Pain  7. Decreased Activity Tolerance  8. Decreased Pacing Skills  9. Increased Fatigue  10.  Decreased Flexibility/Joint Mobility INTERVENTIONS PLANNED:  1. Balance Exercise  2. Cold  3. Cryotherapy  4. Electrical Stimulation  5. Gait Training  6. Heat  7. Home Exercise Program (HEP)  8. Manual Therapy  9. Neuromuscular Re-education/Strengthening  10. Range of Motion (ROM)  11. Therapeutic Exercise/Strengthening  12. Transfer Training   TREATMENT PLAN:  Effective Dates: 6/2/2017 TO 6/30/2017. Frequency/Duration:She has been seen for 34 sessions of therapy from 3/24/2017 to 6/30/2017 with significant success. Patient reports feeling at least 70% better since the start of therapy. Patient reports intermittent swelling and pain but overall improvements functionally. She has met most preset goals, is independent with HEP, and is discharged at this time. GOALS: (Goals have been discussed and agreed upon with patient.)  Short-Term Functional Goals: Time Frame: 3/24/2017 to 4/7/2017  1. Patient demonstrates independence with home exercise program without verbal cueing provided by therapist. - GOAL MET  2. Improve ROM to 0-120 degrees in order to normalize gait, transfers, and stairs. - GOAL MET  3. Improve flexibility of gastrocnemius and hamstrings with SLR to 80 degrees in order to decrease pain with sleeping and sitting. - GOAL MET  Discharge Goals: Time Frame: 6/2/2017 to 6/30/2017   1. Improve pain to 3/10 with work, extended walking, extended weightbearing, squatting, stooping, sleeping, and sitting. - GOAL MET  2. Improve gross lower extremity strength to at least 4+/5 in order to return patient to work full-time, full-duty. - GOAL MET  3. Improve gait without use of crutches without significant antalgia and appropriate heel to toe gait pattern. - GOAL MET  4. Improve stair navigation with reciprocal ascend and descend with use of hand rail. - GOAL MET  5. Improve transfers with use of hands 0-50% of the time into and out of a chair. - GOAL MET  6.  Return patient to full time, full duty work as a Production . - ALMOST MET  7. Improve Lower Extremity Functional Index score to 40/80 from 4/80. - GOAL MET  Rehabilitation Potential For Stated Goals: Good  Regarding Tom Caprice Eliazar's therapy, I certify that the treatment plan above will be carried out by a therapist or under their direction. Thank you for this referral,  Alejo Knapp, PT     Referring Physician Signature: Jennifer Trivedi, DO              Date                    The information in this section was collected on 6/30/2017 (except where otherwise noted). HISTORY:   History of Present Injury/Illness (Reason for Referral):  Ms. Rosemary Pascual is a 46 y.o. female presenting s/p right arthroscopic knee surgery performed on 3/1/2017 after injuring her knee 8/5/2016 when she fell at work and tripped over a pallet and landed directly on her knee. She has reported continued pain in the knee since surgery, as well as weakness and swelling. She is currently full-time, light-duty and is not aware when she is supposed to return full-duty, full-time. She is eager to improve overall strength, ROM, pain, and stamina of the knee with work and with daily activities. Past Medical History/Comorbidities: Patient reported hypertension that is well controlled with lisinopril, Type II Diabetes controlled with proglitazone, metformin, and glipizide, and she has been taking naproxen for inflammation of the knee. She just had a benign tumor removed from her abdomen which required a hysterectomy in 2016. Social History/Living Environment:    Patient lives at home with sons and reports assistance from them with any heavy household chores, ADLs, heavy lifting, and heavy carrying. Prior Level of Function/Work/Activity:  Independent without dysfunction. Patient is currently light-duty, full-time with her job as a . 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   Dominant Side:         RIGHT  Current Medications:     No current outpatient prescriptions on file. Date Last Reviewed:  6/30/2017   Number of Personal Factors/Comorbidities that affect the Plan of Care (Type II Diabetes and high job demand tasks : 1-2: MODERATE COMPLEXITY   EXAMINATION:     Observation/Postural and Gait Assessment:  Patient ambulates without crutches with minimal to no gait deviation usually but came into clinic on 6/2/2017 with significant gait deviation (From bilateral crutches with step to gait on her toes and is able to ambulate with 1 crutch safely, independently, and with minimal gait deviation with verbal and visual cuing, gait without crutch antalgic with foot flat throughout stance). Medial and lateral joint line portal holes completely approximated with scar tissue (From scar tissue and scabbing). No active drainage is present, as well as no signs or symptoms of infection. Anthropometric measurements (cm) Left Right   Knee joint line 42 42 (from 43)     Palpation: Decreased gross tenderness to palpation of medial and lateral portal holes and medial joint line. Flexibility minimally (From severely) limited of gastrocnemius. Hamstrings limited moderately to 80 (from 70) degree SLR.    ROM:     AROM(PROM) Left Right   Knee flexion 122° 120 (From 100°) - 4/19/2017   Knee extension 2° flexion contracture 2 hyperextension 4/19/2017 (From 5° flexion contracture)     Passive Accessory Mobility Testing: Mild to moderate limitations of patellofemoral joint in all planes. Strength:     Manual Muscle Test (out of 5) Left Right   Knee extension 5 4+ (From 3+), 0 degree lag with SLR   Knee flexion 5 4+ (From 4)   Hip flexion 5 4+ (From 4)   Hip extension 4 (from 3) 4 (From 3)   Hip abduction 4 (From 3) 4 (From 3)   Ankle DF 4+ 5 (From 4)   Ankle PF 4+ 5 (From 4)     Special Tests:  No special tests due to acuity of surgery. No signs or symptoms of infection or deep vein thrombosis at this time.     Neurological Screen:  Myotomes: Key muscle strength testing for bilateral LE is WNL. Dermatomes: Sensation testing through bilateral LE for light touch is WNL. Functional Mobility:  Patient reports improvements with gait for longer periods of time without use of crutches with minimal gait deviation (From difficulty with ambulation longer than a few minutes but safe and independent with and without crutches with step to gait). Transfers with use of hands 0 (From 0-50%) of the time into and out of a chair (From 100% of the time). Stairs performed reciprocally with ascend and with descend (From non-reciprocally with left leg as dominant leg with use of crutches). Body Structures Involved:  1. Bones  2. Joints  3. Muscles Body Functions Affected:  1. Sensory/Pain  2. Neuromusculoskeletal Activities and Participation Affected:  1. General Tasks and Demands  2. Mobility  3. Ability to work   Number of elements (examined above) that affect the Plan of Care: 3: MODERATE COMPLEXITY   CLINICAL PRESENTATION:   Presentation: Evolving clinical presentation with changing clinical characteristics: MODERATE COMPLEXITY   CLINICAL DECISION MAKING:   Outcome Measure: Tool Used: Lower Extremity Functional Scale (LEFS)  Score  Initial: 4/80 Most Recent: 35/80 (Date: 4/6/2017)                           26/80 (Date: 4/19/2017)                          48/80 (Date: 5/10/2017)                          46/80 (Date: 5/17/2017)                          54/80 (Date: 6/14/2017)                          57/80 (Date: 6/30/2017)   Interpretation of Score: 20 questions each scored on a 5 point scale with 0 representing \"extreme difficulty or unable to perform\" and 4 representing \"no difficulty\". The lower the score, the greater the functional disability. 80/80 represents no disability. Minimal detectable change is 9 points. Medical Necessity:   She has been seen for 34 sessions of therapy from 3/24/2017 to 6/30/2017 with significant success.   Patient reports feeling at least 70% better since the start of therapy. Patient reports intermittent swelling and pain but overall improvements functionally. She has met most preset goals, is independent with HEP, and is discharged at this time. Use of outcome tool(s) and clinical judgement create a POC that gives a (due to long standing history of knee pain and goal of return to work with high level job tasks): Questionable prediction of patient's progress: MODERATE COMPLEXITY            TREATMENT:   (In addition to Assessment/Re-Assessment sessions the following treatments were rendered)    Pre-treatment Symptoms/Complaints:  Patient reported feeling ready for discharge today. Pain: Initial:   Pain Intensity 1: 3  Pain Location 1: Knee  Pain Orientation 1: Right  Post Session: 2/10 after session      Therapeutic Exercise: (25 Minutes): Exercises per grid below to improve mobility, strength, balance and coordination. Required minimal visual, verbal and manual cues to promote proper body alignment, promote proper body posture, promote proper body mechanics and promote proper body breathing techniques. Progressed resistance, range, repetitions and complexity of movement as indicated.     (used abbreviations: BET-back education training) Date:  6/26/17 Date:  6/28/17   Date:  6/30/17   Activity/Exercise Parameters Parameters Parameters   Calf stretch - angle board 4 x 30 sec On incline 3x30 sec hold  3 x 30 sec   Hamstring stretch Strap 3 x 30 sec supine 3 x 30 sec 3 x 30 sec   Nu step  Level 6 x 10 mins  Level 4 x 10 mins  level 6 x 10 min   Heel raises - off angle board 3 x 10 3 x 10 reps  3 x 10    Standing knee flexion  4 lbs 3 x 10 4 lbs 3 x 10 4 lbs 3 x 10    Standing hip abduction  4 lbs 3 x 10 4 lbs 3 x 10 4 lbs 3 x 10   Standing hamstring curls   4 lbs 3 x 10 4 lbs 3 x 10 4 lbs 3 x 10   Step ups  Hold due to increased pain ---  ---   Sit to and from stand 3 x 10  2 x 10 UE assist 2 x 10 reps    Sideways step up and over Hold due to increased pain  --- ---   Mini squats 20 reps  2 x 10 ---   Long arc quad 4 lbs 3 x 10 4 lbs 3 x 10 4 lbs 2 x 15       Manual Therapy (    Soft Tissue Mobilization Duration  Duration: 15 Minutes):  to improve joint and soft tissue mobility  · Soft issue mobilization to anterior,medial and lateral knee in supine followed by soft tissue mobilization to posterior knee in prone  (Used abbreviations: MET - muscle energy technique; PNF - proprioceptive neuromuscular facilitation; NMR - neuromuscular re-education; a/p - anterior to posterior; p/a - posterior to anterior; NT - not tested)    Therapeutic Modalities: for edema and pain                      Right Knee Cold  Type:  (vasopneumatic)  Duration: 15 minutes  Patient Position: Supine                                                                        HEP: As above; handouts given to patient for all exercises. Treatment/Session Assessment:    · Response to Treatment: Patient reported less pain at the end of session to 2/10. Patient has met preset goals and is discharged at this time. Tolerated session well. · Compliance with Program/Exercises: compliant most of the time. · Recommendations/Intent for next treatment session:  Patient is discharged at this time. RE-CERTIFICATION: 3 times a week for 4 weeks from 6/2/2017 to 6/30/2017 in order to meet the above set goals.     Total Treatment Duration: 60 minutes  PT Patient Time In/Time Out  Time In: 1535  Time Out: [address]   Clearance ISABEL Michelle

## 2018-03-22 ENCOUNTER — HOSPITAL ENCOUNTER (OUTPATIENT)
Dept: PHYSICAL THERAPY | Age: 53
Discharge: HOME | End: 2018-03-22
Payer: COMMERCIAL

## 2018-03-22 PROCEDURE — 97014 ELECTRIC STIMULATION THERAPY: CPT

## 2018-03-22 PROCEDURE — 97162 PT EVAL MOD COMPLEX 30 MIN: CPT

## 2018-03-22 NOTE — THERAPY EVALUATION
Wiley Ing  : 1965  Primary: [facility]  Secondary:  [facility] [place] [address], Jose torres, [address]  [phone]   [phone]       OUTPATIENT PHYSICAL THERAPY:Initial Assessment 3/22/2018    ICD-10: Treatment Diagnosis: pain in right shoulder (M25.511)                Treatment Diagnosis 2: pain in left shoulder (M25.512)                Treatment Diagnosis 3: impingement syndrome (M75.4)  Precautions: Type II Diabetes, Hypertension, history of cancer  Allergies: Review of patient's allergies indicates not on file. Fall Risk Score: 1 (? 5 = High Risk)  MD Orders: evaluate and treat  MEDICAL/REFERRING DIAGNOSIS:  Bilateral shoulder pain [M25.511, M25.512]   DATE OF ONSET: Patient reports pain starting back in 2017 and seeing multiple doctors for her shoulder pain  REFERRING PHYSICIAN: Arlene Vilchis MD  RETURN PHYSICIAN APPOINTMENT: 3/23/18     INITIAL ASSESSMENT:  Ms. Gabbie Colunga is a 46 y.o. female presenting to physical therapy with complaints of bilateral shoulder pain that has worsened over the past few months. She reports difficulty fixing her hair, donning/ doffing pants and shirts, performing housework, and getting comfortable to sleep. Patient is able to do things independently, but reports increased pain up to 9/10 with bilateral shoulder irritation. She states she got a pain shot at the doctor's office which helped for only 4 hours. Patient seeing MD again tomorrow and may be going for an MRI. Cervical testing was negative. Limited evaluation performed due to pain intensity. Patient tearful during examination due to pain. Patient presents with increased pain, decreased strength, decreased ROM, decreased flexibility, impaired posture, impaired overhead reach, impaired transfer ability, decreased activity tolerance, and overall impaired functional mobility.  Patient is a good candidate for skilled physical therapy interventions to include manual therapy, therapeutic exercise, transfer training, postural re-education, body mechanics training, and pain modalities as needed. PROBLEM LIST (Impacting functional limitations):  1. Decreased Strength  2. Decreased ADL/Functional Activities  3. Decreased Transfer Abilities  4. Increased Pain  5. Decreased Activity Tolerance  6. Decreased Pacing Skills  7. Decreased Work Simplification/Energy Conservation Techniques  8. Increased Fatigue  9. Decreased Flexibility/Joint Mobility INTERVENTIONS PLANNED:  1. Bed Mobility  2. Cold  3. Cryotherapy  4. Electrical Stimulation  5. Family Education  6. Heat  7. Home Exercise Program (HEP)  8. Manual Therapy  9. Neuromuscular Re-education/Strengthening  10. Range of Motion (ROM)  11. Therapeutic Activites  12. Therapeutic Exercise/Strengthening  13. Ultrasound (US)   TREATMENT PLAN:  Effective Dates: 3/22/18 to 4/26/18. Frequency/Duration: 2 times a week for 5 weeks  GOALS: (Goals have been discussed and agreed upon with patient.)  Short-Term Functional Goals: Time Frame: 3/22/18 to 4/10/18  1. Patient will be independent with HEP to improve bilateral UE ROM, strength, and posture. 2. Patient will report no more than 7/10 bilateral shoulder pain at rest in order to demonstrate improved self pain control and tolerance. 3. Patient will be educated in and demonstrate improved upright posture including decreased forward head, rounded shoulders, and thoracic kyphosis to improve clearance for overhead motions. Discharge Goals: Time Frame: 3/22/18 to 4/26/18  1. Patient will improve bilateral UE strength to at least 4+/5 in order to improve safety with work and home activities. 2. Patient will improve bilateral shoulder elevation to at least 140 in order to improve ability to reach overhead.   3. Patient will improve bilateral functional internal and external rotation to L5 and C7 respectively, in order to improve ability to fix her hair and don/ doff clothing. 4. Patient will be able to sleep through the night without waking more than once due to shoulder pain in order to improve quality of sleep/ rest.  5. Patient will improve Disability of the Arm, Shoulder, and Hand score to 30/55 from 38/55. Rehabilitation Potential For Stated Goals: Good  Regarding Latesha Perea's therapy, I certify that the treatment plan above will be carried out by a therapist or under their direction. Thank you for this referral,  Cady Woods, PT, DPT   Referring Physician Signature: Meera Bolanos MD              Date                    The information in this section was collected on 3/22/18 (except where otherwise noted). HISTORY:   History of Present Injury/Illness (Reason for Referral):  Ms. Néstor Martinez is a 46 y.o. female presenting to physical therapy with complaints of bilateral shoulder pain that has worsened over the past few months. She reports difficulty fixing her hair, donning/ doffing pants and shirts, performing housework, and getting comfortable to sleep. Patient is able to do things independently, but reports increased pain up to 9/10 with bilateral shoulder irritation. She states she got a pain shot at the doctor's office which helped for only 4 hours. Patient seeing MD again tomorrow and may be going for an MRI. Cervical testing was negative. Limited evaluation performed due to pain intensity. Patient tearful during examination due to pain. Patient presents with increased pain, decreased strength, decreased ROM, decreased flexibility, impaired posture, impaired overhead reach, impaired transfer ability, decreased activity tolerance, and overall impaired functional mobility. Past Medical History/Comorbidities:   Ms. Néstor Martinez reports a history of R index finger, R ankle, R knee, appendix, and mass removal.  Social History/Living Environment:     patient lives in a private, one story, residence with some family.   Prior Level of Function/Work/Activity:  Full time . Reports work includes some lifting, but nothing heavy, and pushing/ pulling. Dominant Side:         RIGHT  Personal Factors:          Sex:  female        Age:  46 y.o. Current Medications:     Tramadol   Date Last Reviewed:  3/22/2018   Number of Personal Factors/Comorbidities that affect the Plan of Care:  (due to co-morbidities and medical history) 1-2: MODERATE COMPLEXITY   EXAMINATION:   Observation/Orthostatic Postural Assessment:          Patient with forward head, rounded shoulders, increased thoracic kyphosis, bilateral scapular protection, sits and stands with increased thoracic kyphosis. Tension noted throughout neck and shoulders with transfers. Palpation:          Significant tenderness to palpation of bilateral shoulders, upper trapezius, deltoid, levator scapula, and gross anterior/ posterior shoulder. ROM:          Patient guarding with PROM and reported pain at end ROM all directions 3/22/18. AROM/ PROM Left (degrees) Right (degrees)   Shoulder Flexion 90 100   Shoulder Abduction 50 70   Shoulder Internal Rotation  30 30   Functional Internal Rotation To L hip To R hip   Shoulder External Rotation 5 10   Functional External Rotation Back of head, limited shoulder flexion Back of head, shoulder flexion to 90     Strength:    Pain reported with manual muscle testing bilaterally on 3/22/18. Motion Tested Left   (*/5) Right  (*/5)   Shoulder Flexion 4 4+   Scaption 4 4   Shoulder Abduction painful painful   Shoulder Internal Rotation  4- 4   Shoulder External Rotation 4- 4   Elbow Flexion 5 5   Elbow Extension 5 5    (in lbs): arm at side with elbow flexed to 90 degrees 30 40     Special Tests:         Cervical compression/ distraction negative for change in symptoms        Limited shoulder testing due to patient pain response, guarding, and difficulty reaching testing positions. Will continue to assess as able.   Passive Accessory Motion:         None performed due to patient pain reports  Neurological Screen:              Myotomes: Key muscle strength testing through bilateral UE is [facility]. Dermatomes: Sensation to light touch for bilateral UE is intact from C3 to T2. Reflexes: Biceps (C5): 1+ bilaterally         Brachioradialis (C6): 1+ bilaterally        Triceps (C7): 1+ bilaterally    Functional Mobility:         Patient with significant difficulty brushing/ fixing hair, pulling pants all the way up, sleeping on her L side (moderate difficulty on R side). Balance:          Sitting and standing balance grossly intact. Body Structures Involved:  1. Bones  2. Joints  3. Muscles  4. Ligaments Body Functions Affected:  1. Sensory/Pain  2. Neuromusculoskeletal  3. Movement Related Activities and Participation Affected:  1. General Tasks and Demands  2. Mobility  3. Self Care  4. Domestic Life  5. Interpersonal Interactions and Relationships  6. Community, Social and [place]   Number of elements (examined above) that affect the Plan of Care: 3: MODERATE COMPLEXITY   CLINICAL PRESENTATION:   Presentation: Evolving clinical presentation with changing clinical characteristics: MODERATE COMPLEXITY   CLINICAL DECISION MAKING:   Outcome Measure: Tool Used: Disabilities of the Arm, Shoulder and Hand (DASH) Questionnaire - Quick Version  Score:  Initial: 38/55  Most Recent: X/55 (Date: -- )   Interpretation of Score: The DASH is designed to measure the activities of daily living in person's with upper extremity dysfunction or pain. Each section is scored on a 1-5 scale, 5 representing the greatest disability. The scores of each section are added together for a total score of 55.     Score 11 12-19 20-28 29-37 38-45 46-54 55   Modifier CH CI CJ CK CL CM CN     Medical Necessity:   · Patient is expected to demonstrate progress in strength, range of motion, balance, coordination and functional technique to increase independence with dressing, bathing, and fixing hair and improve safety during work and home activities. · Skilled intervention continues to be required due to bilateral shoulder pain hindering activity tolerance and overall functional mobility. Reason for Services/Other Comments:  · Patient continues to require skilled intervention due to bilateral shoulder pain hindering quality of life, independence, and return to prior level of function. Use of outcome tool(s) and clinical judgement create a POC that gives a:  (due to presentation and patient demeanor) Questionable prediction of patient's progress: MODERATE COMPLEXITY            TREATMENT:   (In addition to Assessment/Re-Assessment sessions the following treatments were rendered)  Pre-treatment Symptoms/Complaints:  Patient reports 9/10 bilateral shoulder pain. She states she has difficulty dressing and fixing her hair due to ROM limitations and pain. Pain: Initial:   Pain Intensity 1: 9  Pain Location 1: Shoulder  Pain Orientation 1: Right, Left  Post Session:  8/10     Therapeutic Exercise: ( ):  Exercises per grid below to improve mobility, strength, balance and coordination. Required minimal verbal cues to promote proper body alignment, promote proper body posture and promote proper body mechanics. Progressed resistance, range, repetitions and complexity of movement as indicated.    Date:   Date:   Date:     Activity/Exercise Parameters Parameters Parameters   Scapular retractions HEP     Backwards shoulder rolls HEP                                       Manual Therapy (     ): none today    Therapeutic Modalities: for pain:   Right Shoulder Heat  Type: Moist pack  Duration : 15 minutes  Patient Position: Sitting     Left Shoulder Heat  Type: Moist pack  Duration : 15 minutes  Patient Position: Sitting                       Right Shoulder Electrical Stimulation  Type: Premodulated  Placement: upper trap and lateral shoulder  # of Electrodes: 2  Duration : 15 minutes  Patient Position: Sitting     Left Shoulder Electrical Stimulation  Type: Premodulated  Placement: upper trap and lateral shoulder  # of Electrodes: 2  Duration : 15 minutes  Patient Position: Sitting                                                       HEP: As above; handouts given to patient for all exercises. ______________________________________________________________________________________________________    Treatment/Session Assessment:    · Response to Treatment:  Limited assessment today due to patient pain and guarding. Decreased pain reported with heat and electrical stimulation today. Plan to address pain with manual therapy and modalities and progress as tolerated. · Baseline vitals 3/22/18: BP: 122/84  · Compliance with Program/Exercises: Will assess as treatment progresses. · Recommendations/Intent for next treatment session: \"Next visit will focus on advancements to more challenging activities\".      Total Treatment Duration: 50 minutes; 35 evaluation, 15 heat + electrical stimulation  PT Patient Time In/Time Out  Time In: 1640  Time Out: 1730    Kevyn Pepe PT

## 2018-03-22 NOTE — PROGRESS NOTES
Ambulatory/Rehab Services H2 Model Falls Risk Assessment    Risk Factor Pts. ·   Confusion/Disorientation/Impulsivity  []    4 ·   Symptomatic Depression  []   2 ·   Altered Elimination  []   1 ·   Dizziness/Vertigo  []   1 ·   Gender (Male)  []   1 ·   Any administered antiepileptics (anticonvulsants):  []   2 ·   Any administered benzodiazepines:  []   1 ·   Visual Impairment (specify):  []   1 ·   Portable Oxygen Use  []   1 ·   Orthostatic ? BP  []   1 ·   History of Recent Falls (within 3 mos.)  []   5     Ability to Rise from Chair (choose one) Pts. ·   Ability to rise in a single movement  []   0 ·   Pushes up, successful in one attempt  [x]   1 ·   Multiple attempts, but successful  []   3 ·   Unable to rise without assistance  []   4   Total: (5 or greater = High Risk) 1     Falls Prevention Plan:   []                Physical Limitations to Exercise (specify):   []                Mobility Assistance Device (type):   []                Exercise/Equipment Adaptation (specify):    ©2010 [facility] [address] Patent #[id number].  Federal Law prohibits the replication, distribution or use without written permission from [facility]

## 2018-04-02 ENCOUNTER — HOSPITAL ENCOUNTER (OUTPATIENT)
Dept: PHYSICAL THERAPY | Age: 53
Discharge: HOME | End: 2018-04-02

## 2018-04-02 NOTE — PROGRESS NOTES
PHYSICAL THERAPY    Patient called to cancel today's appointment stating she was unable to make it today, but would be here for next scheduled appointment tomorrow afternoon.      Alena Moore, PT, DPT

## 2018-04-03 ENCOUNTER — HOSPITAL ENCOUNTER (OUTPATIENT)
Dept: PHYSICAL THERAPY | Age: 53
Discharge: HOME | End: 2018-04-03

## 2018-04-10 ENCOUNTER — APPOINTMENT (OUTPATIENT)
Dept: PHYSICAL THERAPY | Age: 53
End: 2018-04-10

## 2018-04-10 NOTE — THERAPY DISCHARGE
Isa Gregorio  : 1965  Primary: [facility]  Secondary:  [facility] [address], [address]  [phone]   [phone]       OUTPATIENT PHYSICAL THERAPY:Discontinuation Summary 4/10/2018    ICD-10: Treatment Diagnosis: pain in right shoulder (M25.511)                Treatment Diagnosis 2: pain in left shoulder (M25.512)                Treatment Diagnosis 3: impingement syndrome (M75.4)  Precautions: Type II Diabetes, Hypertension, history of cancer  Allergies: Review of patient's allergies indicates not on file. Fall Risk Score: 1 (? 5 = High Risk)  MD Orders: evaluate and treat  MEDICAL/REFERRING DIAGNOSIS:  Bilateral shoulder pain [M25.511, M25.512]   DATE OF ONSET: Patient reports pain starting back in 2017 and seeing multiple doctors for her shoulder pain  REFERRING PHYSICIAN: Jeimy Cuba MD  RETURN PHYSICIAN APPOINTMENT: 3/23/18     INITIAL ASSESSMENT:  Ms. Carolyn Beltran is a 46 y.o. female presenting to physical therapy with complaints of bilateral shoulder pain that has worsened over the past few months. She reports difficulty fixing her hair, donning/ doffing pants and shirts, performing housework, and getting comfortable to sleep. Patient is able to do things independently, but reports increased pain up to 9/10 with bilateral shoulder irritation. She states she got a pain shot at the doctor's office which helped for only 4 hours. Patient seeing MD again tomorrow and may be going for an MRI. Cervical testing was negative. Limited evaluation performed due to pain intensity. Patient tearful during examination due to pain. Patient presents with increased pain, decreased strength, decreased ROM, decreased flexibility, impaired posture, impaired overhead reach, impaired transfer ability, decreased activity tolerance, and overall impaired functional mobility.  Patient is a good candidate for skilled physical therapy interventions to include manual therapy, therapeutic exercise, transfer training, postural re-education, body mechanics training, and pain modalities as needed. DISCONTINUATION 4/10/18: Patient was seen for initial evaluation on 3/22/18. She cancelled her next two appointments for different reasons. On the second cancel, patient stated she would like to be discharged from therapy due to difficulty making appointments. No goals were met due to patient attending only one session. She is self discharged at this time. PROBLEM LIST (Impacting functional limitations):  1. Decreased Strength  2. Decreased ADL/Functional Activities  3. Decreased Transfer Abilities  4. Increased Pain  5. Decreased Activity Tolerance  6. Decreased Pacing Skills  7. Decreased Work Simplification/Energy Conservation Techniques  8. Increased Fatigue  9. Decreased Flexibility/Joint Mobility INTERVENTIONS PLANNED:  1. Bed Mobility  2. Cold  3. Cryotherapy  4. Electrical Stimulation  5. Family Education  6. Heat  7. Home Exercise Program (HEP)  8. Manual Therapy  9. Neuromuscular Re-education/Strengthening  10. Range of Motion (ROM)  11. Therapeutic Activites  12. Therapeutic Exercise/Strengthening  13. Ultrasound (US)   TREATMENT PLAN:  Effective Dates: 3/22/18 to 4/26/18. Frequency/Duration: Patient asked to self discharge. GOALS: (Goals have been discussed and agreed upon with patient.)  Short-Term Functional Goals: Time Frame: 3/22/18 to 4/10/18  1. Patient will be independent with HEP to improve bilateral UE ROM, strength, and posture. -NOT MET  2. Patient will report no more than 7/10 bilateral shoulder pain at rest in order to demonstrate improved self pain control and tolerance. -NOT MET  3. Patient will be educated in and demonstrate improved upright posture including decreased forward head, rounded shoulders, and thoracic kyphosis to improve clearance for overhead motions.  -NOT MET  Discharge Goals: Time Frame: 3/22/18 to 4/26/18  1. Patient will improve bilateral UE strength to at least 4+/5 in order to improve safety with work and home activities. -NOT MET  2. Patient will improve bilateral shoulder elevation to at least 140 in order to improve ability to reach overhead. -NOT MET  3. Patient will improve bilateral functional internal and external rotation to L5 and C7 respectively, in order to improve ability to fix her hair and don/ doff clothing. -NOT MET  4. Patient will be able to sleep through the night without waking more than once due to shoulder pain in order to improve quality of sleep/ rest. -NOT MET  5.  Patient will improve Disability of the Arm, Shoulder, and Hand score to 30/55 from 38/55. -NOT MET  Rehabilitation Potential For Stated Goals: Good  Regarding Latesha Perea's therapy,   Thank you for this referral,  Tabitha Landrum, PT, DPT

## 2018-04-12 ENCOUNTER — APPOINTMENT (OUTPATIENT)
Dept: PHYSICAL THERAPY | Age: 53
End: 2018-04-12

## 2018-04-16 ENCOUNTER — APPOINTMENT (OUTPATIENT)
Dept: PHYSICAL THERAPY | Age: 53
End: 2018-04-16

## 2018-04-19 ENCOUNTER — APPOINTMENT (OUTPATIENT)
Dept: PHYSICAL THERAPY | Age: 53
End: 2018-04-19

## 2018-04-24 ENCOUNTER — APPOINTMENT (OUTPATIENT)
Dept: PHYSICAL THERAPY | Age: 53
End: 2018-04-24

## 2018-04-26 ENCOUNTER — APPOINTMENT (OUTPATIENT)
Dept: PHYSICAL THERAPY | Age: 53
End: 2018-04-26

## 2018-09-27 ENCOUNTER — HOSPITAL ENCOUNTER (OUTPATIENT)
Dept: SURGERY | Age: 53
Discharge: HOME | End: 2018-09-27

## 2018-09-28 RX ORDER — SODIUM CHLORIDE 0.9 % (FLUSH) 0.9 %
5-10 SYRINGE (ML) INJECTION AS NEEDED
Status: CANCELLED | OUTPATIENT
Start: 2018-09-28

## 2018-09-28 RX ORDER — SODIUM CHLORIDE 0.9 % (FLUSH) 0.9 %
5-10 SYRINGE (ML) INJECTION EVERY 8 HOURS
Status: CANCELLED | OUTPATIENT
Start: 2018-09-28

## 2018-09-30 PROBLEM — M75.01 ADHESIVE CAPSULITIS OF RIGHT SHOULDER: Status: ACTIVE | Noted: 2018-09-30

## 2018-09-30 PROBLEM — M75.02 ADHESIVE CAPSULITIS OF LEFT SHOULDER: Status: ACTIVE | Noted: 2018-09-30

## 2018-09-30 PROBLEM — M75.22 BICIPITAL TENDINITIS OF LEFT SHOULDER: Status: ACTIVE | Noted: 2018-09-30

## 2018-09-30 PROBLEM — M94.212 CHONDROMALACIA OF LEFT SHOULDER: Status: ACTIVE | Noted: 2018-09-30

## 2018-09-30 PROBLEM — M19.012 DEGENERATIVE JOINT DISEASE OF LEFT ACROMIOCLAVICULAR JOINT: Status: ACTIVE | Noted: 2018-09-30

## 2018-09-30 NOTE — BRIEF OP NOTE
BRIEF OPERATIVE NOTE Date of Procedure: 10/4/2018 Preoperative Diagnosis:  ADHESIVE CAPSULITIS LEFT SHOULDER  
    BICEPS TENDINITIS LEFT SHOULDER 
    GLENOHUMERAL CHONDROMALACIA LEFT SHOULDER 
    AC OA LEFT SHOULDER 
 
    ADHESIVE CAPSULITIS RIGHT SHOULDER Postoperative Diagnosis:  ADHESIVE CAPSULITIS LEFT SHOULDER 
    AC OA LEFT SHOULDER 
 
    ADHESIVE CAPSULITIS RIGHT SHOULDER Procedure(s): 1. EXAMINATION AND MANIPULATION RIGHT SHOULDER UNDER ANESTHESIA 2. EXAMINATION AND MANIPULATION LEFT  SHOULDER ARTHROSCOPY LEFT SHOULDER ARTHROSCOPIC SUBACROMIAL DECOMPRESSION, DISTAL CLAVICLE RESECTION, LYSIS OF ADHESIONS Surgeon(s) and Role: 
   * Sanna Mccann MD - Primary Assistant Staff:  Tolu Goss CFA Surgical Staff: 
Circ-1: (Unknown) Scrub Tech-1: (Unknown) Scrub Tech-2: (Unknown) Scrub Tech-3: (Unknown) No case tracking events are documented in the log. Anesthesia:  GENERAL WITH INTERSCALENE BLOCK Estimated Blood Loss: 40 CC. Complications: NONE Sanna Mccann MD

## 2018-09-30 NOTE — H&P
[facility] HISTORY AND PHYSICAL Subjective:  
 
Patient is a 46 y.o. RHD FEMALE WITH LEFT>RIGHT SHOULDER PAIN. SEE OFFICE NOTE. Patient Active Problem List  
 Diagnosis Date Noted  Adhesive capsulitis of left shoulder 09/30/2018  Degenerative joint disease of left acromioclavicular joint 09/30/2018  Bicipital tendinitis of left shoulder 09/30/2018  Chondromalacia of left shoulder 09/30/2018  Adhesive capsulitis of right shoulder 09/30/2018 No past medical history on file. No past surgical history on file. Prior to Admission medications Not on File Allergies no known allergies Social History Substance Use Topics  Smoking status: Not on file  Smokeless tobacco: Not on file  Alcohol use Not on file No family history on file. Review of Systems A comprehensive review of systems was negative except for that written in the HPI. Objective:  
 
No data found. There were no vitals taken for this visit. General:  Alert, cooperative, no distress, appears stated age. Head:  Normocephalic, without obvious abnormality, atraumatic. Back:   Symmetric, no curvature. ROM normal. No CVA tenderness. Lungs:   Clear to auscultation bilaterally. Chest wall:  No tenderness or deformity. Heart:  Regular rate and rhythm, S1, S2 normal, no murmur, click, rub or gallop. Extremities: Extremities normal, atraumatic, no cyanosis or edema. Pulses: 2+ and symmetric all extremities. Skin: Skin color, texture, turgor normal. No rashes or lesions. Lymph nodes: Cervical, supraclavicular, and axillary nodes normal.  
Neurologic: CNII-XII intact. Normal strength, sensation and reflexes throughout. Assessment:  
 
Principal Problem: 
  Adhesive capsulitis of left shoulder (9/30/2018) Active Problems: 
  Degenerative joint disease of left acromioclavicular joint (9/30/2018) Bicipital tendinitis of left shoulder (9/30/2018) Chondromalacia of left shoulder (9/30/2018) Adhesive capsulitis of right shoulder (9/30/2018) Plan: The various methods of treatment have been discussed with the patient and family. PATIENT HAS EXHAUSTED NON-OPERATIVE MODALITIES After consideration of risks, benefits and other options for treatment, the patient has consented to surgical intervention. SEE OFFICE NOTE Ayush Fonseca MD

## 2018-10-02 VITALS — HEIGHT: 65 IN | BODY MASS INDEX: 34.32 KG/M2 | WEIGHT: 206 LBS

## 2018-10-02 RX ORDER — GABAPENTIN 100 MG/1
100 CAPSULE ORAL DAILY
COMMUNITY
Start: 2018-09-06 | End: 2018-11-29

## 2018-10-02 RX ORDER — GLIPIZIDE AND METFORMIN HCL 2.5; 5 MG/1; MG/1
1 TABLET, FILM COATED ORAL 2 TIMES DAILY
COMMUNITY
Start: 2018-09-06 | End: 2018-10-06

## 2018-10-02 RX ORDER — PREGABALIN 50 MG/1
CAPSULE ORAL
COMMUNITY
Start: 2018-08-28 | End: 2018-10-02

## 2018-10-02 RX ORDER — NORTRIPTYLINE HYDROCHLORIDE 25 MG/1
25 CAPSULE ORAL
COMMUNITY
Start: 2018-09-06 | End: 2018-11-29

## 2018-10-02 RX ORDER — MELOXICAM 15 MG/1
TABLET ORAL DAILY
COMMUNITY
Start: 2018-09-06 | End: 2021-06-21

## 2018-10-02 RX ORDER — LISINOPRIL AND HYDROCHLOROTHIAZIDE 20; 25 MG/1; MG/1
TABLET ORAL DAILY
COMMUNITY
Start: 2018-09-11 | End: 2021-06-21

## 2018-10-02 RX ORDER — HYDROCODONE BITARTRATE AND ACETAMINOPHEN 7.5; 325 MG/1; MG/1
TABLET ORAL
COMMUNITY
Start: 2018-09-07 | End: 2021-06-21

## 2018-10-02 NOTE — PERIOP NOTES
Patient verified name and . Order for consent YES found in EHR and matches case posting. Type 1b surgery, phone assessment complete. Orders YES received. Labs per surgeon: none  Labs per anesthesia protocol: potassium and POC glucose to be done on day of surgery; pt lives out of town. Patient answered medical/surgical history questions at their best of ability. All prior to admission medications documented in [facility]. Patient instructed to take the following medications the day of surgery according to anesthesia guidelines with a small sip of water: Gabapentin . Hold all vitamins 7 days prior to surgery and NSAIDS 5 days prior to surgery. Medications to be held Meloxicam hold for 5 days prior to surgery. Patient instructed on the following:  Arrive at A Entrance, time of arrival to be called the day before by 1700  NPO after midnight including gum, mints, and ice chips  Responsible adult must drive patient to the hospital, stay during surgery, and patient will  need supervision 24 hours after anesthesia  Use antibacterial soap in shower the night before surgery and on the morning of surgery  Leave all valuables (money and jewelry) at home but bring insurance card and ID on       DOS  Do not wear make-up, nail polish, lotions, cologne, perfumes, powders, or oil on skin. Patient teach back successful and patient demonstrates knowledge of instruction.

## 2018-10-03 ENCOUNTER — ANESTHESIA EVENT (OUTPATIENT)
Dept: SURGERY | Age: 53
End: 2018-10-03
Payer: COMMERCIAL

## 2018-10-04 ENCOUNTER — APPOINTMENT (OUTPATIENT)
Dept: GENERAL RADIOLOGY | Age: 53
End: 2018-10-04
Attending: ORTHOPAEDIC SURGERY
Payer: COMMERCIAL

## 2018-10-04 ENCOUNTER — ANESTHESIA (OUTPATIENT)
Dept: SURGERY | Age: 53
End: 2018-10-04
Payer: COMMERCIAL

## 2018-10-04 ENCOUNTER — HOSPITAL ENCOUNTER (OUTPATIENT)
Age: 53
Setting detail: OBSERVATION
Discharge: HOME | End: 2018-10-06
Attending: ORTHOPAEDIC SURGERY | Admitting: ORTHOPAEDIC SURGERY
Payer: COMMERCIAL

## 2018-10-04 PROBLEM — M75.02 ADHESIVE CAPSULITIS OF BOTH SHOULDERS: Status: ACTIVE | Noted: 2018-10-04

## 2018-10-04 PROBLEM — M75.01 ADHESIVE CAPSULITIS OF BOTH SHOULDERS: Status: RESOLVED | Noted: 2018-10-04 | Resolved: 2018-10-04

## 2018-10-04 PROBLEM — M75.01 ADHESIVE CAPSULITIS OF BOTH SHOULDERS: Status: ACTIVE | Noted: 2018-10-04

## 2018-10-04 PROBLEM — M75.02 ADHESIVE CAPSULITIS OF BOTH SHOULDERS: Status: RESOLVED | Noted: 2018-10-04 | Resolved: 2018-10-04

## 2018-10-04 PROBLEM — M94.212 CHONDROMALACIA OF LEFT SHOULDER: Status: RESOLVED | Noted: 2018-09-30 | Resolved: 2018-10-04

## 2018-10-04 PROBLEM — M75.22 BICIPITAL TENDINITIS OF LEFT SHOULDER: Status: RESOLVED | Noted: 2018-09-30 | Resolved: 2018-10-04

## 2018-10-04 LAB
EST. AVERAGE GLUCOSE BLD GHB EST-MCNC: 209 MG/DL
GLUCOSE BLD STRIP.AUTO-MCNC: 202 MG/DL (ref 65–100)
GLUCOSE BLD STRIP.AUTO-MCNC: 236 MG/DL (ref 65–100)
GLUCOSE BLD STRIP.AUTO-MCNC: 305 MG/DL (ref 65–100)
GLUCOSE BLD STRIP.AUTO-MCNC: 352 MG/DL (ref 65–100)
HBA1C MFR BLD: 8.9 %
POTASSIUM BLD-SCNC: 4.3 MMOL/L (ref 3.5–5.1)

## 2018-10-04 PROCEDURE — 76010010054 HC POST OP PAIN BLOCK: Performed by: ORTHOPAEDIC SURGERY

## 2018-10-04 PROCEDURE — 77030021678 HC GLIDESCP STAT DISP VERT -B: Performed by: ANESTHESIOLOGY

## 2018-10-04 PROCEDURE — 77030032490 HC SLV COMPR SCD KNE COVD -B

## 2018-10-04 PROCEDURE — 84132 ASSAY OF SERUM POTASSIUM: CPT

## 2018-10-04 PROCEDURE — 83036 HEMOGLOBIN GLYCOSYLATED A1C: CPT

## 2018-10-04 PROCEDURE — 74011250636 HC RX REV CODE- 250/636: Performed by: ORTHOPAEDIC SURGERY

## 2018-10-04 PROCEDURE — 77030008703 HC TU ET UNCUF COVD -A: Performed by: ANESTHESIOLOGY

## 2018-10-04 PROCEDURE — 99218 HC RM OBSERVATION: CPT

## 2018-10-04 PROCEDURE — 76210000016 HC OR PH I REC 1 TO 1.5 HR: Performed by: ORTHOPAEDIC SURGERY

## 2018-10-04 PROCEDURE — 77030006668 HC BLD SHV MENSCS STRY -B: Performed by: ORTHOPAEDIC SURGERY

## 2018-10-04 PROCEDURE — 77030003602 HC NDL NRV BLK BBMI -B: Performed by: ANESTHESIOLOGY

## 2018-10-04 PROCEDURE — 77030002916 HC SUT ETHLN J&J -A: Performed by: ORTHOPAEDIC SURGERY

## 2018-10-04 PROCEDURE — 77030039425 HC BLD LARYNG TRULITE DISP TELE -A: Performed by: ANESTHESIOLOGY

## 2018-10-04 PROCEDURE — 77030033073 HC TBNG ARTHSC PMP OUTFLO STRY -B: Performed by: ORTHOPAEDIC SURGERY

## 2018-10-04 PROCEDURE — 77030027384 HC PRB ARTHSCP SERFAS STRY -C: Performed by: ORTHOPAEDIC SURGERY

## 2018-10-04 PROCEDURE — 76010000161 HC OR TIME 1 TO 1.5 HR INTENSV-TIER 1: Performed by: ORTHOPAEDIC SURGERY

## 2018-10-04 PROCEDURE — 74011250636 HC RX REV CODE- 250/636: Performed by: ANESTHESIOLOGY

## 2018-10-04 PROCEDURE — 82962 GLUCOSE BLOOD TEST: CPT

## 2018-10-04 PROCEDURE — 77030004453 HC BUR SHV STRY -B: Performed by: ORTHOPAEDIC SURGERY

## 2018-10-04 PROCEDURE — 74011636637 HC RX REV CODE- 636/637: Performed by: INTERNAL MEDICINE

## 2018-10-04 PROCEDURE — 77030006891 HC BLD SHV RESECT STRY -B: Performed by: ORTHOPAEDIC SURGERY

## 2018-10-04 PROCEDURE — A4565 SLINGS: HCPCS | Performed by: ORTHOPAEDIC SURGERY

## 2018-10-04 PROCEDURE — 77030003666 HC NDL SPINAL BD -A: Performed by: ORTHOPAEDIC SURGERY

## 2018-10-04 PROCEDURE — 74011250636 HC RX REV CODE- 250/636

## 2018-10-04 PROCEDURE — 74011000258 HC RX REV CODE- 258: Performed by: ORTHOPAEDIC SURGERY

## 2018-10-04 PROCEDURE — 94760 N-INVAS EAR/PLS OXIMETRY 1: CPT

## 2018-10-04 PROCEDURE — 77030020782 HC GWN BAIR PAWS FLX 3M -B: Performed by: ANESTHESIOLOGY

## 2018-10-04 PROCEDURE — 77030033005 HC TBNG ARTHSC PMP STRY -B: Performed by: ORTHOPAEDIC SURGERY

## 2018-10-04 PROCEDURE — 77030018836 HC SOL IRR NACL ICUM -A: Performed by: ORTHOPAEDIC SURGERY

## 2018-10-04 PROCEDURE — 77030008477 HC STYL SATN SLP COVD -A: Performed by: ANESTHESIOLOGY

## 2018-10-04 PROCEDURE — 77030018673: Performed by: ORTHOPAEDIC SURGERY

## 2018-10-04 PROCEDURE — 76060000033 HC ANESTHESIA 1 TO 1.5 HR: Performed by: ORTHOPAEDIC SURGERY

## 2018-10-04 PROCEDURE — 76942 ECHO GUIDE FOR BIOPSY: CPT | Performed by: ORTHOPAEDIC SURGERY

## 2018-10-04 PROCEDURE — 73030 X-RAY EXAM OF SHOULDER: CPT

## 2018-10-04 PROCEDURE — 74011000250 HC RX REV CODE- 250

## 2018-10-04 PROCEDURE — 77030020263 HC SOL INJ SOD CL0.9% LFCR 1000ML

## 2018-10-04 RX ORDER — DEXTROSE 40 %
15 GEL (GRAM) ORAL AS NEEDED
Status: DISCONTINUED | OUTPATIENT
Start: 2018-10-04 | End: 2018-10-06 | Stop reason: HOSPADM

## 2018-10-04 RX ORDER — NEOSTIGMINE METHYLSULFATE 1 MG/ML
INJECTION INTRAVENOUS AS NEEDED
Status: DISCONTINUED | OUTPATIENT
Start: 2018-10-04 | End: 2018-10-04 | Stop reason: HOSPADM

## 2018-10-04 RX ORDER — LANOLIN ALCOHOL/MO/W.PET/CERES
1 CREAM (GRAM) TOPICAL 2 TIMES DAILY WITH MEALS
Status: DISCONTINUED | OUTPATIENT
Start: 2018-10-05 | End: 2018-10-06 | Stop reason: HOSPADM

## 2018-10-04 RX ORDER — HYDROMORPHONE HYDROCHLORIDE 2 MG/ML
1 INJECTION, SOLUTION INTRAMUSCULAR; INTRAVENOUS; SUBCUTANEOUS
Status: DISCONTINUED | OUTPATIENT
Start: 2018-10-04 | End: 2018-10-06 | Stop reason: HOSPADM

## 2018-10-04 RX ORDER — SODIUM CHLORIDE 0.9 % (FLUSH) 0.9 %
5-10 SYRINGE (ML) INJECTION EVERY 8 HOURS
Status: DISCONTINUED | OUTPATIENT
Start: 2018-10-04 | End: 2018-10-06 | Stop reason: HOSPADM

## 2018-10-04 RX ORDER — HYDROMORPHONE HYDROCHLORIDE 2 MG/ML
0.5 INJECTION, SOLUTION INTRAMUSCULAR; INTRAVENOUS; SUBCUTANEOUS
Status: DISCONTINUED | OUTPATIENT
Start: 2018-10-04 | End: 2018-10-04 | Stop reason: HOSPADM

## 2018-10-04 RX ORDER — OXYCODONE HYDROCHLORIDE 5 MG/1
5 TABLET ORAL
Status: DISCONTINUED | OUTPATIENT
Start: 2018-10-04 | End: 2018-10-04 | Stop reason: HOSPADM

## 2018-10-04 RX ORDER — HYDROMORPHONE HYDROCHLORIDE 1 MG/ML
1 INJECTION, SOLUTION INTRAMUSCULAR; INTRAVENOUS; SUBCUTANEOUS
Status: DISCONTINUED | OUTPATIENT
Start: 2018-10-04 | End: 2018-10-04 | Stop reason: SDUPTHER

## 2018-10-04 RX ORDER — DEXAMETHASONE SODIUM PHOSPHATE 4 MG/ML
INJECTION, SOLUTION INTRA-ARTICULAR; INTRALESIONAL; INTRAMUSCULAR; INTRAVENOUS; SOFT TISSUE AS NEEDED
Status: DISCONTINUED | OUTPATIENT
Start: 2018-10-04 | End: 2018-10-04 | Stop reason: HOSPADM

## 2018-10-04 RX ORDER — INSULIN LISPRO 100 [IU]/ML
INJECTION, SOLUTION INTRAVENOUS; SUBCUTANEOUS
Status: DISCONTINUED | OUTPATIENT
Start: 2018-10-04 | End: 2018-10-04

## 2018-10-04 RX ORDER — SODIUM CHLORIDE 0.9 % (FLUSH) 0.9 %
5-10 SYRINGE (ML) INJECTION AS NEEDED
Status: DISCONTINUED | OUTPATIENT
Start: 2018-10-04 | End: 2018-10-06 | Stop reason: HOSPADM

## 2018-10-04 RX ORDER — ONDANSETRON 2 MG/ML
4 INJECTION INTRAMUSCULAR; INTRAVENOUS
Status: DISCONTINUED | OUTPATIENT
Start: 2018-10-04 | End: 2018-10-06 | Stop reason: HOSPADM

## 2018-10-04 RX ORDER — ONDANSETRON 2 MG/ML
INJECTION INTRAMUSCULAR; INTRAVENOUS AS NEEDED
Status: DISCONTINUED | OUTPATIENT
Start: 2018-10-04 | End: 2018-10-04 | Stop reason: HOSPADM

## 2018-10-04 RX ORDER — DIPHENHYDRAMINE HYDROCHLORIDE 50 MG/ML
12.5 INJECTION, SOLUTION INTRAMUSCULAR; INTRAVENOUS
Status: DISCONTINUED | OUTPATIENT
Start: 2018-10-04 | End: 2018-10-04 | Stop reason: HOSPADM

## 2018-10-04 RX ORDER — SODIUM CHLORIDE 9 MG/ML
75 INJECTION, SOLUTION INTRAVENOUS CONTINUOUS
Status: DISPENSED | OUTPATIENT
Start: 2018-10-04 | End: 2018-10-05

## 2018-10-04 RX ORDER — ROCURONIUM BROMIDE 10 MG/ML
INJECTION, SOLUTION INTRAVENOUS AS NEEDED
Status: DISCONTINUED | OUTPATIENT
Start: 2018-10-04 | End: 2018-10-04 | Stop reason: HOSPADM

## 2018-10-04 RX ORDER — PROMETHAZINE HYDROCHLORIDE 25 MG/1
25 TABLET ORAL
Status: DISCONTINUED | OUTPATIENT
Start: 2018-10-04 | End: 2018-10-04

## 2018-10-04 RX ORDER — DEXTROSE 50 % IN WATER (D50W) INTRAVENOUS SYRINGE
25-50 AS NEEDED
Status: DISCONTINUED | OUTPATIENT
Start: 2018-10-04 | End: 2018-10-06 | Stop reason: HOSPADM

## 2018-10-04 RX ORDER — ROPIVACAINE HYDROCHLORIDE 5 MG/ML
INJECTION, SOLUTION EPIDURAL; INFILTRATION; PERINEURAL AS NEEDED
Status: DISCONTINUED | OUTPATIENT
Start: 2018-10-04 | End: 2018-10-04 | Stop reason: HOSPADM

## 2018-10-04 RX ORDER — PROPOFOL 10 MG/ML
INJECTION, EMULSION INTRAVENOUS AS NEEDED
Status: DISCONTINUED | OUTPATIENT
Start: 2018-10-04 | End: 2018-10-04 | Stop reason: HOSPADM

## 2018-10-04 RX ORDER — FENTANYL CITRATE 50 UG/ML
100 INJECTION, SOLUTION INTRAMUSCULAR; INTRAVENOUS ONCE
Status: DISCONTINUED | OUTPATIENT
Start: 2018-10-04 | End: 2018-10-04 | Stop reason: HOSPADM

## 2018-10-04 RX ORDER — LISINOPRIL 20 MG/1
20 TABLET ORAL DAILY
Status: DISCONTINUED | OUTPATIENT
Start: 2018-10-05 | End: 2018-10-06 | Stop reason: HOSPADM

## 2018-10-04 RX ORDER — DOCUSATE SODIUM 100 MG/1
100 CAPSULE, LIQUID FILLED ORAL 2 TIMES DAILY
Status: DISCONTINUED | OUTPATIENT
Start: 2018-10-05 | End: 2018-10-06 | Stop reason: HOSPADM

## 2018-10-04 RX ORDER — LIDOCAINE HYDROCHLORIDE 10 MG/ML
0.1 INJECTION INFILTRATION; PERINEURAL AS NEEDED
Status: DISCONTINUED | OUTPATIENT
Start: 2018-10-04 | End: 2018-10-04 | Stop reason: HOSPADM

## 2018-10-04 RX ORDER — MIDAZOLAM HYDROCHLORIDE 1 MG/ML
2 INJECTION, SOLUTION INTRAMUSCULAR; INTRAVENOUS ONCE
Status: COMPLETED | OUTPATIENT
Start: 2018-10-04 | End: 2018-10-04

## 2018-10-04 RX ORDER — NORTRIPTYLINE HYDROCHLORIDE 25 MG/1
25 CAPSULE ORAL
Status: DISCONTINUED | OUTPATIENT
Start: 2018-10-05 | End: 2018-10-06 | Stop reason: HOSPADM

## 2018-10-04 RX ORDER — FACIAL-BODY WIPES
10 EACH TOPICAL DAILY PRN
Status: DISCONTINUED | OUTPATIENT
Start: 2018-10-04 | End: 2018-10-06 | Stop reason: HOSPADM

## 2018-10-04 RX ORDER — HYDROMORPHONE HYDROCHLORIDE 1 MG/ML
INJECTION, SOLUTION INTRAMUSCULAR; INTRAVENOUS; SUBCUTANEOUS
Status: COMPLETED
Start: 2018-10-04 | End: 2018-10-04

## 2018-10-04 RX ORDER — OXYCODONE HYDROCHLORIDE 5 MG/1
10 TABLET ORAL
Status: DISCONTINUED | OUTPATIENT
Start: 2018-10-04 | End: 2018-10-04 | Stop reason: HOSPADM

## 2018-10-04 RX ORDER — CEFAZOLIN SODIUM/WATER 2 G/20 ML
2 SYRINGE (ML) INTRAVENOUS ONCE
Status: COMPLETED | OUTPATIENT
Start: 2018-10-04 | End: 2018-10-04

## 2018-10-04 RX ORDER — ALBUTEROL SULFATE 0.83 MG/ML
2.5 SOLUTION RESPIRATORY (INHALATION) AS NEEDED
Status: DISCONTINUED | OUTPATIENT
Start: 2018-10-04 | End: 2018-10-04 | Stop reason: HOSPADM

## 2018-10-04 RX ORDER — MIDAZOLAM HYDROCHLORIDE 1 MG/ML
2 INJECTION, SOLUTION INTRAMUSCULAR; INTRAVENOUS
Status: DISCONTINUED | OUTPATIENT
Start: 2018-10-04 | End: 2018-10-04 | Stop reason: HOSPADM

## 2018-10-04 RX ORDER — HYDROMORPHONE HYDROCHLORIDE 4 MG/1
4 TABLET ORAL
Status: DISCONTINUED | OUTPATIENT
Start: 2018-10-04 | End: 2018-10-06 | Stop reason: HOSPADM

## 2018-10-04 RX ORDER — NALOXONE HYDROCHLORIDE 0.4 MG/ML
0.1 INJECTION, SOLUTION INTRAMUSCULAR; INTRAVENOUS; SUBCUTANEOUS AS NEEDED
Status: DISCONTINUED | OUTPATIENT
Start: 2018-10-04 | End: 2018-10-04 | Stop reason: HOSPADM

## 2018-10-04 RX ORDER — DEXAMETHASONE SODIUM PHOSPHATE 100 MG/10ML
INJECTION INTRAMUSCULAR; INTRAVENOUS AS NEEDED
Status: DISCONTINUED | OUTPATIENT
Start: 2018-10-04 | End: 2018-10-04 | Stop reason: HOSPADM

## 2018-10-04 RX ORDER — LIDOCAINE HYDROCHLORIDE 20 MG/ML
INJECTION, SOLUTION EPIDURAL; INFILTRATION; INTRACAUDAL; PERINEURAL AS NEEDED
Status: DISCONTINUED | OUTPATIENT
Start: 2018-10-04 | End: 2018-10-04 | Stop reason: HOSPADM

## 2018-10-04 RX ORDER — ONDANSETRON 2 MG/ML
4 INJECTION INTRAMUSCULAR; INTRAVENOUS ONCE
Status: DISCONTINUED | OUTPATIENT
Start: 2018-10-04 | End: 2018-10-04 | Stop reason: HOSPADM

## 2018-10-04 RX ORDER — HYDRALAZINE HYDROCHLORIDE 20 MG/ML
10 INJECTION INTRAMUSCULAR; INTRAVENOUS
Status: DISCONTINUED | OUTPATIENT
Start: 2018-10-04 | End: 2018-10-06 | Stop reason: HOSPADM

## 2018-10-04 RX ORDER — SODIUM CHLORIDE 0.9 % (FLUSH) 0.9 %
5-10 SYRINGE (ML) INJECTION EVERY 8 HOURS
Status: DISCONTINUED | OUTPATIENT
Start: 2018-10-04 | End: 2018-10-04 | Stop reason: HOSPADM

## 2018-10-04 RX ORDER — SODIUM CHLORIDE, SODIUM LACTATE, POTASSIUM CHLORIDE, CALCIUM CHLORIDE 600; 310; 30; 20 MG/100ML; MG/100ML; MG/100ML; MG/100ML
100 INJECTION, SOLUTION INTRAVENOUS CONTINUOUS
Status: DISCONTINUED | OUTPATIENT
Start: 2018-10-04 | End: 2018-10-04 | Stop reason: HOSPADM

## 2018-10-04 RX ORDER — SODIUM CHLORIDE 0.9 % (FLUSH) 0.9 %
5-10 SYRINGE (ML) INJECTION AS NEEDED
Status: DISCONTINUED | OUTPATIENT
Start: 2018-10-04 | End: 2018-10-04 | Stop reason: HOSPADM

## 2018-10-04 RX ORDER — INSULIN LISPRO 100 [IU]/ML
INJECTION, SOLUTION INTRAVENOUS; SUBCUTANEOUS
Status: DISCONTINUED | OUTPATIENT
Start: 2018-10-04 | End: 2018-10-06 | Stop reason: HOSPADM

## 2018-10-04 RX ORDER — HYDROMORPHONE HYDROCHLORIDE 2 MG/1
2 TABLET ORAL
Status: DISCONTINUED | OUTPATIENT
Start: 2018-10-04 | End: 2018-10-06 | Stop reason: HOSPADM

## 2018-10-04 RX ORDER — TEMAZEPAM 15 MG/1
15 CAPSULE ORAL
Status: DISCONTINUED | OUTPATIENT
Start: 2018-10-04 | End: 2018-10-04

## 2018-10-04 RX ORDER — GLYCOPYRROLATE 0.2 MG/ML
INJECTION INTRAMUSCULAR; INTRAVENOUS AS NEEDED
Status: DISCONTINUED | OUTPATIENT
Start: 2018-10-04 | End: 2018-10-04 | Stop reason: HOSPADM

## 2018-10-04 RX ADMIN — MIDAZOLAM HYDROCHLORIDE 2 MG: 1 INJECTION, SOLUTION INTRAMUSCULAR; INTRAVENOUS at 09:04

## 2018-10-04 RX ADMIN — HYDROMORPHONE HYDROCHLORIDE 0.5 MG: 2 INJECTION, SOLUTION INTRAMUSCULAR; INTRAVENOUS; SUBCUTANEOUS at 12:54

## 2018-10-04 RX ADMIN — HYDROMORPHONE HYDROCHLORIDE 0.5 MG: 2 INJECTION, SOLUTION INTRAMUSCULAR; INTRAVENOUS; SUBCUTANEOUS at 12:25

## 2018-10-04 RX ADMIN — NEOSTIGMINE METHYLSULFATE 3 MG: 1 INJECTION INTRAVENOUS at 11:49

## 2018-10-04 RX ADMIN — CEFAZOLIN 1 G: 1 INJECTION, POWDER, FOR SOLUTION INTRAMUSCULAR; INTRAVENOUS at 19:24

## 2018-10-04 RX ADMIN — ROCURONIUM BROMIDE 30 MG: 10 INJECTION, SOLUTION INTRAVENOUS at 11:03

## 2018-10-04 RX ADMIN — ONDANSETRON 4 MG: 2 INJECTION INTRAMUSCULAR; INTRAVENOUS at 11:34

## 2018-10-04 RX ADMIN — GLYCOPYRROLATE 0.4 MG: 0.2 INJECTION INTRAMUSCULAR; INTRAVENOUS at 11:49

## 2018-10-04 RX ADMIN — SODIUM CHLORIDE, SODIUM LACTATE, POTASSIUM CHLORIDE, AND CALCIUM CHLORIDE 100 ML/HR: 600; 310; 30; 20 INJECTION, SOLUTION INTRAVENOUS at 08:07

## 2018-10-04 RX ADMIN — LIDOCAINE HYDROCHLORIDE 100 MG: 20 INJECTION, SOLUTION EPIDURAL; INFILTRATION; INTRACAUDAL; PERINEURAL at 11:03

## 2018-10-04 RX ADMIN — Medication 2 G: at 11:08

## 2018-10-04 RX ADMIN — HYDROMORPHONE HYDROCHLORIDE 1 MG: 1 INJECTION, SOLUTION INTRAMUSCULAR; INTRAVENOUS; SUBCUTANEOUS at 14:25

## 2018-10-04 RX ADMIN — LIDOCAINE HYDROCHLORIDE 0.1 ML: 10 INJECTION, SOLUTION INFILTRATION; PERINEURAL at 08:07

## 2018-10-04 RX ADMIN — DEXAMETHASONE SODIUM PHOSPHATE 4 MG: 4 INJECTION, SOLUTION INTRA-ARTICULAR; INTRALESIONAL; INTRAMUSCULAR; INTRAVENOUS; SOFT TISSUE at 11:20

## 2018-10-04 RX ADMIN — HYDROMORPHONE HYDROCHLORIDE 0.5 MG: 2 INJECTION, SOLUTION INTRAMUSCULAR; INTRAVENOUS; SUBCUTANEOUS at 13:08

## 2018-10-04 RX ADMIN — PROPOFOL 50 MG: 10 INJECTION, EMULSION INTRAVENOUS at 11:24

## 2018-10-04 RX ADMIN — HYDROMORPHONE HYDROCHLORIDE 0.5 MG: 2 INJECTION, SOLUTION INTRAMUSCULAR; INTRAVENOUS; SUBCUTANEOUS at 12:39

## 2018-10-04 RX ADMIN — SODIUM CHLORIDE 75 ML/HR: 900 INJECTION, SOLUTION INTRAVENOUS at 12:57

## 2018-10-04 RX ADMIN — SODIUM CHLORIDE 75 ML/HR: 900 INJECTION, SOLUTION INTRAVENOUS at 16:37

## 2018-10-04 RX ADMIN — HYDROMORPHONE HYDROCHLORIDE 1 MG: 2 INJECTION, SOLUTION INTRAMUSCULAR; INTRAVENOUS; SUBCUTANEOUS at 19:30

## 2018-10-04 RX ADMIN — INSULIN LISPRO 8 UNITS: 100 INJECTION, SOLUTION INTRAVENOUS; SUBCUTANEOUS at 22:55

## 2018-10-04 RX ADMIN — INSULIN LISPRO 10 UNITS: 100 INJECTION, SOLUTION INTRAVENOUS; SUBCUTANEOUS at 18:18

## 2018-10-04 RX ADMIN — PROPOFOL 200 MG: 10 INJECTION, EMULSION INTRAVENOUS at 11:03

## 2018-10-04 RX ADMIN — ROPIVACAINE HYDROCHLORIDE 30 ML: 5 INJECTION, SOLUTION EPIDURAL; INFILTRATION; PERINEURAL at 09:08

## 2018-10-04 RX ADMIN — SODIUM CHLORIDE, SODIUM LACTATE, POTASSIUM CHLORIDE, AND CALCIUM CHLORIDE: 600; 310; 30; 20 INJECTION, SOLUTION INTRAVENOUS at 11:13

## 2018-10-04 NOTE — H&P
Date of Surgery Update: 
Magali Villalta was seen and examined. History and physical has been reviewed. The patient has been examined.  There have been no significant clinical changes since the completion of the originally dated History and Physical. 
 
Signed By: Mia Perales MD   
 October 4, 2018 8:09 AM

## 2018-10-04 NOTE — PROGRESS NOTES
Assessment completed. Pt transferred from PACU and drowsy. Respirations are present and unlabored. Dressings on left shoulder with arm sling on. IV is clean dry and intact and fluids are running. Bed is low and locked with call button in reach. Will continue to monitor.

## 2018-10-04 NOTE — ANESTHESIA PROCEDURE NOTES
Peripheral Block Start time: 10/4/2018 9:04 AM 
End time: 10/4/2018 9:08 AM 
Performed by: Grant Elder Authorized by: Grant Elder  
 
 
Pre-procedure: Indications: at surgeon's request and post-op pain management Preanesthetic Checklist: patient identified, risks and benefits discussed, site marked, timeout performed, anesthesia consent given and patient being monitored Timeout Time: 09:04 Block Type:  
Block Type: Interscalene Laterality:  Left Monitoring:  Standard ASA monitoring, continuous pulse ox, frequent vital sign checks, heart rate, oxygen and responsive to questions Injection Technique:  Single shot Procedures: ultrasound guided Patient Position: supine Prep: chlorhexidine Location:  Interscalene Needle Type:  Stimuplex Needle Gauge:  21 G Needle Localization:  Ultrasound guidance and anatomical landmarks Medication Injected:  0.5% 
ropivacaine Volume (mL):  30 
 
Assessment: 
Number of attempts:  1 Injection Assessment:  Incremental injection every 5 mL, local visualized surrounding nerve on ultrasound, negative aspiration for blood, no paresthesia, no intravascular symptoms and ultrasound image on chart Patient tolerance:  Patient tolerated the procedure well with no immediate complications

## 2018-10-04 NOTE — PERIOP NOTES
Dr Joe Panda notified of 236 SQBS in PACU. No orders received at this time. Dr Rosina Coronado has ordered Hospitalist to manage diabetes when transferred to orthopedic floor.
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
## 2018-10-04 NOTE — PROGRESS NOTES
TRANSFER - IN REPORT: 
 
Verbal report received from Vidal Sena RN on Tavon Perales  being received from PACU for routine progression of care Report consisted of patients Situation, Background, Assessment and  
Recommendations(SBAR). Information from the following report(s) SBAR, Kardex and OR Summary was reviewed with the receiving nurse. Opportunity for questions and clarification was provided. Assessment completed upon patients arrival to unit and care assumed.

## 2018-10-04 NOTE — PROGRESS NOTES
10/04/18 1556 Dual Skin Pressure Injury Assessment Dual Skin Pressure Injury Assessment X Second Care Provider (Based on [address] [place] [address]) [facility]. RN  
Skin Integumentary Skin Integumentary (WDL) X Skin Integrity Incision (comment) (on left shoulder ) Skin Color Appropriate for ethnicity Skin Condition/Temp Dry; Warm Turgor Non-tenting

## 2018-10-04 NOTE — PROGRESS NOTES
Pt c/o 8/10 right shoulder pain. Administered 1 mg dilaudid IV per MD order. Will continue to monitor

## 2018-10-04 NOTE — ANESTHESIA PREPROCEDURE EVALUATION
Anesthetic History Review of Systems / Medical History Patient summary reviewed, nursing notes reviewed and pertinent labs reviewed Pulmonary Neuro/Psych Cardiovascular Hypertension: well controlled Exercise tolerance: >4 METS 
  
GI/Hepatic/Renal 
  
 
 
 
 
 
 Endo/Other Diabetes: well controlled, type 2 Other Findings Physical Exam 
 
Airway Mallampati: III 
TM Distance: 4 - 6 cm Neck ROM: normal range of motion Mouth opening: Normal 
 
 Cardiovascular Regular rate and rhythm,  S1 and S2 normal,  no murmur, click, rub, or gallop Dental 
No notable dental hx Pulmonary Breath sounds clear to auscultation Abdominal 
 
 
 
 Other Findings Anesthetic Plan ASA: 3 Anesthesia type: general 
 
 
Post-op pain plan if not by surgeon: peripheral nerve block single Induction: Intravenous Anesthetic plan and risks discussed with: Patient

## 2018-10-04 NOTE — ANESTHESIA POSTPROCEDURE EVALUATION
Post-Anesthesia Evaluation and Assessment Patient: Yon Arce MRN: [id number]  SSN: [id number] YOB: 1965  Age: 46 y.o. Sex: female Cardiovascular Function/Vital Signs Visit Vitals  /62  Pulse 82  Temp 36.1 °C (97 °F)  Resp 16  Wt 95.9 kg (211 lb 8 oz)  SpO2 94%  BMI 35.2 kg/m2 Patient is status post general anesthesia for Procedure(s): 1. EXAMINATION AND MANIPULATION RIGHT SHOULDER UNDER ANESTHESIA  2. EXAMINATION AND MANIPULATION LEFT  SHOULDER ARTHROSCOPY LEFT SHOULDER ARTHROSCOPIC SUBACROMIAL DECOMPRESSION, DISTAL CLAVICLE RESECTION, LYSIS OF ADHESIONS. Nausea/Vomiting: None Postoperative hydration reviewed and adequate. Pain: 
Pain Scale 1: Numeric (0 - 10) (10/04/18 1254) Pain Intensity 1: 9 (10/04/18 1254) Managed Neurological Status:  
Neuro (WDL): Exceptions to WDL (10/04/18 1209) Neuro Neurologic State: Eyes open spontaneously (10/04/18 1254) Cognition: Follows commands (10/04/18 1254) LUE Motor Response: Pharmacologically paralyzed (10/04/18 1209) LLE Motor Response: Purposeful (10/04/18 1209) RUE Motor Response: Purposeful (10/04/18 1209) RLE Motor Response: Purposeful (10/04/18 1209) At baseline Mental Status and Level of Consciousness: Arousable Pulmonary Status:  
O2 Device: Nasal cannula (10/04/18 1224) Adequate oxygenation and airway patent Complications related to anesthesia: None Post-anesthesia assessment completed. No concerns Signed By: Christian Zamora MD   
 October 4, 2018

## 2018-10-04 NOTE — DISCHARGE SUMMARY
[address] [facility] Discharge Summary      Patient ID:  Chalo Jay  [id number]  46 y.o.  1965    Allergies: Review of patient's allergies indicates no known allergies.     Admit date: 10/4/2018    Discharge date and time: 10/6/2018     Admitting Physician: Estrella Reed MD     Discharge Physician: Estrella Reed MD      * Admission Diagnoses: Chondromalacia, left shoulder [N52.305]  Biceps tendinitis of left shoulder [M75.22]  Adhesive capsulitis of left shoulder [M75.02]  Osteoarthritis of left shoulder, unspecified osteoarthritis type [M19.012]  Chondromalacia, left shoulder [M94.212]  Biceps tendinitis of left shoulder [M75.22]  Adhesive capsulitis of left shoulder [M75.02]  Osteoarthritis of left shoulder, unspecified osteoarthritis type [M19.012]    * Discharge Diagnoses:   Hospital Problems as of 10/6/2018  Never Reviewed          Codes Class Noted - Resolved POA    * (Principal)Adhesive capsulitis of left shoulder ICD-10-CM: M75.02  ICD-9-CM: 726.0  9/30/2018 - Present Yes        Degenerative joint disease of left acromioclavicular joint ICD-10-CM: M19.012  ICD-9-CM: 715.91  9/30/2018 - Present Yes        Adhesive capsulitis of right shoulder ICD-10-CM: M75.01  ICD-9-CM: 726.0  9/30/2018 - Present Yes        RESOLVED: Adhesive capsulitis of both shoulders ICD-10-CM: M75.01, M75.02  ICD-9-CM: 726.0  10/4/2018 - 10/4/2018 Unknown        RESOLVED: Bicipital tendinitis of left shoulder ICD-10-CM: M75.22  ICD-9-CM: 726.12  9/30/2018 - 10/4/2018 Yes        RESOLVED: Chondromalacia of left shoulder ICD-10-CM: M91.226  ICD-9-CM: 733.92  9/30/2018 - 10/4/2018 Yes              Surgeon: Estrella Reed MD          * Procedure: Procedure(s):  LEFT SHOULDER REBLOCK           Perioperative Antibiotics: Ancef  _X__                                                Vancomycin  ___        Post Op complications: none      * Discharge Condition: good  Wound appears to be healing without any evidence of infection.          * Discharged to: Home    * Follow-up Care/Discharge instructions:  - Resume pre hospital diet            - Resume home medications per medical continuation form     CONTINUE PHYSICAL THERAPY  SLING LEFT SHOULDER  - Follow up in office as scheduled       Signed:  Ariaan Bland MD  10/6/2018  10:40 AM

## 2018-10-04 NOTE — PROGRESS NOTES
10/04/18 1620 Oxygen Therapy O2 Sat (%) 94 % (99% on 3L) Pulse via Oximetry 81 beats per minute O2 Device Room air Patient achieved 1000 Ml/sec on IS. Patient encouraged to do every hour while awake-patient agreed and demonstrated. No shortness of breath or distress noted. BS are clear b/l.

## 2018-10-04 NOTE — PERIOP NOTES
TRANSFER - OUT REPORT: 
 
Verbal report given to Jarrett RN(name) on Marlinda Anne-Marie  being transferred to Room 365(unit) for routine post - op Report consisted of patients Situation, Background, Assessment and  
Recommendations(SBAR). Information from the following report(s) SBAR, Kardex, OR Summary, Procedure Summary, Intake/Output and MAR was reviewed with the receiving nurse. Opportunity for questions and clarification was provided. Patient transported with: 
 O2 @ 3 liters Tech

## 2018-10-04 NOTE — IP AVS SNAPSHOT
[place] [address] 
[phone] Patient: Sheela Christianson MRN: [id number] :1965 About your hospitalization You were admitted on:  2018 You last received care in the:  [address] [place] [address] You were discharged on:  2018 Why you were hospitalized Your primary diagnosis was: Adhesive Capsulitis Of Left Shoulder Your diagnoses also included:  Degenerative Joint Disease Of Left Acromioclavicular Joint, Bicipital Tendinitis Of Left Shoulder, Chondromalacia Of Left Shoulder, Adhesive Capsulitis Of Right Shoulder, Adhesive Capsulitis Of Both Shoulders Follow-up Information Follow up With Details Comments Contact Info Provider Unknown   Patient not available to ask Torrey Gonzalez MD  follow up in 10 Days  [address] [employer] [facility] [address] Discharge Orders None A check jeremy indicates which time of day the medication should be taken. My Medications ASK your doctor about these medications Instructions Each Dose to Equal  
 Morning Noon Evening Bedtime  
 gabapentin 100 mg capsule Commonly known as:  NEURONTIN  
   
 three (3) times daily. Take / use AM day of surgery  per anesthesia protocols. glipiZIDE-metFORMIN 2.5-500 mg per tablet Commonly known as:  METAGLIP Take 1 Tab by mouth two (2) times a day. Indications: type 2 diabetes mellitus 1 Tab HYDROcodone-acetaminophen 7.5-325 mg per tablet Commonly known as:  Adriana Ayon Your last dose was:  DO NOT TAKE!!  
   
 as needed. lisinopril-hydroCHLOROthiazide 20-25 mg per tablet Commonly known as:  PRINZIDE, ZESTORETIC  
   
 daily. meloxicam 15 mg tablet Commonly known as:  MOBIC Your last dose was:  DO NOT TAKE WITH TORADOL  
   
 daily. nortriptyline 25 mg capsule Commonly known as:  PAMELOR Your last dose was:  DO NOT TAKE WITH RESTORIL Take 25 mg by mouth nightly. For sleep 25 mg Opioid Education Prescription Opioids: What You Need to Know: 
 
 
 
F-face looks uneven A-arms unable to move or move unevenly S-speech slurred or non-existent T-time-call 911 as soon as signs and symptoms begin-DO NOT go Back to bed or wait to see if you get better-TIME IS BRAIN. Warning Signs of HEART ATTACK Call 911 if you have these symptoms: 
? Chest discomfort. Most heart attacks involve discomfort in the center of the chest that lasts more than a few minutes, or that goes away and comes back. It can feel like uncomfortable pressure, squeezing, fullness, or pain. ? Discomfort in other areas of the upper body. Symptoms can include pain or discomfort in one or both arms, the back, neck, jaw, or stomach. ? Shortness of breath with or without chest discomfort. ? Other signs may include breaking out in a cold sweat, nausea, or lightheadedness. Don't wait more than five minutes to call [address]! Fast action can save your life.  Calling 911 is almost always the fastest way to get lifesaving treatment. Emergency Medical Services staff can begin treatment when they arrive  up to an hour sooner than if someone gets to the hospital by car. The discharge information has been reviewed with the patient. The patient verbalized understanding. Discharge medications reviewed with the patient and appropriate educational materials and side effects teaching were provided. ___________________________________________________________________________________________________________________________________ Fosubo Announcement We are excited to announce that we are making your provider's discharge notes available to you in Fosubo. You will see these notes when they are completed and signed by the physician that discharged you from your recent hospital stay. If you have any questions or concerns about any information you see in Fosubo, please call the Health Information Department where you were seen or reach out to your Primary Care Provider for more information about your plan of care. Introducing [facility]! Rahel Herrera introduces Fosubo patient portal. Now you can access parts of your medical record, email your doctor's office, and request medication refills online. 1. In your internet browser, go to [email or web address] 2. Click on the First Time User? Click Here link in the Sign In box. You will see the New Member Sign Up page. 3. Enter your Fosubo Access Code exactly as it appears below. You will not need to use this code after youve completed the sign-up process. If you do not sign up before the expiration date, you must request a new code. · Fosubo Access Code: [email or web address] Expires: 1/1/2019  3:48 PM 
 
4. Enter the last four digits of your Social Security Number (xxxx) and Date of Birth (mm/dd/yyyy) as indicated and click Submit. You will be taken to the next sign-up page. 5. Create a [employer] ID. This will be your [employer] login ID and cannot be changed, so think of one that is secure and easy to remember. 6. Create a [employer] password. You can change your password at any time. 7. Enter your Password Reset Question and Answer. This can be used at a later time if you forget your password. 8. Enter your e-mail address. You will receive e-mail notification when new information is available in [facility] 9. Click Sign Up. You can now view and download portions of your medical record. 10. Click the Download Summary menu link to download a portable copy of your medical information. If you have questions, please visit the Frequently Asked Questions section of the [employer] website. Remember, [employer] is NOT to be used for urgent needs. For medical emergencies, dial 911. Now available from your iPhone and Android! Introducing Ortega Cortez As a [employer] patient, I wanted to make you aware of our electronic visit tool called Ortega Cortez. [employer] 24/7 allows you to connect within minutes with a medical provider 24 hours a day, seven days a week via a mobile device or tablet or logging into a secure website from your computer. You can access Ortgea Cortez from anywhere in the [place]. A virtual visit might be right for you when you have a simple condition and feel like you just dont want to get out of bed, or cant get away from work for an appointment, when your regular [employer] provider is not available (evenings, weekends or holidays), or when youre out of town and need minor care. Electronic visits cost only $49 and if the [employer] 24/7 provider determines a prescription is needed to treat your condition, one can be electronically transmitted to a nearby pharmacy*. Please take a moment to enroll today if you have not already done so. The enrollment process is free and takes just a few minutes.   To enroll, please download the [employer] 24/7 bianka to your tablet or phone, or visit [email or web address] to enroll on your computer. And, as an [address] [place] [address] patient with a Intern account, the results of your visits will be scanned into your electronic medical record and your primary care provider will be able to view the scanned results. We urge you to continue to see your regular [facility] provider for your ongoing medical care. And while your primary care provider may not be the one available when you seek a [facility] virtual visit, the peace of mind you get from getting a real diagnosis real time can be priceless. For more information on [facility], view our Frequently Asked Questions (FAQs) at [email or web address]. Sincerely, 
 
Doris Hill MD 
Chief Medical Officer [facility] Keisha Mayer *:  certain medications cannot be prescribed via [facility] Providers Seen During Your Hospitalization Provider Specialty Primary office phone Krystyna Alvarado MD Orthopedic Surgery [phone] Your Primary Care Physician (PCP) Primary Care Physician Office Phone Office Fax UNKNOWN, PROVIDER ** None ** ** None ** You are allergic to the following No active allergies Recent Documentation Weight Breastfeeding? BMI OB Status Smoking Status 95.9 kg No 35.2 kg/m2 Hysterectomy Never Smoker Emergency Contacts Name Discharge Info Relation Home Work Mobile [address] CAREGIVER [3] Son [22] [phone] Ernie Miranda CAREGIVER [3] Friend [5] [phone] [facility] CAREGIVER [3] Son [22] [phone] Patient Belongings  The following personal items are in your possession at time of discharge: 
  Dental Appliances: None  Visual Aid: None      Home Medications: None Jewelry: None  Clothing: None    Other Valuables: None Discharge Instructions Attachments/References SHOULDER ARTHROSCOPY: POST-OP (ENGLISH) Patient Handouts Shoulder Arthroscopy: What to Expect at Home Your Recovery Your arm may be in a sling. You will feel tired for several days. Your shoulder will be swollen, and you may notice that your skin is a different color near the cuts the doctor made (incisions). Your hand and arm may also be swollen. This is normal and will go away in a few days. Depending on the medicine you had during the surgery, your entire arm may feel numb or like you cannot move it. This goes away in 12 to 24 hours. When you can return to work or your usual routine will depend on your shoulder problem. Most people need 6 weeks or longer to recover. How much time you need depends on the surgery that was done. You may have to limit your activity until your shoulder strength and range of motion return to normal. You may also be in a rehabilitation program (rehab). If you have a desk job, you may be able to return to work a few days after the surgery. If you lift things at work, it may take months before you return to work. This care sheet gives you a general idea about how long it will take for you to recover. But each person recovers at a different pace. Follow the steps below to get better as quickly as possible. How can you care for yourself at home? Activity 
  · Rest when you feel tired. Getting enough sleep will help you recover. You may be more comfortable if you sleep in a reclining chair. To make your arm and shoulder feel better, keep a thin pillow under the back of your arm while you are lying down.  
  · Try to walk each day. Start by walking a little more than you did the day before. Bit by bit, increase the amount you walk. Walking boosts blood flow and helps prevent pneumonia and constipation.   · For 2 to 3 weeks, avoid lifting anything heavier than a plate or a glass. You need to give your shoulder time to heal.  
  · Your arm may be in a sling. You may need to use the sling for a few days to a few weeks. Your doctor will advise you on how long to wear the sling.  
  · You may take the sling off when you dress or wash.  
  · Do not use your arm for repeated movements. These include painting, vacuuming, or using a computer. Diet 
  · You can eat your normal diet. If your stomach is upset, try bland, low-fat foods like plain rice, broiled chicken, toast, and yogurt.  
  · Drink plenty of fluids, unless your doctor tells you not to.  
  · You may notice that your bowel movements are not regular right after your surgery. This is common. Try to avoid constipation and straining with bowel movements. You may want to take a fiber supplement every day. If you have not had a bowel movement after a couple of days, ask your doctor about taking a mild laxative. Medicines 
  · Your doctor will tell you if and when you can restart your medicines. He or she will also give you instructions about taking any new medicines.  
  · If you take blood thinners, such as warfarin (Coumadin), clopidogrel (Plavix), or aspirin, be sure to talk to your doctor. He or she will tell you if and when to start taking those medicines again. Make sure that you understand exactly what your doctor wants you to do.  
  · Take pain medicines exactly as directed. ¨ If the doctor gave you a prescription medicine for pain, take it as prescribed. ¨ If you are not taking a prescription pain medicine, ask your doctor if you can take an over-the-counter medicine.  
  · If you think your pain medicine is making you sick to your stomach: 
¨ Take your medicine after meals (unless your doctor has told you not to). ¨ Ask your doctor for a different pain medicine.  
  · If your doctor prescribed antibiotics, take them as directed.  Do not stop taking them just because you feel better. You need to take the full course of antibiotics. Incision care 
  · If you have a dressing over your incision, keep it clean and dry. You may remove it 2 to 3 days after the surgery.  
  · If your incision is open to the air, keep the area clean and dry.  
  · If you have strips of tape on the incision, leave the tape on for a week or until it falls off. Exercise 
  · You may need rehabilitation. This is a series of exercises you do after your surgery. Rehab helps you get back your shoulder's range of motion and strength. You will work with your doctor and physical therapist to plan this exercise program. To get the best results, you need to do the exercises correctly and as often and as long as your doctor tells you.  
 Ice 
  · To reduce swelling and pain, put ice or a cold pack on your shoulder for 10 to 20 minutes at a time. Do this every 1 to 2 hours. Put a thin cloth between the ice and your skin. Follow-up care is a key part of your treatment and safety. Be sure to make and go to all appointments, and call your doctor if you are having problems. It's also a good idea to know your test results and keep a list of the medicines you take. When should you call for help? Call 911 anytime you think you may need emergency care. For example, call if: 
  · You passed out (lost consciousness).  
  · You have severe trouble breathing.  
  · You have sudden chest pain and shortness of breath, or you cough up blood.  
 Call your doctor now or seek immediate medical care if: 
  · Your hand is cool, pale, or numb, or it changes color.  
  · You are unable to move your fingers, wrist, or elbow.  
  · You are sick to your stomach or cannot keep fluids down.  
  · You have pain that does not get better after you take pain medicine.  
  · You have signs of infection, such as: 
¨ Increased pain, swelling, warmth, or redness. ¨ Red streaks leading from the incision. ¨ Pus draining from the incision. ¨ A fever.  
  · You have loose stitches, or your incision comes open.  
  · Your incision bleeds through your first dressing or is still bleeding 3 days after your surgery.  
 Watch closely for changes in your health, and be sure to contact your doctor if: 
  · Your sling feels too tight, and you cannot loosen it.  
  · You have new or increased swelling in your arm.  
  · You have new pain that develops in another area of the affected limb. For example, you have pain in your hand or elbow.  
  · You do not have a bowel movement after taking a laxative.  
  · You do not get better as expected. Where can you learn more? Go to http://andria-ankur.info/. Enter E707 in the search box to learn more about \"Shoulder Arthroscopy: What to Expect at Home. \" Current as of: November 29, 2017 Content Version: 11.8 © 5697-9459 [employer]. Care instructions adapted under license by [employer] (which disclaims liability or warranty for this information). If you have questions about a medical condition or this instruction, always ask your healthcare professional. [address] any warranty or liability for your use of this information. Please provide this summary of care documentation to your next provider. Signatures-by signing, you are acknowledging that this After Visit Summary has been reviewed with you and you have received a copy. Patient Signature:  ____________________________________________________________ Date:  ____________________________________________________________  
  
Orlean Amend Provider Signature:  ____________________________________________________________ Date:  ____________________________________________________________

## 2018-10-05 LAB
ANION GAP SERPL CALC-SCNC: 8 MMOL/L
BUN SERPL-MCNC: 21 MG/DL (ref 6–23)
CALCIUM SERPL-MCNC: 8.5 MG/DL (ref 8.3–10.4)
CHLORIDE SERPL-SCNC: 103 MMOL/L (ref 98–107)
CO2 SERPL-SCNC: 26 MMOL/L (ref 21–32)
CREAT SERPL-MCNC: 1.48 MG/DL (ref 0.6–1)
GLUCOSE BLD STRIP.AUTO-MCNC: 194 MG/DL (ref 65–100)
GLUCOSE BLD STRIP.AUTO-MCNC: 212 MG/DL (ref 65–100)
GLUCOSE BLD STRIP.AUTO-MCNC: 230 MG/DL (ref 65–100)
GLUCOSE BLD STRIP.AUTO-MCNC: 240 MG/DL (ref 65–100)
GLUCOSE SERPL-MCNC: 225 MG/DL (ref 65–100)
MAGNESIUM SERPL-MCNC: 1.4 MG/DL (ref 1.8–2.4)
POTASSIUM SERPL-SCNC: 4.2 MMOL/L (ref 3.5–5.1)
SODIUM SERPL-SCNC: 137 MMOL/L (ref 136–145)

## 2018-10-05 PROCEDURE — 74011250637 HC RX REV CODE- 250/637: Performed by: INTERNAL MEDICINE

## 2018-10-05 PROCEDURE — 99218 HC RM OBSERVATION: CPT

## 2018-10-05 PROCEDURE — 97161 PT EVAL LOW COMPLEX 20 MIN: CPT

## 2018-10-05 PROCEDURE — 83735 ASSAY OF MAGNESIUM: CPT

## 2018-10-05 PROCEDURE — 77030020263 HC SOL INJ SOD CL0.9% LFCR 1000ML

## 2018-10-05 PROCEDURE — 76942 ECHO GUIDE FOR BIOPSY: CPT

## 2018-10-05 PROCEDURE — 74011250637 HC RX REV CODE- 250/637: Performed by: ORTHOPAEDIC SURGERY

## 2018-10-05 PROCEDURE — 74011250636 HC RX REV CODE- 250/636: Performed by: FAMILY MEDICINE

## 2018-10-05 PROCEDURE — 82962 GLUCOSE BLOOD TEST: CPT

## 2018-10-05 PROCEDURE — 74011250636 HC RX REV CODE- 250/636: Performed by: HOSPITALIST

## 2018-10-05 PROCEDURE — 74011000258 HC RX REV CODE- 258: Performed by: ORTHOPAEDIC SURGERY

## 2018-10-05 PROCEDURE — 76010010054 HC POST OP PAIN BLOCK

## 2018-10-05 PROCEDURE — 74011636637 HC RX REV CODE- 636/637: Performed by: INTERNAL MEDICINE

## 2018-10-05 PROCEDURE — 74011636637 HC RX REV CODE- 636/637: Performed by: HOSPITALIST

## 2018-10-05 PROCEDURE — 97110 THERAPEUTIC EXERCISES: CPT

## 2018-10-05 PROCEDURE — 94760 N-INVAS EAR/PLS OXIMETRY 1: CPT

## 2018-10-05 PROCEDURE — 36415 COLL VENOUS BLD VENIPUNCTURE: CPT

## 2018-10-05 PROCEDURE — 74011250636 HC RX REV CODE- 250/636

## 2018-10-05 PROCEDURE — 74011250636 HC RX REV CODE- 250/636: Performed by: ORTHOPAEDIC SURGERY

## 2018-10-05 PROCEDURE — 93005 ELECTROCARDIOGRAM TRACING: CPT | Performed by: HOSPITALIST

## 2018-10-05 PROCEDURE — 80048 BASIC METABOLIC PNL TOTAL CA: CPT

## 2018-10-05 RX ORDER — INSULIN LISPRO 100 [IU]/ML
6 INJECTION, SOLUTION INTRAVENOUS; SUBCUTANEOUS
Status: DISCONTINUED | OUTPATIENT
Start: 2018-10-05 | End: 2018-10-06

## 2018-10-05 RX ORDER — INSULIN LISPRO 100 [IU]/ML
8 INJECTION, SOLUTION INTRAVENOUS; SUBCUTANEOUS
Status: DISCONTINUED | OUTPATIENT
Start: 2018-10-05 | End: 2018-10-05

## 2018-10-05 RX ORDER — MAGNESIUM SULFATE HEPTAHYDRATE 40 MG/ML
2 INJECTION, SOLUTION INTRAVENOUS ONCE
Status: COMPLETED | OUTPATIENT
Start: 2018-10-05 | End: 2018-10-05

## 2018-10-05 RX ORDER — MAGNESIUM SULFATE 1 G/100ML
1 INJECTION INTRAVENOUS ONCE
Status: DISCONTINUED | OUTPATIENT
Start: 2018-10-05 | End: 2018-10-05

## 2018-10-05 RX ORDER — INSULIN GLARGINE 100 [IU]/ML
20 INJECTION, SOLUTION SUBCUTANEOUS
Status: DISCONTINUED | OUTPATIENT
Start: 2018-10-05 | End: 2018-10-05

## 2018-10-05 RX ORDER — INSULIN GLARGINE 100 [IU]/ML
15 INJECTION, SOLUTION SUBCUTANEOUS
Status: DISCONTINUED | OUTPATIENT
Start: 2018-10-05 | End: 2018-10-06

## 2018-10-05 RX ORDER — MAGNESIUM SULFATE HEPTAHYDRATE 40 MG/ML
2 INJECTION, SOLUTION INTRAVENOUS ONCE
Status: DISCONTINUED | OUTPATIENT
Start: 2018-10-05 | End: 2018-10-05

## 2018-10-05 RX ORDER — INSULIN GLARGINE 100 [IU]/ML
18 INJECTION, SOLUTION SUBCUTANEOUS
Status: DISCONTINUED | OUTPATIENT
Start: 2018-10-05 | End: 2018-10-05

## 2018-10-05 RX ORDER — MIDAZOLAM HYDROCHLORIDE 1 MG/ML
INJECTION, SOLUTION INTRAMUSCULAR; INTRAVENOUS
Status: COMPLETED
Start: 2018-10-05 | End: 2018-10-05

## 2018-10-05 RX ORDER — MIDAZOLAM HYDROCHLORIDE 1 MG/ML
2 INJECTION, SOLUTION INTRAMUSCULAR; INTRAVENOUS ONCE
Status: ACTIVE | OUTPATIENT
Start: 2018-10-05 | End: 2018-10-05

## 2018-10-05 RX ADMIN — HYDROMORPHONE HYDROCHLORIDE 1 MG: 2 INJECTION, SOLUTION INTRAMUSCULAR; INTRAVENOUS; SUBCUTANEOUS at 03:14

## 2018-10-05 RX ADMIN — HYDROMORPHONE HYDROCHLORIDE 1 MG: 2 INJECTION, SOLUTION INTRAMUSCULAR; INTRAVENOUS; SUBCUTANEOUS at 11:08

## 2018-10-05 RX ADMIN — CEFAZOLIN 1 G: 1 INJECTION, POWDER, FOR SOLUTION INTRAMUSCULAR; INTRAVENOUS at 14:28

## 2018-10-05 RX ADMIN — CEFAZOLIN 1 G: 1 INJECTION, POWDER, FOR SOLUTION INTRAMUSCULAR; INTRAVENOUS at 03:14

## 2018-10-05 RX ADMIN — HYDROMORPHONE HYDROCHLORIDE 1 MG: 2 INJECTION, SOLUTION INTRAMUSCULAR; INTRAVENOUS; SUBCUTANEOUS at 12:42

## 2018-10-05 RX ADMIN — NORTRIPTYLINE HYDROCHLORIDE 25 MG: 25 CAPSULE ORAL at 22:42

## 2018-10-05 RX ADMIN — SODIUM CHLORIDE 500 ML: 900 INJECTION, SOLUTION INTRAVENOUS at 11:20

## 2018-10-05 RX ADMIN — HYDROMORPHONE HYDROCHLORIDE 1 MG: 2 INJECTION, SOLUTION INTRAMUSCULAR; INTRAVENOUS; SUBCUTANEOUS at 20:44

## 2018-10-05 RX ADMIN — HYDROMORPHONE HYDROCHLORIDE 1 MG: 2 INJECTION, SOLUTION INTRAMUSCULAR; INTRAVENOUS; SUBCUTANEOUS at 17:39

## 2018-10-05 RX ADMIN — INSULIN LISPRO 2 UNITS: 100 INJECTION, SOLUTION INTRAVENOUS; SUBCUTANEOUS at 08:28

## 2018-10-05 RX ADMIN — MAGNESIUM SULFATE HEPTAHYDRATE 2 G: 40 INJECTION, SOLUTION INTRAVENOUS at 11:19

## 2018-10-05 RX ADMIN — Medication 10 ML: at 22:44

## 2018-10-05 RX ADMIN — Medication 10 ML: at 17:41

## 2018-10-05 RX ADMIN — INSULIN LISPRO 4 UNITS: 100 INJECTION, SOLUTION INTRAVENOUS; SUBCUTANEOUS at 22:34

## 2018-10-05 RX ADMIN — MIDAZOLAM HYDROCHLORIDE 2 MG: 1 INJECTION, SOLUTION INTRAMUSCULAR; INTRAVENOUS at 07:04

## 2018-10-05 RX ADMIN — DOCUSATE SODIUM 100 MG: 100 CAPSULE, LIQUID FILLED ORAL at 17:40

## 2018-10-05 RX ADMIN — INSULIN LISPRO 6 UNITS: 100 INJECTION, SOLUTION INTRAVENOUS; SUBCUTANEOUS at 12:52

## 2018-10-05 RX ADMIN — INSULIN GLARGINE 15 UNITS: 100 INJECTION, SOLUTION SUBCUTANEOUS at 22:34

## 2018-10-05 RX ADMIN — INSULIN LISPRO 4 UNITS: 100 INJECTION, SOLUTION INTRAVENOUS; SUBCUTANEOUS at 17:42

## 2018-10-05 RX ADMIN — DOCUSATE SODIUM 100 MG: 100 CAPSULE, LIQUID FILLED ORAL at 09:22

## 2018-10-05 RX ADMIN — LISINOPRIL 20 MG: 20 TABLET ORAL at 09:22

## 2018-10-05 RX ADMIN — MAGNESIUM SULFATE HEPTAHYDRATE 2 G: 40 INJECTION, SOLUTION INTRAVENOUS at 05:49

## 2018-10-05 RX ADMIN — HYDROMORPHONE HYDROCHLORIDE 1 MG: 2 INJECTION, SOLUTION INTRAMUSCULAR; INTRAVENOUS; SUBCUTANEOUS at 14:44

## 2018-10-05 RX ADMIN — INSULIN LISPRO 8 UNITS: 100 INJECTION, SOLUTION INTRAVENOUS; SUBCUTANEOUS at 09:24

## 2018-10-05 RX ADMIN — INSULIN LISPRO 4 UNITS: 100 INJECTION, SOLUTION INTRAVENOUS; SUBCUTANEOUS at 12:53

## 2018-10-05 RX ADMIN — FERROUS SULFATE TAB 325 MG (65 MG ELEMENTAL FE) 325 MG: 325 (65 FE) TAB at 09:22

## 2018-10-05 RX ADMIN — INSULIN LISPRO 6 UNITS: 100 INJECTION, SOLUTION INTRAVENOUS; SUBCUTANEOUS at 17:41

## 2018-10-05 RX ADMIN — FERROUS SULFATE TAB 325 MG (65 MG ELEMENTAL FE) 325 MG: 325 (65 FE) TAB at 17:40

## 2018-10-05 NOTE — OP NOTES
[facility] REPORT    Best Varela  MR#: [id number]  : 1965  ACCOUNT #: [de-identified]   DATE OF SERVICE: 10/04/2018    PREOPERATIVE DIAGNOSES:  1. Adhesive capsulitis, left shoulder. 2.  Biceps tendonitis, left shoulder. 3.  Glenohumeral chondromalacia, left shoulder. 4.  Acromioclavicular joint arthritis, left shoulder. 5.  Adhesive capsulitis, right shoulder. POSTOPERATIVE DIAGNOSES:  1. Adhesive capsulitis, left shoulder. 2.  Acromioclavicular joint arthritis, left shoulder. 3.  Adhesive capsulitis, right shoulder. PROCEDURES PERFORMED:  1. Examination and manipulation of right shoulder under anesthesia. 2.  Examination and manipulation of left shoulder under anesthesia. 3.  Arthroscopy left shoulder, arthroscopic subacromial decompression, arthroscopic distal clavicle resection and lysis of adhesions. SURGEON:  William Gonzalez MD    PATHOLOGY:  1. Type 2 acromion. 2.  ACL arthritis. 2.  Capsulitis. CPT CODE:  95950, [id number], [id number], 89134. ICD-10 CODE:  M75.01, M75.02, M19.012. ANESTHESIA:  General with interscalene block. ESTIMATED BLOOD LOSS:  40 mL. COMPLICATIONS:  None. ASSISTANT:  Certified first assistant is Jerrod Guadalupe. Use of a certified first assistant was necessary to maximize the patient's safety and optimize outcome. INDICATIONS:  The patient is a 55-year-old diabetic who has developed sore painful stiff shoulders. The patient has exhausted nonoperative modalities, elected for operative intervention. PROCEDURE:  Following identification of the patient, the patient was taken to the operative suite. Following induction of general anesthesia and interscalene block for postoperative pain control and measurement of the hemoglobin A1c, a fasting blood glucose both elevated at 8.9 and 202 respectively, the patient was then positioned on the operating table in the supine fashion.   Right shoulder was examined under anesthesia. The patient was noted to have 0-120 degrees of passive forward elevation, 30 degrees of external rotation in the side and 60 degrees of external and internal rotation in the 90 degree abducted position. At this point, gentle manipulation of right shoulder was then performed. The patient achieved 0-180 degrees of passive forward elevation, 60 degrees of external rotation in the side and 90 degrees of external and internal rotation in the 90 degree abducted position. At this point, the right shoulder was then prepped and draped in sterile fashion and injected with 10 mL of Naropin and 10 mg of dexamethasone. A sterile dressing was applied. At this point, the left shoulder was examined under anesthesia. The patient was noted to have 0-90 degrees of passive forward elevation, 10 degrees of external rotation at the side and essentially no rotation. A gentle manipulation of the left shoulder was then performed. The patient achieved 0-180 degrees of passive forward elevation, 60 degrees of external rotation to the side to 90 degrees of external and internal rotation in the 90 degree abducted position. Then the patient was then carefully positioned in the lateral decubitus position, right side down. Axillary roll was placed, beanbag was inflated. Care was taken to pad both dependent lower and upper extremities. Left arm was placed in the [employer] traction device in 15 pounds of traction. Left shoulder was then prepped and draped in sterile fashion. The posterior arthroscopic portal was identified and incised. Scope was introduced in the shoulder. Diagnostic arthroscopy was then commenced. There was diffuse capsulitis throughout the glenohumeral joint. With the use of a 4.5 resector Oratec wand, all adhesions were lysed, all capsulitis was resected and a capsular release was performed circumferentially. Articular surface of the humeral head and glenoid was intact.   Anterior, posterior superior, inferior labrum was intact. Biceps tendon was intact. Rotator cuff was intact. The scope was then flip-flopped from the posterior to anterior portal, the posterior cuff and labrum were visualized and intact. There was capsulitis at the glenohumeral joint posteriorly and this was resected in its entirety and a full capsular release was performed. The scope was then placed into the subacromial space. A lateral portal was then established. Hypertrophic hemorrhagic bursal tissue was then resected. The bursal side of the cuff was intact. There were diffuse hemorrhagic adhesions and all adhesions and all bursal tissue was resected on the bursal side. No rotator cuff tear. Using an Oratec wand and an acromionizing bur, an arthroscopic subacromial depression was then performed. This was taken down to the level of the deltoid fascia anteriorly, AC joint posteriorly and contoured from medial to lateral.  Once this was completed, our attention was then turned to resecting the distal clavicle. The distal 10 mm of distal clavicle was then resected. Care was taken to preserve the posterior superior capsule. At this point, with the procedure complete, the arthroscopic equipment was removed from the shoulder. The portals were approximated using 2-0 nylon horizontal mattress sutures. A sterile dressing was applied. A sling and swathe was applied. The patient was then transferred to recovery in stable condition.       Stepan Feliz MD       AGP / LN  D: 10/04/2018 12:27     T: 10/04/2018 13:43  JOB #: [id number]  CC: Adrien Cowden, MD

## 2018-10-05 NOTE — PROGRESS NOTES
Patient chart reviewed. Pt with uncontrolled DM-2 with HbA1c of 8.9 
suboptimal control so far with POC glucose ranging from 194 to 352 Add lantus 15 units with humalog 6 units TIDAC Continue SSI. Pt is advised to stop metformin on discharge in view of CKD-3. She has short acting insulin at home, which she takes as needed. I recommend continuing Glipizide on discharge, and follow up with PCP as outpatient. BP optimally controlled with lisinopril. Continue lisinopril at discharge I will sign off. Please call us for further questions as needed. Pt not billed for this visit.

## 2018-10-05 NOTE — PROGRESS NOTES
Pt c/o 9/10 right shoulder pain. Administered 1 mg dilaudid IV per Md order. Will continue to monitor

## 2018-10-05 NOTE — PERIOP NOTES
TRANSFER - IN REPORT: 
 
Verbal report received from Abran Campos RN (name) on Amandeep Cantor  being received from room 365 (unit) for routine progression of care Report consisted of patients Situation, Background, Assessment and  
Recommendations(SBAR). Information from the following report(s) SBAR, Kardex, [facility] and Recent Results was reviewed with the receiving nurse. Opportunity for questions and clarification was provided.

## 2018-10-05 NOTE — PROGRESS NOTES
Problem: Mobility Impaired (Adult and Pediatric) Goal: *Acute Goals and Plan of Care (Insert Text) GOALS (1-4 days): (1.)  Patient will be independent with shoulder HEP to increase range of motion per MD orders. ________________________________________________________________________________________________ PHYSICAL THERAPY: Daily Note, Treatment Day: Day of Assessment, PM 10/5/2018 OBSERVATION: Hospital Day: 2 Payor: BLUE CROSS / Plan[place] [employer] [employer] [place] / Product Type: PPO /  
  
NAME/AGE/GENDER: Abisai Ga is a 46 y.o. female PRIMARY DIAGNOSIS: Chondromalacia, left shoulder [X42.658] Biceps tendinitis of left shoulder [M75.22] Adhesive capsulitis of left shoulder [M75.02] Osteoarthritis of left shoulder, unspecified osteoarthritis type [M19.012] Chondromalacia, left shoulder [P11.538] Biceps tendinitis of left shoulder [M75.22] Adhesive capsulitis of left shoulder [M75.02] Osteoarthritis of left shoulder, unspecified osteoarthritis type [M19.012] Adhesive capsulitis of left shoulder Adhesive capsulitis of left shoulder Procedure(s) (LRB): LEFT SHOULDER REBLOCK (Left) Day of Surgery ICD-10: Treatment Diagnosis: 
 · Pain in Left Shoulder (M25.512) · Stiffness of Left Shoulder, Not elsewhere classified (M25.612) · Pain in Right Shoulder (M25.511) · Stiffness of Right Shoulder, Not elsewhere classified (M25.611) Precaution/Allergies: 
Review of patient's allergies indicates no known allergies. ASSESSMENT:  
 
Ms. Parth Skelton presents supine on contact and recently had more IV pain medicine, very groggy but agreeable to PT working on her range of motion. Started on right arm and moved her arm into ABD, flexion, and rotation. Pt dozed off throughout right arm, pt tight at end ranges especially with rotation.  Moved to left arm and pt very guarded, much less range than on the right, pt grimacing and moaning with all movement and resisting movement. Still drowsy but complaining of pain. Pt too drowsy to get up. Will check on again tomorrow for more therapy and to issue HEP and pulleys. This section established at most recent assessment PROBLEM LIST (Impairments causing functional limitations): 1. Decreased [place] with Bed Mobility 2. Decreased [place] with Transfers 3. Decreased [place] with Ambulation 4. Decreased [place] with shoulder HEP INTERVENTIONS PLANNED: (Benefits and precautions of physical therapy have been discussed with the patient.) 1. Bed Mobility Training 2. Transfer Training 3. Gait Training 4. Therapeutic Exercises per MD orders 5. Modalities for Pain TREATMENT PLAN: Frequency/Duration: twice daily for duration of hospital stay Rehabilitation Potential For Stated Goals: Good RECOMMENDED REHABILITATION/EQUIPMENT: (at time of discharge pending progress): Continue Skilled Therapy and Outpatient: Physical Therapy. HISTORY:  
History of Present Injury/Illness (Reason for Referral): 
Pt s/p bilateral shoulder EUA, has been in shoulder pain for at least 6 months and was limited in overhead activities and with shoulder rotation. Past Medical History/Comorbidities: Ms. Ramsey Kay  has a past medical history of Arthritis; Benign hypertension with CKD (chronic kidney disease) stage III ([address] Diabetes ([address] Dyslipidemia; Endometrial cancer ([address] and Morbid obesity ([address] She also has no past medical history of Aneurysm ([address] Arrhythmia; Asthma; Autoimmune disease ([address] CAD (coronary artery disease); Chronic obstructive pulmonary disease ([address] Chronic pain; Coagulation disorder ([address] Difficult intubation; Endocarditis; GERD (gastroesophageal reflux disease); Heart failure ([address] Liver disease; Malignant hyperthermia due to anesthesia;  Nausea & vomiting; Nicotine vapor product user; Non-nicotine vapor product user; Pseudocholinesterase deficiency; Psychiatric disorder; PUD (peptic ulcer disease); Rheumatic fever; Seizures ([facility] Sleep apnea; Stroke [facility]); Thromboembolus ([facility] or Thyroid disease. Ms. Chanel Vera  has a past surgical history that includes hx gyn (2015); hx knee arthroscopy (Right, 2015); hx orthopaedic (Left); hx other surgical (Right); and hx appendectomy (1979). Social History/Living Environment:  
Home Environment: Private residence One/Two Story Residence: One story Living Alone: No 
Support Systems: Family member(s) Patient Expects to be Discharged to[de-identified] Private residence Current DME Used/Available at Home: None Prior Level of Function/Work/Activity: 
Pt living at home, independent with mobility without assistive devices. States she has not been able to work because of her shoulders and that she is eager to get back to where she can work Number of Personal Factors/Comorbidities that affect the Plan of Care: 0: LOW COMPLEXITY EXAMINATION:  
Most Recent Physical Functioning:  
Gross Assessment: 
AROM: Within functional limits (except bilateral upper extremities) Strength: Within functional limits (except bilateral upper extremities) RUE PROM 
R Shoulder Flexion:  (aproximately 170-175)LUE AROM 
L Shoulder Flexion:  (approximate 165-170 degrees) Posture: 
  
Balance: 
  Bed Mobility: 
  
Wheelchair Mobility: 
  
Transfers: 
  
Gait: 
  
   
  
Body Structures Involved: 1. Joints 2. Muscles Body Functions Affected: 1. Movement Related Activities and Participation Affected: 1. Mobility 2. Self Care Number of elements that affect the Plan of Care: 4+: HIGH COMPLEXITY CLINICAL PRESENTATION:  
Presentation: Stable and uncomplicated: LOW COMPLEXITY CLINICAL DECISION MAKING:  
[employer] AM-PAC 6 Clicks Basic Mobility Inpatient Short Form How much difficulty does the patient currently have. .. Unable A Lot A Little None 1.   Turning over in bed (including adjusting bedclothes, sheets and blankets)? [] 1   [] 2   [x] 3   [] 4  
2. Sitting down on and standing up from a chair with arms ( e.g., wheelchair, bedside commode, etc.)   [] 1   [] 2   [x] 3   [] 4  
3. Moving from lying on back to sitting on the side of the bed? [] 1   [] 2   [x] 3   [] 4 How much help from another person does the patient currently need. .. Total A Lot A Little None 4. Moving to and from a bed to a chair (including a wheelchair)? [] 1   [] 2   [x] 3   [] 4  
5. Need to walk in hospital room? [] 1   [] 2   [x] 3   [] 4  
6. Climbing 3-5 steps with a railing? [] 1   [x] 2   [] 3   [] 4  
© 2007, Trustees of [facility] [employer], under license to [employer]. All rights reserved Score:  Initial: 17 Most Recent: X (Date: -- ) Interpretation of Tool:  Represents activities that are increasingly more difficult (i.e. Bed mobility, Transfers, Gait). Score 24 23 22-20 19-15 14-10 9-7 6 Modifier CH CI CJ CK CL CM CN   
 
? Mobility - Walking and Moving Around:  
  - CURRENT STATUS: CK - 40%-59% impaired, limited or restricted  - GOAL STATUS: CI - 1%-19% impaired, limited or restricted  - D/C STATUS:  ---------------To be determined--------------- Payor: Tomas Ringor / Plan: [facility] / Product Type: PPO /   
 
Medical Necessity:    
· Patient is expected to demonstrate progress in strength, range of motion and functional technique to decrease assistance required with shoulder HEP. Reason for Services/Other Comments: 
· Patient continues to require skilled intervention due to inability to complete functional mobility and shoulder HEP independently. Use of outcome tool(s) and clinical judgement create a POC that gives a: Clear prediction of patient's progress: LOW COMPLEXITY  
  
 
 
 
TREATMENT:  
(In addition to Assessment/Re-Assessment sessions the following treatments were rendered) Pre-treatment Symptoms/Complaints:  pain 
Pain: Initial: reporting pain, RN already IV pain medicine Post Session:  Pt drowsy but hurt during therapy Therapeutic Exercise: (23 Minutes):  aggressive range of motion bilateral shoulders, pt very drowsy throughout. Pt resisting movement on right, doing better on the left. Date: 
 Date: 
 Date: 
  
ACTIVITY/EXERCISE AM PM AM PM AM PM  
Gripping Wrist Flexion/Extension Wrist Ulnar/Radial Deviation Pronation/Supination Elbow Flexion/Extension Shoulder Flexion/Extension Shoulder AB/ADduction Shoulder IR/ER Pulleys Pendulums Shrugs Isometric:                 Flexion Extension ABduction ADduction Biceps/Triceps B = bilateral; AA = active assistive; A = active; P = passive Education: 
[x]  Home Exercises  []  Sling Application   []  Movement Precautions []  Pulleys   []  Use of Ice   []  Other:  
Treatment/Session Assessment:   
· Response to Treatment:  Tolerated with pain, also drowsy · Interdisciplinary Collaboration:  
o Registered Nurse · After treatment position/precautions:  
o Supine in bed 
o Bed/Chair-wheels locked 
o Bed in low position 
o Call light within reach 
o RN notified · Compliance with Program/Exercises: Will assess as treatment progresses. · Recommendations/Intent for next treatment session:  Treatment next visit will focus on increasing Ms. Perea's independence with bed mobility, transfers, gait training, strength/ROM exercises, modalities for pain, and patient education. Total Treatment Duration: PT Patient Time In/Time Out Time In: 1500 Time Out: 1523 Georgina Valero, PT

## 2018-10-05 NOTE — PROGRESS NOTES
TRANSFER - OUT REPORT: 
 
Verbal report given to Damon Li RN (name) on Rupert Medeiros  being transferred to Pre-Op (unit) for ordered procedure Report consisted of patients Situation, Background, Assessment and  
Recommendations(SBAR). Information from the following report(s) SBAR, Kardex, Intake/Output and MAR was reviewed with the receiving nurse. Lines:  
Peripheral IV 10/04/18 Right Wrist (Active) Site Assessment Clean, dry, & intact 10/5/2018  3:21 AM  
Phlebitis Assessment 0 10/5/2018  3:21 AM  
Infiltration Assessment 0 10/5/2018  3:21 AM  
Dressing Status Clean, dry, & intact 10/5/2018  3:21 AM  
Dressing Type Tape;Transparent 10/5/2018  3:21 AM  
Hub Color/Line Status Infusing 10/5/2018  3:21 AM  
  
 
Opportunity for questions and clarification was provided. Patient transported with: 
 O2 @ 2 liters

## 2018-10-05 NOTE — PROGRESS NOTES
Problem: Mobility Impaired (Adult and Pediatric) Goal: *Acute Goals and Plan of Care (Insert Text) GOALS (1-4 days): (1.)  Patient will be independent with shoulder HEP to increase range of motion per MD orders. ________________________________________________________________________________________________ PHYSICAL THERAPY: Initial Assessment, Treatment Day: Day of Assessment, AM 10/5/2018 OBSERVATION: Hospital Day: 2 Payor: BLUE CROSS / Plan: [place] [employer] [place] / Product Type: PPO /  
  
NAME/AGE/GENDER: Crispin Page is a 46 y.o. female PRIMARY DIAGNOSIS: Chondromalacia, left shoulder [R64.753] Biceps tendinitis of left shoulder [M75.22] Adhesive capsulitis of left shoulder [M75.02] Osteoarthritis of left shoulder, unspecified osteoarthritis type [M19.012] Chondromalacia, left shoulder [N40.084] Biceps tendinitis of left shoulder [M75.22] Adhesive capsulitis of left shoulder [M75.02] Osteoarthritis of left shoulder, unspecified osteoarthritis type [M19.012] Adhesive capsulitis of left shoulder Adhesive capsulitis of left shoulder Procedure(s) (LRB): LEFT SHOULDER REBLOCK (Left) Day of Surgery ICD-10: Treatment Diagnosis: 
 · Pain in Left Shoulder (M25.512) · Stiffness of Left Shoulder, Not elsewhere classified (M25.612) · Pain in Right Shoulder (M25.511) · Stiffness of Right Shoulder, Not elsewhere classified (M25.611) Precaution/Allergies: 
Review of patient's allergies indicates no known allergies. ASSESSMENT:  
 
Ms. Jeanne Telles presents supine on contact and agreeable to therapy. Pt is a right handed female who had EUA bilateral shoulders on 10/4/18 and had a reblock of her left upper extremity this morning. She is already getting back some movement and feeling and having a lot of pain. RN came in to bring IV pain medicine. She will benefit from skilled PT interventions to work on bilateral shoulder exercises and independence with HEP.  Started with range of motion of her left upper extremity since it was more numb to give pain medicine time to kick in. Pt with tears running down her face while range of motion done all directions. Also worked on assisted range of right upper extremity, pt also teary with range. Put left arm back in sling. Pt reported she gave herself her own bath this morning and that she sat out of bed for about 3 hours in chair. Hope to progress this afternoon. This section established at most recent assessment PROBLEM LIST (Impairments causing functional limitations): 1. Decreased [place] with Bed Mobility 2. Decreased [place] with Transfers 3. Decreased [place] with Ambulation 4. Decreased [place] with shoulder HEP INTERVENTIONS PLANNED: (Benefits and precautions of physical therapy have been discussed with the patient.) 1. Bed Mobility Training 2. Transfer Training 3. Gait Training 4. Therapeutic Exercises per MD orders 5. Modalities for Pain TREATMENT PLAN: Frequency/Duration: twice daily for duration of hospital stay Rehabilitation Potential For Stated Goals: Good RECOMMENDED REHABILITATION/EQUIPMENT: (at time of discharge pending progress): Continue Skilled Therapy and Outpatient: Physical Therapy. HISTORY:  
History of Present Injury/Illness (Reason for Referral): 
Pt s/p bilateral shoulder EUA, has been in shoulder pain for at least 6 months and was limited in overhead activities and with shoulder rotation. Past Medical History/Comorbidities: Ms. Amna Berumen  has a past medical history of Arthritis; Benign hypertension with CKD (chronic kidney disease) stage III ([address] Diabetes ([address] Dyslipidemia; Endometrial cancer ([address] and Morbid obesity ([address] She also has no past medical history of Aneurysm ([address] Arrhythmia; Asthma; Autoimmune disease ([address] CAD (coronary artery disease); Chronic obstructive pulmonary disease ([address]  Chronic pain; Coagulation disorder ([facility] Difficult intubation; Endocarditis; GERD (gastroesophageal reflux disease); Heart failure ([facility] Liver disease; Malignant hyperthermia due to anesthesia; Nausea & vomiting; Nicotine vapor product user; Non-nicotine vapor product user; Pseudocholinesterase deficiency; Psychiatric disorder; PUD (peptic ulcer disease); Rheumatic fever; Seizures ([facility] [address] Sleep apnea; Stroke [facility]); Thromboembolus ([facility] [address] or Thyroid disease. Ms. Chuckie Almaguer  has a past surgical history that includes hx gyn (2015); hx knee arthroscopy (Right, 2015); hx orthopaedic (Left); hx other surgical (Right); and hx appendectomy (1979). Social History/Living Environment:  
Home Environment: Private residence One/Two Story Residence: One story Living Alone: No 
Support Systems: Family member(s) Patient Expects to be Discharged to[de-identified] Private residence Current DME Used/Available at Home: None Prior Level of Function/Work/Activity: 
Pt living at home, independent with mobility without assistive devices. States she has not been able to work because of her shoulders and that she is eager to get back to where she can work Number of Personal Factors/Comorbidities that affect the Plan of Care: 0: LOW COMPLEXITY EXAMINATION:  
Most Recent Physical Functioning:  
Gross Assessment: 
AROM: Within functional limits (except bilateral upper extremities) Strength: Within functional limits (except bilateral upper extremities) RUE PROM 
R Shoulder Flexion:  (aproximately 170-175)LUE AROM 
L Shoulder Flexion:  (approximate 165-170 degrees) Posture: 
  
Balance: 
  Bed Mobility: 
  
Wheelchair Mobility: 
  
Transfers: 
  
Gait: 
  
   
  
Body Structures Involved: 1. Joints 2. Muscles Body Functions Affected: 1. Movement Related Activities and Participation Affected: 1. Mobility 2. Self Care Number of elements that affect the Plan of Care: 4+: HIGH COMPLEXITY CLINICAL PRESENTATION:  
Presentation: Stable and uncomplicated: LOW COMPLEXITY CLINICAL DECISION MAKING:  
[facility] [employer] AM-PAC 6 Clicks Basic Mobility Inpatient Short Form How much difficulty does the patient currently have. .. Unable A Lot A Little None 1. Turning over in bed (including adjusting bedclothes, sheets and blankets)? [] 1   [] 2   [x] 3   [] 4  
2. Sitting down on and standing up from a chair with arms ( e.g., wheelchair, bedside commode, etc.)   [] 1   [] 2   [x] 3   [] 4  
3. Moving from lying on back to sitting on the side of the bed? [] 1   [] 2   [x] 3   [] 4 How much help from another person does the patient currently need. .. Total A Lot A Little None 4. Moving to and from a bed to a chair (including a wheelchair)? [] 1   [] 2   [x] 3   [] 4  
5. Need to walk in hospital room? [] 1   [] 2   [x] 3   [] 4  
6. Climbing 3-5 steps with a railing? [] 1   [x] 2   [] 3   [] 4  
© 2007, Trustees of [facility] [employer], under license to [employer]. All rights reserved Score:  Initial: 17 Most Recent: X (Date: -- ) Interpretation of Tool:  Represents activities that are increasingly more difficult (i.e. Bed mobility, Transfers, Gait). Score 24 23 22-20 19-15 14-10 9-7 6 Modifier CH CI CJ CK CL CM CN   
 
? Mobility - Walking and Moving Around:  
  - CURRENT STATUS: CK - 40%-59% impaired, limited or restricted  - GOAL STATUS: CI - 1%-19% impaired, limited or restricted  - D/C STATUS:  ---------------To be determined--------------- Payor: Pricilla Keller / Plan: [facility] / Product Type: PPO /   
 
Medical Necessity:    
· Patient is expected to demonstrate progress in strength, range of motion and functional technique to decrease assistance required with shoulder HEP. Reason for Services/Other Comments: 
· Patient continues to require skilled intervention due to inability to complete functional mobility and shoulder HEP independently. Use of outcome tool(s) and clinical judgement create a POC that gives a: Clear prediction of patient's progress: LOW COMPLEXITY  
  
 
 
 
TREATMENT:  
(In addition to Assessment/Re-Assessment sessions the following treatments were rendered) Pre-treatment Symptoms/Complaints:  pain 
Pain: Initial:  
  reporting pain, RN giving IV pain medicine Post Session:  Tearful after range of motion Therapeutic Exercise: (15 Minutes):  aggressive range of motion bilateral shoulders , still somewhat num on left from reblock. No restrictions Date: 
 Date: 
 Date: 
  
ACTIVITY/EXERCISE AM PM AM PM AM PM  
Gripping Wrist Flexion/Extension Wrist Ulnar/Radial Deviation Pronation/Supination Elbow Flexion/Extension Shoulder Flexion/Extension Shoulder AB/ADduction Shoulder IR/ER Pulleys Pendulums Shrugs Isometric:                 Flexion Extension ABduction ADduction Biceps/Triceps B = bilateral; AA = active assistive; A = active; P = passive Education: 
[x]  Home Exercises  []  Sling Application   []  Movement Precautions []  Pulleys   []  Use of Ice   []  Other:  
Treatment/Session Assessment:   
· Response to Treatment:  Tolerated with pain · Interdisciplinary Collaboration:  
o Registered Nurse · After treatment position/precautions:  
o Supine in bed 
o Bed/Chair-wheels locked 
o Bed in low position 
o Call light within reach 
o RN notified · Compliance with Program/Exercises: Will assess as treatment progresses. · Recommendations/Intent for next treatment session:  Treatment next visit will focus on increasing Ms. Perea's independence with bed mobility, transfers, gait training, strength/ROM exercises, modalities for pain, and patient education. Total Treatment Duration: PT Patient Time In/Time Out Time In: 1100 Time Out: 1130 Kaylie Kunz PT

## 2018-10-05 NOTE — PROGRESS NOTES
Problem: Falls - Risk of 
Goal: *Absence of Falls Document Julien Vasquez Fall Risk and appropriate interventions in the flowsheet. Outcome: Progressing Towards Goal 
Fall Risk Interventions: 
Mobility Interventions: Patient to call before getting OOB Mentation Interventions: Adequate sleep, hydration, pain control, Bed/chair exit alarm, Door open when patient unattended Medication Interventions: Patient to call before getting OOB, Teach patient to arise slowly Elimination Interventions: Call light in reach, Patient to call for help with toileting needs, Toilet paper/wipes in reach

## 2018-10-05 NOTE — PROGRESS NOTES
PT complain of chest pain. Vitals taken at 1830 are /63, HR 98, 02 98%, RR 12, pain is 3/10. Notified Dr. Dashawn Dc and was told to order a 12 lead ECG.

## 2018-10-05 NOTE — PROGRESS NOTES
Shift assessment completed. Pt alert and oriented x4. Lungs CTA with even and unlabored respirations. Pt on 1L NC. Heart sounds regular. Active bowel sounds with soft and non-tender abdomen. Skin warm and dry. Left shoulder incision wrapped in bandage c/d/i and in sling. IV c/d and infusing. Pt reports 8/10 pain, see separate note and MAR for medication. No other needs. Bed locked in lowest position with call light in reach.

## 2018-10-05 NOTE — PROGRESS NOTES
Notified Dr Katheryn Hamilton that PT was back from procedure and diet was still NPO. He gave orders to change diet to diabetic consistent carb.

## 2018-10-05 NOTE — PROGRESS NOTES
TRANSFER - IN REPORT: 
 
Verbal report received from MIKE Holguin on Amandeep Cantor  being received from "[employer]") for routine progression of care Report consisted of patients Situation, Background, Assessment and  
Recommendations(SBAR). Information from the following report(s) SBAR and MAR was reviewed with the receiving nurse. Opportunity for questions and clarification was provided. Assessment completed upon patients arrival to unit and care assumed.

## 2018-10-05 NOTE — PROCEDURES
Peripheral Block    Start time: 10/5/2018 7:04 AM  Performed by: Michael Terry by: Kade Patricio       Pre-procedure: Indications: at surgeon's request and post-op pain management    Preanesthetic Checklist: patient identified, risks and benefits discussed, site marked, timeout performed, anesthesia consent given and patient being monitored    Timeout Time: 07:04          Block Type:   Block Type:   Interscalene  Laterality:  Left  Monitoring:  Standard ASA monitoring, continuous pulse ox, frequent vital sign checks, heart rate, responsive to questions and oxygen  Injection Technique:  Single shot  Procedures: ultrasound guided    Patient Position: supine  Prep: chlorhexidine    Location:  Interscalene  Needle Type:  Stimuplex  Needle Gauge:  22 G  Needle Localization:  Anatomical landmarks and ultrasound guidance  Medication Injected:  0.25%  bupivacaine  Volume (mL):  30    Assessment:  Number of attempts:  1  Injection Assessment:  Incremental injection every 5 mL, local visualized surrounding nerve on ultrasound, negative aspiration for CSF, negative aspiration for blood, no paresthesia, no intravascular symptoms and ultrasound image on chart  Patient tolerance:  Patient tolerated the procedure well with no immediate complications

## 2018-10-05 NOTE — PROGRESS NOTES
AM assessment completed. PT sitting in chair alert and oriented x4. Respirations are normal and unlabored. Left shoulder is in arm sling. IV site is CDI and nfusing. Denies pain. Chair is locked with table, phone and call light within reach. Will continue to monitor

## 2018-10-05 NOTE — PROGRESS NOTES
Pt given IV dilaudid per MD orders due to complaint of 10/10 pain in left shoulder. Will continue to reassess.

## 2018-10-05 NOTE — PROGRESS NOTES
Orthopedic Joint Progress Note 2018 Admit Date: 10/4/2018 Admit Diagnosis: Chondromalacia, left shoulder [M21.913] Biceps tendinitis of left shoulder [M75.22] Adhesive capsulitis of left shoulder [M75.02] Osteoarthritis of left shoulder, unspecified osteoarthritis type [M19.012] Chondromalacia, left shoulder [I85.516] Biceps tendinitis of left shoulder [M75.22] Adhesive capsulitis of left shoulder [M75.02] Osteoarthritis of left shoulder, unspecified osteoarthritis type [M19.012] Day of Surgery Subjective: complains of severe pain Sophia Figueredo Review of Systems: Pertinent items are noted in HPI. Objective:  
 
PT/OT:  
 
PATIENT MOBILITY Vital Signs:   
Blood pressure 139/68, pulse 91, temperature 97.4 °F (36.3 °C), resp. rate 18, weight 95.9 kg (211 lb 8 oz), SpO2 100 %, not currently breastfeeding. Temp (24hrs), Av.6 °F (36.4 °C), Min:97 °F (36.1 °C), Max:98.1 °F (36.7 °C) Pain Control:  
Pain Assessment Pain Scale 1: Numeric (0 - 10) Pain Intensity 1: 0 Pain Location 1: Shoulder Pain Orientation 1: Right Pain Description 1: Aching Pain Intervention(s) 1: Medication (see MAR) Meds: 
Current Facility-Administered Medications Medication Dose Route Frequency  midazolam (VERSED) injection 2 mg  2 mg IntraVENous ONCE  
 magnesium sulfate 2 g/50 ml IVPB (premix or compounded)  2 g IntraVENous ONCE  
 midazolam (PF) (VERSED) 1 mg/mL injection  0.9% sodium chloride infusion  75 mL/hr IntraVENous CONTINUOUS  
 sodium chloride (NS) flush 5-10 mL  5-10 mL IntraVENous Q8H  
 sodium chloride (NS) flush 5-10 mL  5-10 mL IntraVENous PRN  
 bisacodyl (DULCOLAX) suppository 10 mg  10 mg Rectal DAILY PRN  
 sodium phosphate (FLEET'S) enema 1 Enema  1 Enema Rectal PRN  
 docusate sodium (COLACE) capsule 100 mg  100 mg Oral BID  ferrous sulfate tablet 325 mg  1 Tab Oral BID WITH MEALS  
  HYDROmorphone (DILAUDID) tablet 4 mg  4 mg Oral Q3H PRN  
 HYDROmorphone (DILAUDID) tablet 2 mg  2 mg Oral Q3H PRN  
 ceFAZolin (ANCEF) 1 g in 0.9% sodium chloride (MBP/ADV) 50 mL  1 g IntraVENous Q8H  
 HYDROmorphone (PF) (DILAUDID) injection 1 mg  1 mg IntraVENous Q1H PRN  
 lisinopril (PRINIVIL, ZESTRIL) tablet 20 mg  20 mg Oral DAILY  hydrALAZINE (APRESOLINE) 20 mg/mL injection 10 mg  10 mg IntraVENous Q6H PRN  
 nortriptyline (PAMELOR) capsule 25 mg  25 mg Oral QHS  dextrose 40% (GLUTOSE) oral gel 1 Tube  15 g Oral PRN  
 glucagon (GLUCAGEN) injection 1 mg  1 mg IntraMUSCular PRN  
 dextrose (D50W) injection syrg 12.5-25 g  25-50 mL IntraVENous PRN  
 insulin lispro (HUMALOG) injection   SubCUTAneous AC&HS  ondansetron (ZOFRAN) injection 4 mg  4 mg IntraVENous Q6H PRN  
  
 
LAB:   
No results found for: INR No results found for: HGB, HGBEXT, HGBEXT Wound Shoulder Left (Active) DRESSING STATUS Clean, dry, and intact 10/5/2018  3:21 AM  
DRESSING TYPE Dry dressing 10/4/2018 12:09 PM  
SPLINT TYPE/MATERIAL Shoulder Immobilizer;Sling 10/5/2018  3:21 AM  
Number of days:1 Wound Shoulder Right (Active) DRESSING STATUS Clean, dry, and intact 10/5/2018  3:21 AM  
DRESSING TYPE Adhesive wound dressing (Band-Aid) 10/5/2018  3:21 AM  
Number of days:1 Physical Exam: No significant changes Assessment:  
  
Principal Problem: 
  Adhesive capsulitis of left shoulder (9/30/2018) Active Problems: 
  Degenerative joint disease of left acromioclavicular joint (9/30/2018) Adhesive capsulitis of right shoulder (9/30/2018) Plan:  
 
Continue PT/OT/Rehab Replete magnesium 
reblock this am 
Continue aggressive physical therapy Discharge home Saturday if stable Patient Expects to be Discharged to[de-identified] Private residence Signed By: Toby Alford MD

## 2018-10-06 VITALS
OXYGEN SATURATION: 94 % | WEIGHT: 211.5 LBS | HEART RATE: 105 BPM | BODY MASS INDEX: 35.2 KG/M2 | TEMPERATURE: 98.1 F | RESPIRATION RATE: 20 BRPM | SYSTOLIC BLOOD PRESSURE: 117 MMHG | DIASTOLIC BLOOD PRESSURE: 76 MMHG

## 2018-10-06 LAB
ANION GAP SERPL CALC-SCNC: 6 MMOL/L
ATRIAL RATE: 103 BPM
BUN SERPL-MCNC: 19 MG/DL (ref 6–23)
CALCIUM SERPL-MCNC: 8.7 MG/DL (ref 8.3–10.4)
CALCULATED P AXIS, ECG09: 63 DEGREES
CALCULATED R AXIS, ECG10: 76 DEGREES
CALCULATED T AXIS, ECG11: 54 DEGREES
CHLORIDE SERPL-SCNC: 103 MMOL/L (ref 98–107)
CO2 SERPL-SCNC: 29 MMOL/L (ref 21–32)
CREAT SERPL-MCNC: 1.25 MG/DL (ref 0.6–1)
DIAGNOSIS, 93000: NORMAL
ERYTHROCYTE [DISTWIDTH] IN BLOOD BY AUTOMATED COUNT: 12.8 %
GLUCOSE BLD STRIP.AUTO-MCNC: 220 MG/DL (ref 65–100)
GLUCOSE BLD STRIP.AUTO-MCNC: 228 MG/DL (ref 65–100)
GLUCOSE SERPL-MCNC: 196 MG/DL (ref 65–100)
HCT VFR BLD AUTO: 29.8 % (ref 35.8–46.3)
HGB BLD-MCNC: 9.8 G/DL (ref 11.7–15.4)
MAGNESIUM SERPL-MCNC: 1.9 MG/DL (ref 1.8–2.4)
MCH RBC QN AUTO: 30.2 PG (ref 26.1–32.9)
MCHC RBC AUTO-ENTMCNC: 32.9 G/DL (ref 31.4–35)
MCV RBC AUTO: 92 FL (ref 79.6–97.8)
NRBC # BLD: 0 K/UL (ref 0–0.2)
P-R INTERVAL, ECG05: 180 MS
PLATELET # BLD AUTO: 243 K/UL (ref 150–450)
PMV BLD AUTO: 10.2 FL (ref 9.4–12.3)
POTASSIUM SERPL-SCNC: 4.4 MMOL/L (ref 3.5–5.1)
Q-T INTERVAL, ECG07: 354 MS
QRS DURATION, ECG06: 100 MS
QTC CALCULATION (BEZET), ECG08: 463 MS
RBC # BLD AUTO: 3.24 M/UL (ref 4.05–5.2)
SODIUM SERPL-SCNC: 138 MMOL/L (ref 136–145)
VENTRICULAR RATE, ECG03: 103 BPM
WBC # BLD AUTO: 6.6 K/UL (ref 4.3–11.1)

## 2018-10-06 PROCEDURE — 80048 BASIC METABOLIC PNL TOTAL CA: CPT

## 2018-10-06 PROCEDURE — 97110 THERAPEUTIC EXERCISES: CPT

## 2018-10-06 PROCEDURE — 99218 HC RM OBSERVATION: CPT

## 2018-10-06 PROCEDURE — 85027 COMPLETE CBC AUTOMATED: CPT

## 2018-10-06 PROCEDURE — 83735 ASSAY OF MAGNESIUM: CPT

## 2018-10-06 PROCEDURE — 36415 COLL VENOUS BLD VENIPUNCTURE: CPT

## 2018-10-06 PROCEDURE — 74011250637 HC RX REV CODE- 250/637: Performed by: INTERNAL MEDICINE

## 2018-10-06 PROCEDURE — 74011636637 HC RX REV CODE- 636/637: Performed by: HOSPITALIST

## 2018-10-06 PROCEDURE — 74011250637 HC RX REV CODE- 250/637: Performed by: ORTHOPAEDIC SURGERY

## 2018-10-06 PROCEDURE — 82962 GLUCOSE BLOOD TEST: CPT

## 2018-10-06 PROCEDURE — 74011636637 HC RX REV CODE- 636/637: Performed by: INTERNAL MEDICINE

## 2018-10-06 RX ORDER — GLIPIZIDE 5 MG/1
5 TABLET ORAL 2 TIMES DAILY
Qty: 60 TAB | Refills: 2 | Status: SHIPPED | OUTPATIENT
Start: 2018-10-06 | End: 2018-11-05

## 2018-10-06 RX ORDER — INSULIN GLARGINE 100 [IU]/ML
20 INJECTION, SOLUTION SUBCUTANEOUS
Status: DISCONTINUED | OUTPATIENT
Start: 2018-10-06 | End: 2018-10-06 | Stop reason: HOSPADM

## 2018-10-06 RX ORDER — INSULIN LISPRO 100 [IU]/ML
8 INJECTION, SOLUTION INTRAVENOUS; SUBCUTANEOUS
Status: DISCONTINUED | OUTPATIENT
Start: 2018-10-06 | End: 2018-10-06 | Stop reason: HOSPADM

## 2018-10-06 RX ADMIN — HYDROMORPHONE HYDROCHLORIDE 2 MG: 2 TABLET ORAL at 07:28

## 2018-10-06 RX ADMIN — INSULIN LISPRO 8 UNITS: 100 INJECTION, SOLUTION INTRAVENOUS; SUBCUTANEOUS at 09:20

## 2018-10-06 RX ADMIN — HYDROMORPHONE HYDROCHLORIDE 4 MG: 4 TABLET ORAL at 11:48

## 2018-10-06 RX ADMIN — INSULIN LISPRO 4 UNITS: 100 INJECTION, SOLUTION INTRAVENOUS; SUBCUTANEOUS at 13:19

## 2018-10-06 RX ADMIN — DOCUSATE SODIUM 100 MG: 100 CAPSULE, LIQUID FILLED ORAL at 09:13

## 2018-10-06 RX ADMIN — INSULIN LISPRO 4 UNITS: 100 INJECTION, SOLUTION INTRAVENOUS; SUBCUTANEOUS at 09:19

## 2018-10-06 RX ADMIN — Medication 10 ML: at 06:00

## 2018-10-06 RX ADMIN — FERROUS SULFATE TAB 325 MG (65 MG ELEMENTAL FE) 325 MG: 325 (65 FE) TAB at 09:13

## 2018-10-06 RX ADMIN — LISINOPRIL 20 MG: 20 TABLET ORAL at 09:13

## 2018-10-06 RX ADMIN — INSULIN LISPRO 8 UNITS: 100 INJECTION, SOLUTION INTRAVENOUS; SUBCUTANEOUS at 13:22

## 2018-10-06 NOTE — PROGRESS NOTES
Dilaudid 4 mg given po for pain rated 9/10. Pt no longer has an IV. Up in chair. Dsg was just changed. Bandaids over 3 puncture sites removed. Puncture sites intact with sutures. Guaze 4 by 4's placed over sites and secured with a tegaderm. Pt up in chair. Reminded to push call light for help up out of chair.

## 2018-10-06 NOTE — PROGRESS NOTES
Problem: Mobility Impaired (Adult and Pediatric) Goal: *Acute Goals and Plan of Care (Insert Text) GOALS (1-4 days): (1.)  Patient will be independent with shoulder HEP to increase range of motion per MD orders. ________________________________________________________________________________________________ PHYSICAL THERAPY: Daily Note, Treatment Day: 1st, PM 10/6/2018 OBSERVATION: Hospital Day: 3 Payor: BLUE CROSS / Plan: [place] [employer] [place] / Product Type: PPO /  
  
NAME/AGE/GENDER: Rosetta Dao is a 46 y.o. female PRIMARY DIAGNOSIS: Chondromalacia, left shoulder [X72.360] Biceps tendinitis of left shoulder [M75.22] Adhesive capsulitis of left shoulder [M75.02] Osteoarthritis of left shoulder, unspecified osteoarthritis type [M19.012] Chondromalacia, left shoulder [B50.585] Biceps tendinitis of left shoulder [M75.22] Adhesive capsulitis of left shoulder [M75.02] Osteoarthritis of left shoulder, unspecified osteoarthritis type [M19.012] Adhesive capsulitis of left shoulder Adhesive capsulitis of left shoulder Procedure(s) (LRB): LEFT SHOULDER REBLOCK (Left) 1 Day Post-Op ICD-10: Treatment Diagnosis: 
 · Pain in Left Shoulder (M25.512) · Stiffness of Left Shoulder, Not elsewhere classified (M25.612) · Pain in Right Shoulder (M25.511) · Stiffness of Right Shoulder, Not elsewhere classified (M25.611) Precaution/Allergies: 
Review of patient's allergies indicates no known allergies. ASSESSMENT:  
 
Ms. Padmini Pate presents up in chair on contact and ready for afternoon therapy, she had a shower with CNA assisting. Patient used the restroom. Walked into bathroom with supervision/independence. Came back out in room and we worked on some shoulder exercises. Reviewed and practiced HEP. Pt worked on bilateral shoulder flexion wall slides and wall external rotation stretches. Practiced forward \"kitchen table slides\", pendulums.  She verbalizes and demonstrated understanding. Encouraged her to move her arms frequently and often at home. Worked on chelsea left ultrasling and reviewed with pt how to get on. Ice on shoulder and she stayed up in recliner chair. She plans on leaving when her boyfriend gets here. This section established at most recent assessment PROBLEM LIST (Impairments causing functional limitations): 1. Decreased [place] with Bed Mobility 2. Decreased [place] with Transfers 3. Decreased [place] with Ambulation 4. Decreased [place] with shoulder HEP INTERVENTIONS PLANNED: (Benefits and precautions of physical therapy have been discussed with the patient.) 1. Bed Mobility Training 2. Transfer Training 3. Gait Training 4. Therapeutic Exercises per MD orders 5. Modalities for Pain TREATMENT PLAN: Frequency/Duration: twice daily for duration of hospital stay Rehabilitation Potential For Stated Goals: Good RECOMMENDED REHABILITATION/EQUIPMENT: (at time of discharge pending progress): Continue Skilled Therapy and Outpatient: Physical Therapy. HISTORY:  
History of Present Injury/Illness (Reason for Referral): 
Pt s/p bilateral shoulder EUA, has been in shoulder pain for at least 6 months and was limited in overhead activities and with shoulder rotation. Past Medical History/Comorbidities: Ms. Tabatha Fontanez  has a past medical history of Arthritis; Benign hypertension with CKD (chronic kidney disease) stage III ([address] Diabetes ([address] Dyslipidemia; Endometrial cancer ([address] and Morbid obesity ([address] She also has no past medical history of Aneurysm ([address] Arrhythmia; Asthma; Autoimmune disease ([address] CAD (coronary artery disease); Chronic obstructive pulmonary disease ([address] Chronic pain; Coagulation disorder ([address] Difficult intubation; Endocarditis; GERD (gastroesophageal reflux disease); Heart failure ([address]  Liver disease; Malignant hyperthermia due to anesthesia; Nausea & vomiting; Nicotine vapor product user; Non-nicotine vapor product user; Pseudocholinesterase deficiency; Psychiatric disorder; PUD (peptic ulcer disease); Rheumatic fever; Seizures ([facility] [address] Sleep apnea; Stroke [facility]); Thromboembolus ([facility] [address] or Thyroid disease. Ms. Najera  has a past surgical history that includes hx gyn (2015); hx knee arthroscopy (Right, 2015); hx orthopaedic (Left); hx other surgical (Right); and hx appendectomy (1979). Social History/Living Environment:  
Home Environment: Private residence One/Two Story Residence: One story Living Alone: No 
Support Systems: Family member(s) Patient Expects to be Discharged to[de-identified] Private residence Current DME Used/Available at Home: None Prior Level of Function/Work/Activity: 
Pt living at home, independent with mobility without assistive devices. States she has not been able to work because of her shoulders and that she is eager to get back to where she can work Number of Personal Factors/Comorbidities that affect the Plan of Care: 0: LOW COMPLEXITY EXAMINATION:  
Most Recent Physical Functioning:  
Gross Assessment: 
AROM: Within functional limits (except bilateral upper extremities) Strength: Within functional limits (except bilateral upper extremities) RUE PROM 
R Shoulder Flexion: 140 (approximate)LUE AROM 
L Shoulder Flexion: 175 (approximate) Posture: 
  
Balance: 
Sitting: Intact Standing: Intact Standing - Static: Good Standing - Dynamic : Good Bed Mobility: 
Supine to Sit: Minimum assistance Sit to Supine:  (stayed up in chair) Wheelchair Mobility: 
  
Transfers: 
Sit to Stand: Contact guard assistance Stand to Sit: Contact guard assistance Gait: 
  
Speed/Lynn: Delayed;Pace decreased (<100 feet/min) Step Length: Left shortened;Right shortened Gait Abnormalities: Antalgic;Decreased step clearance Distance (ft):  (ad bobo in room) Body Structures Involved: 1. Joints 2. Muscles Body Functions Affected: 1. Movement Related Activities and Participation Affected: 1. Mobility 2. Self Care Number of elements that affect the Plan of Care: 4+: HIGH COMPLEXITY CLINICAL PRESENTATION:  
Presentation: Stable and uncomplicated: LOW COMPLEXITY CLINICAL DECISION MAKIN [facility] [address] AM-PAC 6 Clicks Basic Mobility Inpatient Short Form How much difficulty does the patient currently have. .. Unable A Lot A Little None 1. Turning over in bed (including adjusting bedclothes, sheets and blankets)? [] 1   [] 2   [x] 3   [] 4  
2. Sitting down on and standing up from a chair with arms ( e.g., wheelchair, bedside commode, etc.)   [] 1   [] 2   [x] 3   [] 4  
3. Moving from lying on back to sitting on the side of the bed? [] 1   [] 2   [x] 3   [] 4 How much help from another person does the patient currently need. .. Total A Lot A Little None 4. Moving to and from a bed to a chair (including a wheelchair)? [] 1   [] 2   [x] 3   [] 4  
5. Need to walk in hospital room? [] 1   [] 2   [x] 3   [] 4  
6. Climbing 3-5 steps with a railing? [] 1   [x] 2   [] 3   [] 4  
© , Trustees of [address] [place] [address], under license to [employer]. All rights reserved Score:  Initial: 17 Most Recent: X (Date: -- ) Interpretation of Tool:  Represents activities that are increasingly more difficult (i.e. Bed mobility, Transfers, Gait). Score 24 23 22-20 19-15 14-10 9-7 6 Modifier CH CI CJ CK CL CM CN   
 
? Mobility - Walking and Moving Around:  
  - CURRENT STATUS: CK - 40%-59% impaired, limited or restricted  - GOAL STATUS: CI - 1%-19% impaired, limited or restricted  - D/C STATUS:  ---------------To be determined--------------- Payor: BLUE CROSS / Plan: [facility] / Product Type: PPO /   
 
Medical Necessity:    
· Patient is expected to demonstrate progress in strength, range of motion and functional technique to decrease assistance required with shoulder HEP. Reason for Services/Other Comments: 
· Patient continues to require skilled intervention due to inability to complete functional mobility and shoulder HEP independently. Use of outcome tool(s) and clinical judgement create a POC that gives a: Clear prediction of patient's progress: LOW COMPLEXITY  
  
 
 
 
TREATMENT:  
(In addition to Assessment/Re-Assessment sessions the following treatments were rendered) Pre-treatment Symptoms/Complaints:  pain 
Pain: Initial:  
  sore and not feeling well Post Session:  Pt tired and reporting headache Therapeutic Exercise: (20 Minutes):  bilateral shoulders emphasis on forward elevation and rotation. All noted above in assessment. Date: 
 Date: 
 Date: 
  
ACTIVITY/EXERCISE AM PM AM PM AM PM  
Gripping Wrist Flexion/Extension Wrist Ulnar/Radial Deviation Pronation/Supination Elbow Flexion/Extension Shoulder Flexion/Extension Shoulder AB/ADduction Shoulder IR/ER Pulleys Pendulums Shrugs Isometric:                 Flexion Extension ABduction ADduction Biceps/Triceps B = bilateral; AA = active assistive; A = active; P = passive Education: 
[x]  Home Exercises  []  Sling Application   []  Movement Precautions [x]  Pulleys   [x]  Use of Ice   []  Other:  
Treatment/Session Assessment:   
· Response to Treatment:  Tolerated with pain, motivated · Interdisciplinary Collaboration:  
o Registered Nurse · After treatment position/precautions:  
o Up in chair 
o Bed/Chair-wheels locked 
o Call light within reach · Compliance with Program/Exercises: compliant all the time · Recommendations/Intent for next treatment session:  Treatment next visit will focus on increasing Ms. Perea's independence with bed mobility, transfers, gait training, strength/ROM exercises, modalities for pain, and patient education. Total Treatment Duration: PT Patient Time In/Time Out Time In: 1400 Time Out: 9514 Fred Caban PT

## 2018-10-06 NOTE — PROGRESS NOTES
Dilaudid 2 mg given po for pain rated 9/10. Lower dose given b/c did not want to oversedate patient and unsure who she would do with this dose. Pt up to BR and back to bed. Bed/falls alarm on.

## 2018-10-06 NOTE — PROGRESS NOTES
Problem: Mobility Impaired (Adult and Pediatric) Goal: *Acute Goals and Plan of Care (Insert Text) GOALS (1-4 days): (1.)  Patient will be independent with shoulder HEP to increase range of motion per MD orders. ________________________________________________________________________________________________ PHYSICAL THERAPY: Daily Note, Treatment Day: 1st, AM 10/6/2018 OBSERVATION: Hospital Day: 3 Payor: BLUE CROSS / Plan: [place] [employer] [place] / Product Type: PPO /  
  
NAME/AGE/GENDER: Abisai Ga is a 46 y.o. female PRIMARY DIAGNOSIS: Chondromalacia, left shoulder [Q46.943] Biceps tendinitis of left shoulder [M75.22] Adhesive capsulitis of left shoulder [M75.02] Osteoarthritis of left shoulder, unspecified osteoarthritis type [M19.012] Chondromalacia, left shoulder [D86.281] Biceps tendinitis of left shoulder [M75.22] Adhesive capsulitis of left shoulder [M75.02] Osteoarthritis of left shoulder, unspecified osteoarthritis type [M19.012] Adhesive capsulitis of left shoulder Adhesive capsulitis of left shoulder Procedure(s) (LRB): LEFT SHOULDER REBLOCK (Left) 1 Day Post-Op ICD-10: Treatment Diagnosis: 
 · Pain in Left Shoulder (M25.512) · Stiffness of Left Shoulder, Not elsewhere classified (M25.612) · Pain in Right Shoulder (M25.511) · Stiffness of Right Shoulder, Not elsewhere classified (M25.611) Precaution/Allergies: 
Review of patient's allergies indicates no known allergies. ASSESSMENT:  
 
Ms. Parth Skelton presents supine on contact and less drowsy this morning than yesterday. Asked to use restroom. Needs minimal assist to get out of bed. Says she has help at home and plans to sleep in a recliner chair anyway. Walked into bathroom with supervision/independence. Came back out in room and we worked on some shoulder exercises. Issued and HEP and we followed along as we went.  Pt worked on bilateral shoulder flexion wall slides and wall external rotation stretches. Practiced forward \"kitchen table slides\", pendulums, used cane in supine to work on forward raises and shoulder rotation. Ice on shoulder and she stayed up in recliner chair. Overall pt did well, having lots more pain in left shoulder than the right and gained range of motion during exercise. Pt ready to take a shower, CNA was notified. HEP and pulleys left in room. Pt familiar with exercises from being in outpatient therapy before. This section established at most recent assessment PROBLEM LIST (Impairments causing functional limitations): 1. Decreased [place] with Bed Mobility 2. Decreased [place] with Transfers 3. Decreased [place] with Ambulation 4. Decreased [place] with shoulder HEP INTERVENTIONS PLANNED: (Benefits and precautions of physical therapy have been discussed with the patient.) 1. Bed Mobility Training 2. Transfer Training 3. Gait Training 4. Therapeutic Exercises per MD orders 5. Modalities for Pain TREATMENT PLAN: Frequency/Duration: twice daily for duration of hospital stay Rehabilitation Potential For Stated Goals: Good RECOMMENDED REHABILITATION/EQUIPMENT: (at time of discharge pending progress): Continue Skilled Therapy and Outpatient: Physical Therapy. HISTORY:  
History of Present Injury/Illness (Reason for Referral): 
Pt s/p bilateral shoulder EUA, has been in shoulder pain for at least 6 months and was limited in overhead activities and with shoulder rotation. Past Medical History/Comorbidities: Ms. Estephania Jeffrey  has a past medical history of Arthritis; Benign hypertension with CKD (chronic kidney disease) stage III ([address] Diabetes ([address] Dyslipidemia; Endometrial cancer ([address] and Morbid obesity ([address] She also has no past medical history of Aneurysm ([address] Arrhythmia; Asthma; Autoimmune disease ([address] CAD (coronary artery disease);  Chronic obstructive pulmonary disease ([facility] [address] Chronic pain; Coagulation disorder ([facility] [address] [address] Difficult intubation; Endocarditis; GERD (gastroesophageal reflux disease); Heart failure ([facility] [address] Liver disease; Malignant hyperthermia due to anesthesia; Nausea & vomiting; Nicotine vapor product user; Non-nicotine vapor product user; Pseudocholinesterase deficiency; Psychiatric disorder; PUD (peptic ulcer disease); Rheumatic fever; Seizures ([facility] [address] Sleep apnea; Stroke [facility]); Thromboembolus ([facility] [address] or Thyroid disease. Ms. Chanel Vera  has a past surgical history that includes hx gyn (2015); hx knee arthroscopy (Right, 2015); hx orthopaedic (Left); hx other surgical (Right); and hx appendectomy (1979). Social History/Living Environment:  
Home Environment: Private residence One/Two Story Residence: One story Living Alone: No 
Support Systems: Family member(s) Patient Expects to be Discharged to[de-identified] Private residence Current DME Used/Available at Home: None Prior Level of Function/Work/Activity: 
Pt living at home, independent with mobility without assistive devices. States she has not been able to work because of her shoulders and that she is eager to get back to where she can work Number of Personal Factors/Comorbidities that affect the Plan of Care: 0: LOW COMPLEXITY EXAMINATION:  
Most Recent Physical Functioning:  
Gross Assessment: 
AROM: Within functional limits (except bilateral upper extremities) Strength: Within functional limits (except bilateral upper extremities) RUE PROM 
R Shoulder Flexion: 140 (approximate)LUE AROM 
L Shoulder Flexion: 175 (approximate) Posture: 
  
Balance: 
Sitting: Intact Standing: Intact Standing - Static: Good Standing - Dynamic : Fair Bed Mobility: 
Supine to Sit: Minimum assistance Sit to Supine:  (stayed up in chair) Wheelchair Mobility: 
  
Transfers: 
Sit to Stand: Minimum assistance Stand to Sit: Minimum assistance Gait: 
  
   
  
Body Structures Involved: 1. Joints 2. Muscles Body Functions Affected: 1. Movement Related Activities and Participation Affected: 1. Mobility 2. Self Care Number of elements that affect the Plan of Care: 4+: HIGH COMPLEXITY CLINICAL PRESENTATION:  
Presentation: Stable and uncomplicated: LOW COMPLEXITY CLINICAL DECISION MAKIN [facility] [address] [address] AM-PAC 6 Clicks Basic Mobility Inpatient Short Form How much difficulty does the patient currently have. .. Unable A Lot A Little None 1. Turning over in bed (including adjusting bedclothes, sheets and blankets)? [] 1   [] 2   [x] 3   [] 4  
2. Sitting down on and standing up from a chair with arms ( e.g., wheelchair, bedside commode, etc.)   [] 1   [] 2   [x] 3   [] 4  
3. Moving from lying on back to sitting on the side of the bed? [] 1   [] 2   [x] 3   [] 4 How much help from another person does the patient currently need. .. Total A Lot A Little None 4. Moving to and from a bed to a chair (including a wheelchair)? [] 1   [] 2   [x] 3   [] 4  
5. Need to walk in hospital room? [] 1   [] 2   [x] 3   [] 4  
6. Climbing 3-5 steps with a railing? [] 1   [x] 2   [] 3   [] 4  
© , Trustees of [address] [place] [address], under license to [employer]. All rights reserved Score:  Initial: 17 Most Recent: X (Date: -- ) Interpretation of Tool:  Represents activities that are increasingly more difficult (i.e. Bed mobility, Transfers, Gait). Score 24 23 22-20 19-15 14-10 9-7 6 Modifier CH CI CJ CK CL CM CN   
 
? Mobility - Walking and Moving Around:  
  - CURRENT STATUS: CK - 40%-59% impaired, limited or restricted  - GOAL STATUS: CI - 1%-19% impaired, limited or restricted  - D/C STATUS:  ---------------To be determined--------------- Payor: BLUE CROSS / Plan: [facility] / Product Type: PPO /   
 
Medical Necessity:    
· Patient is expected to demonstrate progress in strength, range of motion and functional technique to decrease assistance required with shoulder HEP. Reason for Services/Other Comments: 
· Patient continues to require skilled intervention due to inability to complete functional mobility and shoulder HEP independently. Use of outcome tool(s) and clinical judgement create a POC that gives a: Clear prediction of patient's progress: LOW COMPLEXITY  
  
 
 
 
TREATMENT:  
(In addition to Assessment/Re-Assessment sessions the following treatments were rendered) Pre-treatment Symptoms/Complaints:  pain 
Pain: Initial:  
  reporting pain, RN already IV pain medicine Post Session:  Pt drowsy but hurt during therapy Therapeutic Exercise: (23 Minutes):  bilateral shoulders emphasis on forward elevation and rotation. All noted above in assessment. Date: 
 Date: 
 Date: 
  
ACTIVITY/EXERCISE AM PM AM PM AM PM  
Gripping Wrist Flexion/Extension Wrist Ulnar/Radial Deviation Pronation/Supination Elbow Flexion/Extension Shoulder Flexion/Extension Shoulder AB/ADduction Shoulder IR/ER Pulleys Pendulums Shrugs Isometric:                 Flexion Extension ABduction ADduction Biceps/Triceps B = bilateral; AA = active assistive; A = active; P = passive Education: 
[x]  Home Exercises  []  Sling Application   []  Movement Precautions [x]  Pulleys   [x]  Use of Ice   []  Other:  
Treatment/Session Assessment:   
· Response to Treatment:  Tolerated with pain, motivated · Interdisciplinary Collaboration:  
o Registered Nurse · After treatment position/precautions:  
o Up in chair 
o Bed/Chair-wheels locked 
o Call light within reach · Compliance with Program/Exercises: compliant all the time · Recommendations/Intent for next treatment session:  Treatment next visit will focus on increasing Ms. Perea's independence with bed mobility, transfers, gait training, strength/ROM exercises, modalities for pain, and patient education. Total Treatment Duration: PT Patient Time In/Time Out Time In: 1000 Time Out: 1023 Lele Rey, PT

## 2018-10-06 NOTE — PROGRESS NOTES
2018 Post Op day: 1 Day Post-Op Admit Date: 10/4/2018 Admit Diagnosis: Chondromalacia, left shoulder [W45.255] Biceps tendinitis of left shoulder [M75.22] Adhesive capsulitis of left shoulder [M75.02] Osteoarthritis of left shoulder, unspecified osteoarthritis type [M19.012] Chondromalacia, left shoulder [M97.718] Biceps tendinitis of left shoulder [M75.22] Adhesive capsulitis of left shoulder [M75.02] Osteoarthritis of left shoulder, unspecified osteoarthritis type [M19.012] Subjective: Doing well, No complaints, No SOB, No Chest Pain, No Nausea or Vomitting. Weight Bearing Status: WBAT 
BMP: 
Recent Labs 10/05/18 
 2816 CREA  1.48* BUN  21  
NA  137  
K  4.2 CL  103 CO2  26 AGAP  8  
GLU  225* Patient Vitals for the past 8 hrs: 
 BP Temp Pulse Resp SpO2  
10/06/18 0406 112/73 97.8 °F (36.6 °C) 90 20 95 % 10/06/18 0007 102/63 97.8 °F (36.6 °C) 92 20 100 % Temp (24hrs), Av.9 °F (36.6 °C), Min:97.3 °F (36.3 °C), Max:98.1 °F (36.7 °C) CBC: 
Recent Labs 10/06/18 
 0631 WBC  6.6 HGB  9.8* HCT  29.8* PLT  243 Microbiology:  
 
All Micro Results None Objective: Vital Signs are Stable, No Acute Distress, Alert and Oriented Dressing is Dry,  Neurovascular exam is normal. 
 
Assessment: 
Patient Active Problem List  
Diagnosis Code  Adhesive capsulitis of left shoulder M75.02  
 Degenerative joint disease of left acromioclavicular joint M19.012  
 Adhesive capsulitis of right shoulder M75.01  
 
DC Plan: Continue Physical Therapy Monitor Hbg and labs. Dispo soon Signed By: Ernesto Gerard MD

## 2018-10-06 NOTE — PROGRESS NOTES
Discharge instructions and prescriptions for Glipizide, dilaudid, phenergan, toradol, and restoril given to pt. Instructed to check with primary care doctor before taking torodal d/t kidney function. Pt states she will contact PCP on Monday to discuss diabetes care.

## 2018-10-06 NOTE — DISCHARGE INSTRUCTIONS
DISCHARGE SUMMARY from Nurse    PATIENT INSTRUCTIONS:    After general anesthesia or intravenous sedation, for 24 hours or while taking prescription Narcotics:  · Limit your activities  · Do not drive and operate hazardous machinery  · Do not make important personal or business decisions  · Do  not drink alcoholic beverages  · If you have not urinated within 8 hours after discharge, please contact your surgeon on call. Report the following to your surgeon:  · Excessive pain, swelling, redness or odor of or around the surgical area  · Temperature over 100.5  · Nausea and vomiting lasting longer than 4 hours or if unable to take medications  · Any signs of decreased circulation or nerve impairment to extremity: change in color, persistent  numbness, tingling, coldness or increase pain  · Any questions    What to do at Home:  Recommended activity: activity as instructed by surgeon and physical therapy,     If you experience any of the following symptoms redness, swelling, drainage, or odor to dressing, temp greater than 100.0, numbness or tingling to left arm- please follow up with ER or surgeon. *  Please give a list of your current medications to your Primary Care Provider. *  Please update this list whenever your medications are discontinued, doses are      changed, or new medications (including over-the-counter products) are added. *  Please carry medication information at all times in case of emergency situations. These are general instructions for a healthy lifestyle:    No smoking/ No tobacco products/ Avoid exposure to second hand smoke  Surgeon General's Warning:  Quitting smoking now greatly reduces serious risk to your health.     Obesity, smoking, and sedentary lifestyle greatly increases your risk for illness    A healthy diet, regular physical exercise & weight monitoring are important for maintaining a healthy lifestyle    You may be retaining fluid if you have a history of heart failure or if you experience any of the following symptoms:  Weight gain of 3 pounds or more overnight or 5 pounds in a week, increased swelling in our hands or feet or shortness of breath while lying flat in bed. Please call your doctor as soon as you notice any of these symptoms; do not wait until your next office visit. Recognize signs and symptoms of STROKE:    F-face looks uneven    A-arms unable to move or move unevenly    S-speech slurred or non-existent    T-time-call 911 as soon as signs and symptoms begin-DO NOT go       Back to bed or wait to see if you get better-TIME IS BRAIN. Warning Signs of HEART ATTACK     Call 911 if you have these symptoms:   Chest discomfort. Most heart attacks involve discomfort in the center of the chest that lasts more than a few minutes, or that goes away and comes back. It can feel like uncomfortable pressure, squeezing, fullness, or pain.  Discomfort in other areas of the upper body. Symptoms can include pain or discomfort in one or both arms, the back, neck, jaw, or stomach.  Shortness of breath with or without chest discomfort.  Other signs may include breaking out in a cold sweat, nausea, or lightheadedness. Don't wait more than five minutes to call 911 - MINUTES MATTER! Fast action can save your life. Calling 911 is almost always the fastest way to get lifesaving treatment. Emergency Medical Services staff can begin treatment when they arrive -- up to an hour sooner than if someone gets to the hospital by car. The discharge information has been reviewed with the patient. The patient verbalized understanding. Discharge medications reviewed with the patient and appropriate educational materials and side effects teaching were provided.   ___________________________________________________________________________________________________________________________________

## 2018-10-07 NOTE — DISCHARGE SUMMARY
[address] [facility] SUMMARY    Chyna Rasheed  MR#: [id number]  : 1965  ACCOUNT #: [de-identified]   ADMIT DATE: 10/04/2018  DISCHARGE DATE: 10/06/2018    ADMISSION DIAGNOSES:    1. Adhesive capsulitis, right shoulder. 2.  Adhesive capsulitis, left shoulder. 3.  Acromioclavicular arthritis, left shoulder. DISCHARGE DIAGNOSES:    1. Adhesive capsulitis, right shoulder. 2.  Adhesive capsulitis, left shoulder. 3.  Acromioclavicular arthritis, left shoulder. Please see H and P, operative summary and consult for details. HOSPITAL COURSE:  The patient is a 69-year-old female who was admitted on 10/04/2018 and underwent uncomplicated examination and manipulation of the right shoulder, examination and manipulation of the left shoulder, arthroscopy left shoulder, ASD, ADC and lysis of adhesions. Patient was kept overnight for observation. On postoperative day #1, her potassium was 4.2, magnesium 1.4, it was repleted. She was having significant pain. On postop day #1, sugars were 225. She was relocked on postop day #1. It was elected to keep her overnight an additional night for observation for pain control to manage her electrolyte deficiency. On postoperative day #2, all electrolytes are within normal limits after supplementation of her magnesium. She was discharged home on postoperative day #2. She will continue therapy on the outside and follow up in my office in 10 days.       Herchel Phoenix, MD AGP/CLAUDE  D: 10/06/2018 12:01     T: 10/06/2018 21:06  JOB #: [id number]  CC: Mini Acosta MD

## 2018-10-09 ENCOUNTER — HOSPITAL ENCOUNTER (OUTPATIENT)
Dept: PHYSICAL THERAPY | Age: 53
Discharge: HOME | End: 2018-10-09
Payer: COMMERCIAL

## 2018-10-09 PROCEDURE — 97161 PT EVAL LOW COMPLEX 20 MIN: CPT

## 2018-10-09 NOTE — THERAPY EVALUATION
Chalo Jay  : 1965  Primary: [facility]*  Secondary:  Therapy Center at [address] [place] [address] Rd  1101 [facility], [address] [place] [address],[address], [place] [address].  [phone]   [phone]          OUTPATIENT PHYSICAL THERAPY:Initial Assessment 10/9/2018   ICD-10: Treatment Diagnosis:  M75.01 Adhesive capsulitis of right shoulder, M75.02 Adhesive capsulitis of left shoulder, M19.012 Primary osteoarthritis, left shoulder, Pain in left shoulder (M25.512), Pain in right shoulder (M25.511)  Precautions/Allergies: NKDA      Fall Risk Score: 0 (? 5 = High Risk)  MD Orders: Eval and treat, HEP, ROM, aggressive motion B shoulders  3x/wk for 3 weeks MEDICAL/REFERRING DIAGNOSIS:  s/p L shoulder EUA/Manip, arthroscopy, ASD< ADCR< ILDEFONSO< RCR<BT; Rshoulder EUA/Manip    DATE OF ONSET: 10/4/18  REFERRING PHYSICIAN: Lilibeth Orta MD  RETURN PHYSICIAN APPOINTMENT: 10/16/18     INITIAL ASSESSMENT:  Ms. Vel Kim  Is a 46year old R hand dominant female s/p EUA and manipulation of R shoulder, EUA and manipulation of L shoulder and L SAD, ADCR, and LOREN. She presents shoulder pain, decreased ROM in B shoulders, decreased strength in L shoulder and decreased functional use of B shoulders. She could benefit from PT to address deficits and work toward goals. PROBLEM LIST (Impacting functional limitations):  1. Decreased Strength  2. Decreased ADL/Functional Activities  3. Increased Pain  4. Decreased Flexibility/Joint Mobility INTERVENTIONS PLANNED:  1. Home Exercise Program (HEP)  2. Manual Therapy  3. Therapeutic Exercise/Strengthening  4. modals as needed   TREATMENT PLAN:  Effective Dates: 10/9/2018 TO 2019 (90 days). Frequency/Duration: 2-3 times a week for 90 Days  GOALS: (Goals have been discussed and agreed upon with patient.)  Patient's stated goal is to be able to use her arms. Short-Term Functional Goals: Time Frame: 6 weeks  1. Patient to be independent with HEP  2.  Patient to increase PROM L shoulder flex to 135 and PROM L shoulder ER to 30 degrees  3. Patient to report decrease in pain level to 5/10. Discharge Goals: Time Frame: 90 days  1. Patient to report no more than minimal shoulder pain with all activity  2. Patient to improve DASH score to 21 to demo improved use of B UEs  3. Patient to improve AROM B shoulder flex to 150 degrees for improved functional use. Rehabilitation Potential For Stated Goals: Good  Regarding Latesha Perea's therapy, I certify that the treatment plan above will be carried out by a therapist or under their direction. Thank you for this referral,  Josefa Colin PT     Referring Physician Signature: Neil Mckinnon., MD              Date                    The information in this section was collected on 10/9/18 (except where otherwise noted). HISTORY:   History of Present Injury/Illness (Reason for Referral):  Patient reports that shoulder pain started last year. She went to her family doctor and was sent to PT. They tried dry needling and MD injected her. She didn't get better and requested a second opinion. She was sent to Dr. Manuel Avelar and an MRI was done, and then she had surgery on 10/4/18. She was told to leave the dressings on until she returned to MD.   Past Medical History/Comorbidities: OA,  Benign hypertension with CKD (chronic kidney disease) stage III ([address] Diabetes ([address] Dyslipidemia; Endometrial cancer ([address] and Morbid obesity ([address] knee arthroscopy (Right, 2015); appendectomy (1979), L ankle surgery (2007) L index finger surgery (2005)  Social History/Living Environment:     Lives with family in one story home. 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    Dominant Side:         RIGHT  Previous Treatment Approaches:          PT prior to surgery  Current Medications:  GlipiZIDE, lisinopril, Dilaudid, Promethezine    Date Last Reviewed:  10/9/18   Number of Personal Factors/Comorbidities that affect the Plan of Care: 1-2: MODERATE COMPLEXITY   EXAMINATION:   Observation/Orthostatic Postural Assessment:          Patient wearing sling incorrectly upon entry to clinic. It was adjusted appropriately before patient left. Gauze and Tegaderm covering incision sites. ROM:          AROM R shoulder flex 135, Abduct 145, extn 50, ER 45 and IR to mid R buttock        PROM R shoulder flex 130, abduct 110, ER 35 at 90 degree abduct         PROM L shoulder flex 80, abduct 65, IR 50 (to abdomen) and ER -15 from neutral.   Strength:          R shoulder 4+ to 5/5 all directions. L shoulder not tested. Neurological Screen:        Sensation: light touch intact in B UEs   Body Structures Involved:  1. Joints  2. Muscles Body Functions Affected:  1. Neuromusculoskeletal  2. Movement Related Activities and Participation Affected:  1. General Tasks and Demands  2. Self Care  3. Community, Social and [place]   Number of elements (examined above) that affect the Plan of Care: 1-2: LOW COMPLEXITY   CLINICAL PRESENTATION:   Presentation: Evolving clinical presentation with changing clinical characteristics: MODERATE COMPLEXITY   CLINICAL DECISION MAKING:   Outcome Measure: Tool Used: Disabilities of the Arm, Shoulder and Hand (DASH) Questionnaire - Quick Version  Score:  Initial: 55/55  Most Recent: X/55 (Date: -- )   Interpretation of Score: The DASH is designed to measure the activities of daily living in person's with upper extremity dysfunction or pain. Each section is scored on a 1-5 scale, 5 representing the greatest disability. The scores of each section are added together for a total score of 55. Medical Necessity:   · Patient demonstrates good rehab potential due to higher previous functional level. Reason for Services/Other Comments:  · Patient continues to require skilled intervention due to need to regain use of B UEs.    Use of outcome tool(s) and clinical judgement create a POC that gives a: Questionable prediction of patient's progress: MODERATE COMPLEXITY            TREATMENT:   (In addition to Assessment/Re-Assessment sessions the following treatments were rendered)  Pre-treatment Symptoms/Complaints:  Pain, stiffness  Pain: Initial:   Pain Intensity 1: 9   Post Session: No increased pain reported by patient. Said sling felt better after being adjusted. Therapeutic Exercise: ( ):  5 minutes - Exercises to improve mobility. Required moderate visual and verbal cues to ensure correct performance. Progressed complexity of movement as indicated. Instructed patient in motion exercises for hand, wrist, forearm and elbow,  Shoulder shrugs and retractions, pendulums, and pulleys. She was also shown wand ER in the hospital and can continue this if desired. HEP - written copy of exercises given to patient. Treatment/Session Assessment:    · Response to Treatment:  Patient seems motivated to improve. Seems to understand exercises in HEP. · Compliance with Program/Exercises: Will assess as treatment progresses. · Recommendations/Intent for next treatment session: \"Next visit will focus on motion in B shoulders. \".   Total Treatment Duration: 45 minutes  PT Patient Time In/Time Out  Time In: 4205  Time Out: [place]

## 2018-10-09 NOTE — PROGRESS NOTES
Ambulatory/Rehab Services H2 Model Falls Risk Assessment    Risk Factor Pts. ·   Confusion/Disorientation/Impulsivity  []    4 ·   Symptomatic Depression  []   2 ·   Altered Elimination  []   1 ·   Dizziness/Vertigo  []   1 ·   Gender (Male)  []   1 ·   Any administered antiepileptics (anticonvulsants):  []   2 ·   Any administered benzodiazepines:  []   1 ·   Visual Impairment (specify):  []   1 ·   Portable Oxygen Use  []   1 ·   Orthostatic ? BP  []   1 ·   History of Recent Falls (within 3 mos.)  []   5     Ability to Rise from Chair (choose one) Pts. ·   Ability to rise in a single movement  []   0 ·   Pushes up, successful in one attempt  []   1 ·   Multiple attempts, but successful  []   3 ·   Unable to rise without assistance  []   4   Total: (5 or greater = High Risk) 0     Falls Prevention Plan:   []                Physical Limitations to Exercise (specify):   []                Mobility Assistance Device (type):   []                Exercise/Equipment Adaptation (specify):    ©2010 [facility] of [address] States Patent #[id number].  Federal Law prohibits the replication, distribution or use without written permission from [facility] [email or web address]

## 2018-10-10 ENCOUNTER — HOSPITAL ENCOUNTER (OUTPATIENT)
Dept: PHYSICAL THERAPY | Age: 53
Discharge: HOME | End: 2018-10-10
Payer: COMMERCIAL

## 2018-10-10 NOTE — PROGRESS NOTES
Miesha Burkitt  : 1965  Primary: Rinku Montelongo [facility]*  Secondary:  Therapy Center at [facility] ARTIE BLAIR  1101 [facility], [address] [place] [address] [address], [facility] [address]  [phone]   [phone]          OUTPATIENT PHYSICAL Som Gresham  65. 10/10/2018   ICD-10: Treatment Diagnosis:  M75.01 Adhesive capsulitis of right shoulder, M75.02 Adhesive capsulitis of left shoulder, M19.012 Primary osteoarthritis, left shoulder, Pain in left shoulder (M25.512), Pain in right shoulder (M25.511)  Precautions/Allergies: NKDA      Fall Risk Score: 0 (? 5 = High Risk)  MD Orders: Eval and treat, HEP, ROM, aggressive motion B shoulders  3x/wk for 3 weeks MEDICAL/REFERRING DIAGNOSIS:  s/p L shoulder EUA/Manip, arthroscopy, ASD< ADCR< ILDEFONSO< RCR<BT; Rshoulder EUA/Manip    DATE OF ONSET: 10/4/18  REFERRING PHYSICIAN: Brisa Vinson MD  RETURN PHYSICIAN APPOINTMENT: 10/16/18   Patient called to cancel scheduled appointment due to lack of transportation. She rescheduled to tomorrow.

## 2018-10-11 ENCOUNTER — HOSPITAL ENCOUNTER (OUTPATIENT)
Dept: PHYSICAL THERAPY | Age: 53
Discharge: HOME | End: 2018-10-11
Payer: COMMERCIAL

## 2018-10-11 PROCEDURE — 97140 MANUAL THERAPY 1/> REGIONS: CPT

## 2018-10-11 NOTE — PROGRESS NOTES
Paulina Kimo  : 1965  Primary: Rinku [facility]*  Secondary:  Therapy Center at [address] [place] [address] Rd  1101 [facility], [address] [place] [address] [address], [facility]  [phone]   [phone]          OUTPATIENT PHYSICAL THERAPY:Daily Note 10/11/2018   ICD-10: Treatment Diagnosis:  M75.01 Adhesive capsulitis of right shoulder, M75.02 Adhesive capsulitis of left shoulder, M19.012 Primary osteoarthritis, left shoulder, Pain in left shoulder (M25.512), Pain in right shoulder (M25.511)  Precautions/Allergies: NKDA      Fall Risk Score: 0 (? 5 = High Risk)  MD Orders: Eval and treat, HEP, ROM, aggressive motion B shoulders  3x/wk for 3 weeks MEDICAL/REFERRING DIAGNOSIS:  s/p L shoulder EUA/Manip, arthroscopy, ASD< ADCR< ILDEFONSO< RCR<BT; Rshoulder EUA/Manip    DATE OF ONSET: 10/4/18  REFERRING PHYSICIAN: Cornell Bateman MD  RETURN PHYSICIAN APPOINTMENT: 10/16/18     INITIAL ASSESSMENT:  Ms. Amna Berumen  Is a 46year old R hand dominant female s/p EUA and manipulation of R shoulder, EUA and manipulation of L shoulder and L SAD, ADCR, and LOREN. She presents shoulder pain, decreased ROM in B shoulders, decreased strength in L shoulder and decreased functional use of B shoulders. She could benefit from PT to address deficits and work toward goals. PROBLEM LIST (Impacting functional limitations):  1. Decreased Strength  2. Decreased ADL/Functional Activities  3. Increased Pain  4. Decreased Flexibility/Joint Mobility INTERVENTIONS PLANNED:  1. Home Exercise Program (HEP)  2. Manual Therapy  3. Therapeutic Exercise/Strengthening  4. modals as needed   TREATMENT PLAN:  Effective Dates: 10/9/2018 TO 2019 (90 days). Frequency/Duration: 2-3 times a week for 90 Days  GOALS: (Goals have been discussed and agreed upon with patient.)  Patient's stated goal is to be able to use her arms. Short-Term Functional Goals: Time Frame: 6 weeks  1. Patient to be independent with HEP  2.  Patient to increase PROM L shoulder flex to 135 and PROM L shoulder ER to 30 degrees  3. Patient to report decrease in pain level to 5/10. Discharge Goals: Time Frame: 90 days  1. Patient to report no more than minimal shoulder pain with all activity  2. Patient to improve DASH score to 21 to demo improved use of B UEs  3. Patient to improve AROM B shoulder flex to 150 degrees for improved functional use. Rehabilitation Potential For Stated Goals: Good              The information in this section was collected on 10/9/18 (except where otherwise noted). HISTORY:   History of Present Injury/Illness (Reason for Referral):  Patient reports that shoulder pain started last year. She went to her family doctor and was sent to PT. They tried dry needling and MD injected her. She didn't get better and requested a second opinion. She was sent to Dr. Nury West and an MRI was done, and then she had surgery on 10/4/18. She was told to leave the dressings on until she returned to MD.   Past Medical History/Comorbidities: OA,  Benign hypertension with CKD (chronic kidney disease) stage III ([address] Diabetes ([address] Dyslipidemia; Endometrial cancer ([address] and Morbid obesity ([address] knee arthroscopy (Right, 2015); appendectomy (1979), L ankle surgery (2007) L index finger surgery (2005)  Social History/Living Environment:     Lives with family in one story home. 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 Dominant Side:         RIGHT  Previous Treatment Approaches:          PT prior to surgery  Current Medications:  GlipiZIDE, lisinopril, Dilaudid, Promethezine    Date Last Reviewed:  10/9/18   EXAMINATION:   Observation/Orthostatic Postural Assessment:          Patient wearing sling incorrectly upon entry to clinic. It was adjusted appropriately before patient left. Gauze and Tegaderm covering incision sites.    ROM:          AROM R shoulder flex 135, Abduct 145, extn 50, ER 45 and IR to mid R buttock        PROM R shoulder flex 130, abduct 110, ER 35 at 90 degree abduct         PROM L shoulder flex 80, abduct 65, IR 50 (to abdomen) and ER -15 from neutral.   Strength:          R shoulder 4+ to 5/5 all directions. L shoulder not tested. Neurological Screen:        Sensation: light touch intact in B UEs   Body Structures Involved:  1. Joints  2. Muscles Body Functions Affected:  1. Neuromusculoskeletal  2. Movement Related Activities and Participation Affected:  1. General Tasks and Demands  2. Self Care  3. Community, Social and Civic Life   CLINICAL DECISION MAKING:   Outcome Measure: Tool Used: Disabilities of the Arm, Shoulder and Hand (DASH) Questionnaire - Quick Version  Score:  Initial: 55/55  Most Recent: X/55 (Date: -- )   Interpretation of Score: The DASH is designed to measure the activities of daily living in person's with upper extremity dysfunction or pain. Each section is scored on a 1-5 scale, 5 representing the greatest disability. The scores of each section are added together for a total score of 55. Medical Necessity:   · Patient demonstrates good rehab potential due to higher previous functional level. Reason for Services/Other Comments:  · Patient continues to require skilled intervention due to need to regain use of B UEs.            TREATMENT:   (In addition to Assessment/Re-Assessment sessions the following treatments were rendered)  Pre-treatment Symptoms/Complaints:  Patient reports compliance with HEP because she does not want shoulders to stiffen up. She drove herself to PT today. Pain: Initial:   Pain Intensity 1: 8   Post Session: No increased pain. \"Still about the same\"      Therapeutic Exercise: ( ):  none today  HEP - continue current HEP  Manual Therapy ( 40 minutes) Grade 2 to 4- physio mobs B shoulder flex, abduct, IR and ER. Grade 2 to 4 inferior and posterior shoulder glides. Spent more time on L shoulder than R.    Treatment/Session Assessment: · Response to Treatment:  Patient reported that pain was no worse at end of session, even though there was pain during it. · Compliance with Program/Exercises: yes  · Recommendations/Intent for next treatment session: \"Next visit will focus on motion in B shoulders. \".   Total Treatment Duration: 40 minutes  PT Patient Time In/Time Out  Time In: 2560  Time Out: [address]14[address] Rosamaria Trejo

## 2018-10-15 ENCOUNTER — HOSPITAL ENCOUNTER (OUTPATIENT)
Dept: PHYSICAL THERAPY | Age: 53
Discharge: HOME | End: 2018-10-15
Payer: COMMERCIAL

## 2018-10-15 PROCEDURE — 97140 MANUAL THERAPY 1/> REGIONS: CPT

## 2018-10-15 NOTE — PROGRESS NOTES
Amandeep Cantor  : 1965  Primary: [facility]*  Secondary:  [facility] [place] [id number] [facility] [address], [address]  [phone]   [phone]          OUTPATIENT PHYSICAL THERAPY:Daily Note 10/15/2018   ICD-10: Treatment Diagnosis:  M75.01 Adhesive capsulitis of right shoulder, M75.02 Adhesive capsulitis of left shoulder, M19.012 Primary osteoarthritis, left shoulder, Pain in left shoulder (M25.512), Pain in right shoulder (M25.511)  Precautions/Allergies: NKDA      Fall Risk Score: 0 (? 5 = High Risk)  MD Orders: Eval and treat, HEP, ROM, aggressive motion B shoulders  3x/wk for 3 weeks MEDICAL/REFERRING DIAGNOSIS:  s/p L shoulder EUA/Manip, arthroscopy, ASD< ADCR< ILDEFONSO< RCR<BT; Rshoulder EUA/Manip    DATE OF ONSET: 10/4/18  REFERRING PHYSICIAN: Isra Coats MD  RETURN PHYSICIAN APPOINTMENT: 10/16/18     INITIAL ASSESSMENT:  Ms. Noa Gonzalez  Is a 46year old R hand dominant female s/p EUA and manipulation of R shoulder, EUA and manipulation of L shoulder and L SAD, ADCR, and LOREN. She presents shoulder pain, decreased ROM in B shoulders, decreased strength in L shoulder and decreased functional use of B shoulders. She could benefit from PT to address deficits and work toward goals. PROBLEM LIST (Impacting functional limitations):  1. Decreased Strength  2. Decreased ADL/Functional Activities  3. Increased Pain  4. Decreased Flexibility/Joint Mobility INTERVENTIONS PLANNED:  1. Home Exercise Program (HEP)  2. Manual Therapy  3. Therapeutic Exercise/Strengthening  4. modals as needed   TREATMENT PLAN:  Effective Dates: 10/9/2018 TO 2019 (90 days). Frequency/Duration: 2-3 times a week for 90 Days  GOALS: (Goals have been discussed and agreed upon with patient.)  Patient's stated goal is to be able to use her arms. Short-Term Functional Goals: Time Frame: 6 weeks  1. Patient to be independent with HEP  2.  Patient to increase PROM L shoulder flex to 135 and PROM L shoulder ER to 30 degrees  3. Patient to report decrease in pain level to 5/10. Discharge Goals: Time Frame: 90 days  1. Patient to report no more than minimal shoulder pain with all activity  2. Patient to improve DASH score to 21 to demo improved use of B UEs  3. Patient to improve AROM B shoulder flex to 150 degrees for improved functional use. Rehabilitation Potential For Stated Goals: Good              The information in this section was collected on 10/9/18 (except where otherwise noted). HISTORY:   History of Present Injury/Illness (Reason for Referral):  Patient reports that shoulder pain started last year. She went to her family doctor and was sent to PT. They tried dry needling and MD injected her. She didn't get better and requested a second opinion. She was sent to Dr. Stephanie Jones and an MRI was done, and then she had surgery on 10/4/18. She was told to leave the dressings on until she returned to MD.   Past Medical History/Comorbidities: OA,  Benign hypertension with CKD (chronic kidney disease) stage III ([address] Diabetes ([address] Dyslipidemia; Endometrial cancer ([address] and Morbid obesity ([address] knee arthroscopy (Right, 2015); appendectomy (1979), L ankle surgery (2007) L index finger surgery (2005)  Social History/Living Environment:     Lives with family in one story home. 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 Dominant Side:         RIGHT  Previous Treatment Approaches:          PT prior to surgery  Current Medications:  GlipiZIDE, lisinopril, Dilaudid, Promethezine    Date Last Reviewed:  10/15/18   EXAMINATION:   Observation/Orthostatic Postural Assessment:          Patient wearing sling incorrectly upon entry to clinic. It was adjusted appropriately before patient left. Gauze and Tegaderm covering incision sites.    ROM:          AROM R shoulder flex 135, Abduct 145, extn 50, ER 45 and IR to mid R buttock        PROM R shoulder flex 130, abduct 110, ER 35 at 90 degree abduct         PROM L shoulder flex 80, abduct 65, IR 50 (to abdomen) and ER -15 from neutral.   Strength:          R shoulder 4+ to 5/5 all directions. L shoulder not tested. Neurological Screen:        Sensation: light touch intact in B UEs   Body Structures Involved:  1. Joints  2. Muscles Body Functions Affected:  1. Neuromusculoskeletal  2. Movement Related Activities and Participation Affected:  1. General Tasks and Demands  2. Self Care  3. Community, Social and Civic Life   CLINICAL DECISION MAKING:   Outcome Measure: Tool Used: Disabilities of the Arm, Shoulder and Hand (DASH) Questionnaire - Quick Version  Score:  Initial: 55/55  Most Recent: X/55 (Date: -- )   Interpretation of Score: The DASH is designed to measure the activities of daily living in person's with upper extremity dysfunction or pain. Each section is scored on a 1-5 scale, 5 representing the greatest disability. The scores of each section are added together for a total score of 55. Medical Necessity:   · Patient demonstrates good rehab potential due to higher previous functional level. Reason for Services/Other Comments:  · Patient continues to require skilled intervention due to need to regain use of B UEs.            TREATMENT:   (In addition to Assessment/Re-Assessment sessions the following treatments were rendered)  Pre-treatment Symptoms/Complaints:  Patient reports compliance with HEP. She sees MD tomorrow. Could not get comfortable to sleep last night. Pain: Initial:   Pain Intensity 1: 8   Post Session: patient rated pain as 7 at end of session. Said the cold really helped. Therapeutic Exercise: ( ):  none today  HEP - continue current HEP  Manual Therapy ( 40 minutes) Grade 2 to 4- physio mobs B shoulder flex, abduct, IR and ER. Grade 2 to 4 inferior and posterior shoulder glides.  More time on L shoulder than R since it was surgical  Therapeutic Modalities: For pain - GameReady device 40 degrees and light compression. Left Shoulder Cold  Type: Cryocuff (with vasoneumatic compression)  Duration : 15 minutes  Patient Position: Sitting                                                                      . Treatment/Session Assessment:    · Response to Treatment:  Patient reported that pain was less at end of session after using GameReady device. · Compliance with Program/Exercises: yes  · Recommendations/Intent for next treatment session: \"Next visit will focus on motion in B shoulders. \".   Total Treatment Duration: 55 minutes  PT Patient Time In/Time Out  Time In: 1305  Time Out: 1438 [facility]

## 2018-10-18 ENCOUNTER — HOSPITAL ENCOUNTER (OUTPATIENT)
Dept: PHYSICAL THERAPY | Age: 53
Discharge: HOME | End: 2018-10-18
Payer: COMMERCIAL

## 2018-10-18 PROCEDURE — 97140 MANUAL THERAPY 1/> REGIONS: CPT

## 2018-10-18 NOTE — PROGRESS NOTES
Lynnette Willard  : 1965  Primary: [facility]*  Secondary:  [facility] at [facility] ARTIE BLAIR  [address] [facility], [address] [place] [address] [address], [address] [facility]  [phone]   [phone]          OUTPATIENT PHYSICAL THERAPY:Daily Note 10/18/2018   ICD-10: Treatment Diagnosis:  M75.01 Adhesive capsulitis of right shoulder, M75.02 Adhesive capsulitis of left shoulder, M19.012 Primary osteoarthritis, left shoulder, Pain in left shoulder (M25.512), Pain in right shoulder (M25.511)  Precautions/Allergies: NKDA      Fall Risk Score: 0 (? 5 = High Risk)  MD Orders: Eval and treat, HEP, ROM, strengthening, aggressive motion B shoulders, full motion, full strength  3x/wk for 3 weeks MEDICAL/REFERRING DIAGNOSIS:  s/p L shoulder EUA/Manip, arthroscopy, ASD< ADCR< ILDEFONSO< RCR<BT; Rshoulder EUA/Manip    DATE OF ONSET: 10/4/18  REFERRING PHYSICIAN: Mabel Giles MD  RETURN PHYSICIAN APPOINTMENT: 18     INITIAL ASSESSMENT:  Ms. Doug Munoz  Is a 46year old R hand dominant female s/p EUA and manipulation of R shoulder, EUA and manipulation of L shoulder and L SAD, ADCR, and LOREN. She presents shoulder pain, decreased ROM in B shoulders, decreased strength in L shoulder and decreased functional use of B shoulders. She could benefit from PT to address deficits and work toward goals. PROBLEM LIST (Impacting functional limitations):  1. Decreased Strength  2. Decreased ADL/Functional Activities  3. Increased Pain  4. Decreased Flexibility/Joint Mobility INTERVENTIONS PLANNED:  1. Home Exercise Program (HEP)  2. Manual Therapy  3. Therapeutic Exercise/Strengthening  4. modals as needed   TREATMENT PLAN:  Effective Dates: 10/9/2018 TO 2019 (90 days). Frequency/Duration: 2-3 times a week for 90 Days  GOALS: (Goals have been discussed and agreed upon with patient.)  Patient's stated goal is to be able to use her arms. Short-Term Functional Goals: Time Frame: 6 weeks  1.  Patient to be independent with HEP  2. Patient to increase PROM L shoulder flex to 135 and PROM L shoulder ER to 30 degrees  3. Patient to report decrease in pain level to 5/10. Discharge Goals: Time Frame: 90 days  1. Patient to report no more than minimal shoulder pain with all activity  2. Patient to improve DASH score to 21 to demo improved use of B UEs  3. Patient to improve AROM B shoulder flex to 150 degrees for improved functional use. Rehabilitation Potential For Stated Goals: Good              The information in this section was collected on 10/9/18 (except where otherwise noted). HISTORY:   History of Present Injury/Illness (Reason for Referral):  Patient reports that shoulder pain started last year. She went to her family doctor and was sent to PT. They tried dry needling and MD injected her. She didn't get better and requested a second opinion. She was sent to Dr. Iqra Huerta and an MRI was done, and then she had surgery on 10/4/18. She was told to leave the dressings on until she returned to MD.   Past Medical History/Comorbidities: OA,  Benign hypertension with CKD (chronic kidney disease) stage III ([address] Diabetes ([address] Dyslipidemia; Endometrial cancer ([address] and Morbid obesity ([address] knee arthroscopy (Right, 2015); appendectomy (1979), L ankle surgery (2007) L index finger surgery (2005)  Social History/Living Environment:     Lives with family in one story home. 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 Dominant Side:         RIGHT  Previous Treatment Approaches:          PT prior to surgery  Current Medications:  GlipiZIDE, lisinopril, Dilaudid, Promethezine    Date Last Reviewed:  10/15/18   EXAMINATION:   Observation/Orthostatic Postural Assessment:          Patient wearing sling incorrectly upon entry to clinic. It was adjusted appropriately before patient left. Gauze and Tegaderm covering incision sites.    ROM:          AROM R shoulder flex 135, Abduct 145, extn 50, ER 45 and IR to mid R buttock        PROM R shoulder flex 130, abduct 110, ER 35 at 90 degree abduct         PROM L shoulder flex 80, abduct 65, IR 50 (to abdomen) and ER -15 from neutral.   Strength:          R shoulder 4+ to 5/5 all directions. L shoulder not tested. Neurological Screen:        Sensation: light touch intact in B UEs   Body Structures Involved:  1. Joints  2. Muscles Body Functions Affected:  1. Neuromusculoskeletal  2. Movement Related Activities and Participation Affected:  1. General Tasks and Demands  2. Self Care  3. Community, Social and Civic Life   CLINICAL DECISION MAKING:   Outcome Measure: Tool Used: Disabilities of the Arm, Shoulder and Hand (DASH) Questionnaire - Quick Version  Score:  Initial: 55/55  Most Recent: X/55 (Date: -- )   Interpretation of Score: The DASH is designed to measure the activities of daily living in person's with upper extremity dysfunction or pain. Each section is scored on a 1-5 scale, 5 representing the greatest disability. The scores of each section are added together for a total score of 55. Medical Necessity:   · Patient demonstrates good rehab potential due to higher previous functional level. Reason for Services/Other Comments:  · Patient continues to require skilled intervention due to need to regain use of B UEs.            TREATMENT:   (In addition to Assessment/Re-Assessment sessions the following treatments were rendered)  Pre-treatment Symptoms/Complaints:  Patient reports she saw MD and now has orders for strengthening of B shldrs as well as aggressive motion. He wants her to come to PT 3x/wk. She did not take a pain pill before PT today. Pain: Initial:   Pain Intensity 1: 9   Post Session: patient rated pain as decreased, but did not quantify. Therapeutic Exercise: (2 Minutes):  Exercises per grid below to improve mobility and strength.   Required moderate visual and verbal cues to ensure correct performance. Progressed complexity of movement as indicated. Date:  10/18/18 Date:   Date:     Activity/Exercise Parameters Parameters Parameters   B serratus punch next     Wand flex 1x10     Wand ER next                                 HEP - continue current HEP  Manual Therapy ( 40 minutes) Grade 2 to 4 physio mobs B shoulder flex, abduct, IR and ER. Grade 2 to 4 inferior and posterior shoulder glides. Therapeutic Modalities: For pain - GameReady device 40 degrees and light compression. Left Shoulder Cold  Type: Cryocuff(with vasoneumatic compression)  Duration : 15 minutes  Patient Position: Sitting(long sitting with back support)                                                                      . Treatment/Session Assessment:    · Response to Treatment:  Patient reported that Josef Olmstead continues to help after treatment. She is still very painful at end range but will to work through it to improve motion. · Compliance with Program/Exercises: yes  · Recommendations/Intent for next treatment session: \"Next visit will focus on motion in B shoulders. \".   Total Treatment Duration: 57 minutes  PT Patient Time In/Time Out  Time In: 1035  Time Out: [address] [place] [address]

## 2018-10-22 ENCOUNTER — HOSPITAL ENCOUNTER (OUTPATIENT)
Dept: PHYSICAL THERAPY | Age: 53
Discharge: HOME | End: 2018-10-22
Payer: COMMERCIAL

## 2018-10-22 PROCEDURE — 97110 THERAPEUTIC EXERCISES: CPT

## 2018-10-22 PROCEDURE — 97140 MANUAL THERAPY 1/> REGIONS: CPT

## 2018-10-24 ENCOUNTER — HOSPITAL ENCOUNTER (OUTPATIENT)
Dept: PHYSICAL THERAPY | Age: 53
Discharge: HOME | End: 2018-10-24
Payer: COMMERCIAL

## 2018-10-24 PROCEDURE — 97140 MANUAL THERAPY 1/> REGIONS: CPT
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
## 2018-10-25 ENCOUNTER — HOSPITAL ENCOUNTER (OUTPATIENT)
Dept: PHYSICAL THERAPY | Age: 53
Discharge: HOME | End: 2018-10-25
Payer: COMMERCIAL

## 2018-10-25 PROCEDURE — 97140 MANUAL THERAPY 1/> REGIONS: CPT

## 2018-10-25 NOTE — PROGRESS NOTES
Chalo Jay  : 1965  Primary: [facility]*  Secondary:  Therapy Center at [facility] ARTIE BLAIR  [address] [facility], [address] [place] [address],[address], [place] [address].  [phone]   [phone]          OUTPATIENT PHYSICAL THERAPY:Daily Note 10/25/2018   ICD-10: Treatment Diagnosis:  M75.01 Adhesive capsulitis of right shoulder, M75.02 Adhesive capsulitis of left shoulder, M19.012 Primary osteoarthritis, left shoulder, Pain in left shoulder (M25.512), Pain in right shoulder (M25.511)  Precautions/Allergies: NKDA      Fall Risk Score: 0 (? 5 = High Risk)  MD Orders: Eval and treat, HEP, ROM, strengthening, aggressive motion B shoulders, full motion, full strength  3x/wk for 3 weeks MEDICAL/REFERRING DIAGNOSIS:  L shoulder pain    DATE OF ONSET: 10/4/18  REFERRING PHYSICIAN: Lilibeth Orta MD  RETURN PHYSICIAN APPOINTMENT: 18     INITIAL ASSESSMENT:  Ms. Vel Kim  Is a 46year old R hand dominant female s/p EUA and manipulation of R shoulder, EUA and manipulation of L shoulder and L SAD, ADCR, and LOREN. She presents shoulder pain, decreased ROM in B shoulders, decreased strength in L shoulder and decreased functional use of B shoulders. She could benefit from PT to address deficits and work toward goals. PROBLEM LIST (Impacting functional limitations):  1. Decreased Strength  2. Decreased ADL/Functional Activities  3. Increased Pain  4. Decreased Flexibility/Joint Mobility INTERVENTIONS PLANNED:  1. Home Exercise Program (HEP)  2. Manual Therapy  3. Therapeutic Exercise/Strengthening  4. modals as needed   TREATMENT PLAN:  Effective Dates: 10/9/2018 TO 2019 (90 days). Frequency/Duration: 2-3 times a week for 90 Days  GOALS: (Goals have been discussed and agreed upon with patient.)  Patient's stated goal is to be able to use her arms. Short-Term Functional Goals: Time Frame: 6 weeks  1. Patient to be independent with HEP  2.  Patient to increase PROM L shoulder flex to 135 and PROM L shoulder ER to 30 degrees  3. Patient to report decrease in pain level to 5/10. Discharge Goals: Time Frame: 90 days  1. Patient to report no more than minimal shoulder pain with all activity  2. Patient to improve DASH score to 21 to demo improved use of B UEs  3. Patient to improve AROM B shoulder flex to 150 degrees for improved functional use. Rehabilitation Potential For Stated Goals: Good              The information in this section was collected on 10/9/18 (except where otherwise noted). HISTORY:   History of Present Injury/Illness (Reason for Referral):  Patient reports that shoulder pain started last year. She went to her family doctor and was sent to PT. They tried dry needling and MD injected her. She didn't get better and requested a second opinion. She was sent to Dr. Renetta Jacobson and an MRI was done, and then she had surgery on 10/4/18. She was told to leave the dressings on until she returned to MD.   Past Medical History/Comorbidities: OA,  Benign hypertension with CKD (chronic kidney disease) stage III ([address] Diabetes ([address] Dyslipidemia; Endometrial cancer ([address] and Morbid obesity ([address] knee arthroscopy (Right, 2015); appendectomy (1979), L ankle surgery (2007) L index finger surgery (2005)  Social History/Living Environment:     Lives with family in one story home. 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 Dominant Side:         RIGHT  Previous Treatment Approaches:          PT prior to surgery  Current Medications:  GlipiZIDE, lisinopril, Norco    Date Last Reviewed:  10/22/18   EXAMINATION:   Observation/Orthostatic Postural Assessment:          Patient wearing sling incorrectly upon entry to clinic. It was adjusted appropriately before patient left. Gauze and Tegaderm covering incision sites.    ROM:          AROM R shoulder flex 135, Abduct 145, extn 50, ER 45 and IR to mid R buttock        PROM R shoulder flex 130, abduct 110, ER 35 at 90 degree abduct         PROM L shoulder flex 80, abduct 65, IR 50 (to abdomen) and ER -15 from neutral.   Strength:          R shoulder 4+ to 5/5 all directions. L shoulder not tested. Neurological Screen:        Sensation: light touch intact in B UEs   Body Structures Involved:  1. Joints  2. Muscles Body Functions Affected:  1. Neuromusculoskeletal  2. Movement Related Activities and Participation Affected:  1. General Tasks and Demands  2. Self Care  3. Community, Social and Civic Life   CLINICAL DECISION MAKING:   Outcome Measure: Tool Used: Disabilities of the Arm, Shoulder and Hand (DASH) Questionnaire - Quick Version  Score:  Initial: 55/55  Most Recent: X/55 (Date: -- )   Interpretation of Score: The DASH is designed to measure the activities of daily living in person's with upper extremity dysfunction or pain. Each section is scored on a 1-5 scale, 5 representing the greatest disability. The scores of each section are added together for a total score of 55. Medical Necessity:   · Patient demonstrates good rehab potential due to higher previous functional level. Reason for Services/Other Comments:  · Patient continues to require skilled intervention due to need to regain use of B UEs.            TREATMENT:   (In addition to Assessment/Re-Assessment sessions the following treatments were rendered)  Pre-treatment Symptoms/Complaints:  Patient reports she left about a half hour earlier today to get to PT. Pain: Initial:   Pain Intensity 1: 8   Post Session: patient stated that shoulder hurt while manual therapy was being done, but after icing, it was back down to original level (8).       Therapeutic Exercise: ( ):  None today     Date:  10/18/18 Date:  10/22/19 Date:     Activity/Exercise Parameters Parameters Parameters   B serratus punch next 2x10    Wand flex 1x10 1x10    Wand ER next L 1x10    Wand abduct  L 1 x 10                          HEP - continue current HEP  Manual Therapy ( 40 minutes) Grade 2 to 4 physio mobs B shoulder flex, abduct, IR and ER. Grade 2 to 4 inferior and posterior shoulder glides. Contract/relax technique to increase end range motion for L shoulder flex, IR and ER. Therapeutic Modalities:  Game Ready device at 40 degrees and light compression                       Left Shoulder Cold  Type: Cryocuff(with vasoneumatic compression)  Duration : 15 minutes  Patient Position: Sitting                                                                      . Treatment/Session Assessment:    · Response to Treatment:  Patient still painful at end range but tells PT to do what he has to. She wants to get her range back. · Compliance with Program/Exercises: yes  · Recommendations/Intent for next treatment session: \"Next visit will focus on motion in B shoulders. \". Gentle strengthening.    Total Treatment Duration: 55 minutes  PT Patient Time In/Time Out  Time In: 3019  Time Out: [address] Gilberto Taylor

## 2018-10-29 ENCOUNTER — HOSPITAL ENCOUNTER (OUTPATIENT)
Dept: PHYSICAL THERAPY | Age: 53
Discharge: HOME | End: 2018-10-29
Payer: COMMERCIAL

## 2018-10-29 PROCEDURE — 97140 MANUAL THERAPY 1/> REGIONS: CPT

## 2018-10-29 PROCEDURE — 97110 THERAPEUTIC EXERCISES: CPT

## 2018-10-29 NOTE — PROGRESS NOTES
Luke Alvarez  : 1965  Primary: [facility] [employer] [facility]*  [facility]:  [facility], [address], [address] [facility]  [phone]   [phone]          OUTPATIENT PHYSICAL THERAPY:Daily Note 10/29/2018   ICD-10: Treatment Diagnosis:  M75.01 Adhesive capsulitis of right shoulder, M75.02 Adhesive capsulitis of left shoulder, M19.012 Primary osteoarthritis, left shoulder, Pain in left shoulder (M25.512), Pain in right shoulder (M25.511)  Precautions/Allergies: NKDA      Fall Risk Score: 0 (? 5 = High Risk)  MD Orders: Eval and treat, HEP, ROM, strengthening, aggressive motion B shoulders, full motion, full strength  3x/wk for 3 weeks MEDICAL/REFERRING DIAGNOSIS:  L shoulder pain    DATE OF ONSET: 10/4/18  REFERRING PHYSICIAN: Kaylie oYrk MD  RETURN PHYSICIAN APPOINTMENT: 18     INITIAL ASSESSMENT:  Ms. Sher Fish  Is a 46year old R hand dominant female s/p EUA and manipulation of R shoulder, EUA and manipulation of L shoulder and L SAD, ADCR, and LOREN. She presents shoulder pain, decreased ROM in B shoulders, decreased strength in L shoulder and decreased functional use of B shoulders. She could benefit from PT to address deficits and work toward goals. PROBLEM LIST (Impacting functional limitations):  1. Decreased Strength  2. Decreased ADL/Functional Activities  3. Increased Pain  4. Decreased Flexibility/Joint Mobility INTERVENTIONS PLANNED:  1. Home Exercise Program (HEP)  2. Manual Therapy  3. Therapeutic Exercise/Strengthening  4. modals as needed   TREATMENT PLAN:  Effective Dates: 10/9/2018 TO 2019 (90 days). Frequency/Duration: 2-3 times a week for 90 Days  GOALS: (Goals have been discussed and agreed upon with patient.)  Patient's stated goal is to be able to use her arms. Short-Term Functional Goals: Time Frame: 6 weeks  1. Patient to be independent with HEP  2.  Patient to increase PROM L shoulder flex to 135 and PROM L shoulder ER to 30 degrees  3. Patient to report decrease in pain level to 5/10. Discharge Goals: Time Frame: 90 days  1. Patient to report no more than minimal shoulder pain with all activity  2. Patient to improve DASH score to 21 to demo improved use of B UEs  3. Patient to improve AROM B shoulder flex to 150 degrees for improved functional use. Rehabilitation Potential For Stated Goals: Good              The information in this section was collected on 10/9/18 (except where otherwise noted). HISTORY:   History of Present Injury/Illness (Reason for Referral):  Patient reports that shoulder pain started last year. She went to her family doctor and was sent to PT. They tried dry needling and MD injected her. She didn't get better and requested a second opinion. She was sent to Dr. Carlos Velázquez and an MRI was done, and then she had surgery on 10/4/18. She was told to leave the dressings on until she returned to MD.   Past Medical History/Comorbidities: OA,  Benign hypertension with CKD (chronic kidney disease) stage III ([address] Diabetes ([address] Dyslipidemia; Endometrial cancer ([address] and Morbid obesity ([address] knee arthroscopy (Right, 2015); appendectomy (1979), L ankle surgery (2007) L index finger surgery (2005)  Social History/Living Environment:     Lives with family in one story home. 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 Dominant Side:         RIGHT  Previous Treatment Approaches:          PT prior to surgery  Current Medications:  GlipiZIDE, lisinopril, Norco    Date Last Reviewed:  10/29/18   EXAMINATION:   Observation/Orthostatic Postural Assessment:          Patient wearing sling incorrectly upon entry to clinic. It was adjusted appropriately before patient left. Gauze and Tegaderm covering incision sites.    ROM:          AROM R shoulder flex 135, Abduct 145, extn 50, ER 45 and IR to mid R buttock        PROM R shoulder flex 130, abduct 110, ER 35 at 90 degree abduct         PROM L shoulder flex 80, abduct 65, IR 50 (to abdomen) and ER -15 from neutral.   Strength:          R shoulder 4+ to 5/5 all directions. L shoulder not tested. Neurological Screen:        Sensation: light touch intact in B UEs   Body Structures Involved:  1. Joints  2. Muscles Body Functions Affected:  1. Neuromusculoskeletal  2. Movement Related Activities and Participation Affected:  1. General Tasks and Demands  2. Self Care  3. Community, Social and Civic Life   CLINICAL DECISION MAKING:   Outcome Measure: Tool Used: Disabilities of the Arm, Shoulder and Hand (DASH) Questionnaire - Quick Version  Score:  Initial: 55/55  Most Recent:  (Date: -- )   Interpretation of Score: The DASH is designed to measure the activities of daily living in person's with upper extremity dysfunction or pain. Each section is scored on a 1-5 scale, 5 representing the greatest disability. The scores of each section are added together for a total score of 55. Medical Necessity:   · Patient demonstrates good rehab potential due to higher previous functional level. Reason for Services/Other Comments:  · Patient continues to require skilled intervention due to need to regain use of B UEs.            TREATMENT:   (In addition to Assessment/Re-Assessment sessions the following treatments were rendered)  Pre-treatment Symptoms/Complaints:  Patient reports she had a rough weekend. Her nephew , and then she had to take her boyfriend to the ER because he was dehydrated. States pain isn't too bad, more of an ache, but still rates it as 8/10. Pain: Initial:   Pain Intensity 1: 8   Post Session: patient stated that shoulder felt better afterward, 7/10. Therapeutic Exercise: (20 Minutes):  Exercises per grid below to improve mobility and strength. Required moderate visual and verbal cues to ensure correct performance. Progressed complexity of movement as indicated. Date:  10/18/18 Date:  10/22/19 Date:  10/29/18   Activity/Exercise Parameters Parameters Parameters   B serratus punch next 2x10 2x10   Wand flex 1x10 1x10 1x10   Wand ER next L 1x10 L 1x10   Wand abduct  L 1 x 10 L 1x10   B bicep curls   1# 2x15   Wall sldies   B 1x10   IR rope stretch   B 1x10       HEP - continue current HEP  Manual Therapy ( 30 minutes) Grade 2 to 4 physio mobs B shoulder flex, abduct, IR and ER. Grade 2 to 4 inferior and posterior shoulder glides. Contract/relax technique to increase end range motion for L shoulder  IR and ER. Therapeutic Modalities:  Patient took ice with her at end of session. .   Treatment/Session Assessment:    · Response to Treatment:  Patient still limited in L shoulder ROM, but she works hard at exercises and tolerates manual therapy well. · Compliance with Program/Exercises: yes  · Recommendations/Intent for next treatment session: \"Next visit will focus on motion and strength in B shoulders. \".     Total Treatment Duration: 50 minutes  PT Patient Time In/Time Out  Time In: 1002  Time Out: [facility]

## 2018-10-31 ENCOUNTER — HOSPITAL ENCOUNTER (OUTPATIENT)
Dept: PHYSICAL THERAPY | Age: 53
Discharge: HOME | End: 2018-10-31
Payer: COMMERCIAL

## 2018-10-31 PROCEDURE — 97140 MANUAL THERAPY 1/> REGIONS: CPT

## 2018-10-31 NOTE — PROGRESS NOTES
Abdirahman Dodson  : 1965  Primary: [facility]  [facility]:  [facility], [address], [place] [address].  [phone]   [phone]          OUTPATIENT PHYSICAL THERAPY:Daily Note 10/31/2018   ICD-10: Treatment Diagnosis:  M75.01 Adhesive capsulitis of right shoulder, M75.02 Adhesive capsulitis of left shoulder, M19.012 Primary osteoarthritis, left shoulder, Pain in left shoulder (M25.512), Pain in right shoulder (M25.511)  Precautions/Allergies: NKDA      Fall Risk Score: 0 (? 5 = High Risk)  MD Orders: Eval and treat, HEP, ROM, strengthening, aggressive motion B shoulders, full motion, full strength  3x/wk for 3 weeks MEDICAL/REFERRING DIAGNOSIS:  L shoulder pain    DATE OF ONSET: 10/4/18  REFERRING PHYSICIAN: Rufus Romberg., MD  RETURN PHYSICIAN APPOINTMENT: 18     INITIAL ASSESSMENT:  Ms. Antonia Meek  Is a 46year old R hand dominant female s/p EUA and manipulation of R shoulder, EUA and manipulation of L shoulder and L SAD, ADCR, and LOREN. She presents shoulder pain, decreased ROM in B shoulders, decreased strength in L shoulder and decreased functional use of B shoulders. She could benefit from PT to address deficits and work toward goals. PROBLEM LIST (Impacting functional limitations):  1. Decreased Strength  2. Decreased ADL/Functional Activities  3. Increased Pain  4. Decreased Flexibility/Joint Mobility INTERVENTIONS PLANNED:  1. Home Exercise Program (HEP)  2. Manual Therapy  3. Therapeutic Exercise/Strengthening  4. modals as needed   TREATMENT PLAN:  Effective Dates: 10/9/2018 TO 2019 (90 days). Frequency/Duration: 2-3 times a week for 90 Days  GOALS: (Goals have been discussed and agreed upon with patient.)  Patient's stated goal is to be able to use her arms. Short-Term Functional Goals: Time Frame: 6 weeks  1. Patient to be independent with HEP  2.  Patient to increase PROM L shoulder flex to 135 and PROM L shoulder ER to 30 degrees  3. Patient to report decrease in pain level to 5/10. Discharge Goals: Time Frame: 90 days  1. Patient to report no more than minimal shoulder pain with all activity  2. Patient to improve DASH score to 21 to demo improved use of B UEs  3. Patient to improve AROM B shoulder flex to 150 degrees for improved functional use. Rehabilitation Potential For Stated Goals: Good              The information in this section was collected on 10/9/18 (except where otherwise noted). HISTORY:   History of Present Injury/Illness (Reason for Referral):  Patient reports that shoulder pain started last year. She went to her family doctor and was sent to PT. They tried dry needling and MD injected her. She didn't get better and requested a second opinion. She was sent to Dr. Rosaura Alvarez and an MRI was done, and then she had surgery on 10/4/18. She was told to leave the dressings on until she returned to MD.   Past Medical History/Comorbidities: OA,  Benign hypertension with CKD (chronic kidney disease) stage III ([address] Diabetes ([address] Dyslipidemia; Endometrial cancer ([address] and Morbid obesity ([address] knee arthroscopy (Right, 2015); appendectomy (1979), L ankle surgery (2007) L index finger surgery (2005)  Social History/Living Environment:     Lives with family in one story home. 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 Dominant Side:         RIGHT  Previous Treatment Approaches:          PT prior to surgery  Current Medications:  GlipiZIDE, lisinopril, Norco    Date Last Reviewed:  10/29/18   EXAMINATION:   Observation/Orthostatic Postural Assessment:          Patient wearing sling incorrectly upon entry to clinic. It was adjusted appropriately before patient left. Gauze and Tegaderm covering incision sites.    ROM:          AROM R shoulder flex 135, Abduct 145, extn 50, ER 45 and IR to mid R buttock        PROM R shoulder flex 130, abduct 110, ER 35 at 90 degree abduct         PROM L shoulder flex 80, abduct 65, IR 50 (to abdomen) and ER -15 from neutral.   Strength:          R shoulder 4+ to 5/5 all directions. L shoulder not tested. Neurological Screen:        Sensation: light touch intact in B UEs   Body Structures Involved:  1. Joints  2. Muscles Body Functions Affected:  1. Neuromusculoskeletal  2. Movement Related Activities and Participation Affected:  1. General Tasks and Demands  2. Self Care  3. Community, Social and Civic Life   CLINICAL DECISION MAKING:   Outcome Measure: Tool Used: Disabilities of the Arm, Shoulder and Hand (DASH) Questionnaire - Quick Version  Score:  Initial: 55/55  Most Recent: X/55 (Date: -- )   Interpretation of Score: The DASH is designed to measure the activities of daily living in person's with upper extremity dysfunction or pain. Each section is scored on a 1-5 scale, 5 representing the greatest disability. The scores of each section are added together for a total score of 55. Medical Necessity:   · Patient demonstrates good rehab potential due to higher previous functional level. Reason for Services/Other Comments:  · Patient continues to require skilled intervention due to need to regain use of B UEs.            TREATMENT:   (In addition to Assessment/Re-Assessment sessions the following treatments were rendered)  Pre-treatment Symptoms/Complaints:  Patient reports she didn't sleep much last night. Shoulder was sore. Pain: Initial:   Pain Intensity 1: 7   Post Session: patient reported no change in pain at end of session. ,      Therapeutic Exercise: ( ):  none today     Date:  10/18/18 Date:  10/22/19 Date:  10/29/18   Activity/Exercise Parameters Parameters Parameters   B serratus punch next 2x10 2x10   Wand flex 1x10 1x10 1x10   Wand ER next L 1x10 L 1x10   Wand abduct  L 1 x 10 L 1x10   B bicep curls   1# 2x15   Wall sldies   B 1x10   IR rope stretch   B 1x10       HEP - continue current HEP  Manual Therapy ( 40 minutes) Grade 2 to 4 physio mobs B shoulder flex, abduct, IR and ER. Grade 2 to 4 inferior and posterior shoulder glides. Contract/relax technique to increase end range motion for L shoulder flex, IR and ER. Therapeutic Modalities: For pain. Left Shoulder Cold  Type: Cryocuff(with vasoneumatic compression)  Duration : 15 minutes  Patient Position: Sitting                                                                      . Treatment/Session Assessment:    · Response to Treatment:  Patient still limited in L shoulder ROM, but it seems to loosedn a little by end of session. · Compliance with Program/Exercises: yes  · Recommendations/Intent for next treatment session: \"Next visit will focus on motion and strength in B shoulders. \".     Total Treatment Duration: 55 minutes  PT Patient Time In/Time Out  Time In: 1007  Time Out: [address] [facility] Rosalee Mai, JEFF

## 2018-11-01 ENCOUNTER — HOSPITAL ENCOUNTER (OUTPATIENT)
Dept: PHYSICAL THERAPY | Age: 53
Discharge: HOME | End: 2018-11-01
Payer: COMMERCIAL

## 2018-11-01 PROCEDURE — 97140 MANUAL THERAPY 1/> REGIONS: CPT

## 2018-11-01 PROCEDURE — 97110 THERAPEUTIC EXERCISES: CPT

## 2018-11-06 ENCOUNTER — HOSPITAL ENCOUNTER (OUTPATIENT)
Dept: PHYSICAL THERAPY | Age: 53
Discharge: HOME | End: 2018-11-06
Payer: COMMERCIAL

## 2018-11-06 PROCEDURE — 97140 MANUAL THERAPY 1/> REGIONS: CPT

## 2018-11-06 PROCEDURE — 97110 THERAPEUTIC EXERCISES: CPT

## 2018-11-06 NOTE — PROGRESS NOTES
Arash Shamir  : 1965  Primary: [facility]*  Secondary:  [facility] ARTIE [facility]LAIR  [address] [facility], [address] [place] [address],[address], [facility] [address]  [phone]   [phone]          OUTPATIENT PHYSICAL THERAPY:Daily Note 2018   ICD-10: Treatment Diagnosis:  M75.01 Adhesive capsulitis of right shoulder, M75.02 Adhesive capsulitis of left shoulder, M19.012 Primary osteoarthritis, left shoulder, Pain in left shoulder (M25.512), Pain in right shoulder (M25.511)  Precautions/Allergies: NKDA      Fall Risk Score: 0 (? 5 = High Risk)  MD Orders: Eval and treat, HEP, ROM, strengthening, aggressive motion B shoulders, full motion, full strength  2-3x/wk for 3 weeks (written 18)    MEDICAL/REFERRING DIAGNOSIS:  L shoulder pain    DATE OF ONSET: 10/4/18  REFERRING PHYSICIAN: Galindo Barriga MD  RETURN PHYSICIAN APPOINTMENT: 18     ASSESSMENT:  Ms. Daisy Sorto  Is a 46year old R hand dominant female s/p EUA and manipulation of R shoulder, EUA and manipulation of L shoulder and L SAD, ADCR, and LOREN. She presented shoulder pain, decreased ROM in B shoulders, decreased strength in L shoulder and decreased functional use of B shoulders. Her ROM is improving in both arms though she continues to have pain. Her DASH score is improved. She could benefit from continued PT to address deficits and work toward goals. PROBLEM LIST (Impacting functional limitations):  1. Decreased Strength  2. Decreased ADL/Functional Activities  3. Increased Pain  4. Decreased Flexibility/Joint Mobility INTERVENTIONS PLANNED:  1. Home Exercise Program (HEP)  2. Manual Therapy  3. Therapeutic Exercise/Strengthening  4. modals as needed   TREATMENT PLAN:  Effective Dates: 10/9/2018 TO 2019 (90 days). Frequency/Duration: 2-3 times a week for 90 Days  GOALS: (Goals have been discussed and agreed upon with patient.)  Patient's stated goal is to be able to use her arms.    Short-Term Functional Goals: Time Frame: 6 weeks  1. Patient to be independent with HEP - MET  2. Patient to increase PROM L shoulder flex to 135 and PROM L shoulder ER to 30 degrees Almost MET (11/1/18)  130 degrees flex, 30 degrees ER  3. Patient to report decrease in pain level to 5/10. - partially MET - improving score, but not yet 5. Discharge Goals: Time Frame: 90 days - all in progress  1. Patient to report no more than minimal shoulder pain with all activity  2. Patient to improve DASH score to 21 to demo improved use of B UEs  3. Patient to improve AROM B shoulder flex to 150 degrees for improved functional use. Rehabilitation Potential For Stated Goals: Good              The information in this section was collected on 10/9/18 (except where otherwise noted). HISTORY:   History of Present Injury/Illness (Reason for Referral):  Patient reports that shoulder pain started last year. She went to her family doctor and was sent to PT. They tried dry needling and MD injected her. She didn't get better and requested a second opinion. She was sent to Dr. Rosina Coronado and an MRI was done, and then she had surgery on 10/4/18. She was told to leave the dressings on until she returned to MD.   Past Medical History/Comorbidities: OA,  Benign hypertension with CKD (chronic kidney disease) stage III ([address] Diabetes ([address] Dyslipidemia; Endometrial cancer ([address] and Morbid obesity ([address] knee arthroscopy (Right, 2015); appendectomy (1979), L ankle surgery (2007) L index finger surgery (2005)  Social History/Living Environment:     Lives with family in one story home. 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    Dominant Side:         RIGHT  Previous Treatment Approaches:          PT prior to surgery  Current Medications:  GlipiZIDE, lisinopril, Norco    Date Last Reviewed:  10/29/18   EXAMINATION:   Observation/Orthostatic Postural Assessment:          Patient wearing sling incorrectly upon entry to clinic. It was adjusted appropriately before patient left. Gauze and Tegaderm covering incision sites. ROM:          AROM R shoulder flex 135, Abduct 145, extn 50, ER 45 and IR to mid R buttock        PROM R shoulder flex 130, abduct 110, ER 35 at 90 degree abduct         PROM L shoulder flex 80, abduct 65, IR 50 (to abdomen) and ER -15 from neutral.           PROM R shoulder flex 160, abduct 140, ER 70 and IR 65 at 90 degree abduct (11/1/18)        PROM L shoulder flex 130, abduct 110, ER 30 and IR 50 at 90 degrees abduct (11/1/18)  Strength:          R shoulder 4+ to 5/5 all directions. L shoulder not tested. Neurological Screen:        Sensation: light touch intact in B UEs   Body Structures Involved:  1. Joints  2. Muscles Body Functions Affected:  1. Neuromusculoskeletal  2. Movement Related Activities and Participation Affected:  1. General Tasks and Demands  2. Self Care  3. Community, Social and Civic Life   CLINICAL DECISION MAKING:   Outcome Measure: Tool Used: Disabilities of the Arm, Shoulder and Hand (DASH) Questionnaire - Quick Version  Score:  Initial: 55/55  Most Recent: 41/55 (Date: 11/1/18 )   Interpretation of Score: The DASH is designed to measure the activities of daily living in person's with upper extremity dysfunction or pain. Each section is scored on a 1-5 scale, 5 representing the greatest disability. The scores of each section are added together for a total score of 55. Medical Necessity:   · Patient demonstrates good rehab potential due to higher previous functional level. Reason for Services/Other Comments:  · Patient continues to require skilled intervention due to need to regain use of B UEs.            TREATMENT:   (In addition to Assessment/Re-Assessment sessions the following treatments were rendered)  Pre-treatment Symptoms/Complaints:  Patient reports she is doing okay.  She returns to MD today in the middle of PT session and will then come back to PT afterward. The pain medicine she took over the weekend made her sick to her stomach (MD changed it and added Phenergan for nausea at today's appt). AFter seeing MD, she reported that he was pleased with her progress. Pain: Initial:   Pain Intensity 1: 8(\"7.5\")   Post Session: patient reported no change in pain at end of session. Therapeutic Exercise: (25 Minutes):  Exercises per grid below to improve mobility and strength. Required moderate visual and verbal cues to ensure correct performance. Progressed complexity of movement as indicated. Date:  10/18/18 Date:  10/22/19 Date:  10/29/18 Date  11/1/18 Date  11/6/18   Activity/Exercise Parameters Parameters Parameters     B serratus punch next 2x10 2x10 1# 2x15 R 2#, L 1#  1x15   Wand flex 1x10 1x10 1x10 1x15 1x15   Wand ER next L 1x10 L 1x10 - 1x15   Wand abduct  L 1 x 10 L 1x10 - 1x15   B bicep curls   1# 2x15 1# 2x15 R 2#, L 1#  1x15   Wall sldies   B 1x10 -    IR rope stretch   B 1x10 -    IR with band     Yellow 1x15 Yellow 2x15   ER with band     Yellow 1x15 yellow 2x15       HEP - continue current HEP  Manual Therapy ( 15 minutes) Grade 2 to 4 physio mobs B shoulder flex, abduct, IR and ER. Grade 2 to 4 inferior and posterior shoulder glides. Therapeutic Modalities: For pain - patient took ice with her at end of session. .   Treatment/Session Assessment:    · Response to Treatment:  Patient with no increase pain from exercises today. She reported that MD was pleased with her progress. · Compliance with Program/Exercises: yes  · Recommendations/Intent for next treatment session: \"Next visit will focus on motion and strength in B shoulders. \".     Total Treatment Duration: 40 minutes  PT Patient Time In/Time Out  Time In: 0907  Time Out: [address]

## 2018-11-07 ENCOUNTER — HOSPITAL ENCOUNTER (OUTPATIENT)
Dept: PHYSICAL THERAPY | Age: 53
Discharge: HOME | End: 2018-11-07
Payer: COMMERCIAL

## 2018-11-07 PROCEDURE — 97140 MANUAL THERAPY 1/> REGIONS: CPT

## 2018-11-07 NOTE — PROGRESS NOTES
Griffin Rodríguez  : 1965  Primary: [facility]  [facility]:  [facility], [address], [address] [facility]  [phone]   [phone]          OUTPATIENT PHYSICAL THERAPY:Daily Note 2018   ICD-10: Treatment Diagnosis:  M75.01 Adhesive capsulitis of right shoulder, M75.02 Adhesive capsulitis of left shoulder, M19.012 Primary osteoarthritis, left shoulder, Pain in left shoulder (M25.512), Pain in right shoulder (M25.511)  Precautions/Allergies: NKDA      Fall Risk Score: 0 (? 5 = High Risk)  MD Orders: Eval and treat, HEP, ROM, strengthening, aggressive motion B shoulders, full motion, full strength  2-3x/wk for 3 weeks (written 18)    MEDICAL/REFERRING DIAGNOSIS:  L shoulder    DATE OF ONSET: 10/4/18  REFERRING PHYSICIAN: Ceasar Benedict MD  RETURN PHYSICIAN APPOINTMENT: 18     ASSESSMENT:  Ms. Vani De La Rosa  Is a 46year old R hand dominant female s/p EUA and manipulation of R shoulder, EUA and manipulation of L shoulder and L SAD, ADCR, and LOREN. She presented shoulder pain, decreased ROM in B shoulders, decreased strength in L shoulder and decreased functional use of B shoulders. Her ROM is improving in both arms though she continues to have pain. Her DASH score is improved. She could benefit from continued PT to address deficits and work toward goals. PROBLEM LIST (Impacting functional limitations):  1. Decreased Strength  2. Decreased ADL/Functional Activities  3. Increased Pain  4. Decreased Flexibility/Joint Mobility INTERVENTIONS PLANNED:  1. Home Exercise Program (HEP)  2. Manual Therapy  3. Therapeutic Exercise/Strengthening  4. modals as needed   TREATMENT PLAN:  Effective Dates: 10/9/2018 TO 2019 (90 days). Frequency/Duration: 2-3 times a week for 90 Days  GOALS: (Goals have been discussed and agreed upon with patient.)  Patient's stated goal is to be able to use her arms.    Short-Term Functional Goals: Time Frame: 6 weeks  1. Patient to be independent with HEP - MET  2. Patient to increase PROM L shoulder flex to 135 and PROM L shoulder ER to 30 degrees Almost MET (11/1/18)  130 degrees flex, 30 degrees ER  3. Patient to report decrease in pain level to 5/10. - partially MET - improving score, but not yet 5. Discharge Goals: Time Frame: 90 days - all in progress  1. Patient to report no more than minimal shoulder pain with all activity  2. Patient to improve DASH score to 21 to demo improved use of B UEs  3. Patient to improve AROM B shoulder flex to 150 degrees for improved functional use. Rehabilitation Potential For Stated Goals: Good              The information in this section was collected on 10/9/18 (except where otherwise noted). HISTORY:   History of Present Injury/Illness (Reason for Referral):  Patient reports that shoulder pain started last year. She went to her family doctor and was sent to PT. They tried dry needling and MD injected her. She didn't get better and requested a second opinion. She was sent to Dr. Aminta Astudillo and an MRI was done, and then she had surgery on 10/4/18. She was told to leave the dressings on until she returned to MD.   Past Medical History/Comorbidities: OA,  Benign hypertension with CKD (chronic kidney disease) stage III ([address] Diabetes ([address] Dyslipidemia; Endometrial cancer ([address] and Morbid obesity ([address] knee arthroscopy (Right, 2015); appendectomy (1979), L ankle surgery (2007) L index finger surgery (2005)  Social History/Living Environment:     Lives with family in one story home. 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    Dominant Side:         RIGHT  Previous Treatment Approaches:          PT prior to surgery  Current Medications:  GlipiZIDE, lisinopril, Norco    Date Last Reviewed:  10/29/18   EXAMINATION:   Observation/Orthostatic Postural Assessment:          Patient wearing sling incorrectly upon entry to clinic. It was adjusted appropriately before patient left. Gauze and Tegaderm covering incision sites. ROM:          AROM R shoulder flex 135, Abduct 145, extn 50, ER 45 and IR to mid R buttock        PROM R shoulder flex 130, abduct 110, ER 35 at 90 degree abduct         PROM L shoulder flex 80, abduct 65, IR 50 (to abdomen) and ER -15 from neutral.           PROM R shoulder flex 160, abduct 140, ER 70 and IR 65 at 90 degree abduct (11/1/18)        PROM L shoulder flex 130, abduct 110, ER 30 and IR 50 at 90 degrees abduct (11/1/18)  Strength:          R shoulder 4+ to 5/5 all directions. L shoulder not tested. Neurological Screen:        Sensation: light touch intact in B UEs   Body Structures Involved:  1. Joints  2. Muscles Body Functions Affected:  1. Neuromusculoskeletal  2. Movement Related Activities and Participation Affected:  1. General Tasks and Demands  2. Self Care  3. Community, Social and Civic Life   CLINICAL DECISION MAKING:   Outcome Measure: Tool Used: Disabilities of the Arm, Shoulder and Hand (DASH) Questionnaire - Quick Version  Score:  Initial: 55/55  Most Recent: 41/55 (Date: 11/1/18 )   Interpretation of Score: The DASH is designed to measure the activities of daily living in person's with upper extremity dysfunction or pain. Each section is scored on a 1-5 scale, 5 representing the greatest disability. The scores of each section are added together for a total score of 55. Medical Necessity:   · Patient demonstrates good rehab potential due to higher previous functional level. Reason for Services/Other Comments:  · Patient continues to require skilled intervention due to need to regain use of B UEs.            TREATMENT:   (In addition to Assessment/Re-Assessment sessions the following treatments were rendered)  Pre-treatment Symptoms/Complaints:  Patient reports she is tired because she barely slept at all last night.  She goes back to work Monday on light duty. Pain: Initial:   Pain Intensity 1: 9(\"8 or 9\")   Post Session: patient rated pain as 7/10 at end of session. Therapeutic Exercise: ( ):  none today     Date:  10/18/18 Date:  10/22/19 Date:  10/29/18 Date  11/1/18 Date  11/6/18   Activity/Exercise Parameters Parameters Parameters     B serratus punch next 2x10 2x10 1# 2x15 R 2#, L 1#  1x15   Wand flex 1x10 1x10 1x10 1x15 1x15   Wand ER next L 1x10 L 1x10 - 1x15   Wand abduct  L 1 x 10 L 1x10 - 1x15   B bicep curls   1# 2x15 1# 2x15 R 2#, L 1#  1x15   Wall sldies   B 1x10 - -   IR rope stretch   B 1x10 - -   IR with band     Yellow 1x15 Yellow 2x15   ER with band     Yellow 1x15 yellow 2x15       HEP - continue current HEP  Manual Therapy ( 40 minutes) Grade 2 to 4 physio mobs B shoulder flex, abduct, IR and ER. Grade 2 to 4 inferior and posterior shoulder glides. L shoulder contract/relax at end range flex, IR and ER. Therapeutic Modalities: For pain  - Game Ready device with light compression and 42 degrees temperature. Left Shoulder Cold  Type: Cryocuff(with vasoneumatic compression)  Duration : 15 minutes  Patient Position: Sitting(long sitting with back support)                                                                      . Treatment/Session Assessment:    · Response to Treatment:  Patient with high pain levels today,but tolerated manual therapy well. Did not do exercises since she did them in therapy yesterday and is scheduled to return tomorrow. · Compliance with Program/Exercises: yes  · Recommendations/Intent for next treatment session: \"Next visit will focus on motion and strength in B shoulders. \".     Total Treatment Duration: 55 minutes  PT Patient Time In/Time Out  Time In: 0945  Time Out: [address] Krali Mendoza

## 2018-11-08 ENCOUNTER — HOSPITAL ENCOUNTER (OUTPATIENT)
Dept: PHYSICAL THERAPY | Age: 53
Discharge: HOME | End: 2018-11-08
Payer: COMMERCIAL

## 2018-11-08 PROCEDURE — 97140 MANUAL THERAPY 1/> REGIONS: CPT

## 2018-11-08 PROCEDURE — 97110 THERAPEUTIC EXERCISES: CPT

## 2018-11-08 NOTE — PROGRESS NOTES
Bishop Co  : 1965  Primary: [employer] [facility]*  Secondary:  [facility] at [facility] ARTIE BLAIR  [address] [place] [address], [address], [place] [address].  [phone]   [phone]          OUTPATIENT PHYSICAL THERAPY:Daily Note 2018   ICD-10: Treatment Diagnosis:  M75.01 Adhesive capsulitis of right shoulder, M75.02 Adhesive capsulitis of left shoulder, M19.012 Primary osteoarthritis, left shoulder, Pain in left shoulder (M25.512), Pain in right shoulder (M25.511)  Precautions/Allergies: NKDA      Fall Risk Score: 0 (? 5 = High Risk)  MD Orders: Eval and treat, HEP, ROM, strengthening, aggressive motion B shoulders, full motion, full strength  2-3x/wk for 3 weeks (written 18)    MEDICAL/REFERRING DIAGNOSIS:  L shoulder    DATE OF ONSET: 10/4/18  REFERRING PHYSICIAN: Razia Freeman MD  RETURN PHYSICIAN APPOINTMENT: 18     ASSESSMENT:  Ms. Ramsey Kay  Is a 46year old R hand dominant female s/p EUA and manipulation of R shoulder, EUA and manipulation of L shoulder and L SAD, ADCR, and LOREN. She presented shoulder pain, decreased ROM in B shoulders, decreased strength in L shoulder and decreased functional use of B shoulders. Her ROM is improving in both arms though she continues to have pain. Her DASH score is improved. She could benefit from continued PT to address deficits and work toward goals. PROBLEM LIST (Impacting functional limitations):  1. Decreased Strength  2. Decreased ADL/Functional Activities  3. Increased Pain  4. Decreased Flexibility/Joint Mobility INTERVENTIONS PLANNED:  1. Home Exercise Program (HEP)  2. Manual Therapy  3. Therapeutic Exercise/Strengthening  4. modals as needed   TREATMENT PLAN:  Effective Dates: 10/9/2018 TO 2019 (90 days). Frequency/Duration: 2-3 times a week for 90 Days  GOALS: (Goals have been discussed and agreed upon with patient.)  Patient's stated goal is to be able to use her arms.    Short-Term Functional Goals: Time Frame: 6 weeks  1. Patient to be independent with HEP - MET  2. Patient to increase PROM L shoulder flex to 135 and PROM L shoulder ER to 30 degrees Almost MET (11/1/18)  130 degrees flex, 30 degrees ER  3. Patient to report decrease in pain level to 5/10. - partially MET - improving score, but not yet 5. Discharge Goals: Time Frame: 90 days - all in progress  1. Patient to report no more than minimal shoulder pain with all activity  2. Patient to improve DASH score to 21 to demo improved use of B UEs  3. Patient to improve AROM B shoulder flex to 150 degrees for improved functional use. Rehabilitation Potential For Stated Goals: Good              The information in this section was collected on 10/9/18 (except where otherwise noted). HISTORY:   History of Present Injury/Illness (Reason for Referral):  Patient reports that shoulder pain started last year. She went to her family doctor and was sent to PT. They tried dry needling and MD injected her. She didn't get better and requested a second opinion. She was sent to Dr. Lissette Evans and an MRI was done, and then she had surgery on 10/4/18. She was told to leave the dressings on until she returned to MD.   Past Medical History/Comorbidities: OA,  Benign hypertension with CKD (chronic kidney disease) stage III ([address] Diabetes ([address] Dyslipidemia; Endometrial cancer ([address] and Morbid obesity ([address] knee arthroscopy (Right, 2015); appendectomy (1979), L ankle surgery (2007) L index finger surgery (2005)  Social History/Living Environment:     Lives with family in one story home. 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    Dominant Side:         RIGHT  Previous Treatment Approaches:          PT prior to surgery  Current Medications:  GlipiZIDE, lisinopril, Norco    Date Last Reviewed:  11/8/18   EXAMINATION:   Observation/Orthostatic Postural Assessment:          Patient wearing sling incorrectly upon entry to clinic. It was adjusted appropriately before patient left. Gauze and Tegaderm covering incision sites. ROM:          AROM R shoulder flex 135, Abduct 145, extn 50, ER 45 and IR to mid R buttock        PROM R shoulder flex 130, abduct 110, ER 35 at 90 degree abduct         PROM L shoulder flex 80, abduct 65, IR 50 (to abdomen) and ER -15 from neutral.           PROM R shoulder flex 160, abduct 140, ER 70 and IR 65 at 90 degree abduct (11/1/18)        PROM L shoulder flex 130, abduct 110, ER 30 and IR 50 at 90 degrees abduct (11/1/18)  Strength:          R shoulder 4+ to 5/5 all directions. L shoulder not tested. Neurological Screen:        Sensation: light touch intact in B UEs   Body Structures Involved:  1. Joints  2. Muscles Body Functions Affected:  1. Neuromusculoskeletal  2. Movement Related Activities and Participation Affected:  1. General Tasks and Demands  2. Self Care  3. Community, Social and Civic Life   CLINICAL DECISION MAKING:   Outcome Measure: Tool Used: Disabilities of the Arm, Shoulder and Hand (DASH) Questionnaire - Quick Version  Score:  Initial: 55/55  Most Recent: 41/55 (Date: 11/1/18 )   Interpretation of Score: The DASH is designed to measure the activities of daily living in person's with upper extremity dysfunction or pain. Each section is scored on a 1-5 scale, 5 representing the greatest disability. The scores of each section are added together for a total score of 55. Medical Necessity:   · Patient demonstrates good rehab potential due to higher previous functional level. Reason for Services/Other Comments:  · Patient continues to require skilled intervention due to need to regain use of B UEs.            TREATMENT:   (In addition to Assessment/Re-Assessment sessions the following treatments were rendered)  Pre-treatment Symptoms/Complaints:  Patient reports she took a pain pill yesterday and was able to sleep well last night.   She starts work on Monday. Pain: Initial:   Pain Intensity 1: 9   Post Session: patient rated pain as 7.5/10 at end of session. Therapeutic Exercise: (25 Minutes):  Exercises per grid below to improve mobility and strength. Required moderate visual and verbal cues to ensure correct performance. Progressed complexity of movement as indicated. Date:  10/18/18 Date:  10/22/19 Date:  10/29/18 Date  11/1/18 Date  11/6/18 Date  11/8/18   Activity/Exercise Parameters Parameters Parameters      B serratus punch next 2x10 2x10 1# 2x15 R 2#, L 1#  1x15 2# B 2x15   Wand flex 1x10 1x10 1x10 1x15 1x15 +1# 2x15   Wand ER next L 1x10 L 1x10 - 1x15 -   Wand abduct  L 1 x 10 L 1x10 - 1x15 -   B bicep curls   1# 2x15 1# 2x15 R 2#, L 1#  1x15 2# 2x15   Wall slides   B 1x10 - - 2x10   IR rope stretch   B 1x10 - -    IR with band     Yellow 1x15 Yellow 2x15 Yellow 2x15   ER with band     Yellow 1x15 yellow 2x15 Yellow 2x15   Side lying abduct      1# 2x15   Side lying ER      1# 2x15       HEP - continue current HEP  Manual Therapy ( 15 minutes) Grade 2 to 4 physio mobs B shoulder flex, abduct, IR and ER. Grade 2 to 4 inferior and posterior shoulder glides. Therapeutic Modalities: For pain  - Game Ready device with light compression and 42 degrees temperature. Left Shoulder Cold  Type: Cryocuff(with vasoneumatic compression)  Duration : 15 minutes  Patient Position: Sitting                                                                      . Treatment/Session Assessment:    · Response to Treatment:  Patient wwith high pain levels but gradually progressing in strength. She returns to work on light duty on Monday. · Compliance with Program/Exercises: yes  · Recommendations/Intent for next treatment session: \"Next visit will focus on motion and strength in B shoulders. \".     Total Treatment Duration: 55 minutes  PT Patient Time In/Time Out  Time In: 0900  Time Out: [address] [facility]

## 2018-11-13 ENCOUNTER — HOSPITAL ENCOUNTER (OUTPATIENT)
Dept: PHYSICAL THERAPY | Age: 53
Discharge: HOME | End: 2018-11-13
Payer: COMMERCIAL

## 2018-11-13 PROCEDURE — 97140 MANUAL THERAPY 1/> REGIONS: CPT

## 2018-11-13 NOTE — PROGRESS NOTES
Rosetta Sylwia  : 1965  Primary: [facility]*  Secondary:  Therapy Center at [address] [place] [address] Rd  [address] [facility], [address] [place] [address] [address], [place] [address].  [phone]   [phone]          OUTPATIENT PHYSICAL THERAPY:Daily Note 2018   ICD-10: Treatment Diagnosis:  M75.01 Adhesive capsulitis of right shoulder, M75.02 Adhesive capsulitis of left shoulder, M19.012 Primary osteoarthritis, left shoulder, Pain in left shoulder (M25.512), Pain in right shoulder (M25.511)  Precautions/Allergies: NKDA      Fall Risk Score: 0 (? 5 = High Risk)  MD Orders: Eval and treat, HEP, ROM, strengthening, aggressive motion B shoulders, full motion, full strength  2-3x/wk for 3 weeks (written 18)    MEDICAL/REFERRING DIAGNOSIS:  L shoulder    DATE OF ONSET: 10/4/18  REFERRING PHYSICIAN: Loreta Goldberg., MD  RETURN PHYSICIAN APPOINTMENT: 18     ASSESSMENT:  Ms. Padmini Pate  Is a 46year old R hand dominant female s/p EUA and manipulation of R shoulder, EUA and manipulation of L shoulder and L SAD, ADCR, and LOREN. She presented shoulder pain, decreased ROM in B shoulders, decreased strength in L shoulder and decreased functional use of B shoulders. Her ROM is improving in both arms though she continues to have pain. Her DASH score is improved. She could benefit from continued PT to address deficits and work toward goals. PROBLEM LIST (Impacting functional limitations):  1. Decreased Strength  2. Decreased ADL/Functional Activities  3. Increased Pain  4. Decreased Flexibility/Joint Mobility INTERVENTIONS PLANNED:  1. Home Exercise Program (HEP)  2. Manual Therapy  3. Therapeutic Exercise/Strengthening  4. modals as needed   TREATMENT PLAN:  Effective Dates: 10/9/2018 TO 2019 (90 days). Frequency/Duration: 2-3 times a week for 90 Days  GOALS: (Goals have been discussed and agreed upon with patient.)  Patient's stated goal is to be able to use her arms.    Short-Term Functional Goals: Time Frame: 6 weeks  1. Patient to be independent with HEP - MET  2. Patient to increase PROM L shoulder flex to 135 and PROM L shoulder ER to 30 degrees Almost MET (11/1/18)  130 degrees flex, 30 degrees ER  3. Patient to report decrease in pain level to 5/10. - partially MET - improving score, but not yet 5. Discharge Goals: Time Frame: 90 days - all in progress  1. Patient to report no more than minimal shoulder pain with all activity  2. Patient to improve DASH score to 21 to demo improved use of B UEs  3. Patient to improve AROM B shoulder flex to 150 degrees for improved functional use. Rehabilitation Potential For Stated Goals: Good              The information in this section was collected on 10/9/18 (except where otherwise noted). HISTORY:   History of Present Injury/Illness (Reason for Referral):  Patient reports that shoulder pain started last year. She went to her family doctor and was sent to PT. They tried dry needling and MD injected her. She didn't get better and requested a second opinion. She was sent to Dr. Yari Daly and an MRI was done, and then she had surgery on 10/4/18. She was told to leave the dressings on until she returned to MD.   Past Medical History/Comorbidities: OA,  Benign hypertension with CKD (chronic kidney disease) stage III ([address] Diabetes ([address] Dyslipidemia; Endometrial cancer ([address] and Morbid obesity ([address] knee arthroscopy (Right, 2015); appendectomy (1979), L ankle surgery (2007) L index finger surgery (2005)  Social History/Living Environment:     Lives with family in one story home. 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    Dominant Side:         RIGHT  Previous Treatment Approaches:          PT prior to surgery  Current Medications:  GlipiZIDE, lisinopril, Norco, Meloxicam, sleep medicine    Date Last Reviewed:  11/13/18   EXAMINATION:   Observation/Orthostatic Postural Assessment:          No new (11/13/18)  ROM:          AROM R shoulder flex 135, Abduct 145, extn 50, ER 45 and IR to mid R buttock        PROM R shoulder flex 130, abduct 110, ER 35 at 90 degree abduct         PROM L shoulder flex 80, abduct 65, IR 50 (to abdomen) and ER -15 from neutral.           PROM R shoulder flex 160, abduct 140, ER 70 and IR 65 at 90 degree abduct (11/1/18)        PROM L shoulder flex 130, abduct 110, ER 30 and IR 50 at 90 degrees abduct (11/1/18)  Strength:          R shoulder 4+ to 5/5 all directions. L shoulder not tested. Neurological Screen:        Sensation: light touch intact in B UEs   Body Structures Involved:  1. Joints  2. Muscles Body Functions Affected:  1. Neuromusculoskeletal  2. Movement Related Activities and Participation Affected:  1. General Tasks and Demands  2. Self Care  3. Community, Social and Civic Life   CLINICAL DECISION MAKING:   Outcome Measure: Tool Used: Disabilities of the Arm, Shoulder and Hand (DASH) Questionnaire - Quick Version  Score:  Initial: 55/55  Most Recent: 41/55 (Date: 11/1/18 )   Interpretation of Score: The DASH is designed to measure the activities of daily living in person's with upper extremity dysfunction or pain. Each section is scored on a 1-5 scale, 5 representing the greatest disability. The scores of each section are added together for a total score of 55. Medical Necessity:   · Patient demonstrates good rehab potential due to higher previous functional level. Reason for Services/Other Comments:  · Patient continues to require skilled intervention due to need to regain use of B UEs.            TREATMENT:   (In addition to Assessment/Re-Assessment sessions the following treatments were rendered)  Pre-treatment Symptoms/Complaints:  Patient reports she returned to work yesterday and it was really hard. She doesn't know if they really have light duty for her.  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 She may talk to doctor about being off of work a little longer since they don't seem to be giving her light duty. Pain: Initial:   Pain Intensity 1: 8   Post Session: patient rated pain as 'slightly less' at end of session but she did not assign a numeric value to her pain level. Therapeutic Exercise: ( ):  none today     Date:  10/29/18 Date  11/1/18 Date  11/6/18 Date  11/8/18 Date      Activity/Exercise Parameters       B serratus punch 2x10 1# 2x15 R 2#, L 1#  1x15 2# B 2x15    Wand flex 1x10 1x15 1x15 +1# 2x15    Wand ER L 1x10 - 1x15 -    Wand abduct L 1x10 - 1x15 -    B bicep curls 1# 2x15 1# 2x15 R 2#, L 1#  1x15 2# 2x15    Wall slides B 1x10 - - 2x10    IR rope stretch B 1x10 - -     IR with band   Yellow 1x15 Yellow 2x15 Yellow 2x15    ER with band   Yellow 1x15 yellow 2x15 Yellow 2x15    Side lying abduct    1# 2x15    Side lying ER    1# 2x15        HEP - continue current HEP  Manual Therapy ( 40 minutes) Grade 2 to 4 physio mobs B shoulder flex, abduct, IR and ER. Grade 2 to 4 inferior and posterior shoulder glides. Therapeutic Modalities: For pain  - Game Ready device with light compression and 42 degrees temperature. Left Shoulder Cold  Type: Cryocuff(with vasoneumatic compression)  Duration : 15 minutes  Patient Position: Sitting                                                                      . Treatment/Session Assessment:    · Response to Treatment:  Patient wwith high pain levels today. She returned to work on Monday but feels that it is not really light duty and her shoulder is very sore. Focused on ROM today as shoulder was sore and seemed stiff. · Compliance with Program/Exercises: yes  · Recommendations/Intent for next treatment session: \"Next visit will focus on motion and strength in B shoulders. \".     Total Treatment Duration: 55 minutes  PT Patient Time In/Time Out  Time In: 1640  Time Out: 214 [address] Jose Armando Nielsen

## 2018-11-14 ENCOUNTER — HOSPITAL ENCOUNTER (OUTPATIENT)
Dept: PHYSICAL THERAPY | Age: 53
Discharge: HOME | End: 2018-11-14
Payer: COMMERCIAL

## 2018-11-14 PROCEDURE — 97140 MANUAL THERAPY 1/> REGIONS: CPT

## 2018-11-14 PROCEDURE — 97110 THERAPEUTIC EXERCISES: CPT

## 2018-11-14 NOTE — PROGRESS NOTES
Crispin Page  : 1965  Primary: [facility]  Secondary:  [facility]rapy Center at [address], [address], [facility] [place].  [phone]   [phone]          OUTPATIENT PHYSICAL THERAPY:Daily Note 2018   ICD-10: Treatment Diagnosis:  M75.01 Adhesive capsulitis of right shoulder, M75.02 Adhesive capsulitis of left shoulder, M19.012 Primary osteoarthritis, left shoulder, Pain in left shoulder (M25.512), Pain in right shoulder (M25.511)  Precautions/Allergies: NKDA      Fall Risk Score: 0 (? 5 = High Risk)  MD Orders: Eval and treat, HEP, ROM, strengthening, aggressive motion B shoulders, full motion, full strength  2-3x/wk for 3 weeks (written 18)    MEDICAL/REFERRING DIAGNOSIS:  L shoulder    DATE OF ONSET: 10/4/18  REFERRING PHYSICIAN: Tobi Blood MD  RETURN PHYSICIAN APPOINTMENT: 18     ASSESSMENT:  Ms. Najera  Is a 46year old R hand dominant female s/p EUA and manipulation of R shoulder, EUA and manipulation of L shoulder and L SAD, ADCR, and LOREN. She presented shoulder pain, decreased ROM in B shoulders, decreased strength in L shoulder and decreased functional use of B shoulders. Her ROM is improving in both arms though she continues to have pain. Her DASH score is improved. She could benefit from continued PT to address deficits and work toward goals. PROBLEM LIST (Impacting functional limitations):  1. Decreased Strength  2. Decreased ADL/Functional Activities  3. Increased Pain  4. Decreased Flexibility/Joint Mobility INTERVENTIONS PLANNED:  1. Home Exercise Program (HEP)  2. Manual Therapy  3. Therapeutic Exercise/Strengthening  4. modals as needed   TREATMENT PLAN:  Effective Dates: 10/9/2018 TO 2019 (90 days). Frequency/Duration: 2-3 times a week for 90 Days  GOALS: (Goals have been discussed and agreed upon with patient.)  Patient's stated goal is to be able to use her arms.    Short-Term Functional Goals: Time Frame: 6 weeks  1. Patient to be independent with HEP - MET  2. Patient to increase PROM L shoulder flex to 135 and PROM L shoulder ER to 30 degrees Almost MET (11/1/18)  130 degrees flex, 30 degrees ER  3. Patient to report decrease in pain level to 5/10. - partially MET - improving score, but not yet 5. Discharge Goals: Time Frame: 90 days - all in progress  1. Patient to report no more than minimal shoulder pain with all activity  2. Patient to improve DASH score to 21 to demo improved use of B UEs  3. Patient to improve AROM B shoulder flex to 150 degrees for improved functional use. Rehabilitation Potential For Stated Goals: Good              The information in this section was collected on 10/9/18 (except where otherwise noted). HISTORY:   History of Present Injury/Illness (Reason for Referral):  Patient reports that shoulder pain started last year. She went to her family doctor and was sent to PT. They tried dry needling and MD injected her. She didn't get better and requested a second opinion. She was sent to Dr. Carlos Velázquez and an MRI was done, and then she had surgery on 10/4/18. She was told to leave the dressings on until she returned to MD.   Past Medical History/Comorbidities: OA,  Benign hypertension with CKD (chronic kidney disease) stage III ([address] Diabetes ([address] Dyslipidemia; Endometrial cancer ([address] and Morbid obesity ([address] knee arthroscopy (Right, 2015); appendectomy (1979), L ankle surgery (2007) L index finger surgery (2005)  Social History/Living Environment:     Lives with family in one story home. 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    Dominant Side:         RIGHT  Previous Treatment Approaches:          PT prior to surgery  Current Medications:  GlipiZIDE, lisinopril, Norco, Meloxicam, sleep medicine    Date Last Reviewed:  11/13/18   EXAMINATION:   Observation/Orthostatic Postural Assessment:          No new (11/13/18)  ROM:          AROM R shoulder flex 135, Abduct 145, extn 50, ER 45 and IR to mid R buttock        PROM R shoulder flex 130, abduct 110, ER 35 at 90 degree abduct         PROM L shoulder flex 80, abduct 65, IR 50 (to abdomen) and ER -15 from neutral.           PROM R shoulder flex 160, abduct 140, ER 70 and IR 65 at 90 degree abduct (11/1/18)        PROM L shoulder flex 130, abduct 110, ER 30 and IR 50 at 90 degrees abduct (11/1/18)  Strength:          R shoulder 4+ to 5/5 all directions. L shoulder not tested. Neurological Screen:        Sensation: light touch intact in B UEs   Body Structures Involved:  1. Joints  2. Muscles Body Functions Affected:  1. Neuromusculoskeletal  2. Movement Related Activities and Participation Affected:  1. General Tasks and Demands  2. Self Care  3. Community, Social and Civic Life   CLINICAL DECISION MAKING:   Outcome Measure: Tool Used: Disabilities of the Arm, Shoulder and Hand (DASH) Questionnaire - Quick Version  Score:  Initial: 55/55  Most Recent: 41/55 (Date: 11/1/18 )   Interpretation of Score: The DASH is designed to measure the activities of daily living in person's with upper extremity dysfunction or pain. Each section is scored on a 1-5 scale, 5 representing the greatest disability. The scores of each section are added together for a total score of 55. Medical Necessity:   · Patient demonstrates good rehab potential due to higher previous functional level. Reason for Services/Other Comments:  · Patient continues to require skilled intervention due to need to regain use of B UEs.            TREATMENT:   (In addition to Assessment/Re-Assessment sessions the following treatments were rendered)  Pre-treatment Symptoms/Complaints:  Patient reports that work was not nearly as bad today. She was handling lighter parts. She tried to talk to her  about yesterday, but he was busy in meetings when she tried.     Pain: Initial:   Pain Intensity 1: 7(\"6.5\"  )   Post Session: patient rated pain as 'slightly less' at end of session      Therapeutic Exercise: (25 Minutes):  Exercises per grid below to improve mobility and strength. Required moderate visual and verbal cues to ensure correct performance. Progressed resistance and complexity of movement as indicated. Date:  10/29/18 Date  11/1/18 Date  11/6/18 Date  11/8/18 Date  11/14/18    Activity/Exercise Parameters       B serratus punch 2x10 1# 2x15 R 2#, L 1#  1x15 2# B 2x15 2# B 2x15   Wand flex 1x10 1x15 1x15 +1# 2x15 +2# 2x15   Wand ER L 1x10 - 1x15 - -   Wand abduct L 1x10 - 1x15 - -   B bicep curls 1# 2x15 1# 2x15 R 2#, L 1#  1x15 2# 2x15 2# 2x15   Wall slides B 1x10 - - 2x10 2x10   IR rope stretch B 1x10 - -  -   IR with band   Yellow 1x15 Yellow 2x15 Yellow 2x15 Yellow 2x15   ER with band   Yellow 1x15 yellow 2x15 Yellow 2x15 Yellow 2x15   Side lying abduct    1# 2x15 2# 2x15   Side lying ER    1# 2x15 2# 2x15   Wall push ups     2x10   B Lat pull downs     7# 2x15   B Cable rows     7# 2x15               HEP - continue current HEP  Manual Therapy ( 15 minutes) Grade 2 to 4 physio mobs B shoulder flex, abduct, IR and ER. Grade 2 to 4 inferior and posterior shoulder glides. Therapeutic Modalities:  Patient declined ice                                                                                              . Treatment/Session Assessment:    · Response to Treatment:  Patient able to resume exercises and progress today. Works hard. L shoulder is still stiff with manual therapy. · Compliance with Program/Exercises: yes  · Recommendations/Intent for next treatment session: \"Next visit will focus on motion and strength in B shoulders. \".     Total Treatment Duration: 40 minutes  PT Patient Time In/Time Out  Time In: 1645  Time Out: 612 [facility]

## 2018-11-19 ENCOUNTER — HOSPITAL ENCOUNTER (OUTPATIENT)
Dept: PHYSICAL THERAPY | Age: 53
Discharge: HOME | End: 2018-11-19
Payer: COMMERCIAL

## 2018-11-19 PROCEDURE — 97140 MANUAL THERAPY 1/> REGIONS: CPT

## 2018-11-19 PROCEDURE — 97110 THERAPEUTIC EXERCISES: CPT

## 2018-11-19 NOTE — PROGRESS NOTES
Crispin Page  : 1965  Primary: [facility]  Secondary:  Therapy Center at [address], [address], [place] [address].  [phone]   [phone]          OUTPATIENT PHYSICAL THERAPY:Daily Note 2018   ICD-10: Treatment Diagnosis:  M75.01 Adhesive capsulitis of right shoulder, M75.02 Adhesive capsulitis of left shoulder, M19.012 Primary osteoarthritis, left shoulder, Pain in left shoulder (M25.512), Pain in right shoulder (M25.511)  Precautions/Allergies: NKDA      Fall Risk Score: 0 (? 5 = High Risk)  MD Orders: Eval and treat, HEP, ROM, strengthening, aggressive motion B shoulders, full motion, full strength  2-3x/wk for 3 weeks (written 18)    MEDICAL/REFERRING DIAGNOSIS:  L shoulder    DATE OF ONSET: 10/4/18  REFERRING PHYSICIAN: Tobi Blood MD  RETURN PHYSICIAN APPOINTMENT: 18     ASSESSMENT:  Ms. Najera  Is a 46year old R hand dominant female s/p EUA and manipulation of R shoulder, EUA and manipulation of L shoulder and L SAD, ADCR, and LOREN. She presented shoulder pain, decreased ROM in B shoulders, decreased strength in L shoulder and decreased functional use of B shoulders. Her ROM is improving in both arms though she continues to have pain. Her DASH score is improved. She could benefit from continued PT to address deficits and work toward goals. PROBLEM LIST (Impacting functional limitations):  1. Decreased Strength  2. Decreased ADL/Functional Activities  3. Increased Pain  4. Decreased Flexibility/Joint Mobility INTERVENTIONS PLANNED:  1. Home Exercise Program (HEP)  2. Manual Therapy  3. Therapeutic Exercise/Strengthening  4. modals as needed   TREATMENT PLAN:  Effective Dates: 10/9/2018 TO 2019 (90 days). Frequency/Duration: 2-3 times a week for 90 Days  GOALS: (Goals have been discussed and agreed upon with patient.)  Patient's stated goal is to be able to use her arms.    Short-Term Functional Goals: Time Frame: 6 weeks  1. Patient to be independent with HEP - MET  2. Patient to increase PROM L shoulder flex to 135 and PROM L shoulder ER to 30 degrees Almost MET (11/1/18)  130 degrees flex, 30 degrees ER  3. Patient to report decrease in pain level to 5/10. - partially MET - improving score, but not yet 5. Discharge Goals: Time Frame: 90 days - all in progress  1. Patient to report no more than minimal shoulder pain with all activity  2. Patient to improve DASH score to 21 to demo improved use of B UEs  3. Patient to improve AROM B shoulder flex to 150 degrees for improved functional use. Rehabilitation Potential For Stated Goals: Good              The information in this section was collected on 10/9/18 (except where otherwise noted). HISTORY:   History of Present Injury/Illness (Reason for Referral):  Patient reports that shoulder pain started last year. She went to her family doctor and was sent to PT. They tried dry needling and MD injected her. She didn't get better and requested a second opinion. She was sent to Dr. Iqra Huerta and an MRI was done, and then she had surgery on 10/4/18. She was told to leave the dressings on until she returned to MD.   Past Medical History/Comorbidities: OA,  Benign hypertension with CKD (chronic kidney disease) stage III ([address] Diabetes ([address] Dyslipidemia; Endometrial cancer ([address] and Morbid obesity ([address] knee arthroscopy (Right, 2015); appendectomy (1979), L ankle surgery (2007) L index finger surgery (2005)  Social History/Living Environment:     Lives with family in one story home. 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    Dominant Side:         RIGHT  Previous Treatment Approaches:          PT prior to surgery  Current Medications:  GlipiZIDE, lisinopril, Norco, Meloxicam, sleep medicine    Date Last Reviewed:  11/19/18   EXAMINATION:   Observation/Orthostatic Postural Assessment:          No new (11/13/18)  ROM:          AROM R shoulder flex 135, Abduct 145, extn 50, ER 45 and IR to mid R buttock        PROM R shoulder flex 130, abduct 110, ER 35 at 90 degree abduct         PROM L shoulder flex 80, abduct 65, IR 50 (to abdomen) and ER -15 from neutral.           PROM R shoulder flex 160, abduct 140, ER 70 and IR 65 at 90 degree abduct (11/1/18)        PROM L shoulder flex 130, abduct 110, ER 30 and IR 50 at 90 degrees abduct (11/1/18)  Strength:          R shoulder 4+ to 5/5 all directions. L shoulder not tested. Neurological Screen:        Sensation: light touch intact in B UEs   Body Structures Involved:  1. Joints  2. Muscles Body Functions Affected:  1. Neuromusculoskeletal  2. Movement Related Activities and Participation Affected:  1. General Tasks and Demands  2. Self Care  3. Community, Social and Civic Life   CLINICAL DECISION MAKING:   Outcome Measure: Tool Used: Disabilities of the Arm, Shoulder and Hand (DASH) Questionnaire - Quick Version  Score:  Initial: 55/55  Most Recent: 41/55 (Date: 11/1/18 )   Interpretation of Score: The DASH is designed to measure the activities of daily living in person's with upper extremity dysfunction or pain. Each section is scored on a 1-5 scale, 5 representing the greatest disability. The scores of each section are added together for a total score of 55. Medical Necessity:   · Patient demonstrates good rehab potential due to higher previous functional level. Reason for Services/Other Comments:  · Patient continues to require skilled intervention due to need to regain use of B UEs.            TREATMENT:   (In addition to Assessment/Re-Assessment sessions the following treatments were rendered)  Pre-treatment Symptoms/Complaints:  Patient reports that she called MD office and got a note about work. She worked this morning but this is her last day until next Tuesday when she goes back to Dr. Rosina Coronado.  Work was making her shoulder too sore because of its repetitive nature. Pain: Initial:   Pain Intensity 1: 8   Post Session: patient did not rate pain at end of session. Therapeutic Exercise: (25 Minutes):  Exercises per grid below to improve mobility and strength. Required moderate visual and verbal cues to ensure correct performance. Progressed resistance and complexity of movement as indicated. Date  11/1/18 Date  11/6/18 Date  11/8/18 Date  11/14/18  Date  11/19/18   Activity/Exercise        B serratus punch 1# 2x15 R 2#, L 1#  1x15 2# B 2x15 2# B 2x15 2# B 2x15   Wand flex 1x15 1x15 +1# 2x15 +2# 2x15 -   Wand ER - 1x15 - - -   Wand abduct - 1x15 - - -   B bicep curls 1# 2x15 R 2#, L 1#  1x15 2# 2x15 2# 2x15 2# 2x15   Wall slides - - 2x10 2x10 -   IR with band  Yellow 1x15 Yellow 2x15 Yellow 2x15 Yellow 2x15 Red 2x15   ER with band  Yellow 1x15 yellow 2x15 Yellow 2x15 Yellow 2x15 Red 2x15   Side lying abduct   1# 2x15 2# 2x15 -   Side lying ER   1# 2x15 2# 2x15 -   Wall push ups    2x10  2x15   B Lat pull downs    7# 2x15 7# 2x15   B Cable rows    7# 2x15 7# 2x15   B shldr flex with symmetry     1# 2x10   B shldr abduct to <= 90     1# 2x10       HEP - continue current HEP  Manual Therapy ( 15 minutes) Grade 2 to 4 physio mobs L shoulder flex, abduct, IR and ER. Grade 2 to 4 inferior and posterior shoulder glides. Therapeutic Modalities:  Patient declined ice                                                                                              . Treatment/Session Assessment:    · Response to Treatment:  Patient was sore from work, but worked hard at exercises. She is to remain off work now until she returns to MD next week. · Compliance with Program/Exercises: yes  · Recommendations/Intent for next treatment session: \"Next visit will focus on motion and strength in B shoulders. \".     Total Treatment Duration: 40 minutes  PT Patient Time In/Time Out  Time In: 4534  Time Out: 929 [facility]

## 2018-11-20 ENCOUNTER — HOSPITAL ENCOUNTER (OUTPATIENT)
Dept: PHYSICAL THERAPY | Age: 53
Discharge: HOME | End: 2018-11-20
Payer: COMMERCIAL

## 2018-11-20 PROCEDURE — 97140 MANUAL THERAPY 1/> REGIONS: CPT

## 2018-11-20 NOTE — PROGRESS NOTES
Keisha Oscar  : 1965  Primary: [facility]  Secondary:  [facility] ARTIE BLAIR  [address] [place] [address], [address], [place] [address].  [phone]   [phone]          OUTPATIENT PHYSICAL THERAPY:Daily Note 2018   ICD-10: Treatment Diagnosis:  M75.01 Adhesive capsulitis of right shoulder, M75.02 Adhesive capsulitis of left shoulder, M19.012 Primary osteoarthritis, left shoulder, Pain in left shoulder (M25.512), Pain in right shoulder (M25.511)  Precautions/Allergies: NKDA      Fall Risk Score: 0 (? 5 = High Risk)  MD Orders: Eval and treat, HEP, ROM, strengthening, aggressive motion B shoulders, full motion, full strength  2-3x/wk for 3 weeks (written 18)    MEDICAL/REFERRING DIAGNOSIS:  L shoulder    DATE OF ONSET: 10/4/18  REFERRING PHYSICIAN: Gela Ibanez MD  RETURN PHYSICIAN APPOINTMENT: 18     ASSESSMENT:  Ms. [facility]  Is a 46year old R hand dominant female s/p EUA and manipulation of R shoulder, EUA and manipulation of L shoulder and L SAD, ADCR, and LOREN. She presented shoulder pain, decreased ROM in B shoulders, decreased strength in L shoulder and decreased functional use of B shoulders. Her ROM is improving in both arms though she continues to have pain. Her DASH score is improved. She could benefit from continued PT to address deficits and work toward goals. PROBLEM LIST (Impacting functional limitations):  1. Decreased Strength  2. Decreased ADL/Functional Activities  3. Increased Pain  4. Decreased Flexibility/Joint Mobility INTERVENTIONS PLANNED:  1. Home Exercise Program (HEP)  2. Manual Therapy  3. Therapeutic Exercise/Strengthening  4. modals as needed   TREATMENT PLAN:  Effective Dates: 10/9/2018 TO 2019 (90 days). Frequency/Duration: 2-3 times a week for 90 Days  GOALS: (Goals have been discussed and agreed upon with patient.)  Patient's stated goal is to be able to use her arms.    Short-Term Functional Goals: Time Frame: 6 weeks  1. Patient to be independent with HEP - MET  2. Patient to increase PROM L shoulder flex to 135 and PROM L shoulder ER to 30 degrees Almost MET (11/1/18)  130 degrees flex, 30 degrees ER  3. Patient to report decrease in pain level to 5/10. - partially MET - improving score, but not yet 5. Discharge Goals: Time Frame: 90 days - all in progress  1. Patient to report no more than minimal shoulder pain with all activity  2. Patient to improve DASH score to 21 to demo improved use of B UEs  3. Patient to improve AROM B shoulder flex to 150 degrees for improved functional use. Rehabilitation Potential For Stated Goals: Good              The information in this section was collected on 10/9/18 (except where otherwise noted). HISTORY:   History of Present Injury/Illness (Reason for Referral):  Patient reports that shoulder pain started last year. She went to her family doctor and was sent to PT. They tried dry needling and MD injected her. She didn't get better and requested a second opinion. She was sent to Dr. Jeanie Gruber and an MRI was done, and then she had surgery on 10/4/18. She was told to leave the dressings on until she returned to MD.   Past Medical History/Comorbidities: OA,  Benign hypertension with CKD (chronic kidney disease) stage III ([address] Diabetes ([address] Dyslipidemia; Endometrial cancer ([address] and Morbid obesity ([address] knee arthroscopy (Right, 2015); appendectomy (1979), L ankle surgery (2007) L index finger surgery (2005)  Social History/Living Environment:     Lives with family in one story home. 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    Dominant Side:         RIGHT  Previous Treatment Approaches:          PT prior to surgery  Current Medications:  GlipiZIDE, lisinopril, Norco, Meloxicam, sleep medicine    Date Last Reviewed:  11/19/18   EXAMINATION:   Observation/Orthostatic Postural Assessment:          No new (11/13/18)  ROM:          AROM R shoulder flex 135, Abduct 145, extn 50, ER 45 and IR to mid R buttock        PROM R shoulder flex 130, abduct 110, ER 35 at 90 degree abduct         PROM L shoulder flex 80, abduct 65, IR 50 (to abdomen) and ER -15 from neutral.           PROM R shoulder flex 160, abduct 140, ER 70 and IR 65 at 90 degree abduct (11/1/18)        PROM L shoulder flex 130, abduct 110, ER 30 and IR 50 at 90 degrees abduct (11/1/18)  Strength:          R shoulder 4+ to 5/5 all directions. L shoulder not tested. Neurological Screen:        Sensation: light touch intact in B UEs   Body Structures Involved:  1. Joints  2. Muscles Body Functions Affected:  1. Neuromusculoskeletal  2. Movement Related Activities and Participation Affected:  1. General Tasks and Demands  2. Self Care  3. Community, Social and Civic Life   CLINICAL DECISION MAKING:   Outcome Measure: Tool Used: Disabilities of the Arm, Shoulder and Hand (DASH) Questionnaire - Quick Version  Score:  Initial: 55/55  Most Recent: 41/55 (Date: 11/1/18 )   Interpretation of Score: The DASH is designed to measure the activities of daily living in person's with upper extremity dysfunction or pain. Each section is scored on a 1-5 scale, 5 representing the greatest disability. The scores of each section are added together for a total score of 55. Medical Necessity:   · Patient demonstrates good rehab potential due to higher previous functional level. Reason for Services/Other Comments:  · Patient continues to require skilled intervention due to need to regain use of B UEs.            TREATMENT:   (In addition to Assessment/Re-Assessment sessions the following treatments were rendered)  Pre-treatment Symptoms/Complaints:  Patient reports that her shoulder is sore from the exercises yesterday. No new problems. Pain: Initial:   Pain Intensity 1: 8   Post Session: No change in pain at end of session.  Increased with manual therapy but decreased with cryo. Therapeutic Exercise: ( ):  None today     Date  11/1/18 Date  11/6/18 Date  11/8/18 Date  11/14/18  Date  11/19/18   Activity/Exercise        B serratus punch 1# 2x15 R 2#, L 1#  1x15 2# B 2x15 2# B 2x15 2# B 2x15   Wand flex 1x15 1x15 +1# 2x15 +2# 2x15 -   Wand ER - 1x15 - - -   Wand abduct - 1x15 - - -   B bicep curls 1# 2x15 R 2#, L 1#  1x15 2# 2x15 2# 2x15 2# 2x15   Wall slides - - 2x10 2x10 -   IR with band  Yellow 1x15 Yellow 2x15 Yellow 2x15 Yellow 2x15 Red 2x15   ER with band  Yellow 1x15 yellow 2x15 Yellow 2x15 Yellow 2x15 Red 2x15   Side lying abduct   1# 2x15 2# 2x15 -   Side lying ER   1# 2x15 2# 2x15 -   Wall push ups    2x10  2x15   B Lat pull downs    7# 2x15 7# 2x15   B Cable rows    7# 2x15 7# 2x15   B shldr flex with symmetry     1# 2x10   B shldr abduct to <= 90     1# 2x10       HEP - continue current HEP  Manual Therapy ( 45 minutes) Grade 2 to 4 physio mobs B shoulder flex, abduct, IR and ER. Grade 2 to 4 inferior and posterior shoulder glides. Contract/relax technique at end range L shoulder flex, IR and ER. Therapeutic Modalities:                          Left Shoulder Cold  Type: Cryocuff(with vasoneumatic compression)  Duration : 15 minutes  Patient Position: Sitting                                                                      . Treatment/Session Assessment:    · Response to Treatment:  Focused on ROM since patient had done strengthening yesterday afternoon. L shoulder flex and abduct seem to be improving, but she is stiff in IR and ER. · Compliance with Program/Exercises: yes  · Recommendations/Intent for next treatment session: \"Next visit will focus on motion and strength in B shoulders. \".     Total Treatment Duration: 60 minutes  PT Patient Time In/Time Out  Time In: 0918  Time Out: R Melissa Manriquez 8 Gelene Brothers

## 2018-11-26 ENCOUNTER — APPOINTMENT (OUTPATIENT)
Dept: PHYSICAL THERAPY | Age: 53
End: 2018-11-26
Payer: COMMERCIAL

## 2018-11-27 ENCOUNTER — HOSPITAL ENCOUNTER (OUTPATIENT)
Dept: PHYSICAL THERAPY | Age: 53
Discharge: HOME | End: 2018-11-27
Payer: COMMERCIAL

## 2018-11-27 VITALS — HEIGHT: 65 IN | WEIGHT: 210 LBS | BODY MASS INDEX: 34.99 KG/M2

## 2018-11-27 PROBLEM — M75.02 ADHESIVE CAPSULITIS OF LEFT SHOULDER: Status: RESOLVED | Noted: 2018-09-30 | Resolved: 2018-11-27

## 2018-11-27 PROBLEM — M75.02 ADHESIVE CAPSULITIS OF LEFT SHOULDER: Status: ACTIVE | Noted: 2018-11-27

## 2018-11-27 PROBLEM — M75.01 ADHESIVE CAPSULITIS OF RIGHT SHOULDER: Status: RESOLVED | Noted: 2018-09-30 | Resolved: 2018-11-27

## 2018-11-27 PROBLEM — M75.01 ADHESIVE CAPSULITIS OF RIGHT SHOULDER: Status: ACTIVE | Noted: 2018-11-27

## 2018-11-27 RX ORDER — SODIUM CHLORIDE 0.9 % (FLUSH) 0.9 %
5-10 SYRINGE (ML) INJECTION EVERY 8 HOURS
Status: CANCELLED | OUTPATIENT
Start: 2018-11-27

## 2018-11-27 RX ORDER — SODIUM CHLORIDE 0.9 % (FLUSH) 0.9 %
5-10 SYRINGE (ML) INJECTION AS NEEDED
Status: CANCELLED | OUTPATIENT
Start: 2018-11-27

## 2018-11-27 NOTE — PROGRESS NOTES
[facility]  : 1965  Primary: [facility]  Secondary:  [facility] ARTIE BLAIR  [facility], [address] [place] [address],[address], [facility].  [phone]   [phone]          OUTPATIENT PHYSICAL Som Gresham  65. 2018   ICD-10: Treatment Diagnosis:  M75.01 Adhesive capsulitis of right shoulder, M75.02 Adhesive capsulitis of left shoulder, M19.012 Primary osteoarthritis, left shoulder, Pain in left shoulder (M25.512), Pain in right shoulder (M25.511)  Precautions/Allergies: NKDA      Fall Risk Score: 0 (? 5 = High Risk)  MD Orders: Eval and treat, HEP, ROM, strengthening, aggressive motion B shoulders, full motion, full strength  2-3x/wk for 3 weeks (written 18)    MEDICAL/REFERRING DIAGNOSIS:  L shoulder    DATE OF ONSET: 10/4/18  REFERRING PHYSICIAN: Silvia Bernabe MD  RETURN PHYSICIAN APPOINTMENT: 18   Patient came to PT after seeing MD.  She is to have manipulation of L shoulder on 18 so she is cancelling PT for  and Thursday. She will resume PT on Monday after her manipulation on Friday.
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
## 2018-11-27 NOTE — BRIEF OP NOTE
BRIEF OPERATIVE NOTE Date of Procedure: 11/30/2018 Preoperative Diagnosis:  Adhesive capsulitis of left shoulder [M75.02] Adhesive capsulitis of right shoulder [M75.01] Postoperative Diagnosis:  SAME Procedure(s): 1. EXAMINATION AND MANIPULATION LEFT SHOULDER UNDER ANESTHESIA 2. EXAMINATION AND MANIPULATION RIGHT SHOULDER UNDER ANESTHESIA Surgeon(s) and Role: * Abby Hicks MD - Primary Surgical Staff: 
Circ-1: (Unknown) Scrub Tech-1: (Unknown) No case tracking events are documented in the log. Anesthesia: General WITH INTERSCALENE BLOCK (LEFT) Complications: NONE Sanna Mccann MD

## 2018-11-28 ENCOUNTER — HOSPITAL ENCOUNTER (OUTPATIENT)
Dept: SURGERY | Age: 53
Discharge: HOME | End: 2018-11-28

## 2018-11-29 ENCOUNTER — ANESTHESIA EVENT (OUTPATIENT)
Dept: SURGERY | Age: 53
End: 2018-11-29
Payer: COMMERCIAL

## 2018-11-29 ENCOUNTER — APPOINTMENT (OUTPATIENT)
Dept: PHYSICAL THERAPY | Age: 53
End: 2018-11-29
Payer: COMMERCIAL

## 2018-11-29 VITALS — BODY MASS INDEX: 33.82 KG/M2 | WEIGHT: 203 LBS | HEIGHT: 65 IN

## 2018-11-29 RX ORDER — GLIPIZIDE AND METFORMIN HCL 2.5; 5 MG/1; MG/1
1 TABLET, FILM COATED ORAL 2 TIMES DAILY
COMMUNITY
Start: 2018-03-23 | End: 2019-03-23

## 2018-11-29 NOTE — PERIOP NOTES
Patient verified name and . Order for consent found in EHR and matches case posting; patient verifies procedure. Type 1B surgery, PAT phone assessment complete. Orders received. Labs per surgeon: Hemoglobin A1c DOS and POC BS DOS; order signed and held in [address] [place] [address]. Labs per anesthesia protocol: Hgb and K+ DOS; order signed and held in [address] [place] [address]. Patient unable to come to outpatient lab to have blood work completed today; surgery scheduled for tomorrow. Patient answered medical/surgical history questions at their best of ability. All prior to admission medications documented in [facility]. Patient instructed to take the following medications the day of surgery according to anesthesia guidelines with a small sip of water: Norco if needed. Hold all vitamins 7 days prior to surgery and NSAIDS 5 days prior to surgery. Medications to be held: Meloxicam.    Patient instructed on the following:  Arrive at [address], time of arrival to be called the day before by 1700  NPO after midnight including gum, mints, and ice chips  Responsible adult must drive patient to the hospital, stay during surgery, and patient will  need supervision 24 hours after anesthesia  Use anti-bacterial in shower the night before surgery and on the morning of surgery  All piercings must be removed prior to arrival.    Leave all valuables (money and jewelry) at home but bring insurance card and ID on       DOS. Do not wear make-up, nail polish, lotions, cologne, perfumes, powders, or oil on skin. Patient teach back successful and patient demonstrates knowledge of instruction.

## 2018-11-30 ENCOUNTER — ANESTHESIA (OUTPATIENT)
Dept: SURGERY | Age: 53
End: 2018-11-30
Payer: COMMERCIAL

## 2018-11-30 ENCOUNTER — APPOINTMENT (OUTPATIENT)
Dept: GENERAL RADIOLOGY | Age: 53
End: 2018-11-30
Attending: ORTHOPAEDIC SURGERY
Payer: COMMERCIAL

## 2018-11-30 ENCOUNTER — HOSPITAL ENCOUNTER (OUTPATIENT)
Age: 53
Setting detail: OUTPATIENT SURGERY
Discharge: HOME | End: 2018-11-30
Attending: ORTHOPAEDIC SURGERY | Admitting: ORTHOPAEDIC SURGERY
Payer: COMMERCIAL

## 2018-11-30 VITALS
WEIGHT: 209.25 LBS | RESPIRATION RATE: 16 BRPM | BODY MASS INDEX: 34.86 KG/M2 | SYSTOLIC BLOOD PRESSURE: 104 MMHG | DIASTOLIC BLOOD PRESSURE: 58 MMHG | TEMPERATURE: 97.5 F | HEART RATE: 81 BPM | HEIGHT: 65 IN | OXYGEN SATURATION: 93 %

## 2018-11-30 LAB
EST. AVERAGE GLUCOSE BLD GHB EST-MCNC: 278 MG/DL
GLUCOSE BLD STRIP.AUTO-MCNC: 201 MG/DL (ref 65–100)
HBA1C MFR BLD: 11.3 %
HGB BLD-MCNC: 11 G/DL (ref 11.7–15.4)
POTASSIUM BLD-SCNC: >9 MMOL/L (ref 3.5–5.1)

## 2018-11-30 PROCEDURE — 74011250636 HC RX REV CODE- 250/636: Performed by: ORTHOPAEDIC SURGERY

## 2018-11-30 PROCEDURE — 85018 HEMOGLOBIN: CPT

## 2018-11-30 PROCEDURE — 74011250636 HC RX REV CODE- 250/636: Performed by: ANESTHESIOLOGY

## 2018-11-30 PROCEDURE — 76010010054 HC POST OP PAIN BLOCK: Performed by: ORTHOPAEDIC SURGERY

## 2018-11-30 PROCEDURE — 77030021678 HC GLIDESCP STAT DISP VERT -B: Performed by: ANESTHESIOLOGY

## 2018-11-30 PROCEDURE — 73030 X-RAY EXAM OF SHOULDER: CPT

## 2018-11-30 PROCEDURE — 77030037088 HC TUBE ENDOTRACH ORAL NSL COVD-A: Performed by: ANESTHESIOLOGY

## 2018-11-30 PROCEDURE — 74011250636 HC RX REV CODE- 250/636

## 2018-11-30 PROCEDURE — 74011250637 HC RX REV CODE- 250/637: Performed by: ANESTHESIOLOGY

## 2018-11-30 PROCEDURE — 76942 ECHO GUIDE FOR BIOPSY: CPT | Performed by: ORTHOPAEDIC SURGERY

## 2018-11-30 PROCEDURE — 84132 ASSAY OF SERUM POTASSIUM: CPT

## 2018-11-30 PROCEDURE — 83036 HEMOGLOBIN GLYCOSYLATED A1C: CPT

## 2018-11-30 PROCEDURE — 76010000138 HC OR TIME 0.5 TO 1 HR: Performed by: ORTHOPAEDIC SURGERY

## 2018-11-30 PROCEDURE — 77030003602 HC NDL NRV BLK BBMI -B: Performed by: ANESTHESIOLOGY

## 2018-11-30 PROCEDURE — 76060000032 HC ANESTHESIA 0.5 TO 1 HR: Performed by: ORTHOPAEDIC SURGERY

## 2018-11-30 PROCEDURE — 82962 GLUCOSE BLOOD TEST: CPT

## 2018-11-30 PROCEDURE — 77030020782 HC GWN BAIR PAWS FLX 3M -B: Performed by: ANESTHESIOLOGY

## 2018-11-30 PROCEDURE — 76210000017 HC OR PH I REC 1.5 TO 2 HR: Performed by: ORTHOPAEDIC SURGERY

## 2018-11-30 PROCEDURE — 74011000250 HC RX REV CODE- 250

## 2018-11-30 PROCEDURE — A4565 SLINGS: HCPCS | Performed by: ORTHOPAEDIC SURGERY

## 2018-11-30 RX ORDER — HYDROMORPHONE HYDROCHLORIDE 2 MG/ML
0.5 INJECTION, SOLUTION INTRAMUSCULAR; INTRAVENOUS; SUBCUTANEOUS
Status: DISCONTINUED | OUTPATIENT
Start: 2018-11-30 | End: 2018-11-30 | Stop reason: HOSPADM

## 2018-11-30 RX ORDER — PROPOFOL 10 MG/ML
INJECTION, EMULSION INTRAVENOUS AS NEEDED
Status: DISCONTINUED | OUTPATIENT
Start: 2018-11-30 | End: 2018-11-30 | Stop reason: HOSPADM

## 2018-11-30 RX ORDER — SUCCINYLCHOLINE CHLORIDE 20 MG/ML
INJECTION INTRAMUSCULAR; INTRAVENOUS AS NEEDED
Status: DISCONTINUED | OUTPATIENT
Start: 2018-11-30 | End: 2018-11-30 | Stop reason: HOSPADM

## 2018-11-30 RX ORDER — SODIUM CHLORIDE, SODIUM LACTATE, POTASSIUM CHLORIDE, CALCIUM CHLORIDE 600; 310; 30; 20 MG/100ML; MG/100ML; MG/100ML; MG/100ML
1000 INJECTION, SOLUTION INTRAVENOUS CONTINUOUS
Status: DISCONTINUED | OUTPATIENT
Start: 2018-11-30 | End: 2018-11-30 | Stop reason: HOSPADM

## 2018-11-30 RX ORDER — ROPIVACAINE HYDROCHLORIDE 5 MG/ML
INJECTION, SOLUTION EPIDURAL; INFILTRATION; PERINEURAL AS NEEDED
Status: DISCONTINUED | OUTPATIENT
Start: 2018-11-30 | End: 2018-11-30 | Stop reason: HOSPADM

## 2018-11-30 RX ORDER — ALBUTEROL SULFATE 0.83 MG/ML
2.5 SOLUTION RESPIRATORY (INHALATION) AS NEEDED
Status: DISCONTINUED | OUTPATIENT
Start: 2018-11-30 | End: 2018-11-30 | Stop reason: HOSPADM

## 2018-11-30 RX ORDER — ONDANSETRON 2 MG/ML
INJECTION INTRAMUSCULAR; INTRAVENOUS AS NEEDED
Status: DISCONTINUED | OUTPATIENT
Start: 2018-11-30 | End: 2018-11-30 | Stop reason: HOSPADM

## 2018-11-30 RX ORDER — ONDANSETRON 2 MG/ML
4 INJECTION INTRAMUSCULAR; INTRAVENOUS
Status: DISCONTINUED | OUTPATIENT
Start: 2018-11-30 | End: 2018-11-30 | Stop reason: HOSPADM

## 2018-11-30 RX ORDER — LIDOCAINE HYDROCHLORIDE 10 MG/ML
0.1 INJECTION INFILTRATION; PERINEURAL AS NEEDED
Status: DISCONTINUED | OUTPATIENT
Start: 2018-11-30 | End: 2018-11-30 | Stop reason: HOSPADM

## 2018-11-30 RX ORDER — SODIUM CHLORIDE 0.9 % (FLUSH) 0.9 %
5-10 SYRINGE (ML) INJECTION EVERY 8 HOURS
Status: DISCONTINUED | OUTPATIENT
Start: 2018-11-30 | End: 2018-11-30 | Stop reason: HOSPADM

## 2018-11-30 RX ORDER — CELECOXIB 200 MG/1
200 CAPSULE ORAL ONCE
Status: DISCONTINUED | OUTPATIENT
Start: 2018-11-30 | End: 2018-11-30 | Stop reason: HOSPADM

## 2018-11-30 RX ORDER — ACETAMINOPHEN 500 MG
1000 TABLET ORAL ONCE
Status: COMPLETED | OUTPATIENT
Start: 2018-11-30 | End: 2018-11-30

## 2018-11-30 RX ORDER — FENTANYL CITRATE 50 UG/ML
INJECTION, SOLUTION INTRAMUSCULAR; INTRAVENOUS AS NEEDED
Status: DISCONTINUED | OUTPATIENT
Start: 2018-11-30 | End: 2018-11-30 | Stop reason: HOSPADM

## 2018-11-30 RX ORDER — SODIUM CHLORIDE 0.9 % (FLUSH) 0.9 %
5-10 SYRINGE (ML) INJECTION AS NEEDED
Status: DISCONTINUED | OUTPATIENT
Start: 2018-11-30 | End: 2018-11-30 | Stop reason: HOSPADM

## 2018-11-30 RX ORDER — ROPIVACAINE HYDROCHLORIDE 5 MG/ML
INJECTION, SOLUTION EPIDURAL; INFILTRATION; PERINEURAL
Status: COMPLETED | OUTPATIENT
Start: 2018-11-30 | End: 2018-11-30

## 2018-11-30 RX ORDER — LIDOCAINE HYDROCHLORIDE 20 MG/ML
INJECTION, SOLUTION EPIDURAL; INFILTRATION; INTRACAUDAL; PERINEURAL AS NEEDED
Status: DISCONTINUED | OUTPATIENT
Start: 2018-11-30 | End: 2018-11-30 | Stop reason: HOSPADM

## 2018-11-30 RX ORDER — MIDAZOLAM HYDROCHLORIDE 1 MG/ML
2 INJECTION, SOLUTION INTRAMUSCULAR; INTRAVENOUS
Status: COMPLETED | OUTPATIENT
Start: 2018-11-30 | End: 2018-11-30

## 2018-11-30 RX ORDER — DEXAMETHASONE SODIUM PHOSPHATE 100 MG/10ML
INJECTION INTRAMUSCULAR; INTRAVENOUS AS NEEDED
Status: DISCONTINUED | OUTPATIENT
Start: 2018-11-30 | End: 2018-11-30 | Stop reason: HOSPADM

## 2018-11-30 RX ORDER — ROCURONIUM BROMIDE 10 MG/ML
INJECTION, SOLUTION INTRAVENOUS AS NEEDED
Status: DISCONTINUED | OUTPATIENT
Start: 2018-11-30 | End: 2018-11-30 | Stop reason: HOSPADM

## 2018-11-30 RX ORDER — FENTANYL CITRATE 50 UG/ML
INJECTION, SOLUTION INTRAMUSCULAR; INTRAVENOUS
Status: COMPLETED
Start: 2018-11-30 | End: 2018-11-30

## 2018-11-30 RX ADMIN — LIDOCAINE HYDROCHLORIDE 0.1 ML: 10 INJECTION, SOLUTION INFILTRATION; PERINEURAL at 07:05

## 2018-11-30 RX ADMIN — ONDANSETRON 4 MG: 2 INJECTION INTRAMUSCULAR; INTRAVENOUS at 07:53

## 2018-11-30 RX ADMIN — MIDAZOLAM 2 MG: 1 INJECTION INTRAMUSCULAR; INTRAVENOUS at 07:17

## 2018-11-30 RX ADMIN — PROPOFOL 200 MG: 10 INJECTION, EMULSION INTRAVENOUS at 07:37

## 2018-11-30 RX ADMIN — SODIUM CHLORIDE, SODIUM LACTATE, POTASSIUM CHLORIDE, AND CALCIUM CHLORIDE 1000 ML: 600; 310; 30; 20 INJECTION, SOLUTION INTRAVENOUS at 07:00

## 2018-11-30 RX ADMIN — ROPIVACAINE HYDROCHLORIDE 20 ML: 5 INJECTION, SOLUTION EPIDURAL; INFILTRATION; PERINEURAL at 07:20

## 2018-11-30 RX ADMIN — HYDROMORPHONE HYDROCHLORIDE 0.5 MG: 2 INJECTION, SOLUTION INTRAMUSCULAR; INTRAVENOUS; SUBCUTANEOUS at 08:16

## 2018-11-30 RX ADMIN — LIDOCAINE HYDROCHLORIDE 100 MG: 20 INJECTION, SOLUTION EPIDURAL; INFILTRATION; INTRACAUDAL; PERINEURAL at 07:37

## 2018-11-30 RX ADMIN — HYDROMORPHONE HYDROCHLORIDE 0.5 MG: 2 INJECTION, SOLUTION INTRAMUSCULAR; INTRAVENOUS; SUBCUTANEOUS at 08:26

## 2018-11-30 RX ADMIN — ACETAMINOPHEN 1000 MG: 500 TABLET ORAL at 07:00

## 2018-11-30 RX ADMIN — SUCCINYLCHOLINE CHLORIDE 200 MG: 20 INJECTION INTRAMUSCULAR; INTRAVENOUS at 07:37

## 2018-11-30 RX ADMIN — ROCURONIUM BROMIDE 10 MG: 10 INJECTION, SOLUTION INTRAVENOUS at 07:30

## 2018-11-30 RX ADMIN — FENTANYL CITRATE 100 MCG: 50 INJECTION INTRAMUSCULAR; INTRAVENOUS at 07:17

## 2018-11-30 RX ADMIN — FENTANYL CITRATE 50 MCG: 50 INJECTION, SOLUTION INTRAMUSCULAR; INTRAVENOUS at 07:41

## 2018-11-30 NOTE — ANESTHESIA POSTPROCEDURE EVALUATION
Procedure(s): BILATERAL SHOULDER  EUA AND MANIPULATION AND INJECTION OF DEXAMETHASONE BILATERAL SHOULDERS. Anesthesia Post Evaluation Multimodal analgesia: multimodal analgesia used between 6 hours prior to anesthesia start to PACU discharge Patient location during evaluation: PACU Patient participation: complete - patient participated Level of consciousness: sleepy but conscious Pain management: adequate Airway patency: patent Anesthetic complications: no 
Cardiovascular status: acceptable Respiratory status: acceptable Hydration status: acceptable Post anesthesia nausea and vomiting:  none Visit Vitals /58 Pulse 81 Temp 36.4 °C (97.5 °F) Resp 16 Ht 5' 5\" (1.651 m) Wt 94.9 kg (209 lb 4 oz) SpO2 93% BMI 34.82 kg/m²

## 2018-11-30 NOTE — ANESTHESIA PREPROCEDURE EVALUATION
Anesthetic History Increased risk of difficult airway Review of Systems / Medical History Patient summary reviewed, nursing notes reviewed and pertinent labs reviewed Pulmonary Neuro/Psych Cardiovascular Hypertension: well controlled Exercise tolerance: >4 METS 
  
GI/Hepatic/Renal 
  
 
 
 
 
 
 Endo/Other Diabetes: well controlled, type 2 Obesity Other Findings Physical Exam 
 
Airway Mallampati: III 
TM Distance: < 4 cm Neck ROM: normal range of motion, short neck Mouth opening: Normal 
 
 Cardiovascular Rhythm: regular Rate: normal 
 
 
 
 Dental 
No notable dental hx Pulmonary Breath sounds clear to auscultation Abdominal 
 
 
 
 Other Findings Anesthetic Plan ASA: 3 Anesthesia type: general 
 
 
Post-op pain plan if not by surgeon: peripheral nerve block single Induction: Intravenous Anesthetic plan and risks discussed with: Patient

## 2018-11-30 NOTE — OP NOTES
[facility] REPORT    Page Chapman  MR#: [id number]  : 1965  ACCOUNT #: [de-identified]   DATE OF SERVICE: 2018    PREOPERATIVE DIAGNOSES:  1. Adhesive capsulitis, left shoulder. 2.  Adhesive capsulitis, right shoulder. POSTOPERATIVE DIAGNOSES:    1. Adhesive capsulitis, left shoulder. 2.  Adhesive capsulitis, right shoulder. PROCEDURES PERFORMED:  1. Examination and manipulation of left shoulder under anesthesia with an interscalene block. 2.  Examination manipulation of right shoulder under anesthesia. OPERATIVE SURGEON:  Miryam Cooper. Ladi Lieberman MD     ASSISTANT:  None. CPT CODES:  94514 for the left shoulder and 54256 for the right shoulder. ICD-10 CODES:  M75.01 and M75.02.    ANESTHESIA:  General with interscalene block. ESTIMATED BLOOD LOSS:  None. COMPLICATIONS:  None. INDICATIONS:  The patient is a 59-year-old diabetic with a history of bilateral stiff shoulders. Patient is now roughly 3 months status post manipulation of the right shoulder and 3 months status post manipulation of the left shoulder, arthroscopy of the left shoulder and lysis of adhesions. The patient has developed recalcitrant and postoperative stiffness. She is now electively admitted for repeat manipulations with an interscalene block on the left side, the more severe side. PROCEDURE:  Following identification of the patient, the patient was taken to the operative suite. Following induction of general anesthesia and interscalene block for postop pain control, on the left side, no antibiotics and measurement of a hemoglobin A1c and fasting blood glucose 11.3 and 201, respectively, which was elevated. The patient was then placed on the operating table in the supine fashion.   Right shoulder was examined first.  The patient was noted to have 0-165 degrees of passive forward elevation, 30 degrees at the side, 75 degrees of external and internal rotation in the 90 degree abducted position. At this point, gentle manipulation of the right shoulder was then performed. I was able to achieve 0-180 degrees of passive forward elevation, 60 degrees of external rotation to the side to 90 degrees external rotation, 90-degree abducted position. At this point, the right shoulder was then prepped and draped in sterile fashion. The right shoulder was injected with 10 mL of Naropin and 10 mg of dexamethasone. A sterile dressing was applied. Left shoulder was then examined. The patient did have 0-100 degrees of passive forward rotation, 10 degrees of external rotation at the side to 50 degrees external rotation, 90 degree abducted position. The left shoulder was then manipulated under anesthesia. The patient then achieved 0-180 degrees of passive forward elevation, 60 degrees of external rotation to the side and 90 degrees of external and internal rotation in the 90 degree abducted position. The left shoulder was then prepped and draped in a sterile fashion and injected with 10 mL of Naropin and 10 mg of dexamethasone. The left shoulder was imaged in all planes. There was no fracture. A sterile dressing was applied to both shoulders and the patient was then transferred to the recovery room in stable condition.         MD CHASITY Vivar / TN  D: 11/30/2018 07:53     T: 11/30/2018 11:20  JOB #: [id number]  CC: Aryan Loja MD

## 2018-11-30 NOTE — ANESTHESIA PROCEDURE NOTES
ISB with ultrasound Start time: 11/30/2018 7:17 AM 
End time: 11/30/2018 7:20 AM 
Performed by: Randolph Pisano MD 
Authorized by: Randolph Pisano MD  
 
 
Pre-procedure: Indications: at surgeon's request and post-op pain management Preanesthetic Checklist: patient identified, risks and benefits discussed, site marked, timeout performed, anesthesia consent given and patient being monitored Timeout Time: 07:17 Block Type:  
Block Type: Interscalene Laterality:  Left Monitoring:  Continuous pulse ox, frequent vital sign checks, heart rate, responsive to questions and oxygen Injection Technique:  Single shot Procedures: ultrasound guided Patient Position: supine (head elevated 45 degrees) Prep: chlorhexidine Location:  Interscalene Needle Type:  Stimuplex Needle Gauge:  20 G Needle Localization:  Ultrasound guidance Assessment: 
Number of attempts:  1 Injection Assessment:  Incremental injection every 5 mL, local visualized surrounding nerve on ultrasound, negative aspiration for blood, no paresthesia, ultrasound image on chart and no intravascular symptoms Patient tolerance:  Patient tolerated the procedure well with no immediate complications

## 2018-11-30 NOTE — H&P
Date of Surgery Update: 
Abdirahman Dodson was seen and examined. History and physical has been reviewed. The patient has been examined.  There have been no significant clinical changes since the completion of the originally dated History and Physical. 
 
Signed By: Сергей Sharif MD   
 November 30, 2018 5:53 AM

## 2018-12-03 ENCOUNTER — HOSPITAL ENCOUNTER (OUTPATIENT)
Dept: PHYSICAL THERAPY | Age: 53
Discharge: HOME | End: 2018-12-03
Payer: COMMERCIAL

## 2018-12-03 PROCEDURE — 97140 MANUAL THERAPY 1/> REGIONS: CPT

## 2018-12-03 NOTE — THERAPY RECERTIFICATION
Edward Weiss : 1965 Primary: [employer] [facility]* Secondary:  [facility] at [facility] ARTIE BLAIR [address] [facility], [address] [place] [address],[address], [facility]. [phone]   [phone] OUTPATIENT PHYSICAL THERAPY:Daily Note and Recertification  ICD-10: Treatment Diagnosis:  M75.01 Adhesive capsulitis of right shoulder, M75.02 Adhesive capsulitis of left shoulder, M19.012 Primary osteoarthritis, left shoulder, Pain in left shoulder (M25.512), Pain in right shoulder (M25.511) Precautions/Allergies: NKDA Fall Risk Score: 0 (? 5 = High Risk) MD Orders: Eval and treat, HEP, ROM, strengthening, aggressive motion B shoulders, full motion, full strength  2-3x/wk for 3 weeks (written 18) MEDICAL/REFERRING DIAGNOSIS: 
r shoulder DATE OF ONSET: 10/4/18, EUA of B shoulders on 18 REFERRING PHYSICIAN: West Ross MD 
RETURN PHYSICIAN APPOINTMENT: 18 ASSESSMENT:  Ms. Erin Damico  Is a 46year old R hand dominant female s/p EUA and manipulation of R shoulder, EUA and manipulation of L shoulder and L SAD, ADCR, and LOREN on 10/4/18. She presented with shoulder pain, decreased ROM in B shoulders, decreased strength in L shoulder and decreased functional use of B shoulders. Her ROM was improving in both arms though she continued to have pain. Her DASH score was improved. Both shoulders were then manipulated under anesthesia on 18 and she returns to PT. She could benefit from continued PT to address deficits and work toward goals. PROBLEM LIST (Impacting functional limitations): 1. Decreased Strength 2. Decreased ADL/Functional Activities 3. Increased Pain 4. Decreased Flexibility/Joint Mobility INTERVENTIONS PLANNED: 
1. Home Exercise Program (HEP) 2. Manual Therapy 3. Therapeutic Exercise/Strengthening 4. modals as needed TREATMENT PLAN: 
Effective Dates: 12/3/2018 TO 3/3/2019 (90 days). Frequency/Duration: 2-3 times a week for 90 Days GOALS: (Goals have been discussed and agreed upon with patient.) Patient's stated goal is to be able to use her arms. Short-Term Functional Goals: Time Frame: 6 weeks 1. Patient to be independent with HEP - MET 2. Patient to increase PROM L shoulder flex to 135 and PROM L shoulder ER to 30 degrees   MET (12/3/18) 3. Patient to report decrease in pain level to 5/10. - partially MET - improving score, but not yet 5. Discharge Goals: Time Frame: 90 days - all in progress 1. Patient to report no more than minimal shoulder pain with all activity 2. Patient to improve DASH score to 21 to demo improved use of B UEs 3. Patient to improve AROM B shoulder flex to 150 degrees for improved functional use. Rehabilitation Potential For Stated Goals: Good Regarding Latehsa Perea's therapy, I certify that the treatment plan above will be carried out by a therapist or under their direction. Thank you for this referral, 
Tiburcio Sharpe PT Referring Physician Signature: Danish Guan MD         Date The information in this section was collected on 10/9/18 (except where otherwise noted). HISTORY:  
History of Present Injury/Illness (Reason for Referral): 
Patient reports that shoulder pain started last year. She went to her family doctor and was sent to PT. They tried dry needling and MD injected her. She didn't get better and requested a second opinion. She was sent to Dr. Stephanie Jones and an MRI was done, and then she had surgery on 10/4/18. She was told to leave the dressings on until she returned to MD.  
She subsequently had B shoulder manipulations on 11/30/18. Past Medical History/Comorbidities: OA,  Benign hypertension with CKD (chronic kidney disease) stage III ([address] Diabetes ([address] Dyslipidemia; Endometrial cancer ([address] and Morbid obesity ([address] knee arthroscopy (Right, 2015); appendectomy (1979), L ankle surgery (2007) L index finger surgery (2005) Social History/Living Environment:  
  Lives with family in one story home. Prior Level of Function/Work/Activity: 
 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 Dominant Side:  
      RIGHT Previous Treatment Approaches: PT prior to surgery Current Medications:  Glipizide, lisinopril, Norco, Meloxicam, sleep medicine    Date Last Reviewed:  12/3/18 EXAMINATION:  
Observation/Orthostatic Postural Assessment:   
      No new (11/13/18) ROM:   
      AROM R shoulder flex 135, Abduct 145, extn 50, ER 45 and IR to mid R buttock PROM R shoulder flex 130, abduct 110, ER 35 at 90 degree abduct PROM L shoulder flex 80, abduct 65, IR 50 (to abdomen) and ER -15 from neutral.  
 
      PROM R shoulder flex 160, abduct 140, ER 70 and IR 65 at 90 degree abduct (11/1/18) PROM L shoulder flex 130, abduct 110, ER 30 and IR 50 at 90 degrees abduct (11/1/18) PROM R shoulder flex 165, abduct 160, ER 75 and IR 75 at 90 degree abduct (12/3/18) PROM L shoulder flex 140, abduct 120, ER 45 and IR 60 at 90 degrees abduct (12/3/18) Strength:   
      R shoulder 4+ to 5/5 all directions. L shoulder not tested. Neurological Screen: 
      Sensation: light touch intact in B UEs Body Structures Involved: 1. Joints 2. Muscles Body Functions Affected: 1. Neuromusculoskeletal 
2. Movement Related Activities and Participation Affected: 1. General Tasks and Demands 2. Self Care 3. Community, Social and [place] CLINICAL DECISION MAKING:  
Outcome Measure: Tool Used: Disabilities of the Arm, Shoulder and Hand (DASH) Questionnaire - Quick Version Score:  Initial: 55/55   41/55 (Date: 11/1/18 )     Most Recent: 45/55 (Date 12/3/18) Interpretation of Score: The DASH is designed to measure the activities of daily living in person's with upper extremity dysfunction or pain.   Each section is scored on a 1-5 scale, 5 representing the greatest disability. The scores of each section are added together for a total score of 55. Medical Necessity:  
· Patient demonstrates good rehab potential due to higher previous functional level. Reason for Services/Other Comments: 
· Patient continues to require skilled intervention due to need to regain use of B UEs.  
  
 
 
 
TREATMENT:  
(In addition to Assessment/Re-Assessment sessions the following treatments were rendered) Pre-treatment Symptoms/Complaints:  Patient reports that her shoulder manipulations were done on Friday. She slept well that afternoon. Pain: Initial:  
Pain Intensity 1: 8   Post Session: No change in pain at end of session. Increased with manual therapy but decreased with cryo. Therapeutic Exercise: ( ):  None today Date 
11/1/18 Date 
11/6/18 Date 
11/8/18 Date 11/14/18  Date 11/19/18 Activity/Exercise B serratus punch 1# 2x15 R 2#, L 1# 
1x15 2# B 2x15 2# B 2x15 2# B 2x15 Wand flex 1x15 1x15 +1# 2x15 +2# 2x15 - Wand ER - 1x15 - - - Wand abduct - 1x15 - - -  
B bicep curls 1# 2x15 R 2#, L 1# 
1x15 2# 2x15 2# 2x15 2# 2x15 Wall slides - - 2x10 2x10 -  
IR with band  Yellow 1x15 Yellow 2x15 Yellow 2x15 Yellow 2x15 Red 2x15 ER with band  Yellow 1x15 yellow 2x15 Yellow 2x15 Yellow 2x15 Red 2x15 Side lying abduct   1# 2x15 2# 2x15 - Side lying ER   1# 2x15 2# 2x15 - Wall push ups    2x10  2x15 B Lat pull downs    7# 2x15 7# 2x15 B Cable rows    7# 2x15 7# 2x15 B shldr flex with symmetry     1# 2x10 B shldr abduct to <= 90     1# 2x10 HEP - continue current HEP Manual Therapy ( 40 minutes) Grade 2 to 4 physio mobs B shoulder flex, abduct, IR and ER. Grade 2 to 4 inferior and posterior shoulder glides. Therapeutic Modalities: For pain and swelling. Game Ready Device with light compression and 42 degrees temperature. Left Shoulder Cold Type: Cryocuff(with vasoneumatic compression) Duration : 15 minutes Patient Position: Sitting(long sitting with back support) Kelly Ryan Treatment/Session Assessment:   
· Response to Treatment:  Focused on ROM since patient had manipulation of B shoulders on 11/30/18. ROM has improved after manipulation. · Compliance with Program/Exercises: yes · Recommendations/Intent for next treatment session: \"Next visit will focus on motion and strength in B shoulders. \". Total Treatment Duration: 55 minutes PT Patient Time In/Time Out Time In: 1116 Time Out: 1218 Caty Mijares PT

## 2018-12-05 ENCOUNTER — HOSPITAL ENCOUNTER (OUTPATIENT)
Dept: PHYSICAL THERAPY | Age: 53
Discharge: HOME | End: 2018-12-05
Payer: COMMERCIAL

## 2018-12-05 PROCEDURE — 97140 MANUAL THERAPY 1/> REGIONS: CPT

## 2018-12-05 NOTE — PROGRESS NOTES
Parvin Tito : 1965 Primary: [employer] [facility]* Secondary:  [facility] at [facility] ARTIE BLAIR [address] [place] [address], [address] [place] [address],[address], [facility]. [phone]   [phone] OUTPATIENT PHYSICAL THERAPY:Daily Note 2018 ICD-10: Treatment Diagnosis:  M75.01 Adhesive capsulitis of right shoulder, M75.02 Adhesive capsulitis of left shoulder, M19.012 Primary osteoarthritis, left shoulder, Pain in left shoulder (M25.512), Pain in right shoulder (M25.511) Precautions/Allergies: NKDA Fall Risk Score: 0 (? 5 = High Risk) MD Orders: Eval and treat, HEP, ROM, strengthening, aggressive motion B shoulders, full motion, full strength  2-3x/wk for 3 weeks (written 18) MEDICAL/REFERRING DIAGNOSIS: 
L shoulder DATE OF ONSET: 10/4/18, EUA of B shoulders on 18 REFERRING PHYSICIAN: Arya Albright MD 
RETURN PHYSICIAN APPOINTMENT: 18 ASSESSMENT:  Ms. Mary Barger  Is a 46year old R hand dominant female s/p EUA and manipulation of R shoulder, EUA and manipulation of L shoulder and L SAD, ADCR, and LOREN on 10/4/18. She presented with shoulder pain, decreased ROM in B shoulders, decreased strength in L shoulder and decreased functional use of B shoulders. Her ROM was improving in both arms though she continued to have pain. Her DASH score was improved. Both shoulders were then manipulated under anesthesia on 18 and she returns to PT. She could benefit from continued PT to address deficits and work toward goals. PROBLEM LIST (Impacting functional limitations): 1. Decreased Strength 2. Decreased ADL/Functional Activities 3. Increased Pain 4. Decreased Flexibility/Joint Mobility INTERVENTIONS PLANNED: 
1. Home Exercise Program (HEP) 2. Manual Therapy 3. Therapeutic Exercise/Strengthening 4. modals as needed TREATMENT PLAN: 
Effective Dates: 12/3/2018 TO 3/3/2019 (90 days). Frequency/Duration: 2-3 times a week for 90 Days GOALS: (Goals have been discussed and agreed upon with patient.) Patient's stated goal is to be able to use her arms. Short-Term Functional Goals: Time Frame: 6 weeks 1. Patient to be independent with HEP - MET 2. Patient to increase PROM L shoulder flex to 135 and PROM L shoulder ER to 30 degrees   MET (12/3/18) 3. Patient to report decrease in pain level to 5/10. - partially MET - improving score, but not yet 5. Discharge Goals: Time Frame: 90 days - all in progress 1. Patient to report no more than minimal shoulder pain with all activity 2. Patient to improve DASH score to 21 to demo improved use of B UEs 3. Patient to improve AROM B shoulder flex to 150 degrees for improved functional use. Rehabilitation Potential For Stated Goals: Good Regarding Latesha Perea's therapy, I certify that the treatment plan above will be carried out by a therapist or under their direction. Thank you for this referral, 
Aydee Friday, PT The information in this section was collected on 10/9/18 (except where otherwise noted). HISTORY:  
History of Present Injury/Illness (Reason for Referral): 
Patient reports that shoulder pain started last year. She went to her family doctor and was sent to PT. They tried dry needling and MD injected her. She didn't get better and requested a second opinion. She was sent to Dr. Norberto Borden and an MRI was done, and then she had surgery on 10/4/18. She was told to leave the dressings on until she returned to MD.  
She subsequently had B shoulder manipulations on 11/30/18. Past Medical History/Comorbidities: OA,  Benign hypertension with CKD (chronic kidney disease) stage III ([address] Diabetes ([address] Dyslipidemia; Endometrial cancer ([address] and Morbid obesity ([address] knee arthroscopy (Right, 2015); appendectomy (1979), L ankle surgery (2007) L index finger surgery (2005) Social History/Living Environment:  
  Lives with family in one story home. Prior Level of Function/Work/Activity: 
 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 Dominant Side:  
      RIGHT Previous Treatment Approaches: PT prior to surgery Current Medications:  Glipizide, lisinopril, Norco, Meloxicam, sleep medicine    Date Last Reviewed:  12/3/18 EXAMINATION:  
Observation/Orthostatic Postural Assessment:   
      No new (11/13/18) ROM:   
      AROM R shoulder flex 135, Abduct 145, extn 50, ER 45 and IR to mid R buttock PROM R shoulder flex 130, abduct 110, ER 35 at 90 degree abduct PROM L shoulder flex 80, abduct 65, IR 50 (to abdomen) and ER -15 from neutral.  
 
      PROM R shoulder flex 160, abduct 140, ER 70 and IR 65 at 90 degree abduct (11/1/18) PROM L shoulder flex 130, abduct 110, ER 30 and IR 50 at 90 degrees abduct (11/1/18) PROM R shoulder flex 165, abduct 160, ER 75 and IR 75 at 90 degree abduct (12/3/18) PROM L shoulder flex 140, abduct 120, ER 45 and IR 60 at 90 degrees abduct (12/3/18) Strength:   
      R shoulder 4+ to 5/5 all directions. L shoulder not tested. Neurological Screen: 
      Sensation: light touch intact in B UEs Body Structures Involved: 1. Joints 2. Muscles Body Functions Affected: 1. Neuromusculoskeletal 
2. Movement Related Activities and Participation Affected: 1. General Tasks and Demands 2. Self Care 3. Community, Social and [place] CLINICAL DECISION MAKING:  
Outcome Measure: Tool Used: Disabilities of the Arm, Shoulder and Hand (DASH) Questionnaire - Quick Version Score:  Initial: 55/55   41/55 (Date: 11/1/18 )     Most Recent: 45/55 (Date 12/3/18) Interpretation of Score: The DASH is designed to measure the activities of daily living in person's with upper extremity dysfunction or pain. Each section is scored on a 1-5 scale, 5 representing the greatest disability. The scores of each section are added together for a total score of 55. Medical Necessity:  
· Patient demonstrates good rehab potential due to higher previous functional level. Reason for Services/Other Comments: 
· Patient continues to require skilled intervention due to need to regain use of B UEs.  
  
 
 
 
TREATMENT:  
(In addition to Assessment/Re-Assessment sessions the following treatments were rendered) Pre-treatment Symptoms/Complaints:  Patient reports that her shoulders are about the same as a couple of days ago. No change in shoulder pain. L upper trap area is sore and she isn't sure why. Pain: Initial:  
Pain Intensity 1: 8   Post Session: patient rated pain as 7/10 at end of session, and said that shoulder felt better. Therapeutic Exercise: ( ):  None today Date 
11/1/18 Date 
11/6/18 Date 
11/8/18 Date 11/14/18  Date 11/19/18 Activity/Exercise B serratus punch 1# 2x15 R 2#, L 1# 
1x15 2# B 2x15 2# B 2x15 2# B 2x15 Wand flex 1x15 1x15 +1# 2x15 +2# 2x15 - Wand ER - 1x15 - - - Wand abduct - 1x15 - - -  
B bicep curls 1# 2x15 R 2#, L 1# 
1x15 2# 2x15 2# 2x15 2# 2x15 Wall slides - - 2x10 2x10 -  
IR with band  Yellow 1x15 Yellow 2x15 Yellow 2x15 Yellow 2x15 Red 2x15 ER with band  Yellow 1x15 yellow 2x15 Yellow 2x15 Yellow 2x15 Red 2x15 Side lying abduct   1# 2x15 2# 2x15 - Side lying ER   1# 2x15 2# 2x15 - Wall push ups    2x10  2x15 B Lat pull downs    7# 2x15 7# 2x15 B Cable rows    7# 2x15 7# 2x15 B shldr flex with symmetry     1# 2x10 B shldr abduct to <= 90     1# 2x10 HEP - continue current HEP Manual Therapy ( 40 minutes) Grade 2 to 4 physio mobs B shoulder flex, abduct, IR and ER. Grade 2 to 4 inferior and posterior shoulder glides. Therapeutic Modalities: For pain and swelling. Game Ready Device with light compression and 42 degrees temperature. Left Shoulder Cold Type: Cryocuff(with vasoneumatic compression) Duration : 15 minutes Patient Position: Sitting(long sitting with back support) Ancelmo Farrell Treatment/Session Assessment:   
· Response to Treatment:  Focused on ROM again today and will plan to start strengthening tomorrow. Still stiff in L shoulder. · Compliance with Program/Exercises: yes · Recommendations/Intent for next treatment session: \"Next visit will focus on motion and strength in B shoulders. \". Total Treatment Duration: 55 minutes PT Patient Time In/Time Out Time In: 5246 Time Out: 1100 Tiburcio Sharpe PT

## 2018-12-06 ENCOUNTER — HOSPITAL ENCOUNTER (OUTPATIENT)
Dept: PHYSICAL THERAPY | Age: 53
Discharge: HOME | End: 2018-12-06
Payer: COMMERCIAL

## 2018-12-06 PROCEDURE — 97140 MANUAL THERAPY 1/> REGIONS: CPT

## 2018-12-06 PROCEDURE — 97110 THERAPEUTIC EXERCISES: CPT

## 2018-12-06 NOTE — PROGRESS NOTES
Abisai Ga : 1965 Primary: [facility] [facility]* Secondary:  [facility] ARTIE BLAIR [address] [facility], [address] [place] [address],[address], [facility] [place] . [phone]   [phone] OUTPATIENT PHYSICAL THERAPY:Daily Note 2018 ICD-10: Treatment Diagnosis:  M75.01 Adhesive capsulitis of right shoulder, M75.02 Adhesive capsulitis of left shoulder, M19.012 Primary osteoarthritis, left shoulder, Pain in left shoulder (M25.512), Pain in right shoulder (M25.511) Precautions/Allergies: NKDA Fall Risk Score: 0 (? 5 = High Risk) MD Orders: Eval and treat, HEP, ROM, strengthening, aggressive motion B shoulders, full motion, full strength  2-3x/wk for 3 weeks (written 18) MEDICAL/REFERRING DIAGNOSIS: 
L shoulder DATE OF ONSET: 10/4/18, EUA of B shoulders on 18 REFERRING PHYSICIAN: Chapo Perez MD 
RETURN PHYSICIAN APPOINTMENT: 18 ASSESSMENT:  Ms. Parth Skelton  Is a 46year old R hand dominant female s/p EUA and manipulation of R shoulder, EUA and manipulation of L shoulder and L SAD, ADCR, and LOREN on 10/4/18. She presented with shoulder pain, decreased ROM in B shoulders, decreased strength in L shoulder and decreased functional use of B shoulders. Her ROM was improving in both arms though she continued to have pain. Her DASH score was improved. Both shoulders were then manipulated under anesthesia on 18 and she returns to PT. She could benefit from continued PT to address deficits and work toward goals. PROBLEM LIST (Impacting functional limitations): 1. Decreased Strength 2. Decreased ADL/Functional Activities 3. Increased Pain 4. Decreased Flexibility/Joint Mobility INTERVENTIONS PLANNED: 
1. Home Exercise Program (HEP) 2. Manual Therapy 3. Therapeutic Exercise/Strengthening 4. modals as needed TREATMENT PLAN: 
Effective Dates: 12/3/2018 TO 3/3/2019 (90 days). Frequency/Duration: 2-3 times a week for 90 Days GOALS: (Goals have been discussed and agreed upon with patient.) Patient's stated goal is to be able to use her arms. Short-Term Functional Goals: Time Frame: 6 weeks 1. Patient to be independent with HEP - MET 2. Patient to increase PROM L shoulder flex to 135 and PROM L shoulder ER to 30 degrees   MET (12/3/18) 3. Patient to report decrease in pain level to 5/10. - partially MET - improving score, but not yet 5. Discharge Goals: Time Frame: 90 days - all in progress 1. Patient to report no more than minimal shoulder pain with all activity 2. Patient to improve DASH score to 21 to demo improved use of B UEs 3. Patient to improve AROM B shoulder flex to 150 degrees for improved functional use. Rehabilitation Potential For Stated Goals: Good Regarding Latesha Perea's therapy, I certify that the treatment plan above will be carried out by a therapist or under their direction. Thank you for this referral, 
Lucina Ham PT The information in this section was collected on 10/9/18 (except where otherwise noted). HISTORY:  
History of Present Injury/Illness (Reason for Referral): 
Patient reports that shoulder pain started last year. She went to her family doctor and was sent to PT. They tried dry needling and MD injected her. She didn't get better and requested a second opinion. She was sent to Dr. Iqra Huerta and an MRI was done, and then she had surgery on 10/4/18. She was told to leave the dressings on until she returned to MD.  
She subsequently had B shoulder manipulations on 11/30/18. Past Medical History/Comorbidities: OA,  Benign hypertension with CKD (chronic kidney disease) stage III ([address] Diabetes ([address] Dyslipidemia; Endometrial cancer ([address] and Morbid obesity ([address] knee arthroscopy (Right, 2015); appendectomy (1979), L ankle surgery (2007) L index finger surgery (2005) Social History/Living Environment:  
  Lives with family in one story home. Prior Level of Function/Work/Activity: 
 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 Dominant Side:  
      RIGHT Previous Treatment Approaches: PT prior to surgery Current Medications:  Glipizide, lisinopril, Norco, Meloxicam, sleep medicine    Date Last Reviewed:  12/3/18 EXAMINATION:  
Observation/Orthostatic Postural Assessment:   
      No new (11/13/18) ROM:   
      AROM R shoulder flex 135, Abduct 145, extn 50, ER 45 and IR to mid R buttock PROM R shoulder flex 130, abduct 110, ER 35 at 90 degree abduct PROM L shoulder flex 80, abduct 65, IR 50 (to abdomen) and ER -15 from neutral.  
 
      PROM R shoulder flex 160, abduct 140, ER 70 and IR 65 at 90 degree abduct (11/1/18) PROM L shoulder flex 130, abduct 110, ER 30 and IR 50 at 90 degrees abduct (11/1/18) PROM R shoulder flex 165, abduct 160, ER 75 and IR 75 at 90 degree abduct (12/3/18) PROM L shoulder flex 140, abduct 120, ER 45 and IR 60 at 90 degrees abduct (12/3/18) Strength:   
      R shoulder 4+ to 5/5 all directions. L shoulder not tested. Neurological Screen: 
      Sensation: light touch intact in B UEs Body Structures Involved: 1. Joints 2. Muscles Body Functions Affected: 1. Neuromusculoskeletal 
2. Movement Related Activities and Participation Affected: 1. General Tasks and Demands 2. Self Care 3. Community, Social and [place] CLINICAL DECISION MAKING:  
Outcome Measure: Tool Used: Disabilities of the Arm, Shoulder and Hand (DASH) Questionnaire - Quick Version Score:  Initial: 55/55   41/55 (Date: 11/1/18 )     Most Recent: 45/55 (Date 12/3/18) Interpretation of Score: The DASH is designed to measure the activities of daily living in person's with upper extremity dysfunction or pain. Each section is scored on a 1-5 scale, 5 representing the greatest disability. The scores of each section are added together for a total score of 55. Medical Necessity:  
· Patient demonstrates good rehab potential due to higher previous functional level. Reason for Services/Other Comments: 
· Patient continues to require skilled intervention due to need to regain use of B UEs.  
  
 
 
 
TREATMENT:  
(In addition to Assessment/Re-Assessment sessions the following treatments were rendered) Pre-treatment Symptoms/Complaints:  Patient reports that her shoulder remains sore. Still sore in L upper trap also. Has her grandson with her today because he is home from school after getting stitches in forehead. Pain: Initial:  
Pain Intensity 1: 8   Post Session: patient rated pain as 'no change' at end of session. Therapeutic Exercise: (25 Minutes):  Exercises per grid below to improve mobility and strength. Required moderate visual and verbal cues to ensure correct performance. Progressed complexity of movement as indicated. Date 
11/1/18 Date 
11/6/18 Date 
11/8/18 Date 11/14/18  Date 11/19/18 Date 
12/6/18 Activity/Exercise B serratus punch 1# 2x15 R 2#, L 1# 
1x15 2# B 2x15 2# B 2x15 2# B 2x15 2# B 2x15 Wand flex 1x15 1x15 +1# 2x15 +2# 2x15 - +2#  2x20 Wand ER - 1x15 - - - - Wand abduct - 1x15 - - - -  
B bicep curls 1# 2x15 R 2#, L 1# 
1x15 2# 2x15 2# 2x15 2# 2x15 2# 2x15 Wall slides - - 2x10 2x10 - 2x10 IR with band  Yellow 1x15 Yellow 2x15 Yellow 2x15 Yellow 2x15 Red 2x15 Red 2x15 ER with band  Yellow 1x15 yellow 2x15 Yellow 2x15 Yellow 2x15 Red 2x15 Red 2x15 Side lying abduct   1# 2x15 2# 2x15 - - Side lying ER   1# 2x15 2# 2x15 - - Wall push ups    2x10  2x15 2x15 B Lat pull downs    7# 2x15 7# 2x15 7# 2x15 B Cable rows    7# 2x15 7# 2x15 7# 2x15 B shldr flex with symmetry     1# 2x10 1# 1x10 B shldr abduct to <= 90     1# 2x10 1# 1x10 HEP - continue current HEP 
 Manual Therapy ( 15 minutes) Grade 2 to 4 physio mobs B shoulder flex, abduct, IR and ER. Grade 2 to 4 inferior and posterior shoulder glides. Therapeutic Modalities: For pain and swelling. Game Ready Device with light compression and 42 degrees temperature. Left Shoulder Cold Type: Cryocuff(with vasoneumatic compression) Duration : 15 minutes Patient Position: Sitting Sherlean Narrow Treatment/Session Assessment:   
· Response to Treatment:  Patient able to resume strengthening today. Pain levels remain high but ROM in L arm wall slides appears improved. · Compliance with Program/Exercises: yes · Recommendations/Intent for next treatment session: \"Next visit will focus on motion and strength in B shoulders. \". Total Treatment Duration: 55 minutes PT Patient Time In/Time Out Time In: 3673 Time Out: 1005 Caty Mijraes PT

## 2018-12-10 ENCOUNTER — APPOINTMENT (OUTPATIENT)
Dept: PHYSICAL THERAPY | Age: 53
End: 2018-12-10
Payer: COMMERCIAL

## 2018-12-11 ENCOUNTER — HOSPITAL ENCOUNTER (OUTPATIENT)
Dept: PHYSICAL THERAPY | Age: 53
Discharge: HOME | End: 2018-12-11
Payer: COMMERCIAL

## 2018-12-11 PROCEDURE — 97140 MANUAL THERAPY 1/> REGIONS: CPT

## 2018-12-11 NOTE — PROGRESS NOTES
Abisai Ga : 1965 Primary: [facility]* Secondary:  [facility] ARTIE BLAIR [address] [facility], [address] [place] [address],[address], [address] [phone]   [phone] OUTPATIENT PHYSICAL THERAPY:Daily Note 2018 ICD-10: Treatment Diagnosis:  M75.01 Adhesive capsulitis of right shoulder, M75.02 Adhesive capsulitis of left shoulder, M19.012 Primary osteoarthritis, left shoulder, Pain in left shoulder (M25.512), Pain in right shoulder (M25.511) Precautions/Allergies: NKDA Fall Risk Score: 0 (? 5 = High Risk) MD Orders: Eval and treat, HEP, ROM, strengthening, aggressive motion B shoulders, full motion, full strength  2-3x/wk for 3 weeks (written 18) MEDICAL/REFERRING DIAGNOSIS: 
L shoulder DATE OF ONSET: 10/4/18, EUA of B shoulders on 18 REFERRING PHYSICIAN: Chapo Perez MD 
RETURN PHYSICIAN APPOINTMENT: 18 ASSESSMENT:  Ms. Parth Skelton  Is a 46year old R hand dominant female s/p EUA and manipulation of R shoulder, EUA and manipulation of L shoulder and L SAD, ADCR, and LOREN on 10/4/18. She presented with shoulder pain, decreased ROM in B shoulders, decreased strength in L shoulder and decreased functional use of B shoulders. Her ROM was improving in both arms though she continued to have pain. Her DASH score was improved. Both shoulders were then manipulated under anesthesia on 18 and she returns to PT. She could benefit from continued PT to address deficits and work toward goals. PROBLEM LIST (Impacting functional limitations): 1. Decreased Strength 2. Decreased ADL/Functional Activities 3. Increased Pain 4. Decreased Flexibility/Joint Mobility INTERVENTIONS PLANNED: 
1. Home Exercise Program (HEP) 2. Manual Therapy 3. Therapeutic Exercise/Strengthening 4. modals as needed TREATMENT PLAN: 
Effective Dates: 12/3/2018 TO 3/3/2019 (90 days). Frequency/Duration: 2-3 times a week for 90 Days GOALS: (Goals have been discussed and agreed upon with patient.) Patient's stated goal is to be able to use her arms. Short-Term Functional Goals: Time Frame: 6 weeks 1. Patient to be independent with HEP - MET 2. Patient to increase PROM L shoulder flex to 135 and PROM L shoulder ER to 30 degrees   MET (12/3/18) 3. Patient to report decrease in pain level to 5/10. - partially MET - improving score, but not yet 5. Discharge Goals: Time Frame: 90 days - all in progress 1. Patient to report no more than minimal shoulder pain with all activity 2. Patient to improve DASH score to 21 to demo improved use of B UEs 3. Patient to improve AROM B shoulder flex to 150 degrees for improved functional use. Rehabilitation Potential For Stated Goals: Good Regarding Latesha Perea's therapy, I certify that the treatment plan above will be carried out by a therapist or under their direction. Thank you for this referral, 
Jose Maria Damon PT The information in this section was collected on 10/9/18 (except where otherwise noted). HISTORY:  
History of Present Injury/Illness (Reason for Referral): 
Patient reports that shoulder pain started last year. She went to her family doctor and was sent to PT. They tried dry needling and MD injected her. She didn't get better and requested a second opinion. She was sent to Dr. Vale Garibay and an MRI was done, and then she had surgery on 10/4/18. She was told to leave the dressings on until she returned to MD.  
She subsequently had B shoulder manipulations on 11/30/18. Past Medical History/Comorbidities: OA,  Benign hypertension with CKD (chronic kidney disease) stage III ([address] Diabetes ([address] Dyslipidemia; Endometrial cancer ([address] and Morbid obesity ([address] knee arthroscopy (Right, 2015); appendectomy (1979), L ankle surgery (2007) L index finger surgery (2005) Social History/Living Environment:  
  Lives with family in one story home. Prior Level of Function/Work/Activity: 
 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 Dominant Side:  
      RIGHT Previous Treatment Approaches: PT prior to surgery Current Medications:  Glipizide, lisinopril, Norco, Meloxicam   Date Last Reviewed:  12/11/18 EXAMINATION:  
Observation/Orthostatic Postural Assessment:   
      No new (11/13/18) ROM:   
      AROM R shoulder flex 135, Abduct 145, extn 50, ER 45 and IR to mid R buttock PROM R shoulder flex 130, abduct 110, ER 35 at 90 degree abduct PROM L shoulder flex 80, abduct 65, IR 50 (to abdomen) and ER -15 from neutral.  
 
      PROM R shoulder flex 160, abduct 140, ER 70 and IR 65 at 90 degree abduct (11/1/18) PROM L shoulder flex 130, abduct 110, ER 30 and IR 50 at 90 degrees abduct (11/1/18) PROM R shoulder flex 165, abduct 160, ER 75 and IR 75 at 90 degree abduct (12/3/18) PROM L shoulder flex 140, abduct 120, ER 45 and IR 60 at 90 degrees abduct (12/3/18) Strength:   
      R shoulder 4+ to 5/5 all directions. L shoulder not tested. Neurological Screen: 
      Sensation: light touch intact in B UEs Body Structures Involved: 1. Joints 2. Muscles Body Functions Affected: 1. Neuromusculoskeletal 
2. Movement Related Activities and Participation Affected: 1. General Tasks and Demands 2. Self Care 3. Community, Social and [place] CLINICAL DECISION MAKING:  
Outcome Measure: Tool Used: Disabilities of the Arm, Shoulder and Hand (DASH) Questionnaire - Quick Version Score:  Initial: 55/55   41/55 (Date: 11/1/18 )     Most Recent: 45/55 (Date 12/3/18) Interpretation of Score: The DASH is designed to measure the activities of daily living in person's with upper extremity dysfunction or pain. Each section is scored on a 1-5 scale, 5 representing the greatest disability. The scores of each section are added together for a total score of 55. Medical Necessity:  
· Patient demonstrates good rehab potential due to higher previous functional level. Reason for Services/Other Comments: 
· Patient continues to require skilled intervention due to need to regain use of B UEs.  
  
 
 
 
TREATMENT:  
(In addition to Assessment/Re-Assessment sessions the following treatments were rendered) Pre-treatment Symptoms/Complaints:  Patient reports that she stopped taking sleeping medication because it made her dizzy. She returns to MD tomorrow. Shoulder is still sore (she points to distal clavicle region). Pain: Initial:  
Pain Intensity 1: 8   Post Session: patient rated pain as 7 at end of session. \" It feels better\" Therapeutic Exercise: ( ):  none today Date 
11/1/18 Date 
11/6/18 Date 
11/8/18 Date 11/14/18  Date 11/19/18 Date 
12/6/18 Date Activity/Exercise B serratus punch 1# 2x15 R 2#, L 1# 
1x15 2# B 2x15 2# B 2x15 2# B 2x15 2# B 2x15 Wand flex 1x15 1x15 +1# 2x15 +2# 2x15 - +2#  2x20 Wand ER - 1x15 - - - - Wand abduct - 1x15 - - - -   
B bicep curls 1# 2x15 R 2#, L 1# 
1x15 2# 2x15 2# 2x15 2# 2x15 2# 2x15 Wall slides - - 2x10 2x10 - 2x10 IR with band  Yellow 1x15 Yellow 2x15 Yellow 2x15 Yellow 2x15 Red 2x15 Red 2x15 ER with band  Yellow 1x15 yellow 2x15 Yellow 2x15 Yellow 2x15 Red 2x15 Red 2x15 Side lying abduct   1# 2x15 2# 2x15 - - Side lying ER   1# 2x15 2# 2x15 - - Wall push ups    2x10  2x15 2x15 B Lat pull downs    7# 2x15 7# 2x15 7# 2x15 B Cable rows    7# 2x15 7# 2x15 7# 2x15 B shldr flex with symmetry     1# 2x10 1# 1x10 B shldr abduct to <= 90     1# 2x10 1# 1x10 HEP - continue current HEP Manual Therapy ( 40 minutes) Grade 2 to 4 physio mobs B shoulder flex, abduct, IR and ER. Grade 2 to 4 inferior and posterior shoulder glides. Contract/Relax at end range IR, ER and flex for L shoulder. Therapeutic Modalities: For pain and swelling.  Game Ready Device with light compression and 42 degrees temperature at end of session. Moist heat to L shoulder concurrent with part of joint mobilization prior to cryo at end of session. . . Left Shoulder Heat Type: Moist pack Duration : 20 minutes Patient Position: Supine              Left Shoulder Cold Type: Cryocuff(with vasoneumatic compression) Duration : 15 minutes Patient Position: Sitting(long sitting with back support) Shaka Graves Treatment/Session Assessment:   
· Response to Treatment:  Patient continues to be stiff and sore in L shoulder. She has one more PT session tomorrow before returning to MD later tomorrow. · Compliance with Program/Exercises: yes · Recommendations/Intent for next treatment session: \"Next visit will focus on motion and strength in B shoulders. \". Total Treatment Duration: 55 minutes PT Patient Time In/Time Out Time In: 2781 Time Out: 0843 Danielle Palafox, PT

## 2018-12-12 ENCOUNTER — HOSPITAL ENCOUNTER (OUTPATIENT)
Dept: PHYSICAL THERAPY | Age: 53
Discharge: HOME | End: 2018-12-12
Payer: COMMERCIAL

## 2018-12-12 PROCEDURE — 97140 MANUAL THERAPY 1/> REGIONS: CPT

## 2018-12-12 NOTE — PROGRESS NOTES
Humza Sandie  : 1965  Primary: [facility]  Secondary:  [facility] ARTIE BLAIR  [address] [place] [address], [address] [place] [address],[address], [facility] [address]  [phone]   [phone]          OUTPATIENT PHYSICAL THERAPY:Daily Note 2018   ICD-10: Treatment Diagnosis:  M75.01 Adhesive capsulitis of right shoulder, M75.02 Adhesive capsulitis of left shoulder, M19.012 Primary osteoarthritis, left shoulder, Pain in left shoulder (M25.512), Pain in right shoulder (M25.511)  Precautions/Allergies: NKDA      Fall Risk Score: 0 (? 5 = High Risk)  MD Orders: Eval and treat, HEP, ROM, strengthening, aggressive motion B shoulders, full motion, full strength  2-3x/wk for 3 weeks (written 18)    MEDICAL/REFERRING DIAGNOSIS:  L shoulder    DATE OF ONSET: 10/4/18, EUA of B shoulders on 18  REFERRING PHYSICIAN: Nickolas Heredia MD  RETURN PHYSICIAN APPOINTMENT: 18     ASSESSMENT:  Ms. Joshua Adair  Is a 46year old R hand dominant female s/p EUA and manipulation of R shoulder, EUA and manipulation of L shoulder and L SAD, ADCR, and LOREN on 10/4/18. She presented with shoulder pain, decreased ROM in B shoulders, decreased strength in L shoulder and decreased functional use of B shoulders. Her ROM was improving in both arms though she continued to have pain. Her DASH score was improved. Both shoulders were then manipulated under anesthesia on 18 and she returns to PT. She could benefit from continued PT to address deficits and work toward goals. PROBLEM LIST (Impacting functional limitations):  1. Decreased Strength  2. Decreased ADL/Functional Activities  3. Increased Pain  4. Decreased Flexibility/Joint Mobility INTERVENTIONS PLANNED:  1. Home Exercise Program (HEP)  2. Manual Therapy  3. Therapeutic Exercise/Strengthening  4. modals as needed   TREATMENT PLAN:  Effective Dates: 12/3/2018 TO 3/3/2019 (90 days).   Frequency/Duration: 2-3 times a week for 90 Days  GOALS: (Goals have been discussed and agreed upon with patient.)  Patient's stated goal is to be able to use her arms. Short-Term Functional Goals: Time Frame: 6 weeks  1. Patient to be independent with HEP - MET  2. Patient to increase PROM L shoulder flex to 135 and PROM L shoulder ER to 30 degrees   MET (12/3/18)     3. Patient to report decrease in pain level to 5/10. - partially MET - improving score, but not yet 5. Discharge Goals: Time Frame: 90 days - all in progress  1. Patient to report no more than minimal shoulder pain with all activity  2. Patient to improve DASH score to 21 to demo improved use of B UEs  3. Patient to improve AROM B shoulder flex to 150 degrees for improved functional use. Rehabilitation Potential For Stated Goals: Good    Regarding Lateshagm Perea's therapy, I certify that the treatment plan above will be carried out by a therapist or under their direction. Thank you for this referral,  Vishal Newell PT                     The information in this section was collected on 10/9/18 (except where otherwise noted). HISTORY:   History of Present Injury/Illness (Reason for Referral):  Patient reports that shoulder pain started last year. She went to her family doctor and was sent to PT. They tried dry needling and MD injected her. She didn't get better and requested a second opinion. She was sent to Dr. Kelly Alicea and an MRI was done, and then she had surgery on 10/4/18. She was told to leave the dressings on until she returned to MD.   She subsequently had B shoulder manipulations on 11/30/18. Past Medical History/Comorbidities: OA,  Benign hypertension with CKD (chronic kidney disease) stage III ([address] Diabetes ([address] Dyslipidemia; Endometrial cancer ([address] and Morbid obesity ([address] knee arthroscopy (Right, 2015); appendectomy (1979), L ankle surgery (2007) L index finger surgery (2005)  Social History/Living Environment:     Lives with family in one story home.    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 Dominant Side:         RIGHT  Previous Treatment Approaches:          PT prior to surgery  Current Medications:  Glipizide, lisinopril, Norco, Meloxicam   Date Last Reviewed:  12/11/18   EXAMINATION:   Observation/Orthostatic Postural Assessment:          No new (11/13/18)  ROM:          AROM R shoulder flex 135, Abduct 145, extn 50, ER 45 and IR to mid R buttock        PROM R shoulder flex 130, abduct 110, ER 35 at 90 degree abduct         PROM L shoulder flex 80, abduct 65, IR 50 (to abdomen) and ER -15 from neutral.           PROM R shoulder flex 160, abduct 140, ER 70 and IR 65 at 90 degree abduct (11/1/18)        PROM L shoulder flex 130, abduct 110, ER 30 and IR 50 at 90 degrees abduct (11/1/18)          PROM R shoulder flex 165, abduct 160, ER 75 and IR 75 at 90 degree abduct (12/3/18)        PROM L shoulder flex 140, abduct 120, ER 45 and IR 60 at 90 degrees abduct (12/3/18)          PROM L shoulder flex 155, abduct 140, ER 57 and IR 67 at 90 degrees abduct (12/12/18)        Strength:          R shoulder 4+ to 5/5 all directions. L shoulder not tested. Neurological Screen:        Sensation: light touch intact in B UEs   Body Structures Involved:  1. Joints  2. Muscles Body Functions Affected:  1. Neuromusculoskeletal  2. Movement Related Activities and Participation Affected:  1. General Tasks and Demands  2. Self Care  3. Community, Social and Civic Life   CLINICAL DECISION MAKING:   Outcome Measure: Tool Used: Disabilities of the Arm, Shoulder and Hand (DASH) Questionnaire - Quick Version  Score:  Initial: 55/55   41/55 (Date: 11/1/18 )     Most Recent: 45/55 (Date 12/3/18)     Interpretation of Score: The DASH is designed to measure the activities of daily living in person's with upper extremity dysfunction or pain. Each section is scored on a 1-5 scale, 5 representing the greatest disability. The scores of each section are added together for a total score of 55. Medical Necessity:   · Patient demonstrates good rehab potential due to higher previous functional level. Reason for Services/Other Comments:  · Patient continues to require skilled intervention due to need to regain use of B UEs.            TREATMENT:   (In addition to Assessment/Re-Assessment sessions the following treatments were rendered)  Pre-treatment Symptoms/Complaints:  Patient reports that she still has pain in the shoulder. Woke up early today and had a hard time getting back to sleep. Iced both shoulders and her L wrist.   Pain: Initial:   Pain Intensity 1: 8   Post Session: patient did not rate pain at end of session. Therapeutic Exercise: ( ):  none today     Date  11/1/18 Date  11/6/18 Date  11/8/18 Date  11/14/18  Date  11/19/18 Date  12/6/18 Date      Activity/Exercise          B serratus punch 1# 2x15 R 2#, L 1#  1x15 2# B 2x15 2# B 2x15 2# B 2x15 2# B 2x15    Wand flex 1x15 1x15 +1# 2x15 +2# 2x15 - +2#  2x20    Wand ER - 1x15 - - - -    Wand abduct - 1x15 - - - -    B bicep curls 1# 2x15 R 2#, L 1#  1x15 2# 2x15 2# 2x15 2# 2x15 2# 2x15    Wall slides - - 2x10 2x10 - 2x10    IR with band  Yellow 1x15 Yellow 2x15 Yellow 2x15 Yellow 2x15 Red 2x15 Red 2x15    ER with band  Yellow 1x15 yellow 2x15 Yellow 2x15 Yellow 2x15 Red 2x15 Red 2x15    Side lying abduct   1# 2x15 2# 2x15 - -    Side lying ER   1# 2x15 2# 2x15 - -    Wall push ups    2x10  2x15 2x15    B Lat pull downs    7# 2x15 7# 2x15 7# 2x15    B Cable rows    7# 2x15 7# 2x15 7# 2x15    B shldr flex with symmetry     1# 2x10 1# 1x10    B shldr abduct to <= 90     1# 2x10 1# 1x10        HEP - continue current HEP  Manual Therapy ( 40 minutes) Grade 2 to 4 physio mobs B shoulder flex, abduct, IR and ER. Grade 2 to 4 inferior and posterior shoulder glides. Contract/Relax at end range IR, ER and flex for L shoulder.       Therapeutic Modalities:   None today as patient was going up to see MD right after PT. .   Treatment/Session Assessment:    · Response to Treatment:  Patient's ROM continues to improve, but she continues to have pain. She goes up to MD right after PT today. · Compliance with Program/Exercises: yes  · Recommendations/Intent for next treatment session: \"Next visit will focus on motion and strength in B shoulders. \".   Await new orders from MD.   Total Treatment Duration: 40 minutes  PT Patient Time In/Time Out  Time In: 1064  Time Out: One Rocael Sorenson, PT

## 2018-12-17 ENCOUNTER — HOSPITAL ENCOUNTER (OUTPATIENT)
Dept: PHYSICAL THERAPY | Age: 53
Discharge: HOME | End: 2018-12-17
Payer: COMMERCIAL

## 2018-12-17 PROCEDURE — 97140 MANUAL THERAPY 1/> REGIONS: CPT

## 2018-12-17 NOTE — PROGRESS NOTES
Luke Alvarez  : 1965  Primary: [facility]  [facility], [address], [place] [address].  [phone]   [phone]          OUTPATIENT PHYSICAL THERAPY:Daily Note 2018   ICD-10: Treatment Diagnosis:  M75.01 Adhesive capsulitis of right shoulder, M75.02 Adhesive capsulitis of left shoulder, M19.012 Primary osteoarthritis, left shoulder, Pain in left shoulder (M25.512), Pain in right shoulder (M25.511)  Precautions/Allergies: NKDA      Fall Risk Score: 0 (? 5 = High Risk)  MD Orders: Eval and treat, HEP, ROM, strengthening, aggressive, all modalities, dry needling. 2-3x/wk for 5 weeks (written 18)    MEDICAL/REFERRING DIAGNOSIS:  L shoulder    DATE OF ONSET: 10/4/18, EUA of B shoulders on 18  REFERRING PHYSICIAN: Kaylie York MD  RETURN PHYSICIAN APPOINTMENT: 19     ASSESSMENT:  Ms. Sher Fish  Is a 46year old R hand dominant female s/p EUA and manipulation of R shoulder, EUA and manipulation of L shoulder and L SAD, ADCR, and LOREN on 10/4/18. She presented with shoulder pain, decreased ROM in B shoulders, decreased strength in L shoulder and decreased functional use of B shoulders. Her ROM was improving in both arms though she continued to have pain. Her DASH score was improved. Both shoulders were then manipulated under anesthesia on 18 and she returns to PT. She could benefit from continued PT to address deficits and work toward goals. PROBLEM LIST (Impacting functional limitations):  1. Decreased Strength  2. Decreased ADL/Functional Activities  3. Increased Pain  4. Decreased Flexibility/Joint Mobility INTERVENTIONS PLANNED:  1. Home Exercise Program (HEP)  2. Manual Therapy  3. Therapeutic Exercise/Strengthening  4. modals as needed   TREATMENT PLAN:  Effective Dates: 12/3/2018 TO 3/3/2019 (90 days).   Frequency/Duration: 2-3 times a week for 90 Days  GOALS: (Goals have been discussed and agreed upon with patient.)  Patient's stated goal is to be able to use her arms. Short-Term Functional Goals: Time Frame: 6 weeks  1. Patient to be independent with HEP - MET  2. Patient to increase PROM L shoulder flex to 135 and PROM L shoulder ER to 30 degrees   MET (12/3/18)     3. Patient to report decrease in pain level to 5/10. - partially MET - improving score, but not yet 5. Discharge Goals: Time Frame: 90 days - all in progress  1. Patient to report no more than minimal shoulder pain with all activity  2. Patient to improve DASH score to 21 to demo improved use of B UEs  3. Patient to improve AROM B shoulder flex to 150 degrees for improved functional use. Rehabilitation Potential For Stated Goals: Good    Regarding Latesha Perea's therapy, I certify that the treatment plan above will be carried out by a therapist or under their direction. Thank you for this referral,  Sundeep Dalton PT                     The information in this section was collected on 10/9/18 (except where otherwise noted). HISTORY:   History of Present Injury/Illness (Reason for Referral):  Patient reports that shoulder pain started last year. She went to her family doctor and was sent to PT. They tried dry needling and MD injected her. She didn't get better and requested a second opinion. She was sent to Dr. Inocente Mcpherson and an MRI was done, and then she had surgery on 10/4/18. She was told to leave the dressings on until she returned to MD.   She subsequently had B shoulder manipulations on 11/30/18. Past Medical History/Comorbidities: OA,  Benign hypertension with CKD (chronic kidney disease) stage III ([address] Diabetes ([address] Dyslipidemia; Endometrial cancer ([address] and Morbid obesity ([address] knee arthroscopy (Right, 2015); appendectomy (1979), L ankle surgery (2007) L index finger surgery (2005)  Social History/Living Environment:     Lives with family in one story home.    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 Dominant Side:         RIGHT  Previous Treatment Approaches:          PT prior to surgery  Current Medications:  Glipizide, lisinopril, Norco, Meloxicam   Date Last Reviewed:  12/17/18   EXAMINATION:   Observation/Orthostatic Postural Assessment:          No new (11/13/18)  ROM:          AROM R shoulder flex 135, Abduct 145, extn 50, ER 45 and IR to mid R buttock        PROM R shoulder flex 130, abduct 110, ER 35 at 90 degree abduct         PROM L shoulder flex 80, abduct 65, IR 50 (to abdomen) and ER -15 from neutral.           PROM R shoulder flex 160, abduct 140, ER 70 and IR 65 at 90 degree abduct (11/1/18)        PROM L shoulder flex 130, abduct 110, ER 30 and IR 50 at 90 degrees abduct (11/1/18)          PROM R shoulder flex 165, abduct 160, ER 75 and IR 75 at 90 degree abduct (12/3/18)        PROM L shoulder flex 140, abduct 120, ER 45 and IR 60 at 90 degrees abduct (12/3/18)          PROM L shoulder flex 155, abduct 140, ER 57 and IR 67 at 90 degrees abduct (12/12/18)        Strength:          R shoulder 4+ to 5/5 all directions. L shoulder not tested. Neurological Screen:        Sensation: light touch intact in B UEs   Body Structures Involved:  1. Joints  2. Muscles Body Functions Affected:  1. Neuromusculoskeletal  2. Movement Related Activities and Participation Affected:  1. General Tasks and Demands  2. Self Care  3. Community, Social and Civic Life   CLINICAL DECISION MAKING:   Outcome Measure: Tool Used: Disabilities of the Arm, Shoulder and Hand (DASH) Questionnaire - Quick Version  Score:  Initial: 55/55   41/55 (Date: 11/1/18 )     Most Recent: 45/55 (Date 12/3/18)     Interpretation of Score: The DASH is designed to measure the activities of daily living in person's with upper extremity dysfunction or pain. Each section is scored on a 1-5 scale, 5 representing the greatest disability.   The scores of each section are added together for a total score of 55. Medical Necessity:   · Patient demonstrates good rehab potential due to higher previous functional level. Reason for Services/Other Comments:  · Patient continues to require skilled intervention due to need to regain use of B UEs.            TREATMENT:   (In addition to Assessment/Re-Assessment sessions the following treatments were rendered)  Pre-treatment Symptoms/Complaints:  Patient reports that she returned to MD and he was very pleased with her progress. Wants her to continue PT. He included dry needling on the script, but she does not want to do that. Reports it did not help in the past and she had a bad reaction to it. Pain: Initial:   Pain Intensity 1: 5   Post Session: no change in pain at end of session. Therapeutic Exercise: ( ):  none today     Date  11/1/18 Date  11/6/18 Date  11/8/18 Date  11/14/18  Date  11/19/18 Date  12/6/18 Date      Activity/Exercise          B serratus punch 1# 2x15 R 2#, L 1#  1x15 2# B 2x15 2# B 2x15 2# B 2x15 2# B 2x15    Wand flex 1x15 1x15 +1# 2x15 +2# 2x15 - +2#  2x20    Wand ER - 1x15 - - - -    Wand abduct - 1x15 - - - -    B bicep curls 1# 2x15 R 2#, L 1#  1x15 2# 2x15 2# 2x15 2# 2x15 2# 2x15    Wall slides - - 2x10 2x10 - 2x10    IR with band  Yellow 1x15 Yellow 2x15 Yellow 2x15 Yellow 2x15 Red 2x15 Red 2x15    ER with band  Yellow 1x15 yellow 2x15 Yellow 2x15 Yellow 2x15 Red 2x15 Red 2x15    Side lying abduct   1# 2x15 2# 2x15 - -    Side lying ER   1# 2x15 2# 2x15 - -    Wall push ups    2x10  2x15 2x15    B Lat pull downs    7# 2x15 7# 2x15 7# 2x15    B Cable rows    7# 2x15 7# 2x15 7# 2x15    B shldr flex with symmetry     1# 2x10 1# 1x10    B shldr abduct to <= 90     1# 2x10 1# 1x10        HEP - continue current HEP  Manual Therapy ( 40 minutes) Grade 2 to 4 physio mobs B shoulder flex, abduct, IR and ER. Grade 2 to 4 inferior and posterior shoulder glides.    Contract/Relax at end range IR, ER and flex for L shoulder. Therapeutic Modalities: For pain - Game Ready device at 42 degrees and light compression. Left Shoulder Cold  Type: Cryocuff(with vasoneumatic compression)  Duration : 15 minutes  Patient Position: Sitting                                                                      . Treatment/Session Assessment:    · Response to Treatment:  Focused on ROM again today. Patient reports that MD was pleased with her progress. Will continue with strengthening next time. · Compliance with Program/Exercises: yes  · Recommendations/Intent for next treatment session: \"Next visit will focus on motion and strength in B shoulders. \".      Total Treatment Duration: 55 minutes  PT Patient Time In/Time Out  Time In: 0948  Time Out: [address] Jinny Trejo

## 2018-12-19 ENCOUNTER — HOSPITAL ENCOUNTER (OUTPATIENT)
Dept: PHYSICAL THERAPY | Age: 53
Discharge: HOME | End: 2018-12-19
Payer: COMMERCIAL

## 2018-12-19 PROCEDURE — 97110 THERAPEUTIC EXERCISES: CPT

## 2018-12-19 PROCEDURE — 97140 MANUAL THERAPY 1/> REGIONS: CPT

## 2018-12-20 ENCOUNTER — HOSPITAL ENCOUNTER (OUTPATIENT)
Dept: PHYSICAL THERAPY | Age: 53
Discharge: HOME | End: 2018-12-20
Payer: COMMERCIAL

## 2018-12-20 PROCEDURE — 97140 MANUAL THERAPY 1/> REGIONS: CPT

## 2018-12-20 NOTE — PROGRESS NOTES
Yon Arce  : 1965  Primary: [facility]  Secondary:  Therapy Center at [facility] ARTIE BRUMFIELD[address], [address], [place] [address].  [phone]   [phone]          OUTPATIENT PHYSICAL THERAPY:Daily Note 2018   ICD-10: Treatment Diagnosis:  M75.01 Adhesive capsulitis of right shoulder, M75.02 Adhesive capsulitis of left shoulder, M19.012 Primary osteoarthritis, left shoulder, Pain in left shoulder (M25.512), Pain in right shoulder (M25.511)  Precautions/Allergies: NKDA      Fall Risk Score: 0 (? 5 = High Risk)  MD Orders: Eval and treat, HEP, ROM, strengthening, aggressive, all modalities, dry needling. 2-3x/wk for 5 weeks (written 18)    MEDICAL/REFERRING DIAGNOSIS:  L shoulder    DATE OF ONSET: 10/4/18, EUA of B shoulders on 18  REFERRING PHYSICIAN: Albina Ramsay MD  RETURN PHYSICIAN APPOINTMENT: 19     ASSESSMENT:  Ms. Marilu Simms  Is a 46year old R hand dominant female s/p EUA and manipulation of R shoulder, EUA and manipulation of L shoulder and L SAD, ADCR, and LOREN on 10/4/18. She presented with shoulder pain, decreased ROM in B shoulders, decreased strength in L shoulder and decreased functional use of B shoulders. Her ROM was improving in both arms though she continued to have pain. Her DASH score was improved. Both shoulders were then manipulated under anesthesia on 18 and she returns to PT. She could benefit from continued PT to address deficits and work toward goals. PROBLEM LIST (Impacting functional limitations):  1. Decreased Strength  2. Decreased ADL/Functional Activities  3. Increased Pain  4. Decreased Flexibility/Joint Mobility INTERVENTIONS PLANNED:  1. Home Exercise Program (HEP)  2. Manual Therapy  3. Therapeutic Exercise/Strengthening  4. modals as needed   TREATMENT PLAN:  Effective Dates: 12/3/2018 TO 3/3/2019 (90 days).   Frequency/Duration: 2-3 times a week for 90 Days  GOALS: (Goals have been discussed and agreed upon with patient.)  Patient's stated goal is to be able to use her arms. Short-Term Functional Goals: Time Frame: 6 weeks  1. Patient to be independent with HEP - MET  2. Patient to increase PROM L shoulder flex to 135 and PROM L shoulder ER to 30 degrees   MET (12/3/18)     3. Patient to report decrease in pain level to 5/10. - partially MET - improving score, but not yet 5. Discharge Goals: Time Frame: 90 days - all in progress  1. Patient to report no more than minimal shoulder pain with all activity  2. Patient to improve DASH score to 21 to demo improved use of B UEs  3. Patient to improve AROM B shoulder flex to 150 degrees for improved functional use. Rehabilitation Potential For Stated Goals: Good    Regarding Latesha Eliazar's therapy, I certify that the treatment plan above will be carried out by a therapist or under their direction. Thank you for this referral,  Toña Guardado, PT                     The information in this section was collected on 10/9/18 (except where otherwise noted). HISTORY:   History of Present Injury/Illness (Reason for Referral):  Patient reports that shoulder pain started last year. She went to her family doctor and was sent to PT. They tried dry needling and MD injected her. She didn't get better and requested a second opinion. She was sent to Dr. Lissette Evans and an MRI was done, and then she had surgery on 10/4/18. She was told to leave the dressings on until she returned to MD.   She subsequently had B shoulder manipulations on 11/30/18. Past Medical History/Comorbidities: OA,  Benign hypertension with CKD (chronic kidney disease) stage III ([address] Diabetes ([address] Dyslipidemia; Endometrial cancer ([address] and Morbid obesity ([address] knee arthroscopy (Right, 2015); appendectomy (1979), L ankle surgery (2007) L index finger surgery (2005)  Social History/Living Environment:     Lives with family in one story home.    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 Dominant Side:         RIGHT  Previous Treatment Approaches:          PT prior to surgery  Current Medications:  Glipizide, lisinopril, Norco, Meloxicam   Date Last Reviewed:  12/17/18   EXAMINATION:   Observation/Orthostatic Postural Assessment:          No new (11/13/18)  ROM:          AROM R shoulder flex 135, Abduct 145, extn 50, ER 45 and IR to mid R buttock        PROM R shoulder flex 130, abduct 110, ER 35 at 90 degree abduct         PROM L shoulder flex 80, abduct 65, IR 50 (to abdomen) and ER -15 from neutral.           PROM R shoulder flex 160, abduct 140, ER 70 and IR 65 at 90 degree abduct (11/1/18)        PROM L shoulder flex 130, abduct 110, ER 30 and IR 50 at 90 degrees abduct (11/1/18)          PROM R shoulder flex 165, abduct 160, ER 75 and IR 75 at 90 degree abduct (12/3/18)        PROM L shoulder flex 140, abduct 120, ER 45 and IR 60 at 90 degrees abduct (12/3/18)          PROM L shoulder flex 155, abduct 140, ER 57 and IR 67 at 90 degrees abduct (12/12/18)        Strength:          R shoulder 4+ to 5/5 all directions. L shoulder not tested. Neurological Screen:        Sensation: light touch intact in B UEs   Body Structures Involved:  1. Joints  2. Muscles Body Functions Affected:  1. Neuromusculoskeletal  2. Movement Related Activities and Participation Affected:  1. General Tasks and Demands  2. Self Care  3. Community, Social and Civic Life   CLINICAL DECISION MAKING:   Outcome Measure: Tool Used: Disabilities of the Arm, Shoulder and Hand (DASH) Questionnaire - Quick Version  Score:  Initial: 55/55   41/55 (Date: 11/1/18 )     Most Recent: 45/55 (Date 12/3/18)     Interpretation of Score: The DASH is designed to measure the activities of daily living in person's with upper extremity dysfunction or pain. Each section is scored on a 1-5 scale, 5 representing the greatest disability.   The scores of each section are added together for a total score of 55. Medical Necessity:   · Patient demonstrates good rehab potential due to higher previous functional level. Reason for Services/Other Comments:  · Patient continues to require skilled intervention due to need to regain use of B UEs.            TREATMENT:   (In addition to Assessment/Re-Assessment sessions the following treatments were rendered)  Pre-treatment Symptoms/Complaints:  Patient reports that shoulder is still \"irritated\" more than painful, but she rates pain as 5/10. Pain: Initial:   Pain Intensity 1: 5(\"Just irritated, not painful\" )   Post Session: not rated at end of session. Therapeutic Exercise: ( ):  None today     Date  11/14/18  Date  11/19/18 Date  12/6/18 Date  12/19/18  Date     Activity/Exercise        B serratus punch 2# B 2x15 2# B 2x15 2# B 2x15 3# B 2x15    Wand flex +2# 2x15 - +2#  2x20 -    Wand ER - - - -    Wand abduct - - - -    B bicep curls 2# 2x15 2# 2x15 2# 2x15 3# 2x15    Wall slides 2x10 - 2x10 -    IR with band  Yellow 2x15 Red 2x15 Red 2x15 Red 2x15    ER with band  Yellow 2x15 Red 2x15 Red 2x15 Red 2x15    Side lying abduct 2# 2x15 - - 2# 2x15    Side lying ER 2# 2x15 - - 2# 2x15    Wall push ups 2x10  2x15 2x15 Rail 2x10    B Lat pull downs 7# 2x15 7# 2x15 7# 2x15 10# 3x10    B Cable rows 7# 2x15 7# 2x15 7# 2x15 10# 2x15    B cable press    3# 2x15    B shldr flex with symmetry  1# 2x10 1# 1x10 2# 1x10    B shldr abduct to <= 90  1# 2x10 1# 1x10 -    Rows with band    Blue 2x15    B shldr extn with band    Blue 2x15        HEP - continue current HEP  Manual Therapy ( 40 minutes) Grade 2 to 4 physio mobs B shoulder flex, abduct, IR and ER. Grade 2 to 4 inferior and posterior shoulder glides. Emphasis on L shoulder. Contract/relax technique for end range L shoulder flex, IR and ER.    Therapeutic Modalities:   Patient took ice with her at end of session         Left Shoulder Heat  Type: Moist pack  Duration : 25 minutes  Patient Position: Supine                                                                                     . Treatment/Session Assessment:    · Response to Treatment:  Patient with increased pain after session. Focused on motion today since she had done exercises yesterday in PT.   · Compliance with Program/Exercises: yes  · Recommendations/Intent for next treatment session: \"Next visit will focus on motion and strength in B shoulders. \".      Total Treatment Duration: 40 minutes  PT Patient Time In/Time Out  Time In: 0940  Time Out: [facility]

## 2018-12-26 ENCOUNTER — HOSPITAL ENCOUNTER (OUTPATIENT)
Dept: PHYSICAL THERAPY | Age: 53
Discharge: HOME | End: 2018-12-26
Payer: COMMERCIAL

## 2018-12-26 PROCEDURE — 97110 THERAPEUTIC EXERCISES: CPT

## 2018-12-26 PROCEDURE — 97140 MANUAL THERAPY 1/> REGIONS: CPT

## 2018-12-27 ENCOUNTER — APPOINTMENT (OUTPATIENT)
Dept: PHYSICAL THERAPY | Age: 53
End: 2018-12-27
Payer: COMMERCIAL

## 2018-12-28 ENCOUNTER — HOSPITAL ENCOUNTER (OUTPATIENT)
Dept: PHYSICAL THERAPY | Age: 53
Discharge: HOME | End: 2018-12-28
Payer: COMMERCIAL

## 2018-12-28 PROCEDURE — 97140 MANUAL THERAPY 1/> REGIONS: CPT

## 2018-12-28 NOTE — PROGRESS NOTES
Ezequiel Guerra : 1965 Primary: [facility] Secondary:  Therapy Center at [facility] ARTIE BLAIR [address] [facility], [address] [place] [address],[address] [facility] [phone]   [phone] OUTPATIENT PHYSICAL THERAPY:Daily Note 2018 ICD-10: Treatment Diagnosis:  M75.01 Adhesive capsulitis of right shoulder, M75.02 Adhesive capsulitis of left shoulder, M19.012 Primary osteoarthritis, left shoulder, Pain in left shoulder (M25.512), Pain in right shoulder (M25.511) Precautions/Allergies: NKDA Fall Risk Score: 0 (? 5 = High Risk) MD Orders: Eval and treat, HEP, ROM, strengthening, aggressive, all modalities, dry needling. 2-3x/wk for 5 weeks (written 18) MEDICAL/REFERRING DIAGNOSIS: 
L shoulder DATE OF ONSET: 10/4/18, EUA of B shoulders on 18 REFERRING PHYSICIAN: Mendoza Machado MD 
RETURN PHYSICIAN APPOINTMENT: 19 ASSESSMENT:  Ms. Jessica Malone  Is a 46year old R hand dominant female s/p EUA and manipulation of R shoulder, EUA and manipulation of L shoulder and L SAD, ADCR, and LOREN on 10/4/18. She presented with shoulder pain, decreased ROM in B shoulders, decreased strength in L shoulder and decreased functional use of B shoulders. Her ROM was improving in both arms though she continued to have pain. Her DASH score was improved. Both shoulders were then manipulated under anesthesia on 18 and she returns to PT. She could benefit from continued PT to address deficits and work toward goals. PROBLEM LIST (Impacting functional limitations): 1. Decreased Strength 2. Decreased ADL/Functional Activities 3. Increased Pain 4. Decreased Flexibility/Joint Mobility INTERVENTIONS PLANNED: 
1. Home Exercise Program (HEP) 2. Manual Therapy 3. Therapeutic Exercise/Strengthening 4. modals as needed TREATMENT PLAN: 
Effective Dates: 12/3/2018 TO 3/3/2019 (90 days). Frequency/Duration: 2-3 times a week for 90 Days GOALS: (Goals have been discussed and agreed upon with patient.) Patient's stated goal is to be able to use her arms. Short-Term Functional Goals: Time Frame: 6 weeks 1. Patient to be independent with HEP - MET 2. Patient to increase PROM L shoulder flex to 135 and PROM L shoulder ER to 30 degrees   MET (12/3/18) 3. Patient to report decrease in pain level to 5/10. - partially MET - improving score, but not yet 5. Discharge Goals: Time Frame: 90 days - all in progress 1. Patient to report no more than minimal shoulder pain with all activity 2. Patient to improve DASH score to 21 to demo improved use of B UEs 3. Patient to improve AROM B shoulder flex to 150 degrees for improved functional use. Rehabilitation Potential For Stated Goals: Good Regarding Latesha Perea's therapy, I certify that the treatment plan above will be carried out by a therapist or under their direction. Thank you for this referral, 
Aubrie Carlos PT The information in this section was collected on 10/9/18 (except where otherwise noted). HISTORY:  
History of Present Injury/Illness (Reason for Referral): 
Patient reports that shoulder pain started last year. She went to her family doctor and was sent to PT. They tried dry needling and MD injected her. She didn't get better and requested a second opinion. She was sent to Dr. Catalina Nicole and an MRI was done, and then she had surgery on 10/4/18. She was told to leave the dressings on until she returned to MD.  
She subsequently had B shoulder manipulations on 11/30/18. Past Medical History/Comorbidities: OA,  Benign hypertension with CKD (chronic kidney disease) stage III ([address] Diabetes ([address] Dyslipidemia; Endometrial cancer ([address] and Morbid obesity ([address] knee arthroscopy (Right, 2015); appendectomy (1979), L ankle surgery (2007) L index finger surgery (2005) Social History/Living Environment:  
  Lives with family in one story home. Prior Level of Function/Work/Activity: 
 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 Dominant Side:  
      RIGHT Previous Treatment Approaches: PT prior to surgery Current Medications:  Glipizide, lisinopril, Norco, Meloxicam   Date Last Reviewed:  12/26/18 EXAMINATION:  
Observation/Orthostatic Postural Assessment:   
      No new (11/13/18) ROM:   
      AROM R shoulder flex 135, Abduct 145, extn 50, ER 45 and IR to mid R buttock PROM R shoulder flex 130, abduct 110, ER 35 at 90 degree abduct PROM L shoulder flex 80, abduct 65, IR 50 (to abdomen) and ER -15 from neutral.  
 
      PROM R shoulder flex 160, abduct 140, ER 70 and IR 65 at 90 degree abduct (11/1/18) PROM L shoulder flex 130, abduct 110, ER 30 and IR 50 at 90 degrees abduct (11/1/18) PROM R shoulder flex 165, abduct 160, ER 75 and IR 75 at 90 degree abduct (12/3/18) PROM L shoulder flex 140, abduct 120, ER 45 and IR 60 at 90 degrees abduct (12/3/18) PROM L shoulder flex 155, abduct 140, ER 57 and IR 67 at 90 degrees abduct (12/12/18) Strength:   
      R shoulder 4+ to 5/5 all directions. L shoulder not tested. Neurological Screen: 
      Sensation: light touch intact in B UEs Body Structures Involved: 1. Joints 2. Muscles Body Functions Affected: 1. Neuromusculoskeletal 
2. Movement Related Activities and Participation Affected: 1. General Tasks and Demands 2. Self Care 3. Community, Social and [place] CLINICAL DECISION MAKING:  
Outcome Measure: Tool Used: Disabilities of the Arm, Shoulder and Hand (DASH) Questionnaire - Quick Version Score:  Initial: 55/55   41/55 (Date: 11/1/18 )     Most Recent: 45/55 (Date 12/3/18) Interpretation of Score: The DASH is designed to measure the activities of daily living in person's with upper extremity dysfunction or pain.   Each section is scored on a 1-5 scale, 5 representing the greatest disability. The scores of each section are added together for a total score of 55. Medical Necessity:  
· Patient demonstrates good rehab potential due to higher previous functional level. Reason for Services/Other Comments: 
· Patient continues to require skilled intervention due to need to regain use of B UEs.  
  
 
 
 
TREATMENT:  
(In addition to Assessment/Re-Assessment sessions the following treatments were rendered) Pre-treatment Symptoms/Complaints:  Patient reports that she is 30 minutes late because she lost track of the time. Had to take a friend for out patient surgery yesterday and was gone all day. Pain: Initial:  
Pain Intensity 1: 4(More achy than painful. )   Post Session: No change in pain at end of session. Therapeutic Exercise: ( ):  none today Date 11/14/18  Date 11/19/18 Date 
12/6/18 Date 
12/19/18  Date 
12/26/18 Activity/Exercise B serratus punch 2# B 2x15 2# B 2x15 2# B 2x15 3# B 2x15 3# B 2x15 Wand flex +2# 2x15 - +2#  2x20 - -  
B bicep curls 2# 2x15 2# 2x15 2# 2x15 3# 2x15 3# 2x15 Wall slides 2x10 - 2x10 - Red 2x10 IR with band  Yellow 2x15 Red 2x15 Red 2x15 Red 2x15 Red 2x15 ER with band  Yellow 2x15 Red 2x15 Red 2x15 Red 2x15 Red 2x15 Side lying abduct 2# 2x15 - - 2# 2x15 2# 2x15 Side lying ER 2# 2x15 - - 2# 2x15 2# 2x15 Wall push ups 2x10  2x15 2x15 Rail 2x10 Rail 2x10 B Lat pull downs 7# 2x15 7# 2x15 7# 2x15 10# 3x10 10# 2x15 B Cable rows 7# 2x15 7# 2x15 7# 2x15 10# 2x15 10# 2x15 B cable press    3# 2x15 3# 2x15 B shldr flex with symmetry  1# 2x10 1# 1x10 2# 1x10 -  
B shldr abduct to <= 90  1# 2x10 1# 1x10 - - Rows with band    Blue 2x15 Blue 2x15 B shldr extn with band    Blue 2x15 Blue 2x15 HEP - continue current HEP Manual Therapy ( 40minutes) Grade 2 to 4 physio mobs B shoulder flex, abduct, IR and ER. Grade 2 to 4 inferior and posterior shoulder glides. Emphasis on L shoulder. Contract/relax technique for end range flex, IR and ER of L shoulder. Therapeutic Modalities:   Patient took ice with her at end of session. .  
Treatment/Session Assessment:   
· Response to Treatment:  Patient tolerated manual therapy well. No significant changes to report. · Compliance with Program/Exercises: yes · Recommendations/Intent for next treatment session: \"Next visit will focus on motion and strength in B shoulders. \". Total Treatment Duration: 40 minutes PT Patient Time In/Time Out Time In: 0125 Time Out: 1030 Félix Quiroga PT

## 2019-01-03 ENCOUNTER — HOSPITAL ENCOUNTER (OUTPATIENT)
Dept: PHYSICAL THERAPY | Age: 54
Discharge: HOME | End: 2019-01-03
Payer: COMMERCIAL

## 2019-01-03 PROCEDURE — 97140 MANUAL THERAPY 1/> REGIONS: CPT

## 2019-01-03 NOTE — PROGRESS NOTES
Gabi Alvarenga : 1965 Primary: [facility] [employer] [facility]* Secondary:  [facility] at [facility] ARTIE BLAIR [address] [facility], [address] [place] [address],[address], [place] [address]. [phone]   [phone] OUTPATIENT PHYSICAL THERAPY:Progress Report 1/3/2019 ICD-10: Treatment Diagnosis:  M75.01 Adhesive capsulitis of right shoulder, M75.02 Adhesive capsulitis of left shoulder, M19.012 Primary osteoarthritis, left shoulder, Pain in left shoulder (M25.512), Pain in right shoulder (M25.511) Precautions/Allergies: NKDA Fall Risk Score: 0 (? 5 = High Risk) MD Orders: Eval and treat, HEP, ROM, strengthening, aggressive, all modalities, dry needling. 2-3x/wk for 5 weeks (written 18) Patient has attended 28 PT sessions from 10/9/18 to 1/3/19 with 2 missed sessions. MEDICAL/REFERRING DIAGNOSIS: 
L shoulder DATE OF ONSET: 10/4/18, EUA of B shoulders on 18 REFERRING PHYSICIAN: Sindi Gallego., MD 
RETURN PHYSICIAN APPOINTMENT: 19 ASSESSMENT:  Ms. Juan Antonio Mendoza  Is a 46year old R hand dominant female s/p EUA and manipulation of R shoulder, EUA and manipulation of L shoulder and L SAD, ADCR, and LOREN on 10/4/18. She presented with shoulder pain, decreased ROM in B shoulders, decreased strength in L shoulder and decreased functional use of B shoulders. Her ROM was improving in both arms though she continued to have pain. Her DASH score was improved. Both shoulders were then manipulated under anesthesia on 18. She was making progress with ROM, but L shoulder was tighter when measured today (1/3/19). DASH is minimally improved and she continues to have pain. She could benefit from continued PT to address deficits and work toward goals. PROBLEM LIST (Impacting functional limitations): 1. Decreased Strength 2. Decreased ADL/Functional Activities 3. Increased Pain 4. Decreased Flexibility/Joint Mobility INTERVENTIONS PLANNED: 
1. Home Exercise Program (HEP) 2. Manual Therapy 3. Therapeutic Exercise/Strengthening 4. modals as needed TREATMENT PLAN: 
Effective Dates: 12/3/2018 TO 3/3/2019 (90 days). Frequency/Duration: 2-3 times a week for 90 Days GOALS: (Goals have been discussed and agreed upon with patient.) Patient's stated goal is to be able to use her arms. Short-Term Functional Goals: Time Frame: 6 weeks 1. Patient to be independent with HEP - MET 2. Patient to increase PROM L shoulder flex to 135 and PROM L shoulder ER to 30 degrees   MET (12/3/18) 3. Patient to report decrease in pain level to 5/10. - partially MET - improving score, but not yet 5. Discharge Goals: Time Frame: 90 days - all in progress 1. Patient to report no more than minimal shoulder pain with all activity 2. Patient to improve DASH score to 21 to demo improved use of B UEs 3. Patient to improve AROM B shoulder flex to 150 degrees for improved functional use. Rehabilitation Potential For Stated Goals: Good Regarding Lateshagm Perea's therapy, I certify that the treatment plan above will be carried out by a therapist or under their direction. Thank you for this referral, 
Mary Francis PT The information in this section was collected on 10/9/18 (except where otherwise noted). HISTORY:  
History of Present Injury/Illness (Reason for Referral): 
Patient reports that shoulder pain started last year. She went to her family doctor and was sent to PT. They tried dry needling and MD injected her. She didn't get better and requested a second opinion. She was sent to Dr. Eh Saldana and an MRI was done, and then she had surgery on 10/4/18. She was told to leave the dressings on until she returned to MD.  
She subsequently had B shoulder manipulations on 11/30/18. Past Medical History/Comorbidities: OA,  Benign hypertension with CKD (chronic kidney disease) stage III ([address] Diabetes ([address]  Dyslipidemia; Endometrial cancer ([facility] and Morbid obesity ([facility] knee arthroscopy (Right, 2015); appendectomy (1979), L ankle surgery (2007) L index finger surgery (2005) Social History/Living Environment:  
  Lives with family in one story home. Prior Level of Function/Work/Activity: 
 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 Dominant Side:  
      RIGHT Previous Treatment Approaches: PT prior to surgery Current Medications:  Glipizide, lisinopril, Norco, Meloxicam   Date Last Reviewed:  1/3/19 EXAMINATION:  
Observation/Orthostatic Postural Assessment:   
      No new (11/13/18) ROM:   
      AROM R shoulder flex 135, Abduct 145, extn 50, ER 45 and IR to mid R buttock PROM R shoulder flex 130, abduct 110, ER 35 at 90 degree abduct PROM L shoulder flex 80, abduct 65, IR 50 (to abdomen) and ER -15 from neutral.  
 
      PROM R shoulder flex 160, abduct 140, ER 70 and IR 65 at 90 degree abduct (11/1/18) PROM L shoulder flex 130, abduct 110, ER 30 and IR 50 at 90 degrees abduct (11/1/18) PROM R shoulder flex 165, abduct 160, ER 75 and IR 75 at 90 degree abduct (12/3/18) PROM L shoulder flex 140, abduct 120, ER 45 and IR 60 at 90 degrees abduct (12/3/18) PROM L shoulder flex 155, abduct 140, ER 57 and IR 67 at 90 degrees abduct (12/12/18) PROM R shoulder flex 160, abduct 150, ER 85, IR 80 at 90 degrees abduct (1/3/19) PROM L shoulder flex 145, abduct 120, ER 40, IR 60 at 90 degrees abduct (1/3/19) AROM L shoulder flex 140, abduct 108, ER 10, IR to lateral L buttock (1/3/19) Strength:   
      R shoulder 4+ to 5/5 all directions. L shoulder not tested. Neurological Screen: 
      Sensation: light touch intact in B UEs Body Structures Involved: 1. Joints 2. Muscles Body Functions Affected: 1. Neuromusculoskeletal 
2. Movement Related Activities and Participation Affected: 1. General Tasks and Demands 2. Self Care 3. Community, Social and [place] CLINICAL DECISION MAKING:  
Outcome Measure: Tool Used: Disabilities of the Arm, Shoulder and Hand (DASH) Questionnaire - Quick Version Score:  Initial: 55/55   41/55 (Date: 11/1/18 )      45/55 (Date 12/3/18)     Most Recent: 43/55 (1/3/19) Interpretation of Score: The DASH is designed to measure the activities of daily living in person's with upper extremity dysfunction or pain. Each section is scored on a 1-5 scale, 5 representing the greatest disability. The scores of each section are added together for a total score of 55. Medical Necessity:  
· Patient demonstrates good rehab potential due to higher previous functional level. Reason for Services/Other Comments: 
· Patient continues to require skilled intervention due to need to regain use of B UEs.  
  
 
 
 
TREATMENT:  
(In addition to Assessment/Re-Assessment sessions the following treatments were rendered) Pre-treatment Symptoms/Complaints:  Patient reports that her arm is stiff after not being in PT for a week. Shoulder continues to be sore. Pain: Initial:  
Pain Intensity 1: 6   Post Session: No change in pain reported at end of session. Therapeutic Exercise: ( ):  none today Date 11/14/18  Date 11/19/18 Date 
12/6/18 Date 
12/19/18  Date 
12/26/18 Activity/Exercise B serratus punch 2# B 2x15 2# B 2x15 2# B 2x15 3# B 2x15 3# B 2x15 Wand flex +2# 2x15 - +2#  2x20 - -  
B bicep curls 2# 2x15 2# 2x15 2# 2x15 3# 2x15 3# 2x15 Wall slides 2x10 - 2x10 - Red 2x10 IR with band  Yellow 2x15 Red 2x15 Red 2x15 Red 2x15 Red 2x15 ER with band  Yellow 2x15 Red 2x15 Red 2x15 Red 2x15 Red 2x15 Side lying abduct 2# 2x15 - - 2# 2x15 2# 2x15 Side lying ER 2# 2x15 - - 2# 2x15 2# 2x15 Wall push ups 2x10  2x15 2x15 Rail 2x10 Rail 2x10 B Lat pull downs 7# 2x15 7# 2x15 7# 2x15 10# 3x10 10# 2x15 B Cable rows 7# 2x15 7# 2x15 7# 2x15 10# 2x15 10# 2x15 B cable press    3# 2x15 3# 2x15 B shldr flex with symmetry  1# 2x10 1# 1x10 2# 1x10 -  
B shldr abduct to <= 90  1# 2x10 1# 1x10 - - Rows with band    Blue 2x15 Blue 2x15 B shldr extn with band    Blue 2x15 Blue 2x15 HEP - continue current HEP Manual Therapy ( 40minutes) Grade 2 to 4 physio mobs B shoulder flex, abduct, IR and ER. Grade 2 to 4 inferior and posterior shoulder glides. Emphasis on L shoulder. Contract/relax technique for end range flex, IR and ER of L shoulder. Therapeutic Modalities:   Heat during manual therapy on L shoulder;   Patient took ice with her at end of session. Left Shoulder Heat Type: Moist pack Duration : 25 minutes Patient Position: Supine Dimas Susy Treatment/Session Assessment:   
· Response to Treatment:  Patient tolerated with increased stiffness in L shoulder. See full assessment above. · Compliance with Program/Exercises: yes · Recommendations/Intent for next treatment session: \"Next visit will focus on motion and strength in B shoulders. \". Total Treatment Duration: 40 minutes PT Patient Time In/Time Out Time In: 0900 Time Out: 2390 Charlene Aranda, PT

## 2019-01-08 ENCOUNTER — HOSPITAL ENCOUNTER (OUTPATIENT)
Dept: PHYSICAL THERAPY | Age: 54
Discharge: HOME | End: 2019-01-08
Payer: COMMERCIAL

## 2019-01-08 PROCEDURE — 97140 MANUAL THERAPY 1/> REGIONS: CPT

## 2019-01-08 PROCEDURE — 97110 THERAPEUTIC EXERCISES: CPT

## 2019-01-08 NOTE — PROGRESS NOTES
Willie Winkler : 1965 Primary: [facility] [employer] [facility]* Secondary:  [facility] at [facility] ARTIE BLAIR [address] [facility], [address] [place] [address],[address], [address] [facility] [phone]   [phone] OUTPATIENT PHYSICAL THERAPY:Daily Note 2019 ICD-10: Treatment Diagnosis:  M75.01 Adhesive capsulitis of right shoulder, M75.02 Adhesive capsulitis of left shoulder, M19.012 Primary osteoarthritis, left shoulder, Pain in left shoulder (M25.512), Pain in right shoulder (M25.511) Precautions/Allergies: NKDA Fall Risk Score: 0 (? 5 = High Risk) MD Orders: Eval and treat, HEP, ROM, strengthening, aggressive, all modalities, dry needling. 2-3x/wk for 5 weeks (written 18) MEDICAL/REFERRING DIAGNOSIS: 
L shoulder DATE OF ONSET: 10/4/18, EUA of B shoulders on 18 REFERRING PHYSICIAN: Addy Zarate MD 
RETURN PHYSICIAN APPOINTMENT: 19 ASSESSMENT:  Ms. Chiquita Flanagan  Is a 46year old R hand dominant female s/p EUA and manipulation of R shoulder, EUA and manipulation of L shoulder and L SAD, ADCR, and LOREN on 10/4/18. She presented with shoulder pain, decreased ROM in B shoulders, decreased strength in L shoulder and decreased functional use of B shoulders. Her ROM was improving in both arms though she continued to have pain. Her DASH score was improved. Both shoulders were then manipulated under anesthesia on 18. She was making progress with ROM, but L shoulder was tighter when measured today (1/3/19). DASH is minimally improved and she continues to have pain. She could benefit from continued PT to address deficits and work toward goals. PROBLEM LIST (Impacting functional limitations): 1. Decreased Strength 2. Decreased ADL/Functional Activities 3. Increased Pain 4. Decreased Flexibility/Joint Mobility INTERVENTIONS PLANNED: 
1. Home Exercise Program (HEP) 2. Manual Therapy 3. Therapeutic Exercise/Strengthening 4. modals as needed TREATMENT PLAN: 
 Effective Dates: 12/3/2018 TO 3/3/2019 (90 days). Frequency/Duration: 2-3 times a week for 90 Days GOALS: (Goals have been discussed and agreed upon with patient.) Patient's stated goal is to be able to use her arms. Short-Term Functional Goals: Time Frame: 6 weeks 1. Patient to be independent with HEP - MET 2. Patient to increase PROM L shoulder flex to 135 and PROM L shoulder ER to 30 degrees   MET (12/3/18) 3. Patient to report decrease in pain level to 5/10. - partially MET - improving score, but not yet 5. Discharge Goals: Time Frame: 90 days - all in progress 1. Patient to report no more than minimal shoulder pain with all activity 2. Patient to improve DASH score to 21 to demo improved use of B UEs 3. Patient to improve AROM B shoulder flex to 150 degrees for improved functional use. Rehabilitation Potential For Stated Goals: Good The information in this section was collected on 10/9/18 (except where otherwise noted). HISTORY:  
History of Present Injury/Illness (Reason for Referral): 
Patient reports that shoulder pain started last year. She went to her family doctor and was sent to PT. They tried dry needling and MD injected her. She didn't get better and requested a second opinion. She was sent to Dr. Julissa Lambert and an MRI was done, and then she had surgery on 10/4/18. She was told to leave the dressings on until she returned to MD.  
She subsequently had B shoulder manipulations on 11/30/18. Past Medical History/Comorbidities: OA,  Benign hypertension with CKD (chronic kidney disease) stage III ([address] Diabetes ([address] Dyslipidemia; Endometrial cancer ([address] and Morbid obesity ([address] knee arthroscopy (Right, 2015); appendectomy (1979), L ankle surgery (2007) L index finger surgery (2005) Social History/Living Environment:  
  Lives with family in one story home. Prior Level of Function/Work/Activity: 
 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 Dominant Side:  
      RIGHT Previous Treatment Approaches: PT prior to surgery Current Medications:  Glipizide, lisinopril, Norco, Meloxicam   Date Last Reviewed:  1/8/19 EXAMINATION:  
Observation/Orthostatic Postural Assessment:   
      No new (11/13/18) ROM:   
      AROM R shoulder flex 135, Abduct 145, extn 50, ER 45 and IR to mid R buttock PROM R shoulder flex 130, abduct 110, ER 35 at 90 degree abduct PROM L shoulder flex 80, abduct 65, IR 50 (to abdomen) and ER -15 from neutral.  
 
      PROM R shoulder flex 160, abduct 140, ER 70 and IR 65 at 90 degree abduct (11/1/18) PROM L shoulder flex 130, abduct 110, ER 30 and IR 50 at 90 degrees abduct (11/1/18) PROM R shoulder flex 165, abduct 160, ER 75 and IR 75 at 90 degree abduct (12/3/18) PROM L shoulder flex 140, abduct 120, ER 45 and IR 60 at 90 degrees abduct (12/3/18) PROM L shoulder flex 155, abduct 140, ER 57 and IR 67 at 90 degrees abduct (12/12/18) PROM R shoulder flex 160, abduct 150, ER 85, IR 80 at 90 degrees abduct (1/3/19) PROM L shoulder flex 145, abduct 120, ER 40, IR 60 at 90 degrees abduct (1/3/19) AROM L shoulder flex 140, abduct 108, ER 10, IR to lateral L buttock (1/3/19) Strength:   
      R shoulder 4+ to 5/5 all directions. L shoulder not tested. Neurological Screen: 
      Sensation: light touch intact in B UEs Body Structures Involved: 1. Joints 2. Muscles Body Functions Affected: 1. Neuromusculoskeletal 
2. Movement Related Activities and Participation Affected: 1. General Tasks and Demands 2. Self Care 3. Community, Social and [place] CLINICAL DECISION MAKING:  
Outcome Measure: Tool Used: Disabilities of the Arm, Shoulder and Hand (DASH) Questionnaire - Quick Version Score:  Initial: 55/55   41/55 (Date: 11/1/18 )      45/55 (Date 12/3/18)     Most Recent: 43/55 (1/3/19) Interpretation of Score: The DASH is designed to measure the activities of daily living in person's with upper extremity dysfunction or pain. Each section is scored on a 1-5 scale, 5 representing the greatest disability. The scores of each section are added together for a total score of 55. Medical Necessity:  
· Patient demonstrates good rehab potential due to higher previous functional level. Reason for Services/Other Comments: 
· Patient continues to require skilled intervention due to need to regain use of B UEs.  
  
 
 
 
TREATMENT:  
(In addition to Assessment/Re-Assessment sessions the following treatments were rendered) Pre-treatment Symptoms/Complaints:  Patient reports that her arm is doing a little better. She is confident that it is improving because she wants to go back to work. Pain: Initial:  
Pain Intensity 1: 5   Post Session: slight decrease in pain reported at end of session. Therapeutic Exercise: (25 Minutes):  Exercises per grid below to improve mobility and strength. Required moderate verbal cues to ensure correct performance. Progressed resistance and complexity of movement as indicated. Date 11/14/18  Date 11/19/18 Date 
12/6/18 Date 
12/19/18  Date 
12/26/18 Date 1/8/19 Activity/Exercise B serratus punch 2# B 2x15 2# B 2x15 2# B 2x15 3# B 2x15 3# B 2x15 3# B 2x15 Wand flex +2# 2x15 - +2#  2x20 - - -  
B bicep curls 2# 2x15 2# 2x15 2# 2x15 3# 2x15 3# 2x15 4# 2x15 Wall slides 2x10 - 2x10 - Red 2x10 Red 2x15 IR with band  Yellow 2x15 Red 2x15 Red 2x15 Red 2x15 Red 2x15 Red 2x15 ER with band  Yellow 2x15 Red 2x15 Red 2x15 Red 2x15 Red 2x15 Red 2x15 Side lying abduct 2# 2x15 - - 2# 2x15 2# 2x15 2# 2x15 Side lying ER 2# 2x15 - - 2# 2x15 2# 2x15 2# 2x15 Wall push ups 2x10  2x15 2x15 Rail 2x10 Rail 2x10 Rail 2x10 B Lat pull downs 7# 2x15 7# 2x15 7# 2x15 10# 3x10 10# 2x15 10# 2x15 B Cable rows 7# 2x15 7# 2x15 7# 2x15 10# 2x15 10# 2x15 10# 2x15 B cable press    3# 2x15 3# 2x15 7# 2x15 B shldr flex with symmetry  1# 2x10 1# 1x10 2# 1x10 - 2# 2x10 B shldr abduct to <= 90  1# 2x10 1# 1x10 - - 2# 2x10 Rows with band    Blue 2x15 Blue 2x15 Blue 2x15 B shldr extn with band    Blue 2x15 Blue 2x15 Blue 2x15 D1/D2 diagonals on wall      2x10 ea HEP - continue current HEP Manual Therapy ( 15 minutes) Grade 2 to 4 physio mobs L shoulder flex, abduct, IR and ER. Grade 2 to 4 inferior and posterior shoulder glides. Therapeutic Modalities:   Heat during manual therapy on L shoulder;   Patient took ice with her at end of session. Left Shoulder Heat Type: Moist pack Duration : 15 minutes Patient Position: Supine Benna Him Treatment/Session Assessment:   
· Response to Treatment:  Patient did well with resumption of exercises today. L shoulder not as stiff as last week. · Compliance with Program/Exercises: yes · Recommendations/Intent for next treatment session: \"Next visit will focus on motion and strength in B shoulders. \". Total Treatment Duration: 40 minutes PT Patient Time In/Time Out Time In: 7723 Time Out: 1201 Annette Fontanez PT

## 2019-01-09 ENCOUNTER — HOSPITAL ENCOUNTER (OUTPATIENT)
Dept: PHYSICAL THERAPY | Age: 54
Discharge: HOME | End: 2019-01-09
Payer: COMMERCIAL

## 2019-01-09 PROCEDURE — 97140 MANUAL THERAPY 1/> REGIONS: CPT

## 2019-01-09 NOTE — PROGRESS NOTES
Lillian Kamla : 1965 Primary: [facility] Secondary:  [facility] ARTIE BLAIR [address], [address] [place] [address] [address], [address] [facility] [phone]   [phone] OUTPATIENT PHYSICAL THERAPY:Daily Note 2019 ICD-10: Treatment Diagnosis:  M75.01 Adhesive capsulitis of right shoulder, M75.02 Adhesive capsulitis of left shoulder, M19.012 Primary osteoarthritis, left shoulder, Pain in left shoulder (M25.512), Pain in right shoulder (M25.511) Precautions/Allergies: NKDA Fall Risk Score: 0 (? 5 = High Risk) MD Orders: Eval and treat, HEP, ROM, strengthening, aggressive, all modalities, dry needling. 2-3x/wk for 5 weeks (written 18) MEDICAL/REFERRING DIAGNOSIS: 
L shoulder DATE OF ONSET: 10/4/18, EUA of B shoulders on 18 REFERRING PHYSICIAN: Jaime Franklin MD 
RETURN PHYSICIAN APPOINTMENT: 19 ASSESSMENT:  Ms. Quan Maddox  Is a 46year old R hand dominant female s/p EUA and manipulation of R shoulder, EUA and manipulation of L shoulder and L SAD, ADCR, and LOREN on 10/4/18. She presented with shoulder pain, decreased ROM in B shoulders, decreased strength in L shoulder and decreased functional use of B shoulders. Her ROM was improving in both arms though she continued to have pain. Her DASH score was improved. Both shoulders were then manipulated under anesthesia on 18. She was making progress with ROM, but L shoulder was tighter when measured today (1/3/19). DASH is minimally improved and she continues to have pain. She could benefit from continued PT to address deficits and work toward goals. PROBLEM LIST (Impacting functional limitations): 1. Decreased Strength 2. Decreased ADL/Functional Activities 3. Increased Pain 4. Decreased Flexibility/Joint Mobility INTERVENTIONS PLANNED: 
1. Home Exercise Program (HEP) 2. Manual Therapy 3. Therapeutic Exercise/Strengthening 4. modals as needed TREATMENT PLAN: 
 Effective Dates: 12/3/2018 TO 3/3/2019 (90 days). Frequency/Duration: 2-3 times a week for 90 Days GOALS: (Goals have been discussed and agreed upon with patient.) Patient's stated goal is to be able to use her arms. Short-Term Functional Goals: Time Frame: 6 weeks 1. Patient to be independent with HEP - MET 2. Patient to increase PROM L shoulder flex to 135 and PROM L shoulder ER to 30 degrees   MET (12/3/18) 3. Patient to report decrease in pain level to 5/10. - partially MET - improving score, but not yet 5. Discharge Goals: Time Frame: 90 days - all in progress 1. Patient to report no more than minimal shoulder pain with all activity 2. Patient to improve DASH score to 21 to demo improved use of B UEs 3. Patient to improve AROM B shoulder flex to 150 degrees for improved functional use. Rehabilitation Potential For Stated Goals: Good The information in this section was collected on 10/9/18 (except where otherwise noted). HISTORY:  
History of Present Injury/Illness (Reason for Referral): 
Patient reports that shoulder pain started last year. She went to her family doctor and was sent to PT. They tried dry needling and MD injected her. She didn't get better and requested a second opinion. She was sent to Dr. Macey Rogers and an MRI was done, and then she had surgery on 10/4/18. She was told to leave the dressings on until she returned to [place].  
She subsequently had B shoulder manipulations on 11/30/18. Past Medical History/Comorbidities: OA,  Benign hypertension with CKD (chronic kidney disease) stage III ([address] Diabetes ([address] Dyslipidemia; Endometrial cancer ([address] and Morbid obesity ([address] knee arthroscopy (Right, 2015); appendectomy (1979), L ankle surgery (2007) L index finger surgery (2005) Social History/Living Environment:  
  Lives with family in one story home. Prior Level of Function/Work/Activity: 
 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 Dominant Side:  
      RIGHT Previous Treatment Approaches: PT prior to surgery Current Medications:  Glipizide, lisinopril, Norco, Meloxicam   Date Last Reviewed:  1/8/19 EXAMINATION:  
Observation/Orthostatic Postural Assessment:   
      No new (11/13/18) ROM:   
      AROM R shoulder flex 135, Abduct 145, extn 50, ER 45 and IR to mid R buttock PROM R shoulder flex 130, abduct 110, ER 35 at 90 degree abduct PROM L shoulder flex 80, abduct 65, IR 50 (to abdomen) and ER -15 from neutral.  
 
      PROM R shoulder flex 160, abduct 140, ER 70 and IR 65 at 90 degree abduct (11/1/18) PROM L shoulder flex 130, abduct 110, ER 30 and IR 50 at 90 degrees abduct (11/1/18) PROM R shoulder flex 165, abduct 160, ER 75 and IR 75 at 90 degree abduct (12/3/18) PROM L shoulder flex 140, abduct 120, ER 45 and IR 60 at 90 degrees abduct (12/3/18) PROM L shoulder flex 155, abduct 140, ER 57 and IR 67 at 90 degrees abduct (12/12/18) PROM R shoulder flex 160, abduct 150, ER 85, IR 80 at 90 degrees abduct (1/3/19) PROM L shoulder flex 145, abduct 120, ER 40, IR 60 at 90 degrees abduct (1/3/19) AROM L shoulder flex 140, abduct 108, ER 10, IR to lateral L buttock (1/3/19) Strength:   
      R shoulder 4+ to 5/5 all directions. L shoulder not tested. Neurological Screen: 
      Sensation: light touch intact in B UEs Body Structures Involved: 1. Joints 2. Muscles Body Functions Affected: 1. Neuromusculoskeletal 
2. Movement Related Activities and Participation Affected: 1. General Tasks and Demands 2. Self Care 3. Community, Social and [place] CLINICAL DECISION MAKING:  
Outcome Measure: Tool Used: Disabilities of the Arm, Shoulder and Hand (DASH) Questionnaire - Quick Version Score:  Initial: 55/55   41/55 (Date: 11/1/18 )      45/55 (Date 12/3/18)     Most Recent: 43/55 (1/3/19) Interpretation of Score: The DASH is designed to measure the activities of daily living in person's with upper extremity dysfunction or pain. Each section is scored on a 1-5 scale, 5 representing the greatest disability. The scores of each section are added together for a total score of 55. Medical Necessity:  
· Patient demonstrates good rehab potential due to higher previous functional level. Reason for Services/Other Comments: 
· Patient continues to require skilled intervention due to need to regain use of B UEs.  
  
 
 
 
TREATMENT:  
(In addition to Assessment/Re-Assessment sessions the following treatments were rendered) Pre-treatment Symptoms/Complaints:  Patient reports that her arm was a little sore after all the exercises yesterday Pain: Initial:  
Pain Intensity 1: 5   Post Session: pain rated as 4/10 at end of session. Therapeutic Exercise: ( ):  none today Date 11/14/18  Date 11/19/18 Date 
12/6/18 Date 
12/19/18  Date 
12/26/18 Date 1/8/19 Activity/Exercise B serratus punch 2# B 2x15 2# B 2x15 2# B 2x15 3# B 2x15 3# B 2x15 3# B 2x15 Wand flex +2# 2x15 - +2#  2x20 - - -  
B bicep curls 2# 2x15 2# 2x15 2# 2x15 3# 2x15 3# 2x15 4# 2x15 Wall slides 2x10 - 2x10 - Red 2x10 Red 2x15 IR with band  Yellow 2x15 Red 2x15 Red 2x15 Red 2x15 Red 2x15 Red 2x15 ER with band  Yellow 2x15 Red 2x15 Red 2x15 Red 2x15 Red 2x15 Red 2x15 Side lying abduct 2# 2x15 - - 2# 2x15 2# 2x15 2# 2x15 Side lying ER 2# 2x15 - - 2# 2x15 2# 2x15 2# 2x15 Wall push ups 2x10  2x15 2x15 Rail 2x10 Rail 2x10 Rail 2x10 B Lat pull downs 7# 2x15 7# 2x15 7# 2x15 10# 3x10 10# 2x15 10# 2x15 B Cable rows 7# 2x15 7# 2x15 7# 2x15 10# 2x15 10# 2x15 10# 2x15 B cable press    3# 2x15 3# 2x15 7# 2x15 B shldr flex with symmetry  1# 2x10 1# 1x10 2# 1x10 - 2# 2x10 B shldr abduct to <= 90  1# 2x10 1# 1x10 - - 2# 2x10 Rows with band    Blue 2x15 Blue 2x15 Blue 2x15 B shldr extn with band    Blue 2x15 Blue 2x15 Blue 2x15 D1/D2 diagonals on wall      2x10 ea HEP - continue current HEP Manual Therapy ( 40 minutes) Grade 2 to 4 physio mobs B shoulder flex, abduct, IR and ER. Grade 2 to 4 inferior and posterior shoulder glides. Contract/relax at end range for L shoulder flex, IR and ER. Therapeutic Modalities:   Heat during manual therapy on L shoulder;     
      Left Shoulder Heat Type: Moist pack Duration : 25 minutes Patient Position: Supine John Rogers Treatment/Session Assessment:   
· Response to Treatment:  Focused on ROM today since patient had done strengthening yesterday. Slightly decreased pain at end of session. · Compliance with Program/Exercises: yes · Recommendations/Intent for next treatment session: \"Next visit will focus on motion and strength in B shoulders. \". Total Treatment Duration: 40 minutes PT Patient Time In/Time Out Time In: 5446 Time Out: 1115 Ally Bain, PT

## 2019-01-14 ENCOUNTER — HOSPITAL ENCOUNTER (OUTPATIENT)
Dept: PHYSICAL THERAPY | Age: 54
Discharge: HOME | End: 2019-01-14
Payer: COMMERCIAL

## 2019-01-14 PROCEDURE — 97110 THERAPEUTIC EXERCISES: CPT

## 2019-01-14 PROCEDURE — 97140 MANUAL THERAPY 1/> REGIONS: CPT

## 2019-01-14 NOTE — PROGRESS NOTES
Rafaela Gutierrez : 1965 Primary: [facility]* Secondary:  [facility] ARTIE BLAIR [address] [facility], [address] [place] [address],[address], [facility]. [phone]   [phone] OUTPATIENT PHYSICAL THERAPY:Daily Note 2019 ICD-10: Treatment Diagnosis:  M75.01 Adhesive capsulitis of right shoulder, M75.02 Adhesive capsulitis of left shoulder, M19.012 Primary osteoarthritis, left shoulder, Pain in left shoulder (M25.512), Pain in right shoulder (M25.511) Precautions/Allergies: NKDA Fall Risk Score: 0 (? 5 = High Risk) MD Orders: Eval and treat, HEP, ROM, strengthening, aggressive, all modalities, dry needling. 2-3x/wk for 5 weeks (written 18) MEDICAL/REFERRING DIAGNOSIS: 
L shoulder DATE OF ONSET: 10/4/18, EUA of B shoulders on 18 REFERRING PHYSICIAN: Mahad Champion MD 
RETURN PHYSICIAN APPOINTMENT: 19 ASSESSMENT:  Ms. Douglas Roberson  Is a 46year old R hand dominant female s/p EUA and manipulation of R shoulder, EUA and manipulation of L shoulder and L SAD, ADCR, and LOREN on 10/4/18. She presented with shoulder pain, decreased ROM in B shoulders, decreased strength in L shoulder and decreased functional use of B shoulders. Her ROM was improving in both arms though she continued to have pain. Her DASH score was improved. Both shoulders were then manipulated under anesthesia on 18. She was making progress with ROM, but L shoulder was tighter when measured today (1/3/19). DASH is minimally improved and she continues to have pain. She could benefit from continued PT to address deficits and work toward goals. PROBLEM LIST (Impacting functional limitations): 1. Decreased Strength 2. Decreased ADL/Functional Activities 3. Increased Pain 4. Decreased Flexibility/Joint Mobility INTERVENTIONS PLANNED: 
1. Home Exercise Program (HEP) 2. Manual Therapy 3. Therapeutic Exercise/Strengthening 4. modals as needed TREATMENT PLAN: 
 Effective Dates: 12/3/2018 TO 3/3/2019 (90 days). Frequency/Duration: 2-3 times a week for 90 Days GOALS: (Goals have been discussed and agreed upon with patient.) Patient's stated goal is to be able to use her arms. Short-Term Functional Goals: Time Frame: 6 weeks 1. Patient to be independent with HEP - MET 2. Patient to increase PROM L shoulder flex to 135 and PROM L shoulder ER to 30 degrees   MET (12/3/18) 3. Patient to report decrease in pain level to 5/10. - partially MET - improving score, but not yet 5. Discharge Goals: Time Frame: 90 days - all in progress 1. Patient to report no more than minimal shoulder pain with all activity 2. Patient to improve DASH score to 21 to demo improved use of B UEs 3. Patient to improve AROM B shoulder flex to 150 degrees for improved functional use. Rehabilitation Potential For Stated Goals: Good The information in this section was collected on 10/9/18 (except where otherwise noted). HISTORY:  
History of Present Injury/Illness (Reason for Referral): 
Patient reports that shoulder pain started last year. She went to her family doctor and was sent to PT. They tried dry needling and MD injected her. She didn't get better and requested a second opinion. She was sent to Dr. Humberto Campos and an MRI was done, and then she had surgery on 10/4/18. She was told to leave the dressings on until she returned to [place].  
She subsequently had B shoulder manipulations on 11/30/18. Past Medical History/Comorbidities: OA,  Benign hypertension with CKD (chronic kidney disease) stage III ([address] Diabetes ([address] Dyslipidemia; Endometrial cancer ([address] and Morbid obesity ([address] knee arthroscopy (Right, 2015); appendectomy (1979), L ankle surgery (2007) L index finger surgery (2005) Social History/Living Environment:  
  Lives with family in one story home. Prior Level of Function/Work/Activity: 
 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 Dominant Side:  
      RIGHT Previous Treatment Approaches: PT prior to surgery Current Medications:  Glipizide, lisinopril, Norco, Meloxicam   Date Last Reviewed:  1/14/19 EXAMINATION:  
Observation/Orthostatic Postural Assessment:   
      No new (11/13/18) ROM:   
      AROM R shoulder flex 135, Abduct 145, extn 50, ER 45 and IR to mid R buttock PROM R shoulder flex 130, abduct 110, ER 35 at 90 degree abduct PROM L shoulder flex 80, abduct 65, IR 50 (to abdomen) and ER -15 from neutral.  
 
      PROM R shoulder flex 160, abduct 140, ER 70 and IR 65 at 90 degree abduct (11/1/18) PROM L shoulder flex 130, abduct 110, ER 30 and IR 50 at 90 degrees abduct (11/1/18) PROM R shoulder flex 165, abduct 160, ER 75 and IR 75 at 90 degree abduct (12/3/18) PROM L shoulder flex 140, abduct 120, ER 45 and IR 60 at 90 degrees abduct (12/3/18) PROM L shoulder flex 155, abduct 140, ER 57 and IR 67 at 90 degrees abduct (12/12/18) PROM R shoulder flex 160, abduct 150, ER 85, IR 80 at 90 degrees abduct (1/3/19) PROM L shoulder flex 145, abduct 120, ER 40, IR 60 at 90 degrees abduct (1/3/19) AROM L shoulder flex 140, abduct 108, ER 10, IR to lateral L buttock (1/3/19) Strength:   
      R shoulder 4+ to 5/5 all directions. L shoulder not tested. Neurological Screen: 
      Sensation: light touch intact in B UEs Body Structures Involved: 1. Joints 2. Muscles Body Functions Affected: 1. Neuromusculoskeletal 
2. Movement Related Activities and Participation Affected: 1. General Tasks and Demands 2. Self Care 3. Community, Social and [place] CLINICAL DECISION MAKING:  
Outcome Measure: Tool Used: Disabilities of the Arm, Shoulder and Hand (DASH) Questionnaire - Quick Version Score:  Initial: 55/55   41/55 (Date: 11/1/18 )      45/55 (Date 12/3/18)     Most Recent: 43/55 (1/3/19) Interpretation of Score: The DASH is designed to measure the activities of daily living in person's with upper extremity dysfunction or pain. Each section is scored on a 1-5 scale, 5 representing the greatest disability. The scores of each section are added together for a total score of 55. Medical Necessity:  
· Patient demonstrates good rehab potential due to higher previous functional level. Reason for Services/Other Comments: 
· Patient continues to require skilled intervention due to need to regain use of B UEs.  
  
 
 
 
TREATMENT:  
(In addition to Assessment/Re-Assessment sessions the following treatments were rendered) Pre-treatment Symptoms/Complaints:  Patient reports that her arm continues to be a little sore. She went to [place] yesterday. She goes back to [place] on Wednesday. She feels she will be sent back to work soon since she sees the doctor on Wednesday after PT. Pain: Initial:  
Pain Intensity 1: 5   Post Session: pain rated as 'decreased' at end of session, but pain level not quantified. Therapeutic Exercise: (25 Minutes):  Exercises per grid below to improve mobility and strength. Required minimal visual and verbal cues to ensure correct performance. Progressed resistance as indicated. Date 11/14/18  Date 11/19/18 Date 
12/6/18 Date 
12/19/18  Date 
12/26/18 Date 1/8/19 Date 1/14/19 Activity/Exercise B serratus punch 2# B 2x15 2# B 2x15 2# B 2x15 3# B 2x15 3# B 2x15 3# B 2x15 4# B 2x15 Wand flex +2# 2x15 - +2#  2x20 - - - -  
B bicep curls 2# 2x15 2# 2x15 2# 2x15 3# 2x15 3# 2x15 4# 2x15 5# 2x15 Wall slides 2x10 - 2x10 - Red 2x10 Red 2x15 -  
IR with band  Yellow 2x15 Red 2x15 Red 2x15 Red 2x15 Red 2x15 Red 2x15 Green 2x15 ER with band  Yellow 2x15 Red 2x15 Red 2x15 Red 2x15 Red 2x15 Red 2x15 Red 2x15 Side lying abduct 2# 2x15 - - 2# 2x15 2# 2x15 2# 2x15 3# 2x15 Side lying ER 2# 2x15 - - 2# 2x15 2# 2x15 2# 2x15 3# 2x15 Wall push ups 2x10  2x15 2x15 Rail 2x10 Rail 2x10 Rail 2x10 Rail 2x10 B Lat pull downs 7# 2x15 7# 2x15 7# 2x15 10# 3x10 10# 2x15 10# 2x15 10# 1x15 B Cable rows 7# 2x15 7# 2x15 7# 2x15 10# 2x15 10# 2x15 10# 2x15 10# 2x10 B cable press    3# 2x15 3# 2x15 7# 2x15 10# 2x15 B shldr flex with symmetry  1# 2x10 1# 1x10 2# 1x10 - 2# 2x10 2# 2x10 B shldr abduct to <= 90  1# 2x10 1# 1x10 - - 2# 2x10 2# 2x10 Rows with band    Blue 2x15 Blue 2x15 Blue 2x15 Black 2x15 B shldr extn with band    Blue 2x15 Blue 2x15 Blue 2x15 Black 2x15 D1/D2 diagonals on wall      2x10 ea 2x10 ea HEP - continue current HEP Manual Therapy ( 15 minutes) Grade 2 to 4 physio mobs B shoulder flex, abduct, IR and ER. Grade 2 to 4 inferior and posterior shoulder glides. Therapeutic Modalities:   Heat during manual therapy on L shoulder;      Patient took ice with her at end of session. Left Shoulder Heat Type: Moist pack Duration : 10 minutes Patient Position: Supine [place] Treatment/Session Assessment:   
· Response to Treatment:  Patient works hard at strengthening. Pain decreased slightly after doing exercises. · Compliance with Program/Exercises: yes · Recommendations/Intent for next treatment session: \"Next visit will focus on motion and strength in B shoulders. \". Total Treatment Duration: 40 minutes PT Patient Time In/Time Out Time In: 1027 Time Out: 8516 Ora Gonzalez PT

## 2019-01-16 ENCOUNTER — HOSPITAL ENCOUNTER (OUTPATIENT)
Dept: PHYSICAL THERAPY | Age: 54
Discharge: HOME | End: 2019-01-16
Payer: COMMERCIAL

## 2019-01-16 PROCEDURE — 97140 MANUAL THERAPY 1/> REGIONS: CPT

## 2019-01-16 NOTE — PROGRESS NOTES
Siria Peralta : 1965 Primary: [facility]* Secondary:  Therapy Center at [address] [place] [address] [address] [facility], [address] [place] [address],[address], [facility]. [phone]   [phone] OUTPATIENT PHYSICAL THERAPY:Daily Note 2019 ICD-10: Treatment Diagnosis:  M75.01 Adhesive capsulitis of right shoulder, M75.02 Adhesive capsulitis of left shoulder, M19.012 Primary osteoarthritis, left shoulder, Pain in left shoulder (M25.512), Pain in right shoulder (M25.511) Precautions/Allergies: NKDA Fall Risk Score: 0 (? 5 = High Risk) MD Orders: Eval and treat, HEP, ROM, strengthening, aggressive, all modalities, dry needling. 2-3x/wk for 5 weeks (written 18) MEDICAL/REFERRING DIAGNOSIS: 
L shoulder DATE OF ONSET: 10/4/18, EUA of B shoulders on 18 REFERRING PHYSICIAN: Sindhu Collins MD 
RETURN PHYSICIAN APPOINTMENT: 19 ASSESSMENT:  Ms. Candy Vaz  Is a 46year old R hand dominant female s/p EUA and manipulation of R shoulder, EUA and manipulation of L shoulder and L SAD, ADCR, and LOREN on 10/4/18. She presented with shoulder pain, decreased ROM in B shoulders, decreased strength in L shoulder and decreased functional use of B shoulders. Her ROM was improving in both arms though she continued to have pain. Her DASH score was improved. Both shoulders were then manipulated under anesthesia on 18. She was making progress with ROM, but L shoulder was tighter when measured today (1/3/19). DASH is minimally improved and she continues to have pain. She could benefit from continued PT to address deficits and work toward goals. PROBLEM LIST (Impacting functional limitations): 1. Decreased Strength 2. Decreased ADL/Functional Activities 3. Increased Pain 4. Decreased Flexibility/Joint Mobility INTERVENTIONS PLANNED: 
1. Home Exercise Program (HEP) 2. Manual Therapy 3. Therapeutic Exercise/Strengthening 4. modals as needed TREATMENT PLAN: 
 Effective Dates: 12/3/2018 TO 3/3/2019 (90 days). Frequency/Duration: 2-3 times a week for 90 Days GOALS: (Goals have been discussed and agreed upon with patient.) Patient's stated goal is to be able to use her arms. Short-Term Functional Goals: Time Frame: 6 weeks 1. Patient to be independent with HEP - MET 2. Patient to increase PROM L shoulder flex to 135 and PROM L shoulder ER to 30 degrees   MET (12/3/18) 3. Patient to report decrease in pain level to 5/10. - partially MET - improving score, but not yet 5. Discharge Goals: Time Frame: 90 days - all in progress 1. Patient to report no more than minimal shoulder pain with all activity 2. Patient to improve DASH score to 21 to demo improved use of B UEs 3. Patient to improve AROM B shoulder flex to 150 degrees for improved functional use. Rehabilitation Potential For Stated Goals: Good The information in this section was collected on 10/9/18 (except where otherwise noted). HISTORY:  
History of Present Injury/Illness (Reason for Referral): 
Patient reports that shoulder pain started last year. She went to her family doctor and was sent to PT. They tried dry needling and MD injected her. She didn't get better and requested a second opinion. She was sent to Dr. Jeanne Cheung and an MRI was done, and then she had surgery on 10/4/18. She was told to leave the dressings on until she returned to MD.  
She subsequently had B shoulder manipulations on 11/30/18. Past Medical History/Comorbidities: OA,  Benign hypertension with CKD (chronic kidney disease) stage III ([address] Diabetes ([address] Dyslipidemia; Endometrial cancer ([address] and Morbid obesity ([address] knee arthroscopy (Right, 2015); appendectomy (1979), L ankle surgery (2007) L index finger surgery (2005) Social History/Living Environment:  
  Lives with family in one story home. Prior Level of Function/Work/Activity: 
 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 Dominant Side:  
      RIGHT Previous Treatment Approaches: PT prior to surgery Current Medications:  Glipizide, lisinopril, Norco, Meloxicam   Date Last Reviewed:  1/14/19 EXAMINATION:  
Observation/Orthostatic Postural Assessment:   
      No new (11/13/18) ROM:   
      AROM R shoulder flex 135, Abduct 145, extn 50, ER 45 and IR to mid R buttock PROM R shoulder flex 130, abduct 110, ER 35 at 90 degree abduct PROM L shoulder flex 80, abduct 65, IR 50 (to abdomen) and ER -15 from neutral.  
 
      PROM R shoulder flex 160, abduct 140, ER 70 and IR 65 at 90 degree abduct (11/1/18) PROM L shoulder flex 130, abduct 110, ER 30 and IR 50 at 90 degrees abduct (11/1/18) PROM R shoulder flex 165, abduct 160, ER 75 and IR 75 at 90 degree abduct (12/3/18) PROM L shoulder flex 140, abduct 120, ER 45 and IR 60 at 90 degrees abduct (12/3/18) PROM L shoulder flex 155, abduct 140, ER 57 and IR 67 at 90 degrees abduct (12/12/18) PROM R shoulder flex 160, abduct 150, ER 85, IR 80 at 90 degrees abduct (1/3/19) PROM L shoulder flex 145, abduct 120, ER 40, IR 60 at 90 degrees abduct (1/3/19) AROM L shoulder flex 140, abduct 108, ER 10, IR to lateral L buttock (1/3/19) PROM L shoulder flex 150, abduct 130, ER 65, IR 55 at 90 degrees abduct (1/16/19) AROM L shoulder flex 148, abduct 120, ER 23, IR to posterolateral L buttock (1/16/19) Strength:   
      R shoulder 4+ to 5/5 all directions. L shoulder not tested. Neurological Screen: 
      Sensation: light touch intact in B UEs Body Structures Involved: 1. Joints 2. Muscles Body Functions Affected: 1. Neuromusculoskeletal 
2. Movement Related Activities and Participation Affected: 1. General Tasks and Demands 2. Self Care 3. Community, Social and [place] CLINICAL DECISION MAKING:  
Outcome Measure: Tool Used: Disabilities of the Arm, Shoulder and Hand (DASH) Questionnaire - Quick Version Score:  Initial: 55/55   41/55 (Date: 11/1/18 )      45/55 (Date 12/3/18)     Most Recent: 43/55 (1/3/19) Interpretation of Score: The DASH is designed to measure the activities of daily living in person's with upper extremity dysfunction or pain. Each section is scored on a 1-5 scale, 5 representing the greatest disability. The scores of each section are added together for a total score of 55. Medical Necessity:  
· Patient demonstrates good rehab potential due to higher previous functional level. Reason for Services/Other Comments: 
· Patient continues to require skilled intervention due to need to regain use of B UEs.  
  
 
 
 
TREATMENT:  
(In addition to Assessment/Re-Assessment sessions the following treatments were rendered) Pre-treatment Symptoms/Complaints:  Patient reports that her arm is about the same. She goes to MD after PT today. Pain: Initial:  
Pain Intensity 1: 5   Post Session: No change in pain level per patient. Therapeutic Exercise: ( ):  Exercises per grid below to improve mobility and strength. Required minimal visual and verbal cues to ensure correct performance. Progressed resistance as indicated. Date 11/14/18  Date 11/19/18 Date 
12/6/18 Date 
12/19/18  Date 
12/26/18 Date 1/8/19 Date 1/14/19 Activity/Exercise B serratus punch 2# B 2x15 2# B 2x15 2# B 2x15 3# B 2x15 3# B 2x15 3# B 2x15 4# B 2x15 Wand flex +2# 2x15 - +2#  2x20 - - - -  
B bicep curls 2# 2x15 2# 2x15 2# 2x15 3# 2x15 3# 2x15 4# 2x15 5# 2x15 Wall slides 2x10 - 2x10 - Red 2x10 Red 2x15 -  
IR with band  Yellow 2x15 Red 2x15 Red 2x15 Red 2x15 Red 2x15 Red 2x15 Green 2x15 ER with band  Yellow 2x15 Red 2x15 Red 2x15 Red 2x15 Red 2x15 Red 2x15 Red 2x15 Side lying abduct 2# 2x15 - - 2# 2x15 2# 2x15 2# 2x15 3# 2x15 Side lying ER 2# 2x15 - - 2# 2x15 2# 2x15 2# 2x15 3# 2x15 Wall push ups 2x10  2x15 2x15 Rail 2x10 Rail 2x10 Rail 2x10 Rail 2x10 B Lat pull downs 7# 2x15 7# 2x15 7# 2x15 10# 3x10 10# 2x15 10# 2x15 10# 1x15 B Cable rows 7# 2x15 7# 2x15 7# 2x15 10# 2x15 10# 2x15 10# 2x15 10# 2x10 B cable press    3# 2x15 3# 2x15 7# 2x15 10# 2x15 B shldr flex with symmetry  1# 2x10 1# 1x10 2# 1x10 - 2# 2x10 2# 2x10 B shldr abduct to <= 90  1# 2x10 1# 1x10 - - 2# 2x10 2# 2x10 Rows with band    Blue 2x15 Blue 2x15 Blue 2x15 Black 2x15 B shldr extn with band    Blue 2x15 Blue 2x15 Blue 2x15 Black 2x15 D1/D2 diagonals on wall      2x10 ea 2x10 ea HEP - continue current HEP Manual Therapy ( 40 minutes) Grade 2 to 4 physio mobs B shoulder flex, abduct, IR and ER. Grade 2 to 4 inferior and posterior shoulder glides. Contract/relax at end range L shoulder flex, IR and ER. Therapeutic Modalities:   None today. Tried heat during manual therapy for L shoulder, but hydrocollator had been cleaned and was not heated up yet. .  
Treatment/Session Assessment:   
· Response to Treatment:  Patient slowly progressing in ROM. No increased pain. · Compliance with Program/Exercises: yes · Recommendations/Intent for next treatment session: \"Next visit will focus on motion and strength in B shoulders. \". Total Treatment Duration: 40 minutes PT Patient Time In/Time Out Time In: 0900 Time Out: 1535 Sonia Canavan, PT

## 2019-01-21 ENCOUNTER — HOSPITAL ENCOUNTER (OUTPATIENT)
Dept: PHYSICAL THERAPY | Age: 54
Discharge: HOME | End: 2019-01-21
Payer: COMMERCIAL

## 2019-01-21 PROCEDURE — 97140 MANUAL THERAPY 1/> REGIONS: CPT

## 2019-01-21 NOTE — PROGRESS NOTES
Khoi Man : 1965 Primary: [facility]* Secondary:  Therapy Center at [facility] ARTIE BLAIR [address] [facility], [address] [place] [address],[address], [facility]. [phone]   [phone] OUTPATIENT PHYSICAL THERAPY:Daily Note 2019 ICD-10: Treatment Diagnosis:  M75.01 Adhesive capsulitis of right shoulder, M75.02 Adhesive capsulitis of left shoulder, M19.012 Primary osteoarthritis, left shoulder, Pain in left shoulder (M25.512), Pain in right shoulder (M25.511) Precautions/Allergies: NKDA Fall Risk Score: 0 (? 5 = High Risk) MD Orders: Eval and treat, HEP, ROM, strengthening, all modalities  3x/wk for 6 weeks (written 19 MEDICAL/REFERRING DIAGNOSIS: 
L shoulder DATE OF ONSET: 10/4/18, EUA of B shoulders on 18 REFERRING PHYSICIAN: Paul Crandall MD 
RETURN PHYSICIAN APPOINTMENT: 19 ASSESSMENT:  Ms. Chichi Kenney  Is a 46year old R hand dominant female s/p EUA and manipulation of R shoulder, EUA and manipulation of L shoulder and L SAD, ADCR, and LROEN on 10/4/18. She presented with shoulder pain, decreased ROM in B shoulders, decreased strength in L shoulder and decreased functional use of B shoulders. Her ROM was improving in both arms though she continued to have pain. Her DASH score was improved. Both shoulders were then manipulated under anesthesia on 18. She was making progress with ROM, but L shoulder was tighter when measured today (1/3/19). DASH is minimally improved and she continues to have pain. She could benefit from continued PT to address deficits and work toward goals. PROBLEM LIST (Impacting functional limitations): 1. Decreased Strength 2. Decreased ADL/Functional Activities 3. Increased Pain 4. Decreased Flexibility/Joint Mobility INTERVENTIONS PLANNED: 
1. Home Exercise Program (HEP) 2. Manual Therapy 3. Therapeutic Exercise/Strengthening 4. modals as needed TREATMENT PLAN: 
 Effective Dates: 12/3/2018 TO 3/3/2019 (90 days). Frequency/Duration: 2-3 times a week for 90 Days GOALS: (Goals have been discussed and agreed upon with patient.) Patient's stated goal is to be able to use her arms. Short-Term Functional Goals: Time Frame: 6 weeks 1. Patient to be independent with HEP - MET 2. Patient to increase PROM L shoulder flex to 135 and PROM L shoulder ER to 30 degrees   MET (12/3/18) 3. Patient to report decrease in pain level to 5/10. - partially MET - improving score, but not yet 5. Discharge Goals: Time Frame: 90 days - all in progress 1. Patient to report no more than minimal shoulder pain with all activity 2. Patient to improve DASH score to 21 to demo improved use of B UEs 3. Patient to improve AROM B shoulder flex to 150 degrees for improved functional use. Rehabilitation Potential For Stated Goals: Good The information in this section was collected on 10/9/18 (except where otherwise noted). HISTORY:  
History of Present Injury/Illness (Reason for Referral): 
Patient reports that shoulder pain started last year. She went to her family doctor and was sent to PT. They tried dry needling and MD injected her. She didn't get better and requested a second opinion. She was sent to Dr. Blank Miranda and an MRI was done, and then she had surgery on 10/4/18. She was told to leave the dressings on until she returned to MD.  
She subsequently had B shoulder manipulations on 11/30/18. Past Medical History/Comorbidities: OA,  Benign hypertension with CKD (chronic kidney disease) stage III ([address] Diabetes ([address] Dyslipidemia; Endometrial cancer ([address] and Morbid obesity ([address] knee arthroscopy (Right, 2015); appendectomy (1979), L ankle surgery (2007) L index finger surgery (2005) Social History/Living Environment:  
  Lives with family in one story home. Prior Level of Function/Work/Activity: 
 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 Dominant Side:  
      RIGHT Previous Treatment Approaches: PT prior to surgery Current Medications:  Glipizide, lisinopril, Norco, Meloxicam   Date Last Reviewed:  1/21/19 EXAMINATION:  
Observation/Orthostatic Postural Assessment:   
      No new (11/13/18) ROM:   
      AROM R shoulder flex 135, Abduct 145, extn 50, ER 45 and IR to mid R buttock PROM R shoulder flex 130, abduct 110, ER 35 at 90 degree abduct PROM L shoulder flex 80, abduct 65, IR 50 (to abdomen) and ER -15 from neutral.  
 
      PROM R shoulder flex 160, abduct 140, ER 70 and IR 65 at 90 degree abduct (11/1/18) PROM L shoulder flex 130, abduct 110, ER 30 and IR 50 at 90 degrees abduct (11/1/18) PROM R shoulder flex 165, abduct 160, ER 75 and IR 75 at 90 degree abduct (12/3/18) PROM L shoulder flex 140, abduct 120, ER 45 and IR 60 at 90 degrees abduct (12/3/18) PROM L shoulder flex 155, abduct 140, ER 57 and IR 67 at 90 degrees abduct (12/12/18) PROM R shoulder flex 160, abduct 150, ER 85, IR 80 at 90 degrees abduct (1/3/19) PROM L shoulder flex 145, abduct 120, ER 40, IR 60 at 90 degrees abduct (1/3/19) AROM L shoulder flex 140, abduct 108, ER 10, IR to lateral L buttock (1/3/19) PROM L shoulder flex 150, abduct 130, ER 65, IR 55 at 90 degrees abduct (1/16/19) AROM L shoulder flex 148, abduct 120, ER 23, IR to posterolateral L buttock (1/16/19) Strength:   
      R shoulder 4+ to 5/5 all directions. L shoulder not tested. Neurological Screen: 
      Sensation: light touch intact in B UEs Body Structures Involved: 1. Joints 2. Muscles Body Functions Affected: 1. Neuromusculoskeletal 
2. Movement Related Activities and Participation Affected: 1. General Tasks and Demands 2. Self Care 3. Community, Social and [place] CLINICAL DECISION MAKING:  
Outcome Measure: Tool Used: Disabilities of the Arm, Shoulder and Hand (DASH) Questionnaire - Quick Version Score:  Initial: 55/55   41/55 (Date: 11/1/18 )      45/55 (Date 12/3/18)     Most Recent: 43/55 (1/3/19) Interpretation of Score: The DASH is designed to measure the activities of daily living in person's with upper extremity dysfunction or pain. Each section is scored on a 1-5 scale, 5 representing the greatest disability. The scores of each section are added together for a total score of 55. Medical Necessity:  
· Patient demonstrates good rehab potential due to higher previous functional level. Reason for Services/Other Comments: 
· Patient continues to require skilled intervention due to need to regain use of B UEs.  
  
 
 
 
TREATMENT:  
(In addition to Assessment/Re-Assessment sessions the following treatments were rendered) Pre-treatment Symptoms/Complaints:  Patient reports that she returned to MD and is cleared to go back to work on 2/4/19. Her shoulder is sore today and she would like to work on just stretching it out today, and then do strengthening on Wednesday. She states that doctor told her that her shoulder was doing better, but was not all the way healed yet. Pain: Initial:  
Pain Intensity 1: 7(about 6.5)   Post Session: No change in pain level per patient. Therapeutic Exercise: ( ):  none today Date 11/14/18  Date 11/19/18 Date 
12/6/18 Date 
12/19/18  Date 
12/26/18 Date 1/8/19 Date 1/14/19 Activity/Exercise B serratus punch 2# B 2x15 2# B 2x15 2# B 2x15 3# B 2x15 3# B 2x15 3# B 2x15 4# B 2x15 Wand flex +2# 2x15 - +2#  2x20 - - - -  
B bicep curls 2# 2x15 2# 2x15 2# 2x15 3# 2x15 3# 2x15 4# 2x15 5# 2x15 Wall slides 2x10 - 2x10 - Red 2x10 Red 2x15 -  
IR with band  Yellow 2x15 Red 2x15 Red 2x15 Red 2x15 Red 2x15 Red 2x15 Green 2x15 ER with band  Yellow 2x15 Red 2x15 Red 2x15 Red 2x15 Red 2x15 Red 2x15 Red 2x15 Side lying abduct 2# 2x15 - - 2# 2x15 2# 2x15 2# 2x15 3# 2x15 Side lying ER 2# 2x15 - - 2# 2x15 2# 2x15 2# 2x15 3# 2x15 Wall push ups 2x10  2x15 2x15 Rail 2x10 Rail 2x10 Rail 2x10 Rail 2x10 B Lat pull downs 7# 2x15 7# 2x15 7# 2x15 10# 3x10 10# 2x15 10# 2x15 10# 1x15 B Cable rows 7# 2x15 7# 2x15 7# 2x15 10# 2x15 10# 2x15 10# 2x15 10# 2x10 B cable press    3# 2x15 3# 2x15 7# 2x15 10# 2x15 B shldr flex with symmetry  1# 2x10 1# 1x10 2# 1x10 - 2# 2x10 2# 2x10 B shldr abduct to <= 90  1# 2x10 1# 1x10 - - 2# 2x10 2# 2x10 Rows with band    Blue 2x15 Blue 2x15 Blue 2x15 Black 2x15 B shldr extn with band    Blue 2x15 Blue 2x15 Blue 2x15 Black 2x15 D1/D2 diagonals on wall      2x10 ea 2x10 ea HEP - continue current HEP Manual Therapy ( 40 minutes) Grade 2 to 4 physio mobs L shoulder flex, abduct, IR and ER. Grade 2 to 4 inferior and posterior shoulder glides. Contract/relax at end range L shoulder flex, IR and ER. Therapeutic Modalities:   Heat during manual therapy. Cold for pain afterward - Game Ready Device, 42 degrees and light compression. Left Shoulder Heat Type: Moist pack Duration : 40 minutes Patient Position: Supine              Left Shoulder Cold Type: Cryocuff(with vaso neumatic compression) Duration : 15 minutes Patient Position: Sitting [place] Treatment/Session Assessment:   
· Response to Treatment:  Patient seemed more stiff today and wanted to work on ROM rather than strength. Heat seems to help her shoulder relax a little during treatment, but she is still stiff, especially in L shoulder IR and ER. · Compliance with Program/Exercises: yes · Recommendations/Intent for next treatment session: \"Next visit will focus on motion and strength in B shoulders. \". Total Treatment Duration: 55 minutes PT Patient Time In/Time Out Time In: 3247 Time Out: 1450 Radha Mcnamara, PT

## 2019-01-23 ENCOUNTER — HOSPITAL ENCOUNTER (OUTPATIENT)
Dept: PHYSICAL THERAPY | Age: 54
Discharge: HOME | End: 2019-01-23
Payer: COMMERCIAL

## 2019-01-23 PROCEDURE — 97110 THERAPEUTIC EXERCISES: CPT

## 2019-01-23 PROCEDURE — 97140 MANUAL THERAPY 1/> REGIONS: CPT

## 2019-01-23 NOTE — PROGRESS NOTES
Chaz Gayle : 1965 Primary: [facility]* Secondary:  [facility]rapy Center at [facility] ARTIE BLAIR [facility], [address] [place] [address] [address], [address] [phone]   [phone] OUTPATIENT PHYSICAL THERAPY:Daily Note 2019 ICD-10: Treatment Diagnosis:  M75.01 Adhesive capsulitis of right shoulder, M75.02 Adhesive capsulitis of left shoulder, M19.012 Primary osteoarthritis, left shoulder, Pain in left shoulder (M25.512), Pain in right shoulder (M25.511) Precautions/Allergies: NKDA Fall Risk Score: 0 (? 5 = High Risk) MD Orders: Eval and treat, HEP, ROM, strengthening, all modalities  3x/wk for 6 weeks (written 19 MEDICAL/REFERRING DIAGNOSIS: 
L shoulder DATE OF ONSET: 10/4/18, EUA of B shoulders on 18 REFERRING PHYSICIAN: Victor Manuel Willingham MD 
RETURN PHYSICIAN APPOINTMENT: 19 ASSESSMENT:  Ms. Kathy Hollins  Is a 46year old R hand dominant female s/p EUA and manipulation of R shoulder, EUA and manipulation of L shoulder and L SAD, ADCR, and LOREN on 10/4/18. She presented with shoulder pain, decreased ROM in B shoulders, decreased strength in L shoulder and decreased functional use of B shoulders. Her ROM was improving in both arms though she continued to have pain. Her DASH score was improved. Both shoulders were then manipulated under anesthesia on 18. She was making progress with ROM, but L shoulder was tighter when measured today (1/3/19). DASH is minimally improved and she continues to have pain. She could benefit from continued PT to address deficits and work toward goals. PROBLEM LIST (Impacting functional limitations): 1. Decreased Strength 2. Decreased ADL/Functional Activities 3. Increased Pain 4. Decreased Flexibility/Joint Mobility INTERVENTIONS PLANNED: 
1. Home Exercise Program (HEP) 2. Manual Therapy 3. Therapeutic Exercise/Strengthening 4. modals as needed TREATMENT PLAN: 
 Effective Dates: 12/3/2018 TO 3/3/2019 (90 days). Frequency/Duration: 2-3 times a week for 90 Days GOALS: (Goals have been discussed and agreed upon with patient.) Patient's stated goal is to be able to use her arms. Short-Term Functional Goals: Time Frame: 6 weeks 1. Patient to be independent with HEP - MET 2. Patient to increase PROM L shoulder flex to 135 and PROM L shoulder ER to 30 degrees   MET (12/3/18) 3. Patient to report decrease in pain level to 5/10. - partially MET - improving score, but not yet 5. Discharge Goals: Time Frame: 90 days - all in progress 1. Patient to report no more than minimal shoulder pain with all activity 2. Patient to improve DASH score to 21 to demo improved use of B UEs 3. Patient to improve AROM B shoulder flex to 150 degrees for improved functional use. Rehabilitation Potential For Stated Goals: Good The information in this section was collected on 10/9/18 (except where otherwise noted). HISTORY:  
History of Present Injury/Illness (Reason for Referral): 
Patient reports that shoulder pain started last year. She went to her family doctor and was sent to PT. They tried dry needling and MD injected her. She didn't get better and requested a second opinion. She was sent to Dr. Dominga Segovia and an MRI was done, and then she had surgery on 10/4/18. She was told to leave the dressings on until she returned to MD.  
She subsequently had B shoulder manipulations on 11/30/18. Past Medical History/Comorbidities: OA,  Benign hypertension with CKD (chronic kidney disease) stage III ([address] Diabetes ([address] Dyslipidemia; Endometrial cancer ([address] and Morbid obesity ([address] knee arthroscopy (Right, 2015); appendectomy (1979), L ankle surgery (2007) L index finger surgery (2005) Social History/Living Environment:  
  Lives with family in one story home. Prior Level of Function/Work/Activity: 
 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 Dominant Side:  
      RIGHT Previous Treatment Approaches: PT prior to surgery Current Medications:  Glipizide, lisinopril, Norco, Meloxicam   Date Last Reviewed:  1/21/19 EXAMINATION:  
Observation/Orthostatic Postural Assessment:   
      No new (11/13/18) ROM:   
      AROM R shoulder flex 135, Abduct 145, extn 50, ER 45 and IR to mid R buttock PROM R shoulder flex 130, abduct 110, ER 35 at 90 degree abduct PROM L shoulder flex 80, abduct 65, IR 50 (to abdomen) and ER -15 from neutral.  
 
      PROM R shoulder flex 160, abduct 140, ER 70 and IR 65 at 90 degree abduct (11/1/18) PROM L shoulder flex 130, abduct 110, ER 30 and IR 50 at 90 degrees abduct (11/1/18) PROM R shoulder flex 165, abduct 160, ER 75 and IR 75 at 90 degree abduct (12/3/18) PROM L shoulder flex 140, abduct 120, ER 45 and IR 60 at 90 degrees abduct (12/3/18) PROM L shoulder flex 155, abduct 140, ER 57 and IR 67 at 90 degrees abduct (12/12/18) PROM R shoulder flex 160, abduct 150, ER 85, IR 80 at 90 degrees abduct (1/3/19) PROM L shoulder flex 145, abduct 120, ER 40, IR 60 at 90 degrees abduct (1/3/19) AROM L shoulder flex 140, abduct 108, ER 10, IR to lateral L buttock (1/3/19) PROM L shoulder flex 150, abduct 130, ER 65, IR 55 at 90 degrees abduct (1/16/19) AROM L shoulder flex 148, abduct 120, ER 23, IR to posterolateral L buttock (1/16/19) Strength:   
      R shoulder 4+ to 5/5 all directions. L shoulder not tested. Neurological Screen: 
      Sensation: light touch intact in B UEs Body Structures Involved: 1. Joints 2. Muscles Body Functions Affected: 1. Neuromusculoskeletal 
2. Movement Related Activities and Participation Affected: 1. General Tasks and Demands 2. Self Care 3. Community, Social and [place] CLINICAL DECISION MAKING:  
Outcome Measure: Tool Used: Disabilities of the Arm, Shoulder and Hand (DASH) Questionnaire - Quick Version Score:  Initial: 55/55   41/55 (Date: 11/1/18 )      45/55 (Date 12/3/18)     Most Recent: 43/55 (1/3/19) Interpretation of Score: The DASH is designed to measure the activities of daily living in person's with upper extremity dysfunction or pain. Each section is scored on a 1-5 scale, 5 representing the greatest disability. The scores of each section are added together for a total score of 55. Medical Necessity:  
· Patient demonstrates good rehab potential due to higher previous functional level. Reason for Services/Other Comments: 
· Patient continues to require skilled intervention due to need to regain use of B UEs.  
  
 
 
 
TREATMENT:  
(In addition to Assessment/Re-Assessment sessions the following treatments were rendered) Pre-treatment Symptoms/Complaints:  Patient reports that her shoulder is sore today. Pain: Initial:  
Pain Intensity 1: 7(\"6 or 7\")   Post Session: Decreased pain level per patient, but not quantified. .   
 
Therapeutic Exercise: (30 Minutes):  Exercises per grid below to improve mobility and strength. Required minimal verbal cues to ensure correct performance. Progressed complexity of movement as indicated. Date 11/14/18  Date 11/19/18 Date 
12/6/18 Date 
12/19/18  Date 
12/26/18 Date 1/8/19 Date 1/14/19 Date 1/23/19 Activity/Exercise B serratus punch 2# B 2x15 2# B 2x15 2# B 2x15 3# B 2x15 3# B 2x15 3# B 2x15 4# B 2x15 4# 2x15 Wand flex +2# 2x15 - +2#  2x20 - - - - -  
B bicep curls 2# 2x15 2# 2x15 2# 2x15 3# 2x15 3# 2x15 4# 2x15 5# 2x15 5# 2x15 Wall slides 2x10 - 2x10 - Red 2x10 Red 2x15 - -  
IR with band  Yellow 2x15 Red 2x15 Red 2x15 Red 2x15 Red 2x15 Red 2x15 Green 2x15 Green 2x15 ER with band  Yellow 2x15 Red 2x15 Red 2x15 Red 2x15 Red 2x15 Red 2x15 Red 2x15 Red 2x15 Side lying abduct 2# 2x15 - - 2# 2x15 2# 2x15 2# 2x15 3# 2x15 3# 2x15 Side lying ER 2# 2x15 - - 2# 2x15 2# 2x15 2# 2x15 3# 2x15 3# 2x15 Wall push ups 2x10  2x15 2x15 Rail 2x10 Rail 2x10 Rail 2x10 Rail 2x10 Rail 2x15 B Lat pull downs 7# 2x15 7# 2x15 7# 2x15 10# 3x10 10# 2x15 10# 2x15 10# 1x15 13# 2x15 B Cable rows 7# 2x15 7# 2x15 7# 2x15 10# 2x15 10# 2x15 10# 2x15 10# 2x10 13# 2x15 B cable press    3# 2x15 3# 2x15 7# 2x15 10# 2x15 10# 2x15 B shldr flex with symmetry  1# 2x10 1# 1x10 2# 1x10 - 2# 2x10 2# 2x10 2# 2x10 B shldr abduct to <= 90  1# 2x10 1# 1x10 - - 2# 2x10 2# 2x10 2# 2x10 Rows with band    Blue 2x15 Blue 2x15 Blue 2x15 Black 2x15 Black 2x15 B shldr extn with band    Blue 2x15 Blue 2x15 Blue 2x15 Black 2x15 Black 2x15 D1/D2 diagonals on wall      2x10 ea 2x10 ea 2x10 ea Pass behind back        2x10 HEP - continue current HEP Manual Therapy ( 15 minutes) Grade 2 to 4 physio mobs L shoulder flex, abduct, IR and ER. Grade 2 to 4 inferior and posterior shoulder glides. Therapeutic Modalities:   Heat during manual therapy. Patient took ice with her at end of session. Left Shoulder Heat Type: Moist pack Duration : 15 minutes Patient Position: Supine Annamary Ripa Treatment/Session Assessment:   
· Response to Treatment:  Patient works hard at exercises. Reported decreased pain at end of session. She is scheduled to return to PT tomorrow. · Compliance with Program/Exercises: yes · Recommendations/Intent for next treatment session: \"Next visit will focus on motion and strength in B shoulders. \". Total Treatment Duration: 45 minutes PT Patient Time In/Time Out Time In: 9094 Time Out: 1023 Mary Francis, PT

## 2019-01-24 ENCOUNTER — HOSPITAL ENCOUNTER (OUTPATIENT)
Dept: PHYSICAL THERAPY | Age: 54
Discharge: HOME | End: 2019-01-24
Payer: COMMERCIAL

## 2019-01-24 PROCEDURE — 97140 MANUAL THERAPY 1/> REGIONS: CPT

## 2019-01-24 NOTE — PROGRESS NOTES
Solomon Turner : 1965 Primary: [facility]* Secondary:  [facility] at [facility] ARTIE BLAIR [address] [facility], [address] [place] [address],[address], [facility]. [phone]   [phone] OUTPATIENT PHYSICAL THERAPY:Daily Note 2019 ICD-10: Treatment Diagnosis:  M75.01 Adhesive capsulitis of right shoulder, M75.02 Adhesive capsulitis of left shoulder, M19.012 Primary osteoarthritis, left shoulder, Pain in left shoulder (M25.512), Pain in right shoulder (M25.511) Precautions/Allergies: NKDA Fall Risk Score: 0 (? 5 = High Risk) MD Orders: Eval and treat, HEP, ROM, strengthening, all modalities  3x/wk for 6 weeks (written 19 MEDICAL/REFERRING DIAGNOSIS: 
L shoulder DATE OF ONSET: 10/4/18, EUA of B shoulders on 18 REFERRING PHYSICIAN: Reece Cifuentes MD 
RETURN PHYSICIAN APPOINTMENT: 19 ASSESSMENT:  Ms. Florentino Steve  Is a 46year old R hand dominant female s/p EUA and manipulation of R shoulder, EUA and manipulation of L shoulder and L SAD, ADCR, and LOREN on 10/4/18. She presented with shoulder pain, decreased ROM in B shoulders, decreased strength in L shoulder and decreased functional use of B shoulders. Her ROM was improving in both arms though she continued to have pain. Her DASH score was improved. Both shoulders were then manipulated under anesthesia on 18. She was making progress with ROM, but L shoulder was tighter when measured today (1/3/19). DASH is minimally improved and she continues to have pain. She could benefit from continued PT to address deficits and work toward goals. PROBLEM LIST (Impacting functional limitations): 1. Decreased Strength 2. Decreased ADL/Functional Activities 3. Increased Pain 4. Decreased Flexibility/Joint Mobility INTERVENTIONS PLANNED: 
1. Home Exercise Program (HEP) 2. Manual Therapy 3. Therapeutic Exercise/Strengthening 4. modals as needed TREATMENT PLAN: 
 Effective Dates: 12/3/2018 TO 3/3/2019 (90 days). Frequency/Duration: 2-3 times a week for 90 Days GOALS: (Goals have been discussed and agreed upon with patient.) Patient's stated goal is to be able to use her arms. Short-Term Functional Goals: Time Frame: 6 weeks 1. Patient to be independent with HEP - MET 2. Patient to increase PROM L shoulder flex to 135 and PROM L shoulder ER to 30 degrees   MET (12/3/18) 3. Patient to report decrease in pain level to 5/10. - partially MET - improving score, but not yet 5. Discharge Goals: Time Frame: 90 days - all in progress 1. Patient to report no more than minimal shoulder pain with all activity 2. Patient to improve DASH score to 21 to demo improved use of B UEs 3. Patient to improve AROM B shoulder flex to 150 degrees for improved functional use. Rehabilitation Potential For Stated Goals: Good The information in this section was collected on 10/9/18 (except where otherwise noted). HISTORY:  
History of Present Injury/Illness (Reason for Referral): 
Patient reports that shoulder pain started last year. She went to her family doctor and was sent to PT. They tried dry needling and MD injected her. She didn't get better and requested a second opinion. She was sent to Dr. Orquidea Cerna and an MRI was done, and then she had surgery on 10/4/18. She was told to leave the dressings on until she returned to MD.  
She subsequently had B shoulder manipulations on 11/30/18. Past Medical History/Comorbidities: OA,  Benign hypertension with CKD (chronic kidney disease) stage III ([address] Diabetes ([address] Dyslipidemia; Endometrial cancer ([address] and Morbid obesity ([address] knee arthroscopy (Right, 2015); appendectomy (1979), L ankle surgery (2007) L index finger surgery (2005) Social History/Living Environment:  
  Lives with family in one story home. Prior Level of Function/Work/Activity: 
 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 Dominant Side:  
      RIGHT Previous Treatment Approaches: PT prior to surgery Current Medications:  Glipizide, lisinopril, Norco, Meloxicam   Date Last Reviewed:  1/21/19 EXAMINATION:  
Observation/Orthostatic Postural Assessment:   
      No new (11/13/18) ROM:   
      AROM R shoulder flex 135, Abduct 145, extn 50, ER 45 and IR to mid R buttock PROM R shoulder flex 130, abduct 110, ER 35 at 90 degree abduct PROM L shoulder flex 80, abduct 65, IR 50 (to abdomen) and ER -15 from neutral.  
 
      PROM R shoulder flex 160, abduct 140, ER 70 and IR 65 at 90 degree abduct (11/1/18) PROM L shoulder flex 130, abduct 110, ER 30 and IR 50 at 90 degrees abduct (11/1/18) PROM R shoulder flex 165, abduct 160, ER 75 and IR 75 at 90 degree abduct (12/3/18) PROM L shoulder flex 140, abduct 120, ER 45 and IR 60 at 90 degrees abduct (12/3/18) PROM L shoulder flex 155, abduct 140, ER 57 and IR 67 at 90 degrees abduct (12/12/18) PROM R shoulder flex 160, abduct 150, ER 85, IR 80 at 90 degrees abduct (1/3/19) PROM L shoulder flex 145, abduct 120, ER 40, IR 60 at 90 degrees abduct (1/3/19) AROM L shoulder flex 140, abduct 108, ER 10, IR to lateral L buttock (1/3/19) PROM L shoulder flex 150, abduct 130, ER 65, IR 55 at 90 degrees abduct (1/16/19) AROM L shoulder flex 148, abduct 120, ER 23, IR to posterolateral L buttock (1/16/19) Strength:   
      R shoulder 4+ to 5/5 all directions. L shoulder not tested. Neurological Screen: 
      Sensation: light touch intact in B UEs Body Structures Involved: 1. Joints 2. Muscles Body Functions Affected: 1. Neuromusculoskeletal 
2. Movement Related Activities and Participation Affected: 1. General Tasks and Demands 2. Self Care 3. Community, Social and [facility] CLINICAL DECISION MAKING:  
Outcome Measure: Tool Used: Disabilities of the Arm, Shoulder and Hand (DASH) Questionnaire - Quick Version Score:  Initial: 55/55   41/55 (Date: 11/1/18 )      45/55 (Date 12/3/18)     Most Recent: 43/55 (1/3/19) Interpretation of Score: The DASH is designed to measure the activities of daily living in person's with upper extremity dysfunction or pain. Each section is scored on a 1-5 scale, 5 representing the greatest disability. The scores of each section are added together for a total score of 55. Medical Necessity:  
· Patient demonstrates good rehab potential due to higher previous functional level. Reason for Services/Other Comments: 
· Patient continues to require skilled intervention due to need to regain use of B UEs.  
  
 
 
 
TREATMENT:  
(In addition to Assessment/Re-Assessment sessions the following treatments were rendered) Pre-treatment Symptoms/Complaints:  Patient reports that her shoulder is a little less sore. She looked into getting a hot pack for her shoulder. Pain: Initial:  
Pain Intensity 1: 6   Post Session: Decreased pain level per patient, \"Maybe a 5\" Therapeutic Exercise: ( ): none today Date 11/14/18  Date 11/19/18 Date 
12/6/18 Date 
12/19/18  Date 
12/26/18 Date 1/8/19 Date 1/14/19 Date 1/23/19 Activity/Exercise B serratus punch 2# B 2x15 2# B 2x15 2# B 2x15 3# B 2x15 3# B 2x15 3# B 2x15 4# B 2x15 4# 2x15 Wand flex +2# 2x15 - +2#  2x20 - - - - -  
B bicep curls 2# 2x15 2# 2x15 2# 2x15 3# 2x15 3# 2x15 4# 2x15 5# 2x15 5# 2x15 Wall slides 2x10 - 2x10 - Red 2x10 Red 2x15 - -  
IR with band  Yellow 2x15 Red 2x15 Red 2x15 Red 2x15 Red 2x15 Red 2x15 Green 2x15 Green 2x15 ER with band  Yellow 2x15 Red 2x15 Red 2x15 Red 2x15 Red 2x15 Red 2x15 Red 2x15 Red 2x15 Side lying abduct 2# 2x15 - - 2# 2x15 2# 2x15 2# 2x15 3# 2x15 3# 2x15 Side lying ER 2# 2x15 - - 2# 2x15 2# 2x15 2# 2x15 3# 2x15 3# 2x15 Wall push ups 2x10  2x15 2x15 Rail 2x10 Rail 2x10 Rail 2x10 Rail 2x10 Rail 2x15 B Lat pull downs 7# 2x15 7# 2x15 7# 2x15 10# 3x10 10# 2x15 10# 2x15 10# 1x15 13# 2x15 B Cable rows 7# 2x15 7# 2x15 7# 2x15 10# 2x15 10# 2x15 10# 2x15 10# 2x10 13# 2x15 B cable press    3# 2x15 3# 2x15 7# 2x15 10# 2x15 10# 2x15 B shldr flex with symmetry  1# 2x10 1# 1x10 2# 1x10 - 2# 2x10 2# 2x10 2# 2x10 B shldr abduct to <= 90  1# 2x10 1# 1x10 - - 2# 2x10 2# 2x10 2# 2x10 Rows with band    Blue 2x15 Blue 2x15 Blue 2x15 Black 2x15 Black 2x15 B shldr extn with band    Blue 2x15 Blue 2x15 Blue 2x15 Black 2x15 Black 2x15 D1/D2 diagonals on wall      2x10 ea 2x10 ea 2x10 ea Pass behind back        2x10 HEP - continue current HEP Manual Therapy ( 40 minutes) Grade 2 to 4 physio mobs L shoulder flex, abduct, IR and ER. Grade 2 to 4 inferior and posterior shoulder glides. Contract/Relax at end range flex, IR and ER. AIS 3 sec x 10 each for L shoulder flex, abduct, ER at 45 and 90 degree abduct, IR at 90 degree abduct and at 90 degree flex, horizontal adduct, D1 and D2  Diagonals - all in supine. Therapeutic Modalities:   Heat during manual therapy. Post session, used Game Ready device at light compression and at 42 degrees. Left Shoulder Heat Type: Moist pack Duration : 20 minutes Patient Position: Supine              Left Shoulder Cold Type: Cryocuff(with vasoneumatic compression) Duration : 15 minutes [facility] Treatment/Session Assessment:   
· Response to Treatment:  Focused on motion today since patient had done strengthening yesterday. Reported decreased pain at end of session. ROM seemed to improve with treatment. · Compliance with Program/Exercises: yes · Recommendations/Intent for next treatment session: \"Next visit will focus on motion and strength in B shoulders.  \".    
 Total Treatment Duration: 55 minutes PT Patient Time In/Time Out Time In: 4541 Time Out: 1050 Nicola Esquivel, PT

## 2019-01-28 ENCOUNTER — HOSPITAL ENCOUNTER (OUTPATIENT)
Dept: PHYSICAL THERAPY | Age: 54
Discharge: HOME | End: 2019-01-28
Payer: COMMERCIAL

## 2019-01-28 PROCEDURE — 97140 MANUAL THERAPY 1/> REGIONS: CPT

## 2019-01-28 NOTE — PROGRESS NOTES
Willie Winkler : 1965 Primary: [facility]* Secondary:  Therapy Center at [facility] ARTIE BLAIR [address] [facility], [address] [place] [address],[address], [facility]. [phone]   [phone] OUTPATIENT PHYSICAL THERAPY:Daily Note 2019 ICD-10: Treatment Diagnosis:  M75.01 Adhesive capsulitis of right shoulder, M75.02 Adhesive capsulitis of left shoulder, M19.012 Primary osteoarthritis, left shoulder, Pain in left shoulder (M25.512), Pain in right shoulder (M25.511) Precautions/Allergies: NKDA Fall Risk Score: 0 (? 5 = High Risk) MD Orders: Eval and treat, HEP, ROM, strengthening, all modalities  3x/wk for 6 weeks (written 19 MEDICAL/REFERRING DIAGNOSIS: 
L shoulder DATE OF ONSET: 10/4/18, EUA of B shoulders on 18 REFERRING PHYSICIAN: Addy Zarate MD 
RETURN PHYSICIAN APPOINTMENT: 19 ASSESSMENT:  Ms. Chiquita Flanagan  Is a 46year old R hand dominant female s/p EUA and manipulation of R shoulder, EUA and manipulation of L shoulder and L SAD, ADCR, and LOREN on 10/4/18. She presented with shoulder pain, decreased ROM in B shoulders, decreased strength in L shoulder and decreased functional use of B shoulders. Her ROM was improving in both arms though she continued to have pain. Her DASH score was improved. Both shoulders were then manipulated under anesthesia on 18. She was making progress with ROM, but L shoulder was tighter when measured today (1/3/19). DASH is minimally improved and she continues to have pain. She could benefit from continued PT to address deficits and work toward goals. PROBLEM LIST (Impacting functional limitations): 1. Decreased Strength 2. Decreased ADL/Functional Activities 3. Increased Pain 4. Decreased Flexibility/Joint Mobility INTERVENTIONS PLANNED: 
1. Home Exercise Program (HEP) 2. Manual Therapy 3. Therapeutic Exercise/Strengthening 4. modals as needed TREATMENT PLAN: 
 Effective Dates: 12/3/2018 TO 3/3/2019 (90 days). Frequency/Duration: 2-3 times a week for 90 Days GOALS: (Goals have been discussed and agreed upon with patient.) Patient's stated goal is to be able to use her arms. Short-Term Functional Goals: Time Frame: 6 weeks 1. Patient to be independent with HEP - MET 2. Patient to increase PROM L shoulder flex to 135 and PROM L shoulder ER to 30 degrees   MET (12/3/18) 3. Patient to report decrease in pain level to 5/10. - partially MET - improving score, but not yet 5. Discharge Goals: Time Frame: 90 days - all in progress 1. Patient to report no more than minimal shoulder pain with all activity 2. Patient to improve DASH score to 21 to demo improved use of B UEs 3. Patient to improve AROM B shoulder flex to 150 degrees for improved functional use. Rehabilitation Potential For Stated Goals: Good The information in this section was collected on 10/9/18 (except where otherwise noted). HISTORY:  
History of Present Injury/Illness (Reason for Referral): 
Patient reports that shoulder pain started last year. She went to her family doctor and was sent to PT. They tried dry needling and MD injected her. She didn't get better and requested a second opinion. She was sent to Dr. Nadira Gonzalez and an MRI was done, and then she had surgery on 10/4/18. She was told to leave the dressings on until she returned to MD.  
She subsequently had B shoulder manipulations on 11/30/18. Past Medical History/Comorbidities: OA,  Benign hypertension with CKD (chronic kidney disease) stage III ([address] Diabetes ([address] Dyslipidemia; Endometrial cancer ([address] and Morbid obesity ([address] knee arthroscopy (Right, 2015); appendectomy (1979), L ankle surgery (2007) L index finger surgery (2005) Social History/Living Environment:  
  Lives with family in one story home. Prior Level of Function/Work/Activity: 
 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 Dominant Side:  
      RIGHT Previous Treatment Approaches: PT prior to surgery Current Medications:  Glipizide, lisinopril, Norco, Meloxicam   Date Last Reviewed:  1/28/19 EXAMINATION:  
Observation/Orthostatic Postural Assessment:   
      No new (11/13/18) ROM:   
      AROM R shoulder flex 135, Abduct 145, extn 50, ER 45 and IR to mid R buttock PROM R shoulder flex 130, abduct 110, ER 35 at 90 degree abduct PROM L shoulder flex 80, abduct 65, IR 50 (to abdomen) and ER -15 from neutral.  
 
      PROM R shoulder flex 160, abduct 140, ER 70 and IR 65 at 90 degree abduct (11/1/18) PROM L shoulder flex 130, abduct 110, ER 30 and IR 50 at 90 degrees abduct (11/1/18) PROM R shoulder flex 165, abduct 160, ER 75 and IR 75 at 90 degree abduct (12/3/18) PROM L shoulder flex 140, abduct 120, ER 45 and IR 60 at 90 degrees abduct (12/3/18) PROM L shoulder flex 155, abduct 140, ER 57 and IR 67 at 90 degrees abduct (12/12/18) PROM R shoulder flex 160, abduct 150, ER 85, IR 80 at 90 degrees abduct (1/3/19) PROM L shoulder flex 145, abduct 120, ER 40, IR 60 at 90 degrees abduct (1/3/19) AROM L shoulder flex 140, abduct 108, ER 10, IR to lateral L buttock (1/3/19) PROM L shoulder flex 150, abduct 130, ER 65, IR 55 at 90 degrees abduct (1/16/19) AROM L shoulder flex 148, abduct 120, ER 23, IR to posterolateral L buttock (1/16/19) Strength:   
      R shoulder 4+ to 5/5 all directions. L shoulder not tested. Neurological Screen: 
      Sensation: light touch intact in B UEs Body Structures Involved: 1. Joints 2. Muscles Body Functions Affected: 1. Neuromusculoskeletal 
2. Movement Related Activities and Participation Affected: 1. General Tasks and Demands 2. Self Care 3. Community, Social and [place] CLINICAL DECISION MAKING:  
Outcome Measure: Tool Used: Disabilities of the Arm, Shoulder and Hand (DASH) Questionnaire - Quick Version Score:  Initial: 55/55   41/55 (Date: 11/1/18 )      45/55 (Date 12/3/18)     Most Recent: 43/55 (1/3/19) Interpretation of Score: The DASH is designed to measure the activities of daily living in person's with upper extremity dysfunction or pain. Each section is scored on a 1-5 scale, 5 representing the greatest disability. The scores of each section are added together for a total score of 55. Medical Necessity:  
· Patient demonstrates good rehab potential due to higher previous functional level. Reason for Services/Other Comments: 
· Patient continues to require skilled intervention due to need to regain use of B UEs.  
  
 
 
 
TREATMENT:  
(In addition to Assessment/Re-Assessment sessions the following treatments were rendered) Pre-treatment Symptoms/Complaints:  Patient reports that she doesn't feel well. Took a whole pain pill on Friday and the room started spinning. Did not feel good all weekend. Pain: Initial:  
Pain Intensity 1: 5   Post Session: Decreased pain level per patient, \"Maybe a 5\" Therapeutic Exercise: ( ): none today Date 11/14/18  Date 11/19/18 Date 
12/6/18 Date 
12/19/18  Date 
12/26/18 Date 1/8/19 Date 1/14/19 Date 1/23/19 Activity/Exercise B serratus punch 2# B 2x15 2# B 2x15 2# B 2x15 3# B 2x15 3# B 2x15 3# B 2x15 4# B 2x15 4# 2x15 Wand flex +2# 2x15 - +2#  2x20 - - - - -  
B bicep curls 2# 2x15 2# 2x15 2# 2x15 3# 2x15 3# 2x15 4# 2x15 5# 2x15 5# 2x15 Wall slides 2x10 - 2x10 - Red 2x10 Red 2x15 - -  
IR with band  Yellow 2x15 Red 2x15 Red 2x15 Red 2x15 Red 2x15 Red 2x15 Green 2x15 Green 2x15 ER with band  Yellow 2x15 Red 2x15 Red 2x15 Red 2x15 Red 2x15 Red 2x15 Red 2x15 Red 2x15 Side lying abduct 2# 2x15 - - 2# 2x15 2# 2x15 2# 2x15 3# 2x15 3# 2x15 Side lying ER 2# 2x15 - - 2# 2x15 2# 2x15 2# 2x15 3# 2x15 3# 2x15 Wall push ups 2x10  2x15 2x15 Rail 2x10 Rail 2x10 Rail 2x10 Rail 2x10 Rail 2x15 B Lat pull downs 7# 2x15 7# 2x15 7# 2x15 10# 3x10 10# 2x15 10# 2x15 10# 1x15 13# 2x15 B Cable rows 7# 2x15 7# 2x15 7# 2x15 10# 2x15 10# 2x15 10# 2x15 10# 2x10 13# 2x15 B cable press    3# 2x15 3# 2x15 7# 2x15 10# 2x15 10# 2x15 B shldr flex with symmetry  1# 2x10 1# 1x10 2# 1x10 - 2# 2x10 2# 2x10 2# 2x10 B shldr abduct to <= 90  1# 2x10 1# 1x10 - - 2# 2x10 2# 2x10 2# 2x10 Rows with band    Blue 2x15 Blue 2x15 Blue 2x15 Black 2x15 Black 2x15 B shldr extn with band    Blue 2x15 Blue 2x15 Blue 2x15 Black 2x15 Black 2x15 D1/D2 diagonals on wall      2x10 ea 2x10 ea 2x10 ea Pass behind back        2x10 HEP - continue current HEP Manual Therapy ( 30 minutes) Grade 2 to 4 physio mobs L shoulder flex, abduct, IR and ER. Grade 2 to 4 inferior and posterior shoulder glides. Therapeutic Modalities:   Heat during manual therapy. Post session, used Game Ready device at light compression and at 42 degrees. Left Shoulder Heat Type: Moist pack Duration : 30 minutes Patient Position: Supine              Left Shoulder Cold Type: Cryocuff(with vasoneumatic compression) Duration : 15 minutes Patient Position: Sitting [facility] Treatment/Session Assessment:   
· Response to Treatment:  Focused on motion today since patient was not feeling well. Will plan to resume exercises tomorrow. · Compliance with Program/Exercises: yes · Recommendations/Intent for next treatment session: \"Next visit will focus on motion and strength in B shoulders. \". Total Treatment Duration: 45 minutes PT Patient Time In/Time Out Time In: 8365 Time Out: 1045 [facility], PT

## 2019-01-29 ENCOUNTER — HOSPITAL ENCOUNTER (OUTPATIENT)
Dept: PHYSICAL THERAPY | Age: 54
Discharge: HOME | End: 2019-01-29
Payer: COMMERCIAL

## 2019-01-29 PROCEDURE — 97140 MANUAL THERAPY 1/> REGIONS: CPT

## 2019-01-29 NOTE — PROGRESS NOTES
Ariana Enciso : 1965 Primary: [facility]* Secondary:  Therapy Center at [address] [place] [address] Rd [address] [facility], [address] [place] [address],[address], [place] [address]. [phone]   [phone] OUTPATIENT PHYSICAL THERAPY:Daily Note 2019 ICD-10: Treatment Diagnosis:  M75.01 Adhesive capsulitis of right shoulder, M75.02 Adhesive capsulitis of left shoulder, M19.012 Primary osteoarthritis, left shoulder, Pain in left shoulder (M25.512), Pain in right shoulder (M25.511) Precautions/Allergies: NKDA Fall Risk Score: 0 (? 5 = High Risk) MD Orders: Eval and treat, HEP, ROM, strengthening, all modalities  3x/wk for 6 weeks (written 19 MEDICAL/REFERRING DIAGNOSIS: 
L shoulder DATE OF ONSET: 10/4/18, EUA of B shoulders on 18 REFERRING PHYSICIAN: Alisha Ram MD 
RETURN PHYSICIAN APPOINTMENT: 19 ASSESSMENT:  Ms. María Elena Puckett  Is a 46year old R hand dominant female s/p EUA and manipulation of R shoulder, EUA and manipulation of L shoulder and L SAD, ADCR, and LOREN on 10/4/18. She presented with shoulder pain, decreased ROM in B shoulders, decreased strength in L shoulder and decreased functional use of B shoulders. Her ROM was improving in both arms though she continued to have pain. Her DASH score was improved. Both shoulders were then manipulated under anesthesia on 18. She was making progress with ROM, but L shoulder was tighter when measured today (1/3/19). DASH is minimally improved and she continues to have pain. She could benefit from continued PT to address deficits and work toward goals. PROBLEM LIST (Impacting functional limitations): 1. Decreased Strength 2. Decreased ADL/Functional Activities 3. Increased Pain 4. Decreased Flexibility/Joint Mobility INTERVENTIONS PLANNED: 
1. Home Exercise Program (HEP) 2. Manual Therapy 3. Therapeutic Exercise/Strengthening 4. modals as needed TREATMENT PLAN: 
 Effective Dates: 12/3/2018 TO 3/3/2019 (90 days). Frequency/Duration: 2-3 times a week for 90 Days GOALS: (Goals have been discussed and agreed upon with patient.) Patient's stated goal is to be able to use her arms. Short-Term Functional Goals: Time Frame: 6 weeks 1. Patient to be independent with HEP - MET 2. Patient to increase PROM L shoulder flex to 135 and PROM L shoulder ER to 30 degrees   MET (12/3/18) 3. Patient to report decrease in pain level to 5/10. - partially MET - improving score, but not yet 5. Discharge Goals: Time Frame: 90 days - all in progress 1. Patient to report no more than minimal shoulder pain with all activity 2. Patient to improve DASH score to 21 to demo improved use of B UEs 3. Patient to improve AROM B shoulder flex to 150 degrees for improved functional use. Rehabilitation Potential For Stated Goals: Good The information in this section was collected on 10/9/18 (except where otherwise noted). HISTORY:  
History of Present Injury/Illness (Reason for Referral): 
Patient reports that shoulder pain started last year. She went to her family doctor and was sent to PT. They tried dry needling and MD injected her. She didn't get better and requested a second opinion. She was sent to Dr. Brigido Madison and an MRI was done, and then she had surgery on 10/4/18. She was told to leave the dressings on until she returned to MD.  
She subsequently had B shoulder manipulations on 11/30/18. Past Medical History/Comorbidities: OA,  Benign hypertension with CKD (chronic kidney disease) stage III ([address] Diabetes ([address] Dyslipidemia; Endometrial cancer ([address] and Morbid obesity ([address] knee arthroscopy (Right, 2015); appendectomy (1979), L ankle surgery (2007) L index finger surgery (2005) Social History/Living Environment:  
  Lives with family in one story home. Prior Level of Function/Work/Activity: 
 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 Dominant Side:  
      RIGHT Previous Treatment Approaches: PT prior to surgery Current Medications:  Glipizide, lisinopril, Norco, Meloxicam   Date Last Reviewed:  1/28/19 EXAMINATION:  
Observation/Orthostatic Postural Assessment:   
      No new (11/13/18) ROM:   
      AROM R shoulder flex 135, Abduct 145, extn 50, ER 45 and IR to mid R buttock PROM R shoulder flex 130, abduct 110, ER 35 at 90 degree abduct PROM L shoulder flex 80, abduct 65, IR 50 (to abdomen) and ER -15 from neutral.  
 
      PROM R shoulder flex 160, abduct 140, ER 70 and IR 65 at 90 degree abduct (11/1/18) PROM L shoulder flex 130, abduct 110, ER 30 and IR 50 at 90 degrees abduct (11/1/18) PROM R shoulder flex 165, abduct 160, ER 75 and IR 75 at 90 degree abduct (12/3/18) PROM L shoulder flex 140, abduct 120, ER 45 and IR 60 at 90 degrees abduct (12/3/18) PROM L shoulder flex 155, abduct 140, ER 57 and IR 67 at 90 degrees abduct (12/12/18) PROM R shoulder flex 160, abduct 150, ER 85, IR 80 at 90 degrees abduct (1/3/19) PROM L shoulder flex 145, abduct 120, ER 40, IR 60 at 90 degrees abduct (1/3/19) AROM L shoulder flex 140, abduct 108, ER 10, IR to lateral L buttock (1/3/19) PROM L shoulder flex 150, abduct 130, ER 65, IR 55 at 90 degrees abduct (1/16/19) AROM L shoulder flex 148, abduct 120, ER 23, IR to posterolateral L buttock (1/16/19) Strength:   
      R shoulder 4+ to 5/5 all directions. L shoulder not tested. Neurological Screen: 
      Sensation: light touch intact in B UEs Body Structures Involved: 1. Joints 2. Muscles Body Functions Affected: 1. Neuromusculoskeletal 
2. Movement Related Activities and Participation Affected: 1. General Tasks and Demands 2. Self Care 3. Community, Social and [place] CLINICAL DECISION MAKING:  
Outcome Measure: Tool Used: Disabilities of the Arm, Shoulder and Hand (DASH) Questionnaire - Quick Version Score:  Initial: 55/55   41/55 (Date: 11/1/18 )      45/55 (Date 12/3/18)     Most Recent: 43/55 (1/3/19) Interpretation of Score: The DASH is designed to measure the activities of daily living in person's with upper extremity dysfunction or pain. Each section is scored on a 1-5 scale, 5 representing the greatest disability. The scores of each section are added together for a total score of 55. Medical Necessity:  
· Patient demonstrates good rehab potential due to higher previous functional level. Reason for Services/Other Comments: 
· Patient continues to require skilled intervention due to need to regain use of B UEs.  
  
 
 
 
TREATMENT:  
(In addition to Assessment/Re-Assessment sessions the following treatments were rendered) Pre-treatment Symptoms/Complaints:  Patient reports that she is late because her  kept talking and she wasn't able to get out on time. Pain: Initial:  
Pain Intensity 1: 5   Post Session: No change in pain. Therapeutic Exercise: ( ):  None today Date 
12/6/18 Date 
12/19/18  Date 
12/26/18 Date 1/8/19 Date 1/14/19 Date 1/23/19 Date Activity/Exercise B serratus punch 2# B 2x15 3# B 2x15 3# B 2x15 3# B 2x15 4# B 2x15 4# 2x15 Wand flex +2#  2x20 - - - - -   
B bicep curls 2# 2x15 3# 2x15 3# 2x15 4# 2x15 5# 2x15 5# 2x15 Wall slides 2x10 - Red 2x10 Red 2x15 - -   
IR with band  Red 2x15 Red 2x15 Red 2x15 Red 2x15 Green 2x15 Green 2x15 ER with band  Red 2x15 Red 2x15 Red 2x15 Red 2x15 Red 2x15 Red 2x15 Side lying abduct - 2# 2x15 2# 2x15 2# 2x15 3# 2x15 3# 2x15 Side lying ER - 2# 2x15 2# 2x15 2# 2x15 3# 2x15 3# 2x15 Wall push ups 2x15 Rail 2x10 Rail 2x10 Rail 2x10 Rail 2x10 Rail 2x15 B Lat pull downs 7# 2x15 10# 3x10 10# 2x15 10# 2x15 10# 1x15 13# 2x15 B Cable rows 7# 2x15 10# 2x15 10# 2x15 10# 2x15 10# 2x10 13# 2x15 B cable press  3# 2x15 3# 2x15 7# 2x15 10# 2x15 10# 2x15 B shldr flex with symmetry 1# 1x10 2# 1x10 - 2# 2x10 2# 2x10 2# 2x10 B shldr abduct to <= 90 1# 1x10 - - 2# 2x10 2# 2x10 2# 2x10 Rows with band  Blue 2x15 Blue 2x15 Blue 2x15 Black 2x15 Black 2x15 B shldr extn with band  Blue 2x15 Blue 2x15 Blue 2x15 Black 2x15 Black 2x15 D1/D2 diagonals on wall    2x10 ea 2x10 ea 2x10 ea Pass behind back      2x10 HEP - continue current HEP Manual Therapy ( 25 minutes) Grade 2 to 4 physio mobs L shoulder flex, abduct, IR and ER. Grade 2 to 4 inferior and posterior shoulder glides. Contract/relax at end range flex, IR and ER Therapeutic Modalities:   Heat during manual therapy. Post session, patient took ice with her. Left Shoulder Heat Type: Moist pack Duration : 25 minutes Patient Position: Supine Eura Chirag Treatment/Session Assessment:   
· Response to Treatment:  Had planned to work on exercises today but due to patient being so late for appointment, did not have time to do so. Will plan to do exercises on Thursday. · Compliance with Program/Exercises: yes · Recommendations/Intent for next treatment session: \"Next visit will focus on motion and strength in B shoulders. \". Total Treatment Duration: 25 minutes PT Patient Time In/Time Out Time In: 8718 Time Out: 1430 S[facility]al, PT

## 2019-01-31 ENCOUNTER — HOSPITAL ENCOUNTER (OUTPATIENT)
Dept: PHYSICAL THERAPY | Age: 54
Discharge: HOME | End: 2019-01-31
Payer: COMMERCIAL

## 2019-01-31 PROCEDURE — 97110 THERAPEUTIC EXERCISES: CPT

## 2019-01-31 PROCEDURE — 97140 MANUAL THERAPY 1/> REGIONS: CPT

## 2019-01-31 NOTE — PROGRESS NOTES
Christos Del Castillo : 1965 Primary: [facility] Secondary:  [facility] ARTIE BLAIR [address] [place] [address], [address] [place] [address],[address], [address] [facility] [phone]   [phone] OUTPATIENT PHYSICAL THERAPY:Progress Report 2019 ICD-10: Treatment Diagnosis:  M75.01 Adhesive capsulitis of right shoulder, M75.02 Adhesive capsulitis of left shoulder, M19.012 Primary osteoarthritis, left shoulder, Pain in left shoulder (M25.512), Pain in right shoulder (M25.511) Precautions/Allergies: NKDA Fall Risk Score: 0 (? 5 = High Risk) MD Orders: Eval and treat, HEP, ROM, strengthening, all modalities  3x/wk for 6 weeks (written 19    
 patient has attended 38 PT sessions from 10/9/18 to 19 with 2 missed sessions. MEDICAL/REFERRING DIAGNOSIS: 
L shoulder DATE OF ONSET: 10/4/18, EUA of B shoulders on 18 REFERRING PHYSICIAN: Paulie Dai MD 
RETURN PHYSICIAN APPOINTMENT: 19 ASSESSMENT ( 19) :  Ms. Ele Stallworth  Is a 46year old R hand dominant female s/p EUA and manipulation of R shoulder, EUA and manipulation of L shoulder and L SAD, ADCR, and LOREN on 10/4/18. She presented with shoulder pain, decreased ROM in B shoulders, decreased strength in L shoulder and decreased functional use of B shoulders. Her ROM was improving in both arms though she continued to have pain. Her DASH score was improved. Both shoulders were then manipulated under anesthesia on 18. She continues to have pain in shoulder but is scheduled to return to work next week (19). Her ROM and strength are gradually improving. DASH continues to slowly improve. She could benefit from continued PT to address deficits and work toward goals. PROBLEM LIST (Impacting functional limitations): 1. Decreased Strength 2. Decreased ADL/Functional Activities 3. Increased Pain 4. Decreased Flexibility/Joint Mobility INTERVENTIONS PLANNED: 
1. Home Exercise Program (HEP) 2. Manual Therapy 3. Therapeutic Exercise/Strengthening 4. modals as needed TREATMENT PLAN: 
Effective Dates: 12/3/2018 TO 3/3/2019 (90 days). Frequency/Duration: 2-3 times a week for 90 Days GOALS: (Goals have been discussed and agreed upon with patient.) Patient's stated goal is to be able to use her arms. Short-Term Functional Goals: Time Frame: 6 weeks 1. Patient to be independent with HEP - MET 2. Patient to increase PROM L shoulder flex to 135 and PROM L shoulder ER to 30 degrees   MET (12/3/18) 3. Patient to report decrease in pain level to 5/10. - partially MET - improving score, but not yet 5. Discharge Goals: Time Frame: 90 days - all in progress 1. Patient to report no more than minimal shoulder pain with all activity -Not MET yet 2. Patient to improve DASH score to 21 to demo improved use of B UEs 3. Patient to improve AROM B shoulder flex to 150 degrees for improved functional use. Rehabilitation Potential For Stated Goals: Good The information in this section was collected on 10/9/18 (except where otherwise noted). HISTORY:  
History of Present Injury/Illness (Reason for Referral): 
Patient reports that shoulder pain started last year. She went to her family doctor and was sent to PT. They tried dry needling and MD injected her. She didn't get better and requested a second opinion. She was sent to Dr. Stephen Bolton and an MRI was done, and then she had surgery on 10/4/18. She was told to leave the dressings on until she returned to MD.  
She subsequently had B shoulder manipulations on 11/30/18. Past Medical History/Comorbidities: OA,  Benign hypertension with CKD (chronic kidney disease) stage III ([address] Diabetes ([address] Dyslipidemia; Endometrial cancer ([address] and Morbid obesity ([address] knee arthroscopy (Right, 2015); appendectomy (1979), L ankle surgery (2007) L index finger surgery (2005) Social History/Living Environment:  
  Lives with family in one story home. Prior Level of Function/Work/Activity: 
 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 Dominant Side:  
      RIGHT Previous Treatment Approaches: PT prior to surgery Current Medications:  Glipizide, lisinopril, Norco, Meloxicam   Date Last Reviewed:  1/28/19 EXAMINATION:  
Observation/Orthostatic Postural Assessment:   
      No new (11/13/18) ROM:   
      AROM R shoulder flex 135, Abduct 145, extn 50, ER 45 and IR to mid R buttock PROM R shoulder flex 130, abduct 110, ER 35 at 90 degree abduct PROM L shoulder flex 80, abduct 65, IR 50 (to abdomen) and ER -15 from neutral.  
 
      PROM R shoulder flex 160, abduct 140, ER 70 and IR 65 at 90 degree abduct (11/1/18) PROM L shoulder flex 130, abduct 110, ER 30 and IR 50 at 90 degrees abduct (11/1/18) PROM R shoulder flex 165, abduct 160, ER 75 and IR 75 at 90 degree abduct (12/3/18) PROM L shoulder flex 140, abduct 120, ER 45 and IR 60 at 90 degrees abduct (12/3/18) PROM L shoulder flex 155, abduct 140, ER 57 and IR 67 at 90 degrees abduct (12/12/18) PROM R shoulder flex 160, abduct 150, ER 85, IR 80 at 90 degrees abduct (1/3/19) PROM L shoulder flex 145, abduct 120, ER 40, IR 60 at 90 degrees abduct (1/3/19) AROM L shoulder flex 140, abduct 108, ER 10, IR to lateral L buttock (1/3/19) PROM L shoulder flex 150, abduct 130, ER 65, IR 55 at 90 degrees abduct (1/16/19) AROM L shoulder flex 148, abduct 120, ER 23, IR to posterolateral L buttock (1/16/19) PROM L shoulder flex 170, abduct 150, ER 55 and IR 70 at 90 degrees abduct (1/31/19) AROM L shoulder flex 152, abduct 110, ER 24, and IR not quite to sacrum (1/31/19) Strength:   
      R shoulder 4+ to 5/5 all directions. L shoulder not tested. Neurological Screen: 
      Sensation: light touch intact in B UEs Body Structures Involved: 1. Joints 2. Muscles Body Functions Affected: 1. Neuromusculoskeletal 
2. Movement Related Activities and Participation Affected: 1. General Tasks and Demands 2. Self Care 3. Community, Social and [place] CLINICAL DECISION MAKING:  
Outcome Measure: Tool Used: Disabilities of the Arm, Shoulder and Hand (DASH) Questionnaire - Quick Version Score:  Initial: 55/55   41/55 (Date: 11/1/18 )      45/55 (Date 12/3/18)      43/55 (1/3/19)       Most Recent: 40/55 (1/31/19) Interpretation of Score: The DASH is designed to measure the activities of daily living in person's with upper extremity dysfunction or pain. Each section is scored on a 1-5 scale, 5 representing the greatest disability. The scores of each section are added together for a total score of 55. Medical Necessity:  
· Patient demonstrates good rehab potential due to higher previous functional level. Reason for Services/Other Comments: 
· Patient continues to require skilled intervention due to need to regain use of B UEs.  
  
 
 
 
TREATMENT:  
(In addition to Assessment/Re-Assessment sessions the following treatments were rendered) Pre-treatment Symptoms/Complaints:  Patient reports that she went upstairs to MD office to get a prescription. She goes back to work on Monday. Pain: Initial:  
Pain Intensity 1: 5   Post Session: No change in pain noted by patient. Therapeutic Exercise: (25 Minutes):  Exercises per grid below to improve strength. Required minimal visual and verbal cues to ensure correct performance. Progressed   as indicated. Date 
12/19/18  Date 
12/26/18 Date 1/8/19 Date 1/14/19 Date 1/23/19 Date 1/31/19 Activity/Exercise B serratus punch 3# B 2x15 3# B 2x15 3# B 2x15 4# B 2x15 4# 2x15 4# 2x15 B bicep curls 3# 2x15 3# 2x15 4# 2x15 5# 2x15 5# 2x15 5# 2x15 Wall slides - Red 2x10 Red 2x15 - - -  
IR with band  Red 2x15 Red 2x15 Red 2x15 Green 2x15 Green 2x15 Green 2x15 ER with band  Red 2x15 Red 2x15 Red 2x15 Red 2x15 Red 2x15 Red 2x15 Side lying abduct 2# 2x15 2# 2x15 2# 2x15 3# 2x15 3# 2x15 3# 2x15 Side lying ER 2# 2x15 2# 2x15 2# 2x15 3# 2x15 3# 2x15 3# 2x15 Wall push ups Rail 2x10 Rail 2x10 Rail 2x10 Rail 2x10 Rail 720 [address] [place] 2x15 B Lat pull downs 10# 3x10 10# 2x15 10# 2x15 10# 1x15 13# 2x15 13# 2x15 B Cable rows 10# 2x15 10# 2x15 10# 2x15 10# 2x10 13# 2x15 13# 2x15 B cable press 3# 2x15 3# 2x15 7# 2x15 10# 2x15 10# 2x15 10# 2x15 B shldr flex with symmetry 2# 1x10 - 2# 2x10 2# 2x10 2# 2x10 -  
B shldr abduct to <= 90 - - 2# 2x10 2# 2x10 2# 2x10 - Rows with band Blue 2x15 Blue 2x15 Blue 2x15 Black 2x15 Black 2x15 Black 2x15 B shldr extn with band Blue 2x15 Blue 2x15 Blue 2x15 Black 2x15 Black 2x15 Black 2x15 D1/D2 diagonals on wall   2x10 ea 2x10 ea 2x10 ea 2x10 ea Pass behind back     2x10 -  
 
 
HEP - continue current HEP Manual Therapy ( 15 minutes) Grade 2 to 4 physio mobs L shoulder flex, abduct, IR and ER. Grade 2 to 4 inferior and posterior shoulder glides. Therapeutic Modalities:   Heat during manual therapy. Post session, GameReady device at 42 degrees and light compression. Left Shoulder Heat Type: Moist pack Duration : 10 minutes Patient Position: Supine              Left Shoulder Cold Type: Cryocuff(with vasoneumatic compression) Duration : 15 minutes Patient Position: Sitting [place] Treatment/Session Assessment:   
· Response to Treatment:  Patient slowly, but gradually progressing. See full assessment above. ·  
· Compliance with Program/Exercises: yes · Recommendations/Intent for next treatment session: \"Next visit will focus on motion and strength in B shoulders. \". Total Treatment Duration: 55 minutes PT Patient Time In/Time Out Time In: 1350 Time Out: 1450 Lauren Thomas PT

## 2019-02-05 ENCOUNTER — HOSPITAL ENCOUNTER (OUTPATIENT)
Dept: PHYSICAL THERAPY | Age: 54
Discharge: HOME | End: 2019-02-05
Payer: COMMERCIAL

## 2019-02-05 PROCEDURE — 97140 MANUAL THERAPY 1/> REGIONS: CPT

## 2019-02-05 PROCEDURE — 97110 THERAPEUTIC EXERCISES: CPT

## 2019-02-05 NOTE — PROGRESS NOTES
Tacos[place]jaymie Patel : 1965 Primary: [facility]* Secondary:  [facility] ARTIE BLAIR [address] [facility], [address] [place] [address] [address], [address] [facility] [phone]   [phone] OUTPATIENT PHYSICAL THERAPY:Daily Note 2019 ICD-10: Treatment Diagnosis:  M75.01 Adhesive capsulitis of right shoulder, M75.02 Adhesive capsulitis of left shoulder, M19.012 Primary osteoarthritis, left shoulder, Pain in left shoulder (M25.512), Pain in right shoulder (M25.511) Precautions/Allergies: NKDA Fall Risk Score: 0 (? 5 = High Risk) MD Orders: Eval and treat, HEP, ROM, strengthening, all modalities  3x/wk for 6 weeks (written 19 MEDICAL/REFERRING DIAGNOSIS: 
L shoulder DATE OF ONSET: 10/4/18, EUA of B shoulders on 18 REFERRING PHYSICIAN: Rian De Santiago MD 
RETURN PHYSICIAN APPOINTMENT: 19 ASSESSMENT ( 19) :  Ms. Andrey Argueta  Is a 46year old R hand dominant female s/p EUA and manipulation of R shoulder, EUA and manipulation of L shoulder and L SAD, ADCR, and LOREN on 10/4/18. She presented with shoulder pain, decreased ROM in B shoulders, decreased strength in L shoulder and decreased functional use of B shoulders. Her ROM was improving in both arms though she continued to have pain. Her DASH score was improved. Both shoulders were then manipulated under anesthesia on 18. She continues to have pain in shoulder but is scheduled to return to work next week (19). Her ROM and strength are gradually improving. DASH continues to slowly improve. She could benefit from continued PT to address deficits and work toward goals. PROBLEM LIST (Impacting functional limitations): 1. Decreased Strength 2. Decreased ADL/Functional Activities 3. Increased Pain 4. Decreased Flexibility/Joint Mobility INTERVENTIONS PLANNED: 
1. Home Exercise Program (HEP) 2. Manual Therapy 3. Therapeutic Exercise/Strengthening 4. modals as needed TREATMENT PLAN: 
Effective Dates: 12/3/2018 TO 3/3/2019 (90 days). Frequency/Duration: 2-3 times a week for 90 Days GOALS: (Goals have been discussed and agreed upon with patient.) Patient's stated goal is to be able to use her arms. Short-Term Functional Goals: Time Frame: 6 weeks 1. Patient to be independent with HEP - MET 2. Patient to increase PROM L shoulder flex to 135 and PROM L shoulder ER to 30 degrees   MET (12/3/18) 3. Patient to report decrease in pain level to 5/10. - partially MET - improving score, but not yet 5. Discharge Goals: Time Frame: 90 days - all in progress 1. Patient to report no more than minimal shoulder pain with all activity -Not MET yet 2. Patient to improve DASH score to 21 to demo improved use of B UEs 3. Patient to improve AROM B shoulder flex to 150 degrees for improved functional use. Rehabilitation Potential For Stated Goals: Good The information in this section was collected on 10/9/18 (except where otherwise noted). HISTORY:  
History of Present Injury/Illness (Reason for Referral): 
Patient reports that shoulder pain started last year. She went to her family doctor and was sent to PT. They tried dry needling and MD injected her. She didn't get better and requested a second opinion. She was sent to Dr. Mikayla Mccauley and an MRI was done, and then she had surgery on 10/4/18. She was told to leave the dressings on until she returned to MD.  
She subsequently had B shoulder manipulations on 11/30/18. Past Medical History/Comorbidities: OA,  Benign hypertension with CKD (chronic kidney disease) stage III ([address] Diabetes ([address] Dyslipidemia; Endometrial cancer ([address] and Morbid obesity ([address] knee arthroscopy (Right, 2015); appendectomy (1979), L ankle surgery (2007) L index finger surgery (2005) Social History/Living Environment:  
  Lives with family in one story home. 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 Dominant Side:  
      RIGHT Previous Treatment Approaches: PT prior to surgery Current Medications:  Glipizide, lisinopril, Norco, Meloxicam   Date Last Reviewed:  2/5/19 EXAMINATION:  
Observation/Orthostatic Postural Assessment:   
      No new (11/13/18) ROM:   
      AROM R shoulder flex 135, Abduct 145, extn 50, ER 45 and IR to mid R buttock PROM R shoulder flex 130, abduct 110, ER 35 at 90 degree abduct PROM L shoulder flex 80, abduct 65, IR 50 (to abdomen) and ER -15 from neutral.  
 
      PROM R shoulder flex 160, abduct 140, ER 70 and IR 65 at 90 degree abduct (11/1/18) PROM L shoulder flex 130, abduct 110, ER 30 and IR 50 at 90 degrees abduct (11/1/18) PROM R shoulder flex 165, abduct 160, ER 75 and IR 75 at 90 degree abduct (12/3/18) PROM L shoulder flex 140, abduct 120, ER 45 and IR 60 at 90 degrees abduct (12/3/18) PROM L shoulder flex 155, abduct 140, ER 57 and IR 67 at 90 degrees abduct (12/12/18) PROM R shoulder flex 160, abduct 150, ER 85, IR 80 at 90 degrees abduct (1/3/19) PROM L shoulder flex 145, abduct 120, ER 40, IR 60 at 90 degrees abduct (1/3/19) AROM L shoulder flex 140, abduct 108, ER 10, IR to lateral L buttock (1/3/19) PROM L shoulder flex 150, abduct 130, ER 65, IR 55 at 90 degrees abduct (1/16/19) AROM L shoulder flex 148, abduct 120, ER 23, IR to posterolateral L buttock (1/16/19) PROM L shoulder flex 170, abduct 150, ER 55 and IR 70 at 90 degrees abduct (1/31/19) AROM L shoulder flex 152, abduct 110, ER 24, and IR not quite to sacrum (1/31/19) Strength:   
      R shoulder 4+ to 5/5 all directions. L shoulder not tested. Neurological Screen: 
      Sensation: light touch intact in B UEs Body Structures Involved: 1. Joints 2. Muscles Body Functions Affected: 1. Neuromusculoskeletal 
2. Movement Related Activities and Participation Affected: 1. General Tasks and Demands 2. Self Care 3. Community, Social and [employer] CLINICAL DECISION MAKING:  
Outcome Measure: Tool Used: Disabilities of the Arm, Shoulder and Hand (DASH) Questionnaire - Quick Version Score:  Initial: 55/55   41/55 (Date: 11/1/18 )      45/55 (Date 12/3/18)      43/55 (1/3/19)       Most Recent: 40/55 (1/31/19) Interpretation of Score: The DASH is designed to measure the activities of daily living in person's with upper extremity dysfunction or pain. Each section is scored on a 1-5 scale, 5 representing the greatest disability. The scores of each section are added together for a total score of 55. Medical Necessity:  
· Patient demonstrates good rehab potential due to higher previous functional level. Reason for Services/Other Comments: 
· Patient continues to require skilled intervention due to need to regain use of B UEs.  
  
 
 
 
TREATMENT:  
(In addition to Assessment/Re-Assessment sessions the following treatments were rendered) Pre-treatment Symptoms/Complaints:  Patient reports that she returned to work yesterday. First day back wasn't too bad. Today was a little harder, maybe because she was tired. Pain: Initial:  
Pain Intensity 1: 5(\"maybe 4.5\")   Post Session: No change in pain noted by patient. Therapeutic Exercise: (10 Minutes):  Exercises per grid below to improve strength. Required minimal visual and verbal cues to ensure correct performance. Progressed   as indicated. AIS 3 sec x 10 each for L shoulder flex, abduct, ER at 45 and 90 degree abduct, IR at 90 degree abduct and at 90 degree flex, horizontal adduct, D1 and D2  Diagonals - all in supine. Date 
12/19/18  Date 
12/26/18 Date 1/8/19 Date 1/14/19 Date 1/23/19 Date 1/31/19 Activity/Exercise B serratus punch 3# B 2x15 3# B 2x15 3# B 2x15 4# B 2x15 4# 2x15 4# 2x15 B bicep curls 3# 2x15 3# 2x15 4# 2x15 5# 2x15 5# 2x15 5# 2x15 Wall slides - Red 2x10 Red 2x15 - - -  
IR with band  Red 2x15 Red 2x15 Red 2x15 Green 2x15 Green 2x15 Green 2x15 ER with band  Red 2x15 Red 2x15 Red 2x15 Red 2x15 Red 2x15 Red 2x15 Side lying abduct 2# 2x15 2# 2x15 2# 2x15 3# 2x15 3# 2x15 3# 2x15 Side lying ER 2# 2x15 2# 2x15 2# 2x15 3# 2x15 3# 2x15 3# 2x15 Wall push ups Rail 2x10 Rail 2x10 Rail 2x10 Rail 2x10 Rail 720 G[address] [place] 2x15 B Lat pull downs 10# 3x10 10# 2x15 10# 2x15 10# 1x15 13# 2x15 13# 2x15 B Cable rows 10# 2x15 10# 2x15 10# 2x15 10# 2x10 13# 2x15 13# 2x15 B cable press 3# 2x15 3# 2x15 7# 2x15 10# 2x15 10# 2x15 10# 2x15 B shldr flex with symmetry 2# 1x10 - 2# 2x10 2# 2x10 2# 2x10 -  
B shldr abduct to <= 90 - - 2# 2x10 2# 2x10 2# 2x10 - Rows with band Blue 2x15 Blue 2x15 Blue 2x15 Black 2x15 Black 2x15 Black 2x15 B shldr extn with band Blue 2x15 Blue 2x15 Blue 2x15 Black 2x15 Black 2x15 Black 2x15 D1/D2 diagonals on wall   2x10 ea 2x10 ea 2x10 ea 2x10 ea Pass behind back     2x10 -  
 
 
HEP - continue current HEP Manual Therapy ( 20 minutes) Grade 2 to 4 physio mobs L shoulder flex, abduct, IR and ER. Grade 2 to 4 inferior and posterior shoulder glides. Therapeutic Modalities:   Heat during manual therapy. Post session, GameReady device at 42 degrees and light compression. Left Shoulder Heat Type: Moist pack Duration : 15 minutes Patient Position: Supine              Left Shoulder Cold Type: Cryocuff(with vasoneumatic compression) Duration : 15 minutes Patient Position: Sitting Yony Curran Treatment/Session Assessment:   
· Response to Treatment:  Focused on ROM today since patient had just gotten off of work. No major change in ROM noted. · Compliance with Program/Exercises: yes · Recommendations/Intent for next treatment session:  \"Next visit will focus on motion and strength in B shoulders. \". Total Treatment Duration: 45 minutes PT Patient Time In/Time Out Time In: 7961 Time Out: 8850 Briseyda Larson, PT

## 2019-02-06 ENCOUNTER — HOSPITAL ENCOUNTER (OUTPATIENT)
Dept: PHYSICAL THERAPY | Age: 54
Discharge: HOME | End: 2019-02-06
Payer: COMMERCIAL

## 2019-02-06 PROCEDURE — 97110 THERAPEUTIC EXERCISES: CPT

## 2019-02-06 PROCEDURE — 97140 MANUAL THERAPY 1/> REGIONS: CPT

## 2019-02-06 NOTE — PROGRESS NOTES
Noe Gan : 1965 Primary: [facility] [employer] [facility]* Secondary:  [facility] Center at [facility] ARTIE BLAIR [address] [place] [address], [address] [place] [address],[address], [address] [facility] [phone]   [phone] OUTPATIENT PHYSICAL THERAPY:Daily Note 2019 ICD-10: Treatment Diagnosis:  M75.01 Adhesive capsulitis of right shoulder, M75.02 Adhesive capsulitis of left shoulder, M19.012 Primary osteoarthritis, left shoulder, Pain in left shoulder (M25.512), Pain in right shoulder (M25.511) Precautions/Allergies: NKDA Fall Risk Score: 0 (? 5 = High Risk) MD Orders: Eval and treat, HEP, ROM, strengthening, all modalities  3x/wk for 6 weeks (written 19)   MEDICAL/REFERRING DIAGNOSIS: 
L shoulder DATE OF ONSET: 10/4/18, EUA of B shoulders on 18 REFERRING PHYSICIAN: Daphnie Carmona., MD 
RETURN PHYSICIAN APPOINTMENT: 19 ASSESSMENT ( 19) :  Ms. Robbie Kaur  Is a 46year old R hand dominant female s/p EUA and manipulation of R shoulder, EUA and manipulation of L shoulder and L SAD, ADCR, and LOREN on 10/4/18. She presented with shoulder pain, decreased ROM in B shoulders, decreased strength in L shoulder and decreased functional use of B shoulders. Her ROM was improving in both arms though she continued to have pain. Her DASH score was improved. Both shoulders were then manipulated under anesthesia on 18. She continues to have pain in shoulder but is scheduled to return to work next week (19). Her ROM and strength are gradually improving. DASH continues to slowly improve. She could benefit from continued PT to address deficits and work toward goals. PROBLEM LIST (Impacting functional limitations): 1. Decreased Strength 2. Decreased ADL/Functional Activities 3. Increased Pain 4. Decreased Flexibility/Joint Mobility INTERVENTIONS PLANNED: 
1. Home Exercise Program (HEP) 2. Manual Therapy 3. Therapeutic Exercise/Strengthening 4. modals as needed TREATMENT PLAN: 
 Effective Dates: 12/3/2018 TO 3/3/2019 (90 days). Frequency/Duration: 2-3 times a week for 90 Days GOALS: (Goals have been discussed and agreed upon with patient.) Patient's stated goal is to be able to use her arms. Short-Term Functional Goals: Time Frame: 6 weeks 1. Patient to be independent with HEP - MET 2. Patient to increase PROM L shoulder flex to 135 and PROM L shoulder ER to 30 degrees   MET (12/3/18) 3. Patient to report decrease in pain level to 5/10. - partially MET - improving score, but not yet 5. Discharge Goals: Time Frame: 90 days - all in progress 1. Patient to report no more than minimal shoulder pain with all activity -Not MET yet 2. Patient to improve DASH score to 21 to demo improved use of B UEs 3. Patient to improve AROM B shoulder flex to 150 degrees for improved functional use. Rehabilitation Potential For Stated Goals: Good The information in this section was collected on 10/9/18 (except where otherwise noted). HISTORY:  
History of Present Injury/Illness (Reason for Referral): 
Patient reports that shoulder pain started last year. She went to her family doctor and was sent to PT. They tried dry needling and MD injected her. She didn't get better and requested a second opinion. She was sent to Dr. Humberto Campos and an MRI was done, and then she had surgery on 10/4/18. She was told to leave the dressings on until she returned to MD.  
She subsequently had B shoulder manipulations on 11/30/18. Past Medical History/Comorbidities: OA,  Benign hypertension with CKD (chronic kidney disease) stage III ([address] Diabetes ([address] Dyslipidemia; Endometrial cancer ([address] and Morbid obesity ([address] knee arthroscopy (Right, 2015); appendectomy (1979), L ankle surgery (2007) L index finger surgery (2005) Social History/Living Environment:  
  Lives with family in one story home. 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 Dominant Side:  
      RIGHT Previous Treatment Approaches: PT prior to surgery Current Medications:  Glipizide, lisinopril, Norco, Meloxicam   Date Last Reviewed:  2/5/19 EXAMINATION:  
Observation/Orthostatic Postural Assessment:   
      No new (11/13/18) ROM:   
      AROM R shoulder flex 135, Abduct 145, extn 50, ER 45 and IR to mid R buttock PROM R shoulder flex 130, abduct 110, ER 35 at 90 degree abduct PROM L shoulder flex 80, abduct 65, IR 50 (to abdomen) and ER -15 from neutral.  
 
      PROM R shoulder flex 160, abduct 140, ER 70 and IR 65 at 90 degree abduct (11/1/18) PROM L shoulder flex 130, abduct 110, ER 30 and IR 50 at 90 degrees abduct (11/1/18) PROM R shoulder flex 165, abduct 160, ER 75 and IR 75 at 90 degree abduct (12/3/18) PROM L shoulder flex 140, abduct 120, ER 45 and IR 60 at 90 degrees abduct (12/3/18) PROM L shoulder flex 155, abduct 140, ER 57 and IR 67 at 90 degrees abduct (12/12/18) PROM R shoulder flex 160, abduct 150, ER 85, IR 80 at 90 degrees abduct (1/3/19) PROM L shoulder flex 145, abduct 120, ER 40, IR 60 at 90 degrees abduct (1/3/19) AROM L shoulder flex 140, abduct 108, ER 10, IR to lateral L buttock (1/3/19) PROM L shoulder flex 150, abduct 130, ER 65, IR 55 at 90 degrees abduct (1/16/19) AROM L shoulder flex 148, abduct 120, ER 23, IR to posterolateral L buttock (1/16/19) PROM L shoulder flex 170, abduct 150, ER 55 and IR 70 at 90 degrees abduct (1/31/19) AROM L shoulder flex 152, abduct 110, ER 24, and IR not quite to sacrum (1/31/19) Strength:   
      R shoulder 4+ to 5/5 all directions. L shoulder not tested. Neurological Screen: 
      Sensation: light touch intact in B UEs Body Structures Involved: 1. Joints 2. Muscles Body Functions Affected: 1. Neuromusculoskeletal 
2. Movement Related Activities and Participation Affected: 1. General Tasks and Demands 2. Self Care 3. Community, Social and Friend [place] CLINICAL DECISION MAKING:  
Outcome Measure: Tool Used: Disabilities of the Arm, Shoulder and Hand (DASH) Questionnaire - Quick Version Score:  Initial: 55/55   41/55 (Date: 11/1/18 )      45/55 (Date 12/3/18)      43/55 (1/3/19)       Most Recent: 40/55 (1/31/19) Interpretation of Score: The DASH is designed to measure the activities of daily living in person's with upper extremity dysfunction or pain. Each section is scored on a 1-5 scale, 5 representing the greatest disability. The scores of each section are added together for a total score of 55. Medical Necessity:  
· Patient demonstrates good rehab potential due to higher previous functional level. Reason for Services/Other Comments: 
· Patient continues to require skilled intervention due to need to regain use of B UEs.  
  
 
 
 
TREATMENT:  
(In addition to Assessment/Re-Assessment sessions the following treatments were rendered) Pre-treatment Symptoms/Complaints:  Patient reports that both shoulders hurt a lot last night and she couldn't sleep. So she has been tired all day and does not feel well. Would like to just stretch out today, then go get her prescription filled and go home and rest.  She will try to resume strengthening tomorrow if able. Pain: Initial:  
Pain Intensity 1: 6   Post Session: Slight decrease in pain noted by patient. Therapeutic Exercise: (10 Minutes):  Exercises per grid below to improve strength. Required minimal visual and verbal cues to ensure correct performance. AIS 3 sec x 10 each for L shoulder flex, abduct, ER at 45 and 90 degree abduct, IR at 90 degree abduct and at 90 degree flex, horizontal adduct, D1 and D2  Diagonals - all in supine. Date 
12/19/18  Date 
12/26/18 Date 1/8/19 Date 1/14/19 Date 1/23/19 Date 1/31/19 Activity/Exercise B serratus punch 3# B 2x15 3# B 2x15 3# B 2x15 4# B 2x15 4# 2x15 4# 2x15 B bicep curls 3# 2x15 3# 2x15 4# 2x15 5# 2x15 5# 2x15 5# 2x15 Wall slides - Red 2x10 Red 2x15 - - -  
IR with band  Red 2x15 Red 2x15 Red 2x15 Green 2x15 Green 2x15 Green 2x15 ER with band  Red 2x15 Red 2x15 Red 2x15 Red 2x15 Red 2x15 Red 2x15 Side lying abduct 2# 2x15 2# 2x15 2# 2x15 3# 2x15 3# 2x15 3# 2x15 Side lying ER 2# 2x15 2# 2x15 2# 2x15 3# 2x15 3# 2x15 3# 2x15 Wall push ups Rail 2x10 Rail 2x10 Rail 2x10 Rail 2x10 Rail [address] [place] 2x15 B Lat pull downs 10# 3x10 10# 2x15 10# 2x15 10# 1x15 13# 2x15 13# 2x15 B Cable rows 10# 2x15 10# 2x15 10# 2x15 10# 2x10 13# 2x15 13# 2x15 B cable press 3# 2x15 3# 2x15 7# 2x15 10# 2x15 10# 2x15 10# 2x15 B shldr flex with symmetry 2# 1x10 - 2# 2x10 2# 2x10 2# 2x10 -  
B shldr abduct to <= 90 - - 2# 2x10 2# 2x10 2# 2x10 - Rows with band Blue 2x15 Blue 2x15 Blue 2x15 Black 2x15 Black 2x15 Black 2x15 B shldr extn with band Blue 2x15 Blue 2x15 Blue 2x15 Black 2x15 Black 2x15 Black 2x15 D1/D2 diagonals on wall   2x10 ea 2x10 ea 2x10 ea 2x10 ea Pass behind back     2x10 -  
 
 
HEP - continue current HEP Manual Therapy ( 20 minutes) Grade 2 to 4 physio mobs L shoulder flex, abduct, IR and ER. Grade 2 to 4 inferior and posterior shoulder glides. Contract/relax at end range IR and ER. Therapeutic Modalities:   Heat during manual therapy. Patient declined ice at end of session. Left Shoulder Heat Type: Moist pack Duration : 20 minutes Patient Position: Supine Zena James Treatment/Session Assessment:   
· Response to Treatment:  Focused on ROM again today since patient had just gotten off of work, was tired and reported not feeling well. · Compliance with Program/Exercises: yes · Recommendations/Intent for next treatment session:  \"Next visit will focus on motion and strength in B shoulders. \". Total Treatment Duration: 30 minutes PT Patient Time In/Time Out Time In: 1630 Time Out: 1703 Oralee , PT

## 2019-02-07 ENCOUNTER — HOSPITAL ENCOUNTER (OUTPATIENT)
Dept: PHYSICAL THERAPY | Age: 54
Discharge: HOME | End: 2019-02-07
Payer: COMMERCIAL

## 2019-02-07 NOTE — PROGRESS NOTES
Dayanara Francois : 1965 Primary: [facility]* Secondary:  [facility]rapy Center at [facility] ARTIE BLAIR [address] [place] [address], [address] [place] [address] [address], [address] [facility] [phone]   [phone] OUTPATIENT PHYSICAL THERAPY:Cancellation 2019 ICD-10: Treatment Diagnosis:  M75.01 Adhesive capsulitis of right shoulder, M75.02 Adhesive capsulitis of left shoulder, M19.012 Primary osteoarthritis, left shoulder, Pain in left shoulder (M25.512), Pain in right shoulder (M25.511) Precautions/Allergies: NKDA Fall Risk Score: 0 (? 5 = High Risk) MD Orders: Eval and treat, HEP, ROM, strengthening, all modalities  3x/wk for 6 weeks (written 19)   MEDICAL/REFERRING DIAGNOSIS: 
L shoulder DATE OF ONSET: 10/4/18, EUA of B shoulders on 18 REFERRING PHYSICIAN: Kushal Griffin MD 
RETURN PHYSICIAN APPOINTMENT: 19 Patient called to cancel scheduled appointment due to not feeling well.

## 2019-02-11 ENCOUNTER — HOSPITAL ENCOUNTER (OUTPATIENT)
Dept: PHYSICAL THERAPY | Age: 54
Discharge: HOME | End: 2019-02-11
Payer: COMMERCIAL

## 2019-02-11 PROCEDURE — 97110 THERAPEUTIC EXERCISES: CPT

## 2019-02-11 PROCEDURE — 97140 MANUAL THERAPY 1/> REGIONS: CPT

## 2019-02-11 NOTE — PROGRESS NOTES
Sepideh Dunbar : 1965 Primary: [facility]* Secondary:  Therapy Center at [facility] ARTIE BLAIR [address] [facility], [address] [place] [address],[address], [place] [address]. [phone]   [phone] OUTPATIENT PHYSICAL THERAPY:Daily Note 2019 ICD-10: Treatment Diagnosis:  M75.01 Adhesive capsulitis of right shoulder, M75.02 Adhesive capsulitis of left shoulder, M19.012 Primary osteoarthritis, left shoulder, Pain in left shoulder (M25.512), Pain in right shoulder (M25.511) Precautions/Allergies: NKDA Fall Risk Score: 0 (? 5 = High Risk) MD Orders: Eval and treat, HEP, ROM, strengthening, all modalities  3x/wk for 6 weeks (written 19)   MEDICAL/REFERRING DIAGNOSIS: 
L shoulder DATE OF ONSET: 10/4/18, EUA of B shoulders on 18 REFERRING PHYSICIAN: Chase Villalba MD 
RETURN PHYSICIAN APPOINTMENT: 19 ASSESSMENT ( 19) :  Ms. Senait Garces  Is a 46year old R hand dominant female s/p EUA and manipulation of R shoulder, EUA and manipulation of L shoulder and L SAD, ADCR, and LOREN on 10/4/18. She presented with shoulder pain, decreased ROM in B shoulders, decreased strength in L shoulder and decreased functional use of B shoulders. Her ROM was improving in both arms though she continued to have pain. Her DASH score was improved. Both shoulders were then manipulated under anesthesia on 18. She continues to have pain in shoulder but is scheduled to return to work next week (19). Her ROM and strength are gradually improving. DASH continues to slowly improve. She could benefit from continued PT to address deficits and work toward goals. PROBLEM LIST (Impacting functional limitations): 1. Decreased Strength 2. Decreased ADL/Functional Activities 3. Increased Pain 4. Decreased Flexibility/Joint Mobility INTERVENTIONS PLANNED: 
1. Home Exercise Program (HEP) 2. Manual Therapy 3. Therapeutic Exercise/Strengthening 4. modals as needed TREATMENT PLAN: 
 Effective Dates: 12/3/2018 TO 3/3/2019 (90 days). Frequency/Duration: 2-3 times a week for 90 Days GOALS: (Goals have been discussed and agreed upon with patient.) Patient's stated goal is to be able to use her arms. Short-Term Functional Goals: Time Frame: 6 weeks 1. Patient to be independent with HEP - MET 2. Patient to increase PROM L shoulder flex to 135 and PROM L shoulder ER to 30 degrees   MET (12/3/18) 3. Patient to report decrease in pain level to 5/10. - partially MET - improving score, but not yet 5. Discharge Goals: Time Frame: 90 days - all in progress 1. Patient to report no more than minimal shoulder pain with all activity -Not MET yet 2. Patient to improve DASH score to 21 to demo improved use of B UEs 3. Patient to improve AROM B shoulder flex to 150 degrees for improved functional use. Rehabilitation Potential For Stated Goals: Good The information in this section was collected on 10/9/18 (except where otherwise noted). HISTORY:  
History of Present Injury/Illness (Reason for Referral): 
Patient reports that shoulder pain started last year. She went to her family doctor and was sent to PT. They tried dry needling and MD injected her. She didn't get better and requested a second opinion. She was sent to Dr. Verner Redwood and an MRI was done, and then she had surgery on 10/4/18. She was told to leave the dressings on until she returned to MD.  
She subsequently had B shoulder manipulations on 11/30/18. Past Medical History/Comorbidities: OA,  Benign hypertension with CKD (chronic kidney disease) stage III ([address] Diabetes ([address] Dyslipidemia; Endometrial cancer ([address] and Morbid obesity ([address] knee arthroscopy (Right, 2015); appendectomy (1979), L ankle surgery (2007) L index finger surgery (2005) Social History/Living Environment:  
  Lives with family in one story home. 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 Dominant Side:  
      RIGHT Previous Treatment Approaches: PT prior to surgery Current Medications:  Glipizide, lisinopril, Norco, Meloxicam   Date Last Reviewed:  2/11/19 EXAMINATION:  
Observation/Orthostatic Postural Assessment:   
      No new (11/13/18) ROM:   
      AROM R shoulder flex 135, Abduct 145, extn 50, ER 45 and IR to mid R buttock PROM R shoulder flex 130, abduct 110, ER 35 at 90 degree abduct PROM L shoulder flex 80, abduct 65, IR 50 (to abdomen) and ER -15 from neutral.  
 
      PROM R shoulder flex 160, abduct 140, ER 70 and IR 65 at 90 degree abduct (11/1/18) PROM L shoulder flex 130, abduct 110, ER 30 and IR 50 at 90 degrees abduct (11/1/18) PROM R shoulder flex 165, abduct 160, ER 75 and IR 75 at 90 degree abduct (12/3/18) PROM L shoulder flex 140, abduct 120, ER 45 and IR 60 at 90 degrees abduct (12/3/18) PROM L shoulder flex 155, abduct 140, ER 57 and IR 67 at 90 degrees abduct (12/12/18) PROM R shoulder flex 160, abduct 150, ER 85, IR 80 at 90 degrees abduct (1/3/19) PROM L shoulder flex 145, abduct 120, ER 40, IR 60 at 90 degrees abduct (1/3/19) AROM L shoulder flex 140, abduct 108, ER 10, IR to lateral L buttock (1/3/19) PROM L shoulder flex 150, abduct 130, ER 65, IR 55 at 90 degrees abduct (1/16/19) AROM L shoulder flex 148, abduct 120, ER 23, IR to posterolateral L buttock (1/16/19) PROM L shoulder flex 170, abduct 150, ER 55 and IR 70 at 90 degrees abduct (1/31/19) AROM L shoulder flex 152, abduct 110, ER 24, and IR not quite to sacrum (1/31/19) Strength:   
      R shoulder 4+ to 5/5 all directions. L shoulder not tested. Neurological Screen: 
      Sensation: light touch intact in B UEs Body Structures Involved: 1. Joints 2. Muscles Body Functions Affected: 1. Neuromusculoskeletal 
2. Movement Related Activities and Participation Affected: 1. General Tasks and Demands 2. Self Care 3. Community, Social and [place] CLINICAL DECISION MAKING:  
Outcome Measure: Tool Used: Disabilities of the Arm, Shoulder and Hand (DASH) Questionnaire - Quick Version Score:  Initial: 55/55   41/55 (Date: 11/1/18 )      45/55 (Date 12/3/18)      43/55 (1/3/19)       Most Recent: 40/55 (1/31/19) Interpretation of Score: The DASH is designed to measure the activities of daily living in person's with upper extremity dysfunction or pain. Each section is scored on a 1-5 scale, 5 representing the greatest disability. The scores of each section are added together for a total score of 55. Medical Necessity:  
· Patient demonstrates good rehab potential due to higher previous functional level. Reason for Services/Other Comments: 
· Patient continues to require skilled intervention due to need to regain use of B UEs.  
  
 
 
 
TREATMENT:  
(In addition to Assessment/Re-Assessment sessions the following treatments were rendered) Pre-treatment Symptoms/Complaints:  Patient reports that she still does not feel well, but she still worked all day. She is very tired and plans to go to bed when she gets home. Pain: Initial:  
Pain Intensity 1: 6   Post Session: Slight decrease in pain noted by patient. Therapeutic Exercise: (10 Minutes):  Exercises to improve mobility and strength. Required minimal visual and verbal cues to ensure correct performance. AIS 3 sec x 10 each for L shoulder flex, abduct, ER at 45 and 90 degree abduct, IR at 90 degree abduct and at 90 degree flex, horizontal adduct, D1 and D2  Diagonals - all in supine. Date 
12/19/18  Date 
12/26/18 Date 1/8/19 Date 1/14/19 Date 1/23/19 Date 1/31/19 Activity/Exercise B serratus punch 3# B 2x15 3# B 2x15 3# B 2x15 4# B 2x15 4# 2x15 4# 2x15 B bicep curls 3# 2x15 3# 2x15 4# 2x15 5# 2x15 5# 2x15 5# 2x15 Wall slides - Red 2x10 Red 2x15 - - -  
IR with band  Red 2x15 Red 2x15 Red 2x15 Green 2x15 Green 2x15 Green 2x15 ER with band  Red 2x15 Red 2x15 Red 2x15 Red 2x15 Red 2x15 Red 2x15 Side lying abduct 2# 2x15 2# 2x15 2# 2x15 3# 2x15 3# 2x15 3# 2x15 Side lying ER 2# 2x15 2# 2x15 2# 2x15 3# 2x15 3# 2x15 3# 2x15 Wall push ups Rail 2x10 Rail 2x10 Rail 2x10 Rail 2x10 Rail [address] [place] 2x15 B Lat pull downs 10# 3x10 10# 2x15 10# 2x15 10# 1x15 13# 2x15 13# 2x15 B Cable rows 10# 2x15 10# 2x15 10# 2x15 10# 2x10 13# 2x15 13# 2x15 B cable press 3# 2x15 3# 2x15 7# 2x15 10# 2x15 10# 2x15 10# 2x15 B shldr flex with symmetry 2# 1x10 - 2# 2x10 2# 2x10 2# 2x10 -  
B shldr abduct to <= 90 - - 2# 2x10 2# 2x10 2# 2x10 - Rows with band Blue 2x15 Blue 2x15 Blue 2x15 Black 2x15 Black 2x15 Black 2x15 B shldr extn with band Blue 2x15 Blue 2x15 Blue 2x15 Black 2x15 Black 2x15 Black 2x15 D1/D2 diagonals on wall   2x10 ea 2x10 ea 2x10 ea 2x10 ea Pass behind back     2x10 -  
 
 
HEP - continue current HEP Manual Therapy ( 30 minutes) Grade 2 to 4 physio mobs L shoulder flex, abduct, IR and ER. Grade 2 to 4 inferior and posterior shoulder glides. Contract/relax at end range flex,  IR and ER. Therapeutic Modalities:   Heat during manual therapy. Patient declined ice at end of session. Left Shoulder Heat Type: Moist pack Duration : 20 minutes Patient Position: Supine [facility] Treatment/Session Assessment:   
· Response to Treatment:  Focused on ROM again today since patient was not feeling well. Shoulder was initially stiff, but seemed to loosen up with treatment. Will plan to resume strengthening tomorrow. · Compliance with Program/Exercises: yes · Recommendations/Intent for next treatment session: \"Next visit will focus on motion and strength in B shoulders.  \".    
 Total Treatment Duration: 40 minutes PT Patient Time In/Time Out Time In: 1640 Time Out: 5963 Aisha Christopher, PT

## 2019-02-12 ENCOUNTER — HOSPITAL ENCOUNTER (OUTPATIENT)
Dept: PHYSICAL THERAPY | Age: 54
Discharge: HOME | End: 2019-02-12
Payer: COMMERCIAL

## 2019-02-12 PROCEDURE — 97140 MANUAL THERAPY 1/> REGIONS: CPT

## 2019-02-12 PROCEDURE — 97110 THERAPEUTIC EXERCISES: CPT

## 2019-02-12 NOTE — PROGRESS NOTES
Sepideh Dunbar : 1965 Primary: [employer] [facility]* Secondary:  [facility] ARTIE BLAIR [address] [facility], [address] [place] [address],[address], [address] [facility] [phone]   [phone] OUTPATIENT PHYSICAL THERAPY:Daily Note 2019 ICD-10: Treatment Diagnosis:  M75.01 Adhesive capsulitis of right shoulder, M75.02 Adhesive capsulitis of left shoulder, M19.012 Primary osteoarthritis, left shoulder, Pain in left shoulder (M25.512), Pain in right shoulder (M25.511) Precautions/Allergies: NKDA Fall Risk Score: 0 (? 5 = High Risk) MD Orders: Eval and treat, HEP, ROM, strengthening, all modalities  3x/wk for 6 weeks (written 19)   MEDICAL/REFERRING DIAGNOSIS: 
L shoulder DATE OF ONSET: 10/4/18, EUA of B shoulders on 18 REFERRING PHYSICIAN: Chase Villalba MD 
RETURN PHYSICIAN APPOINTMENT: 19 ASSESSMENT ( 19) :  Ms. Senait Garces  Is a 46year old R hand dominant female s/p EUA and manipulation of R shoulder, EUA and manipulation of L shoulder and L SAD, ADCR, and LOREN on 10/4/18. She presented with shoulder pain, decreased ROM in B shoulders, decreased strength in L shoulder and decreased functional use of B shoulders. Her ROM was improving in both arms though she continued to have pain. Her DASH score was improved. Both shoulders were then manipulated under anesthesia on 18. She continues to have pain in shoulder but is scheduled to return to work next week (19). Her ROM and strength are gradually improving. DASH continues to slowly improve. She could benefit from continued PT to address deficits and work toward goals. PROBLEM LIST (Impacting functional limitations): 1. Decreased Strength 2. Decreased ADL/Functional Activities 3. Increased Pain 4. Decreased Flexibility/Joint Mobility INTERVENTIONS PLANNED: 
1. Home Exercise Program (HEP) 2. Manual Therapy 3. Therapeutic Exercise/Strengthening 4. modals as needed TREATMENT PLAN: 
 Effective Dates: 12/3/2018 TO 3/3/2019 (90 days). Frequency/Duration: 2-3 times a week for 90 Days GOALS: (Goals have been discussed and agreed upon with patient.) Patient's stated goal is to be able to use her arms. Short-Term Functional Goals: Time Frame: 6 weeks 1. Patient to be independent with HEP - MET 2. Patient to increase PROM L shoulder flex to 135 and PROM L shoulder ER to 30 degrees   MET (12/3/18) 3. Patient to report decrease in pain level to 5/10. - partially MET - improving score, but not yet 5. Discharge Goals: Time Frame: 90 days - all in progress 1. Patient to report no more than minimal shoulder pain with all activity -Not MET yet 2. Patient to improve DASH score to 21 to demo improved use of B UEs 3. Patient to improve AROM B shoulder flex to 150 degrees for improved functional use. Rehabilitation Potential For Stated Goals: Good The information in this section was collected on 10/9/18 (except where otherwise noted). HISTORY:  
History of Present Injury/Illness (Reason for Referral): 
Patient reports that shoulder pain started last year. She went to her family doctor and was sent to PT. They tried dry needling and MD injected her. She didn't get better and requested a second opinion. She was sent to Dr. Parker Ordoñez and an MRI was done, and then she had surgery on 10/4/18. She was told to leave the dressings on until she returned to MD.  
She subsequently had B shoulder manipulations on 11/30/18. Past Medical History/Comorbidities: OA,  Benign hypertension with CKD (chronic kidney disease) stage III ([address] Diabetes ([address] Dyslipidemia; Endometrial cancer ([address] and Morbid obesity ([address] knee arthroscopy (Right, 2015); appendectomy (1979), L ankle surgery (2007) L index finger surgery (2005) Social History/Living Environment:  
  Lives with family in one story home. 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 Dominant Side:  
      RIGHT Previous Treatment Approaches: PT prior to surgery Current Medications:  Glipizide, lisinopril, Norco, Meloxicam   Date Last Reviewed:  2/11/19 EXAMINATION:  
Observation/Orthostatic Postural Assessment:   
      No new (11/13/18) ROM:   
      AROM R shoulder flex 135, Abduct 145, extn 50, ER 45 and IR to mid R buttock PROM R shoulder flex 130, abduct 110, ER 35 at 90 degree abduct PROM L shoulder flex 80, abduct 65, IR 50 (to abdomen) and ER -15 from neutral.  
 
      PROM R shoulder flex 160, abduct 140, ER 70 and IR 65 at 90 degree abduct (11/1/18) PROM L shoulder flex 130, abduct 110, ER 30 and IR 50 at 90 degrees abduct (11/1/18) PROM R shoulder flex 165, abduct 160, ER 75 and IR 75 at 90 degree abduct (12/3/18) PROM L shoulder flex 140, abduct 120, ER 45 and IR 60 at 90 degrees abduct (12/3/18) PROM L shoulder flex 155, abduct 140, ER 57 and IR 67 at 90 degrees abduct (12/12/18) PROM R shoulder flex 160, abduct 150, ER 85, IR 80 at 90 degrees abduct (1/3/19) PROM L shoulder flex 145, abduct 120, ER 40, IR 60 at 90 degrees abduct (1/3/19) AROM L shoulder flex 140, abduct 108, ER 10, IR to lateral L buttock (1/3/19) PROM L shoulder flex 150, abduct 130, ER 65, IR 55 at 90 degrees abduct (1/16/19) AROM L shoulder flex 148, abduct 120, ER 23, IR to posterolateral L buttock (1/16/19) PROM L shoulder flex 170, abduct 150, ER 55 and IR 70 at 90 degrees abduct (1/31/19) AROM L shoulder flex 152, abduct 110, ER 24, and IR not quite to sacrum (1/31/19) PROM L shoulder flex 170, abduct 160, ER 70 and IR 70 at 90 degrees abduct (2/12/19) AROM L shoulder flex 155, abduct 115, ER 34 and IR not quite to sacrum (2/12/19) Strength:   
 R shoulder 4+ to 5/5 all directions. L shoulder not tested. Neurological Screen: 
      Sensation: light touch intact in B UEs Body Structures Involved: 1. Joints 2. Muscles Body Functions Affected: 1. Neuromusculoskeletal 
2. Movement Related Activities and Participation Affected: 1. General Tasks and Demands 2. Self Care 3. Community, Social and [place] CLINICAL DECISION MAKING:  
Outcome Measure: Tool Used: Disabilities of the Arm, Shoulder and Hand (DASH) Questionnaire - Quick Version Score:  Initial: 55/55   41/55 (Date: 11/1/18 )      45/55 (Date 12/3/18)      43/55 (1/3/19)       Most Recent: 40/55 (1/31/19) Interpretation of Score: The DASH is designed to measure the activities of daily living in person's with upper extremity dysfunction or pain. Each section is scored on a 1-5 scale, 5 representing the greatest disability. The scores of each section are added together for a total score of 55. Medical Necessity:  
· Patient demonstrates good rehab potential due to higher previous functional level. Reason for Services/Other Comments: 
· Patient continues to require skilled intervention due to need to regain use of B UEs.  
  
 
 
 
TREATMENT:  
(In addition to Assessment/Re-Assessment sessions the following treatments were rendered) Pre-treatment Symptoms/Complaints:  Patient reports that she still does not feel well and she may go to the doctor. She went home last night and slept from 7:30 till 5 this morning. She has her grandson with her today. Pain: Initial:  
Pain Intensity 1: 5   Post Session: No change in pain noted by patient. Therapeutic Exercise: (10 Minutes):  Exercises to improve mobility and strength. Required minimal visual and verbal cues to ensure correct performance.    
AIS 3 sec x 10 each for L shoulder flex, abduct, ER at 45 and 90 degree abduct, IR at 90 degree abduct and at 90 degree flex, horizontal adduct, D1 and D2  Diagonals - all in supine. Date 
12/19/18  Date 
12/26/18 Date 1/8/19 Date 1/14/19 Date 1/23/19 Date 1/31/19  Date 2/12/19 Activity/Exercise B serratus punch 3# B 2x15 3# B 2x15 3# B 2x15 4# B 2x15 4# 2x15 4# 2x15 B bicep curls 3# 2x15 3# 2x15 4# 2x15 5# 2x15 5# 2x15 5# 2x15 Wall slides - Red 2x10 Red 2x15 - - -   
IR with band  Red 2x15 Red 2x15 Red 2x15 Green 2x15 Green 2x15 Green 2x15 ER with band  Red 2x15 Red 2x15 Red 2x15 Red 2x15 Red 2x15 Red 2x15 Side lying abduct 2# 2x15 2# 2x15 2# 2x15 3# 2x15 3# 2x15 3# 2x15 Side lying ER 2# 2x15 2# 2x15 2# 2x15 3# 2x15 3# 2x15 3# 2x15 Wall push ups Rail 2x10 Rail 2x10 Rail 2x10 Rail 2x10 Rail 720 Gigi Honeoye 2x15 B Lat pull downs 10# 3x10 10# 2x15 10# 2x15 10# 1x15 13# 2x15 13# 2x15 B Cable rows 10# 2x15 10# 2x15 10# 2x15 10# 2x10 13# 2x15 13# 2x15 B cable press 3# 2x15 3# 2x15 7# 2x15 10# 2x15 10# 2x15 10# 2x15 B shldr flex with symmetry 2# 1x10 - 2# 2x10 2# 2x10 2# 2x10 -   
B shldr abduct to <= 90 - - 2# 2x10 2# 2x10 2# 2x10 - Rows with band Blue 2x15 Blue 2x15 Blue 2x15 Black 2x15 Black 2x15 Black 2x15 B shldr extn with band Blue 2x15 Blue 2x15 Blue 2x15 Black 2x15 Black 2x15 Black 2x15 D1/D2 diagonals on wall   2x10 ea 2x10 ea 2x10 ea 2x10 ea Pass behind back     2x10 -   
 
 
HEP - continue current HEP Manual Therapy ( 15 minutes) Grade 2 to 4 physio mobs L shoulder flex, abduct, IR and ER. Grade 2 to 4 inferior and posterior shoulder glides. Therapeutic Modalities:    Patient took ice with her at end of session. .  
Treatment/Session Assessment:   
· Response to Treatment:  Focused on ROM again today since patient was not feeling well. She had her grandson with her today. Measurements show slight increase in ROM since she returned to work (she was concerned about not losing range). · Compliance with Program/Exercises: yes · Recommendations/Intent for next treatment session: \"Next visit will focus on motion and strength in L shoulder . \". Total Treatment Duration: 25 minutes PT Patient Time In/Time Out Time In: 1442 Time Out: 5173 Radha Mcnamara, PT

## 2019-02-14 ENCOUNTER — HOSPITAL ENCOUNTER (OUTPATIENT)
Dept: PHYSICAL THERAPY | Age: 54
Discharge: HOME | End: 2019-02-14
Payer: COMMERCIAL

## 2019-02-14 PROCEDURE — 97110 THERAPEUTIC EXERCISES: CPT

## 2019-02-14 NOTE — PROGRESS NOTES
Juan John : 1965 Primary: [employer] [facility]* Secondary:  [facility] at [facility] ARTIE BLAIR [address] [facility], [address] [place] [address],[address], [address] [facility] [phone]   [phone] OUTPATIENT PHYSICAL THERAPY:Daily Note 2019 ICD-10: Treatment Diagnosis:  M75.01 Adhesive capsulitis of right shoulder, M75.02 Adhesive capsulitis of left shoulder, M19.012 Primary osteoarthritis, left shoulder, Pain in left shoulder (M25.512), Pain in right shoulder (M25.511) Precautions/Allergies: NKDA Fall Risk Score: 0 (? 5 = High Risk) MD Orders: Eval and treat, HEP, ROM, strengthening, all modalities  3x/wk for 6 weeks (written 19)   MEDICAL/REFERRING DIAGNOSIS: 
L shoulder DATE OF ONSET: 10/4/18, EUA of B shoulders on 18 REFERRING PHYSICIAN: Jud Kohler MD 
RETURN PHYSICIAN APPOINTMENT: 19 ASSESSMENT ( 19) :  Ms. Rosemary Pascual  Is a 46year old R hand dominant female s/p EUA and manipulation of R shoulder, EUA and manipulation of L shoulder and L SAD, ADCR, and LOREN on 10/4/18. She presented with shoulder pain, decreased ROM in B shoulders, decreased strength in L shoulder and decreased functional use of B shoulders. Her ROM was improving in both arms though she continued to have pain. Her DASH score was improved. Both shoulders were then manipulated under anesthesia on 18. She continues to have pain in shoulder but is scheduled to return to work next week (19). Her ROM and strength are gradually improving. DASH continues to slowly improve. She could benefit from continued PT to address deficits and work toward goals. PROBLEM LIST (Impacting functional limitations): 1. Decreased Strength 2. Decreased ADL/Functional Activities 3. Increased Pain 4. Decreased Flexibility/Joint Mobility INTERVENTIONS PLANNED: 
1. Home Exercise Program (HEP) 2. Manual Therapy 3. Therapeutic Exercise/Strengthening 4. modals as needed TREATMENT PLAN: 
 Effective Dates: 12/3/2018 TO 3/3/2019 (90 days). Frequency/Duration: 2-3 times a week for 90 Days GOALS: (Goals have been discussed and agreed upon with patient.) Patient's stated goal is to be able to use her arms. Short-Term Functional Goals: Time Frame: 6 weeks 1. Patient to be independent with HEP - MET 2. Patient to increase PROM L shoulder flex to 135 and PROM L shoulder ER to 30 degrees   MET (12/3/18) 3. Patient to report decrease in pain level to 5/10. - partially MET - improving score, but not yet 5. Discharge Goals: Time Frame: 90 days - all in progress 1. Patient to report no more than minimal shoulder pain with all activity -Not MET yet 2. Patient to improve DASH score to 21 to demo improved use of B UEs 3. Patient to improve AROM B shoulder flex to 150 degrees for improved functional use. Rehabilitation Potential For Stated Goals: Good The information in this section was collected on 10/9/18 (except where otherwise noted). HISTORY:  
History of Present Injury/Illness (Reason for Referral): 
Patient reports that shoulder pain started last year. She went to her family doctor and was sent to PT. They tried dry needling and MD injected her. She didn't get better and requested a second opinion. She was sent to Dr. Humberto Campos and an MRI was done, and then she had surgery on 10/4/18. She was told to leave the dressings on until she returned to MD.  
She subsequently had B shoulder manipulations on 11/30/18. Past Medical History/Comorbidities: OA,  Benign hypertension with CKD (chronic kidney disease) stage III ([address] Diabetes ([address] Dyslipidemia; Endometrial cancer ([address] and Morbid obesity ([address] knee arthroscopy (Right, 2015); appendectomy (1979), L ankle surgery (2007) L index finger surgery (2005) Social History/Living Environment:  
  Lives with family in one story home. 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 Dominant Side:  
      RIGHT Previous Treatment Approaches: PT prior to surgery Current Medications:  Glipizide, lisinopril, Norco, Meloxicam   Date Last Reviewed:  2/11/19 EXAMINATION:  
Observation/Orthostatic Postural Assessment:   
      No new (11/13/18) ROM:   
      AROM R shoulder flex 135, Abduct 145, extn 50, ER 45 and IR to mid R buttock PROM R shoulder flex 130, abduct 110, ER 35 at 90 degree abduct PROM L shoulder flex 80, abduct 65, IR 50 (to abdomen) and ER -15 from neutral.  
 
      PROM R shoulder flex 160, abduct 140, ER 70 and IR 65 at 90 degree abduct (11/1/18) PROM L shoulder flex 130, abduct 110, ER 30 and IR 50 at 90 degrees abduct (11/1/18) PROM R shoulder flex 165, abduct 160, ER 75 and IR 75 at 90 degree abduct (12/3/18) PROM L shoulder flex 140, abduct 120, ER 45 and IR 60 at 90 degrees abduct (12/3/18) PROM L shoulder flex 155, abduct 140, ER 57 and IR 67 at 90 degrees abduct (12/12/18) PROM R shoulder flex 160, abduct 150, ER 85, IR 80 at 90 degrees abduct (1/3/19) PROM L shoulder flex 145, abduct 120, ER 40, IR 60 at 90 degrees abduct (1/3/19) AROM L shoulder flex 140, abduct 108, ER 10, IR to lateral L buttock (1/3/19) PROM L shoulder flex 150, abduct 130, ER 65, IR 55 at 90 degrees abduct (1/16/19) AROM L shoulder flex 148, abduct 120, ER 23, IR to posterolateral L buttock (1/16/19) PROM L shoulder flex 170, abduct 150, ER 55 and IR 70 at 90 degrees abduct (1/31/19) AROM L shoulder flex 152, abduct 110, ER 24, and IR not quite to sacrum (1/31/19) PROM L shoulder flex 170, abduct 160, ER 70 and IR 70 at 90 degrees abduct (2/12/19) AROM L shoulder flex 155, abduct 115, ER 34 and IR not quite to sacrum (2/12/19) Strength:   
 R shoulder 4+ to 5/5 all directions. L shoulder not tested. Neurological Screen: 
      Sensation: light touch intact in B UEs Body Structures Involved: 1. Joints 2. Muscles Body Functions Affected: 1. Neuromusculoskeletal 
2. Movement Related Activities and Participation Affected: 1. General Tasks and Demands 2. Self Care 3. Community, Social and [place] CLINICAL DECISION MAKING:  
Outcome Measure: Tool Used: Disabilities of the Arm, Shoulder and Hand (DASH) Questionnaire - Quick Version Score:  Initial: 55/55   41/55 (Date: 11/1/18 )      45/55 (Date 12/3/18)      43/55 (1/3/19)       Most Recent: 40/55 (1/31/19) Interpretation of Score: The DASH is designed to measure the activities of daily living in person's with upper extremity dysfunction or pain. Each section is scored on a 1-5 scale, 5 representing the greatest disability. The scores of each section are added together for a total score of 55. Medical Necessity:  
· Patient demonstrates good rehab potential due to higher previous functional level. Reason for Services/Other Comments: 
· Patient continues to require skilled intervention due to need to regain use of B UEs.  
  
 
 
 
TREATMENT:  
(In addition to Assessment/Re-Assessment sessions the following treatments were rendered) Pre-treatment Symptoms/Complaints:  Patient reports that traffic was terrible today so she is running late. Pain: Initial:  
Pain Intensity 1: 5   Post Session: No change in pain noted by patient. Therapeutic Exercise: (38 Minutes):  Exercises to improve mobility and strength. Required minimal visual and verbal cues to ensure correct performance. AIS 3 sec x 10 each for L shoulder flex, abduct, ER at 45 and 90 degree abduct, IR at 90 degree abduct and at 90 degree flex, horizontal adduct, D1 and D2  Diagonals - all in supine. Date 
12/19/18  Date 
12/26/18 Date 1/8/19 Date 1/14/19 Date 1/23/19 Date 1/31/19  Date 2/14/19 Activity/Exercise B serratus punch 3# B 2x15 3# B 2x15 3# B 2x15 4# B 2x15 4# 2x15 4# 2x15 4# 2x15 B bicep curls 3# 2x15 3# 2x15 4# 2x15 5# 2x15 5# 2x15 5# 2x15 5# 2x15 Wall slides - Red 2x10 Red 2x15 - - - -  
IR with band  Red 2x15 Red 2x15 Red 2x15 Green 2x15 Green 2x15 Green 2x15 Green 2x15 ER with band  Red 2x15 Red 2x15 Red 2x15 Red 2x15 Red 2x15 Red 2x15 Red 2x15 Side lying abduct 2# 2x15 2# 2x15 2# 2x15 3# 2x15 3# 2x15 3# 2x15 3# 2x15 Side lying ER 2# 2x15 2# 2x15 2# 2x15 3# 2x15 3# 2x15 3# 2x15 3# 2x15 Wall push ups Rail 2x10 Rail 2x10 Rail 2x10 Rail 2x10 Rail 2x15 Rail 2x15 Rail 2x10 B Lat pull downs 10# 3x10 10# 2x15 10# 2x15 10# 1x15 13# 2x15 13# 2x15 13# 2x15 B Cable rows 10# 2x15 10# 2x15 10# 2x15 10# 2x10 13# 2x15 13# 2x15 13# 2x15 B cable press 3# 2x15 3# 2x15 7# 2x15 10# 2x15 10# 2x15 10# 2x15 10# 2x15 B shldr flex with symmetry 2# 1x10 - 2# 2x10 2# 2x10 2# 2x10 - 2# 2x10 B shldr abduct to <= 90 - - 2# 2x10 2# 2x10 2# 2x10 - 2# 2x10 Rows with band Blue 2x15 Blue 2x15 Blue 2x15 Black 2x15 Black 2x15 Black 2x15 Black 2x15 B shldr extn with band Blue 2x15 Blue 2x15 Blue 2x15 Black 2x15 Black 2x15 Black 2x15 Black 2x15 D1/D2 diagonals on wall   2x10 ea 2x10 ea 2x10 ea 2x10 ea 1# 2x10 Pass behind back     2x10 - 2x10 HEP - continue current HEP Manual Therapy ( 0 minutes) none today Therapeutic Modalities: For pain : GameReady Device at 42 degrees and mild compression. Left Shoulder Cold Type: Cryocuff(with vasoneumatic compression) Duration : 10 minutes Patient Position: Sitting Thaliain Olivia Treatment/Session Assessment:   
 
· Response to Treatment:  Resumed strengthening today even though patient had worked all day because it had been about 2 weeks since she last did all her exercises in PT.   She was able to do exercises as asked, but needed encouragement to work through full available ROM. · Compliance with Program/Exercises: yes · Recommendations/Intent for next treatment session: \"Next visit will focus on motion and strength in L shoulder . \". Total Treatment Duration: 48 minutes PT Patient Time In/Time Out Time In: 0278 Time Out: 0254 Ezella Sever, PT

## 2019-02-18 ENCOUNTER — HOSPITAL ENCOUNTER (OUTPATIENT)
Dept: PHYSICAL THERAPY | Age: 54
Discharge: HOME | End: 2019-02-18
Payer: COMMERCIAL

## 2019-02-18 PROCEDURE — 97140 MANUAL THERAPY 1/> REGIONS: CPT

## 2019-02-18 PROCEDURE — 97110 THERAPEUTIC EXERCISES: CPT

## 2019-02-18 NOTE — PROGRESS NOTES
Siria Peralta : 1965 Primary: [facility]* Secondary:  [facility] ARTIE BLAIR [address] [facility], [address] [place] [address],[address], [place] [address]. [phone]   [phone] OUTPATIENT PHYSICAL THERAPY:Daily Note 2019 ICD-10: Treatment Diagnosis:  M75.01 Adhesive capsulitis of right shoulder, M75.02 Adhesive capsulitis of left shoulder, M19.012 Primary osteoarthritis, left shoulder, Pain in left shoulder (M25.512), Pain in right shoulder (M25.511) Precautions/Allergies: NKDA Fall Risk Score: 0 (? 5 = High Risk) MD Orders: Eval and treat, HEP, ROM, strengthening, all modalities  3x/wk for 6 weeks (written 19)   MEDICAL/REFERRING DIAGNOSIS: 
L shoulder DATE OF ONSET: 10/4/18, EUA of B shoulders on 18 REFERRING PHYSICIAN: Sindhu Collins MD 
RETURN PHYSICIAN APPOINTMENT: 19 ASSESSMENT ( 19) :  Ms. Candy Vaz  Is a 46year old R hand dominant female s/p EUA and manipulation of R shoulder, EUA and manipulation of L shoulder and L SAD, ADCR, and LOREN on 10/4/18. She presented with shoulder pain, decreased ROM in B shoulders, decreased strength in L shoulder and decreased functional use of B shoulders. Her ROM was improving in both arms though she continued to have pain. Her DASH score was improved. Both shoulders were then manipulated under anesthesia on 18. She continues to have pain in shoulder but is scheduled to return to work next week (19). Her ROM and strength are gradually improving. DASH continues to slowly improve. She could benefit from continued PT to address deficits and work toward goals. PROBLEM LIST (Impacting functional limitations): 1. Decreased Strength 2. Decreased ADL/Functional Activities 3. Increased Pain 4. Decreased Flexibility/Joint Mobility INTERVENTIONS PLANNED: 
1. Home Exercise Program (HEP) 2. Manual Therapy 3. Therapeutic Exercise/Strengthening 4. modals as needed TREATMENT PLAN: 
 Effective Dates: 12/3/2018 TO 3/3/2019 (90 days). Frequency/Duration: 2-3 times a week for 90 Days GOALS: (Goals have been discussed and agreed upon with patient.) Patient's stated goal is to be able to use her arms. Short-Term Functional Goals: Time Frame: 6 weeks 1. Patient to be independent with HEP - MET 2. Patient to increase PROM L shoulder flex to 135 and PROM L shoulder ER to 30 degrees   MET (12/3/18) 3. Patient to report decrease in pain level to 5/10. - partially MET - improving score, but not yet 5. Discharge Goals: Time Frame: 90 days - all in progress 1. Patient to report no more than minimal shoulder pain with all activity -Not MET yet 2. Patient to improve DASH score to 21 to demo improved use of B UEs 3. Patient to improve AROM B shoulder flex to 150 degrees for improved functional use. Rehabilitation Potential For Stated Goals: Good The information in this section was collected on 10/9/18 (except where otherwise noted). HISTORY:  
History of Present Injury/Illness (Reason for Referral): 
Patient reports that shoulder pain started last year. She went to her family doctor and was sent to PT. They tried dry needling and MD injected her. She didn't get better and requested a second opinion. She was sent to Dr. Laurent Erazo and an MRI was done, and then she had surgery on 10/4/18. She was told to leave the dressings on until she returned to MD.  
She subsequently had B shoulder manipulations on 11/30/18. Past Medical History/Comorbidities: OA,  Benign hypertension with CKD (chronic kidney disease) stage III ([address] Diabetes ([address] Dyslipidemia; Endometrial cancer ([address] and Morbid obesity ([address] knee arthroscopy (Right, 2015); appendectomy (1979), L ankle surgery (2007) L index finger surgery (2005) Social History/Living Environment:  
  Lives with family in one story home. 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 Dominant Side:  
      RIGHT Previous Treatment Approaches: PT prior to surgery Current Medications:  Glipizide, lisinopril, Norco, Meloxicam   Date Last Reviewed:  2/18/19 EXAMINATION:  
Observation/Orthostatic Postural Assessment:   
      No new (11/13/18) ROM:   
      AROM R shoulder flex 135, Abduct 145, extn 50, ER 45 and IR to mid R buttock PROM R shoulder flex 130, abduct 110, ER 35 at 90 degree abduct PROM L shoulder flex 80, abduct 65, IR 50 (to abdomen) and ER -15 from neutral.  
 
      PROM R shoulder flex 160, abduct 140, ER 70 and IR 65 at 90 degree abduct (11/1/18) PROM L shoulder flex 130, abduct 110, ER 30 and IR 50 at 90 degrees abduct (11/1/18) PROM R shoulder flex 165, abduct 160, ER 75 and IR 75 at 90 degree abduct (12/3/18) PROM L shoulder flex 140, abduct 120, ER 45 and IR 60 at 90 degrees abduct (12/3/18) PROM L shoulder flex 155, abduct 140, ER 57 and IR 67 at 90 degrees abduct (12/12/18) PROM R shoulder flex 160, abduct 150, ER 85, IR 80 at 90 degrees abduct (1/3/19) PROM L shoulder flex 145, abduct 120, ER 40, IR 60 at 90 degrees abduct (1/3/19) AROM L shoulder flex 140, abduct 108, ER 10, IR to lateral L buttock (1/3/19) PROM L shoulder flex 150, abduct 130, ER 65, IR 55 at 90 degrees abduct (1/16/19) AROM L shoulder flex 148, abduct 120, ER 23, IR to posterolateral L buttock (1/16/19) PROM L shoulder flex 170, abduct 150, ER 55 and IR 70 at 90 degrees abduct (1/31/19) AROM L shoulder flex 152, abduct 110, ER 24, and IR not quite to sacrum (1/31/19) PROM L shoulder flex 170, abduct 160, ER 70 and IR 70 at 90 degrees abduct (2/12/19) AROM L shoulder flex 155, abduct 115, ER 34 and IR not quite to sacrum (2/12/19) Strength:   
 R shoulder 4+ to 5/5 all directions. L shoulder not tested. Neurological Screen: 
      Sensation: light touch intact in B UEs Body Structures Involved: 1. Joints 2. Muscles Body Functions Affected: 1. Neuromusculoskeletal 
2. Movement Related Activities and Participation Affected: 1. General Tasks and Demands 2. Self Care 3. Community, Social and [place]ha [place] CLINICAL DECISION MAKING:  
Outcome Measure: Tool Used: Disabilities of the Arm, Shoulder and Hand (DASH) Questionnaire - Quick Version Score:  Initial: 55/55   41/55 (Date: 11/1/18 )      45/55 (Date 12/3/18)      43/55 (1/3/19)       Most Recent: 40/55 (1/31/19) Interpretation of Score: The DASH is designed to measure the activities of daily living in person's with upper extremity dysfunction or pain. Each section is scored on a 1-5 scale, 5 representing the greatest disability. The scores of each section are added together for a total score of 55. Medical Necessity:  
· Patient demonstrates good rehab potential due to higher previous functional level. Reason for Services/Other Comments: 
· Patient continues to require skilled intervention due to need to regain use of B UEs.  
  
 
 
 
TREATMENT:  
(In addition to Assessment/Re-Assessment sessions the following treatments were rendered) Pre-treatment Symptoms/Complaints:  Patient reports that shoulder pain is not as bad today. She would like to stretch the shoulder today, and then do her exercises tomorrow and Thursday. She later said she could do some of her exercises today. She may be late tomorrow because she has her annual review right after work. Reports that her work wants her to work 6 days a week even though MD has her on light duty. .   
Pain: Initial:  
Pain Intensity 1: 3   Post Session: No change in pain noted by patient. Therapeutic Exercise: (25 Minutes):  Exercises to improve mobility and strength.   Required minimal visual and verbal cues to ensure correct performance. AIS 3 sec x 10 each for L shoulder flex, abduct, ER at 45 and 90 degree abduct, IR at 90 degree abduct and at 90 degree flex, horizontal adduct, D1 and D2  Diagonals - all in supine. Date 
12/19/18  Date 
12/26/18 Date 1/8/19 Date 1/14/19 Date 1/23/19 Date 1/31/19  Date 2/14/19 Date 2/18/19 Activity/Exercise B serratus punch 3# B 2x15 3# B 2x15 3# B 2x15 4# B 2x15 4# 2x15 4# 2x15 4# 2x15 -  
B bicep curls 3# 2x15 3# 2x15 4# 2x15 5# 2x15 5# 2x15 5# 2x15 5# 2x15 - Wall slides - Red 2x10 Red 2x15 - - - - -  
IR with band  Red 2x15 Red 2x15 Red 2x15 Green 2x15 Green 2x15 Green 2x15 Green 2x15 Green 2x15 ER with band  Red 2x15 Red 2x15 Red 2x15 Red 2x15 Red 2x15 Red 2x15 Red 2x15 Red 2x15 Side lying abduct 2# 2x15 2# 2x15 2# 2x15 3# 2x15 3# 2x15 3# 2x15 3# 2x15 - Side lying ER 2# 2x15 2# 2x15 2# 2x15 3# 2x15 3# 2x15 3# 2x15 3# 2x15 - Wall push ups Rail 2x10 Rail 2x10 Rail 2x10 Rail 2x10 Rail 2x15 Rail 2x15 Rail 2x10 Rail 2x15 B Lat pull downs 10# 3x10 10# 2x15 10# 2x15 10# 1x15 13# 2x15 13# 2x15 13# 2x15 13# 2x15 B Cable rows 10# 2x15 10# 2x15 10# 2x15 10# 2x10 13# 2x15 13# 2x15 13# 2x15 13# 2x15 B cable press 3# 2x15 3# 2x15 7# 2x15 10# 2x15 10# 2x15 10# 2x15 10# 2x15 10# 2x15 B shldr flex with symmetry 2# 1x10 - 2# 2x10 2# 2x10 2# 2x10 - 2# 2x10 -  
B shldr abduct to <= 90 - - 2# 2x10 2# 2x10 2# 2x10 - 2# 2x10 - Rows with band Blue 2x15 Blue 2x15 Blue 2x15 Black 2x15 Black 2x15 Black 2x15 Black 2x15 -  
B shldr extn with band Blue 2x15 Blue 2x15 Blue 2x15 Black 2x15 Black 2x15 Black 2x15 Black 2x15 -  
D1/D2 diagonals on wall   2x10 ea 2x10 ea 2x10 ea 2x10 ea 1# 2x10 1# 2x15 Pass behind back     2x10 - 2x10 2x10 HEP - continue current HEP Manual Therapy ( 15 minutes) - For mobility - grade 2 to 4 physio mobs L shoulder flex, abduct, IR and ER at multiple angles. Grade 2 to 4 inferior and posterior shoulder glides. Therapeutic Modalities:    Heat for comfort during manual therapy, Cryo for pain at end of session : GameReady Device at 42 degrees and mild compression. Left Shoulder Heat Type: Moist pack Duration : 15 minutes Patient Position: Supine              Left Shoulder Cold Type: Cryocuff(with vasoneumatic compression) Duration : 15 minutes Patient Position: Sitting Lalo Saunas Treatment/Session Assessment:   
 
· Response to Treatment:  Patent did okay with exercises performed. Some increase in pain with exercises, but cryo at end of session helps control it. · Compliance with Program/Exercises: yes · Recommendations/Intent for next treatment session: \"Next visit will focus on motion and strength in L shoulder . \". Total Treatment Duration: 55 minutes PT Patient Time In/Time Out Time In: 8928 Time Out: 8497 Lauren Thomas, PT

## 2019-02-19 ENCOUNTER — HOSPITAL ENCOUNTER (OUTPATIENT)
Dept: PHYSICAL THERAPY | Age: 54
Discharge: HOME | End: 2019-02-19
Payer: COMMERCIAL

## 2019-02-19 NOTE — PROGRESS NOTES
Gabi Alvarenga : 1965 Primary: [facility]* Secondary:  [facility] at [facility] ARTIE BLAIR [address] [facility], [address] [place] [address] [address], [place] [address]. [phone]   [phone] OUTPATIENT PHYSICAL THERAPY:Cancellation 2019 ICD-10: Treatment Diagnosis:  M75.01 Adhesive capsulitis of right shoulder, M75.02 Adhesive capsulitis of left shoulder, M19.012 Primary osteoarthritis, left shoulder, Pain in left shoulder (M25.512), Pain in right shoulder (M25.511) Precautions/Allergies: NKDA Fall Risk Score: 0 (? 5 = High Risk) MD Orders: Eval and treat, HEP, ROM, strengthening, all modalities  3x/wk for 6 weeks (written 19)   MEDICAL/REFERRING DIAGNOSIS: 
L shoulder DATE OF ONSET: 10/4/18, EUA of B shoulders on 18 REFERRING PHYSICIAN: Sindi Gallego., MD 
RETURN PHYSICIAN APPOINTMENT: 19 Patient called to cancel scheduled appointment due to lack of transportation. The person who borrowed her car did not pick her up at work.

## 2019-02-21 ENCOUNTER — HOSPITAL ENCOUNTER (OUTPATIENT)
Dept: PHYSICAL THERAPY | Age: 54
Discharge: HOME | End: 2019-02-21
Payer: COMMERCIAL

## 2019-02-21 PROCEDURE — 97140 MANUAL THERAPY 1/> REGIONS: CPT

## 2019-02-21 PROCEDURE — 97110 THERAPEUTIC EXERCISES: CPT

## 2019-02-21 NOTE — PROGRESS NOTES
Kayden Vitale : 1965 Primary: [facility]* Secondary:  Therapy Center at [facility] ARTIE BLAIR [address] [facility], [address] [place] [address],[address], [facility] [address] [phone]   [phone] OUTPATIENT PHYSICAL THERAPY:Daily Note 2019 ICD-10: Treatment Diagnosis:  M75.01 Adhesive capsulitis of right shoulder, M75.02 Adhesive capsulitis of left shoulder, M19.012 Primary osteoarthritis, left shoulder, Pain in left shoulder (M25.512), Pain in right shoulder (M25.511) Precautions/Allergies: NKDA Fall Risk Score: 0 (? 5 = High Risk) MD Orders: Eval and treat, HEP, ROM, strengthening, all modalities  3x/wk for 6 weeks (written 19)   MEDICAL/REFERRING DIAGNOSIS: 
L shoulder DATE OF ONSET: 10/4/18, EUA of B shoulders on 18 REFERRING PHYSICIAN: Alethea Guadarrama MD 
RETURN PHYSICIAN APPOINTMENT: 19 ASSESSMENT ( 19) :  Ms. Farshad Garcia  Is a 46year old R hand dominant female s/p EUA and manipulation of R shoulder, EUA and manipulation of L shoulder and L SAD, ADCR, and LOREN on 10/4/18. She presented with shoulder pain, decreased ROM in B shoulders, decreased strength in L shoulder and decreased functional use of B shoulders. Her ROM was improving in both arms though she continued to have pain. Her DASH score was improved. Both shoulders were then manipulated under anesthesia on 18. She continues to have pain in shoulder but is scheduled to return to work next week (19). Her ROM and strength are gradually improving. DASH continues to slowly improve. She could benefit from continued PT to address deficits and work toward goals. PROBLEM LIST (Impacting functional limitations): 1. Decreased Strength 2. Decreased ADL/Functional Activities 3. Increased Pain 4. Decreased Flexibility/Joint Mobility INTERVENTIONS PLANNED: 
1. Home Exercise Program (HEP) 2. Manual Therapy 3. Therapeutic Exercise/Strengthening 4. modals as needed TREATMENT PLAN: 
 Effective Dates: 12/3/2018 TO 3/3/2019 (90 days). Frequency/Duration: 2-3 times a week for 90 Days GOALS: (Goals have been discussed and agreed upon with patient.) Patient's stated goal is to be able to use her arms. Short-Term Functional Goals: Time Frame: 6 weeks 1. Patient to be independent with HEP - MET 2. Patient to increase PROM L shoulder flex to 135 and PROM L shoulder ER to 30 degrees   MET (12/3/18) 3. Patient to report decrease in pain level to 5/10. - partially MET - improving score, but not yet 5. Discharge Goals: Time Frame: 90 days - all in progress 1. Patient to report no more than minimal shoulder pain with all activity -Not MET yet 2. Patient to improve DASH score to 21 to demo improved use of B UEs 3. Patient to improve AROM B shoulder flex to 150 degrees for improved functional use. Rehabilitation Potential For Stated Goals: Good The information in this section was collected on 10/9/18 (except where otherwise noted). HISTORY:  
History of Present Injury/Illness (Reason for Referral): 
Patient reports that shoulder pain started last year. She went to her family doctor and was sent to PT. They tried dry needling and MD injected her. She didn't get better and requested a second opinion. She was sent to Dr. Danish Parra and an MRI was done, and then she had surgery on 10/4/18. She was told to leave the dressings on until she returned to MD.  
She subsequently had B shoulder manipulations on 11/30/18. Past Medical History/Comorbidities: OA,  Benign hypertension with CKD (chronic kidney disease) stage III ([address] Diabetes ([address] Dyslipidemia; Endometrial cancer ([address] and Morbid obesity ([address] knee arthroscopy (Right, 2015); appendectomy (1979), L ankle surgery (2007) L index finger surgery (2005) Social History/Living Environment:  
  Lives with family in one story home. 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 Dominant Side:  
      RIGHT Previous Treatment Approaches: PT prior to surgery Current Medications:  Glipizide, lisinopril, Norco, Meloxicam   Date Last Reviewed:  2/18/19 EXAMINATION:  
Observation/Orthostatic Postural Assessment:   
      No new (11/13/18) ROM:   
      AROM R shoulder flex 135, Abduct 145, extn 50, ER 45 and IR to mid R buttock PROM R shoulder flex 130, abduct 110, ER 35 at 90 degree abduct PROM L shoulder flex 80, abduct 65, IR 50 (to abdomen) and ER -15 from neutral.  
 
      PROM R shoulder flex 160, abduct 140, ER 70 and IR 65 at 90 degree abduct (11/1/18) PROM L shoulder flex 130, abduct 110, ER 30 and IR 50 at 90 degrees abduct (11/1/18) PROM R shoulder flex 165, abduct 160, ER 75 and IR 75 at 90 degree abduct (12/3/18) PROM L shoulder flex 140, abduct 120, ER 45 and IR 60 at 90 degrees abduct (12/3/18) PROM L shoulder flex 155, abduct 140, ER 57 and IR 67 at 90 degrees abduct (12/12/18) PROM R shoulder flex 160, abduct 150, ER 85, IR 80 at 90 degrees abduct (1/3/19) PROM L shoulder flex 145, abduct 120, ER 40, IR 60 at 90 degrees abduct (1/3/19) AROM L shoulder flex 140, abduct 108, ER 10, IR to lateral L buttock (1/3/19) PROM L shoulder flex 150, abduct 130, ER 65, IR 55 at 90 degrees abduct (1/16/19) AROM L shoulder flex 148, abduct 120, ER 23, IR to posterolateral L buttock (1/16/19) PROM L shoulder flex 170, abduct 150, ER 55 and IR 70 at 90 degrees abduct (1/31/19) AROM L shoulder flex 152, abduct 110, ER 24, and IR not quite to sacrum (1/31/19) PROM L shoulder flex 170, abduct 160, ER 70 and IR 70 at 90 degrees abduct (2/12/19) AROM L shoulder flex 155, abduct 115, ER 34 and IR not quite to sacrum (2/12/19) Strength:   
 R shoulder 4+ to 5/5 all directions. L shoulder not tested. Neurological Screen: 
      Sensation: light touch intact in B UEs Body Structures Involved: 1. Joints 2. Muscles Body Functions Affected: 1. Neuromusculoskeletal 
2. Movement Related Activities and Participation Affected: 1. General Tasks and Demands 2. Self Care 3. Community, Social and [place] CLINICAL DECISION MAKING:  
Outcome Measure: Tool Used: Disabilities of the Arm, Shoulder and Hand (DASH) Questionnaire - Quick Version Score:  Initial: 55/55   41/55 (Date: 11/1/18 )      45/55 (Date 12/3/18)      43/55 (1/3/19)       Most Recent: 40/55 (1/31/19) Interpretation of Score: The DASH is designed to measure the activities of daily living in person's with upper extremity dysfunction or pain. Each section is scored on a 1-5 scale, 5 representing the greatest disability. The scores of each section are added together for a total score of 55. Medical Necessity:  
· Patient demonstrates good rehab potential due to higher previous functional level. Reason for Services/Other Comments: 
· Patient continues to require skilled intervention due to need to regain use of B UEs.  
  
 
 
 
TREATMENT:  
(In addition to Assessment/Re-Assessment sessions the following treatments were rendered) Pre-treatment Symptoms/Complaints:  Patient reports that she has to work this Saturday, which will be 6 days this week. She returns to MD next week. Pain: Initial:  
Pain Intensity 1: 6(\"5.5\")   Post Session: patient noted that pain went \"up slightly\" but did not assign a number to it. Therapeutic Exercise: (10 Minutes):  Exercises to improve mobility and strength. Required minimal visual and verbal cues to ensure correct performance. AIS 3 sec x 10 each for L shoulder flex, abduct, ER at 45 and 90 degree abduct, IR at 90 degree abduct and at 90 degree flex, horizontal adduct, D1 and D2  Diagonals - all in supine. Date 1/8/19 Date 1/14/19 Date 1/23/19 Date 1/31/19  Date 2/14/19 Date 2/18/19 Activity/Exercise B serratus punch 3# B 2x15 4# B 2x15 4# 2x15 4# 2x15 4# 2x15 -  
B bicep curls 4# 2x15 5# 2x15 5# 2x15 5# 2x15 5# 2x15 - Wall slides Red 2x15 - - - - -  
IR with band  Red 2x15 Green 2x15 Green 2x15 Green 2x15 Green 2x15 Green 2x15 ER with band  Red 2x15 Red 2x15 Red 2x15 Red 2x15 Red 2x15 Red 2x15 Side lying abduct 2# 2x15 3# 2x15 3# 2x15 3# 2x15 3# 2x15 - Side lying ER 2# 2x15 3# 2x15 3# 2x15 3# 2x15 3# 2x15 - Wall push ups Rail 2x10 Rail 2x10 Rail 2x15 Rail 2x15 Rail 2x10 Rail 2x15 B Lat pull downs 10# 2x15 10# 1x15 13# 2x15 13# 2x15 13# 2x15 13# 2x15 B Cable rows 10# 2x15 10# 2x10 13# 2x15 13# 2x15 13# 2x15 13# 2x15 B cable press 7# 2x15 10# 2x15 10# 2x15 10# 2x15 10# 2x15 10# 2x15 B shldr flex with symmetry 2# 2x10 2# 2x10 2# 2x10 - 2# 2x10 -  
B shldr abduct to <= 90 2# 2x10 2# 2x10 2# 2x10 - 2# 2x10 - Rows with band Blue 2x15 Black 2x15 Black 2x15 Black 2x15 Black 2x15 -  
B shldr extn with band Blue 2x15 Black 2x15 Black 2x15 Black 2x15 Black 2x15 -  
D1/D2 diagonals on wall 2x10 ea 2x10 ea 2x10 ea 2x10 ea 1# 2x10 1# 2x15 Pass behind back   2x10 - 2x10 2x10 HEP - continue current HEP Manual Therapy ( 20 minutes) - For mobility - grade 2 to 4 physio mobs L shoulder flex, abduct, IR and ER at multiple angles. Grade 2 to 4 inferior and posterior shoulder glides. Contract/relax technique at end range of flex, IR and ER. Therapeutic Modalities:    Heat for comfort during manual therapy, patient took ice with her at end of session. Left Shoulder Heat Type: Moist pack Duration : 15 minutes Patient Position: Supine Alej[address] Plaza   
Treatment/Session Assessment:   
 
· Response to Treatment:  Patent was somewhat sore again after working all day.  She is to do her strengthening exercises at home on Saturday, then return to PT for exercises on Monday. · Compliance with Program/Exercises: yes · Recommendations/Intent for next treatment session: \"Next visit will focus on motion and strength in L shoulder . \". Total Treatment Duration: 30 minutes PT Patient Time In/Time Out Time In: 9334 Time Out: 1992 Sonia Canavan, PT

## 2019-02-25 ENCOUNTER — HOSPITAL ENCOUNTER (OUTPATIENT)
Dept: PHYSICAL THERAPY | Age: 54
Discharge: HOME | End: 2019-02-25
Payer: COMMERCIAL

## 2019-02-25 PROCEDURE — 97110 THERAPEUTIC EXERCISES: CPT

## 2019-02-25 NOTE — PROGRESS NOTES
Ame Armenta : 1965 [facility] Secondary:  Therapy Center at [facility] ARTIE BLAIR [address] [facility], [address] [place] [address],[address], [facility] UNilda Duarte. [phone]   [phone] OUTPATIENT PHYSICAL THERAPY:Daily Note 2019 ICD-10: Treatment Diagnosis:  M75.01 Adhesive capsulitis of right shoulder, M75.02 Adhesive capsulitis of left shoulder, M19.012 Primary osteoarthritis, left shoulder, Pain in left shoulder (M25.512), Pain in right shoulder (M25.511) Precautions/Allergies: NKDA Fall Risk Score: 0 (? 5 = High Risk) MD Orders: Eval and treat, HEP, ROM, strengthening, all modalities  3x/wk for 6 weeks (written 19)   MEDICAL/REFERRING DIAGNOSIS: 
L shoulder DATE OF ONSET: 10/4/18, EUA of B shoulders on 18 REFERRING PHYSICIAN: Chris Liu MD 
RETURN PHYSICIAN APPOINTMENT: 19 ASSESSMENT ( 19) :  Ms. Tyson Valverde  Is a 46year old R hand dominant female s/p EUA and manipulation of R shoulder, EUA and manipulation of L shoulder and L SAD, ADCR, and LOREN on 10/4/18. She presented with shoulder pain, decreased ROM in B shoulders, decreased strength in L shoulder and decreased functional use of B shoulders. Her ROM was improving in both arms though she continued to have pain. Her DASH score was improved. Both shoulders were then manipulated under anesthesia on 18. She continues to have pain in shoulder but is scheduled to return to work next week (19). Her ROM and strength are gradually improving. DASH continues to slowly improve. She could benefit from continued PT to address deficits and work toward goals. PROBLEM LIST (Impacting functional limitations): 1. Decreased Strength 2. Decreased ADL/Functional Activities 3. Increased Pain 4. Decreased Flexibility/Joint Mobility INTERVENTIONS PLANNED: 
1. Home Exercise Program (HEP) 2. Manual Therapy 3. Therapeutic Exercise/Strengthening 4. modals as needed TREATMENT PLAN: 
 Effective Dates: 12/3/2018 TO 3/3/2019 (90 days). Frequency/Duration: 2-3 times a week for 90 Days GOALS: (Goals have been discussed and agreed upon with patient.) Patient's stated goal is to be able to use her arms. Short-Term Functional Goals: Time Frame: 6 weeks 1. Patient to be independent with HEP - MET 2. Patient to increase PROM L shoulder flex to 135 and PROM L shoulder ER to 30 degrees   MET (12/3/18) 3. Patient to report decrease in pain level to 5/10. - partially MET - improving score, but not yet 5. Discharge Goals: Time Frame: 90 days - all in progress 1. Patient to report no more than minimal shoulder pain with all activity -Not MET yet 2. Patient to improve DASH score to 21 to demo improved use of B UEs 3. Patient to improve AROM B shoulder flex to 150 degrees for improved functional use. Rehabilitation Potential For Stated Goals: Good The information in this section was collected on 10/9/18 (except where otherwise noted). HISTORY:  
History of Present Injury/Illness (Reason for Referral): 
Patient reports that shoulder pain started last year. She went to her family doctor and was sent to PT. They tried dry needling and MD injected her. She didn't get better and requested a second opinion. She was sent to Dr. Nadira Gonzalez and an MRI was done, and then she had surgery on 10/4/18. She was told to leave the dressings on until she returned to MD.  
She subsequently had B shoulder manipulations on 11/30/18. Past Medical History/Comorbidities: OA,  Benign hypertension with CKD (chronic kidney disease) stage III ([address] Diabetes ([address] Dyslipidemia; Endometrial cancer ([address] and Morbid obesity ([address] knee arthroscopy (Right, 2015); appendectomy (1979), L ankle surgery (2007) L index finger surgery (2005) Social History/Living Environment:  
  Lives with family in one story home. 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 Dominant Side:  
      RIGHT Previous Treatment Approaches: PT prior to surgery Current Medications:  Glipizide, lisinopril, Norco, Meloxicam   Date Last Reviewed:  2/25/19 EXAMINATION:  
Observation/Orthostatic Postural Assessment:   
      No new (11/13/18) ROM:   
      AROM R shoulder flex 135, Abduct 145, extn 50, ER 45 and IR to mid R buttock PROM R shoulder flex 130, abduct 110, ER 35 at 90 degree abduct PROM L shoulder flex 80, abduct 65, IR 50 (to abdomen) and ER -15 from neutral.  
 
      PROM R shoulder flex 160, abduct 140, ER 70 and IR 65 at 90 degree abduct (11/1/18) PROM L shoulder flex 130, abduct 110, ER 30 and IR 50 at 90 degrees abduct (11/1/18) PROM R shoulder flex 165, abduct 160, ER 75 and IR 75 at 90 degree abduct (12/3/18) PROM L shoulder flex 140, abduct 120, ER 45 and IR 60 at 90 degrees abduct (12/3/18) PROM L shoulder flex 155, abduct 140, ER 57 and IR 67 at 90 degrees abduct (12/12/18) PROM R shoulder flex 160, abduct 150, ER 85, IR 80 at 90 degrees abduct (1/3/19) PROM L shoulder flex 145, abduct 120, ER 40, IR 60 at 90 degrees abduct (1/3/19) AROM L shoulder flex 140, abduct 108, ER 10, IR to lateral L buttock (1/3/19) PROM L shoulder flex 150, abduct 130, ER 65, IR 55 at 90 degrees abduct (1/16/19) AROM L shoulder flex 148, abduct 120, ER 23, IR to posterolateral L buttock (1/16/19) PROM L shoulder flex 170, abduct 150, ER 55 and IR 70 at 90 degrees abduct (1/31/19) AROM L shoulder flex 152, abduct 110, ER 24, and IR not quite to sacrum (1/31/19) PROM L shoulder flex 170, abduct 160, ER 70 and IR 70 at 90 degrees abduct (2/12/19) AROM L shoulder flex 155, abduct 115, ER 34 and IR not quite to sacrum (2/12/19) Strength:   
 R shoulder 4+ to 5/5 all directions. L shoulder not tested. Neurological Screen: 
      Sensation: light touch intact in B UEs Body Structures Involved: 1. Joints 2. Muscles Body Functions Affected: 1. Neuromusculoskeletal 
2. Movement Related Activities and Participation Affected: 1. General Tasks and Demands 2. Self Care 3. Community, Social and [place] CLINICAL DECISION MAKING:  
Outcome Measure: Tool Used: Disabilities of the Arm, Shoulder and Hand (DASH) Questionnaire - Quick Version Score:  Initial: 55/55   41/55 (Date: 11/1/18 )      45/55 (Date 12/3/18)      43/55 (1/3/19)       Most Recent: 40/55 (1/31/19) Interpretation of Score: The DASH is designed to measure the activities of daily living in person's with upper extremity dysfunction or pain. Each section is scored on a 1-5 scale, 5 representing the greatest disability. The scores of each section are added together for a total score of 55. Medical Necessity:  
· Patient demonstrates good rehab potential due to higher previous functional level. Reason for Services/Other Comments: 
· Patient continues to require skilled intervention due to need to regain use of B UEs.  
  
 
 
 
TREATMENT:  
(In addition to Assessment/Re-Assessment sessions the following treatments were rendered) Pre-treatment Symptoms/Complaints:  Patient reports that she is off today and then works 5 days in a row. She goes back to [place] on Wednesday. Reports that she got new glasses and they are making her a little dizzy. Pain: Initial:  
Pain Intensity 1: 5   Post Session: patient noted noo change in pain \"no better, no worse\" Therapeutic Exercise: (40 Minutes):  Exercises to improve mobility and strength. Required minimal visual and verbal cues to ensure correct performance.    
AIS 3 sec x 10 each for L shoulder flex, abduct, ER at 45 and 90 degree abduct, IR at 90 degree abduct and at 90 degree flex, horizontal adduct, D1 and D2  Diagonals - all in supine. AIS 3 sec x 10 each for L shoulder flex, abduct and ER in standing. Date 1/14/19 Date 1/23/19 Date 1/31/19  Date 2/14/19 Date 2/18/19 Date 2/25/19 Activity/Exercise B serratus punch 4# B 2x15 4# 2x15 4# 2x15 4# 2x15 - 4# 2x15 B bicep curls 5# 2x15 5# 2x15 5# 2x15 5# 2x15 - -  
IR with band  Green 2x15 Green 2x15 Green 2x15 Green 2x15 Green 2x15 Green 2x15 ER with band  Red 2x15 Red 2x15 Red 2x15 Red 2x15 Red 2x15 Red 2x15 Side lying abduct 3# 2x15 3# 2x15 3# 2x15 3# 2x15 - 3# 2x15 Side lying ER 3# 2x15 3# 2x15 3# 2x15 3# 2x15 - 3# 2x15 Wall push ups Rail 2x10 Rail 2x15 Rail 2x15 Rail 2x10 [employer] [address] B Lat pull downs 10# 1x15 13# 2x15 13# 2x15 13# 2x15 13# 2x15 13# 2x15 B Cable rows 10# 2x10 13# 2x15 13# 2x15 13# 2x15 13# 2x15 13# 2x15 B cable press 10# 2x15 10# 2x15 10# 2x15 10# 2x15 10# 2x15 10# 2x15 B shldr flex with symmetry 2# 2x10 2# 2x10 - 2# 2x10 - 2# 2x10 B shldr abduct to <= 90 2# 2x10 2# 2x10 - 2# 2x10 - 2# 2x10 Rows with band Black 2x15 Black 2x15 Black 2x15 Black 2x15 - -  
B shldr extn with band Black 2x15 Black 2x15 Black 2x15 Black 2x15 - -  
D1/D2 diagonals on wall 2x10 ea 2x10 ea 2x10 ea 1# 2x10 1# 2x15 1# 2x10 ea Pass behind back  2x10 - 2x10 2x10 2x15 HEP - continue current HEP Manual Therapy (   minutes) -  None today Therapeutic Modalities:    Patient took ice with her at end of session. .  
Treatment/Session Assessment:   
 
 
· Response to Treatment:  Patent with no increased pain even with doing all her exercises today. Will plan to stretch tomorrow and then she goes to MD on Wednesday. · Compliance with Program/Exercises: yes · Recommendations/Intent for next treatment session: \"Next visit will focus on motion and strength in L shoulder . \". Total Treatment Duration: 40 minutes PT Patient Time In/Time Out Time In: 1204 Time Out: 1245 Sailaja Bernal, PT

## 2019-02-26 ENCOUNTER — HOSPITAL ENCOUNTER (OUTPATIENT)
Dept: PHYSICAL THERAPY | Age: 54
Discharge: HOME | End: 2019-02-26
Payer: COMMERCIAL

## 2019-02-26 PROCEDURE — 97140 MANUAL THERAPY 1/> REGIONS: CPT

## 2019-02-26 PROCEDURE — 97110 THERAPEUTIC EXERCISES: CPT

## 2019-02-26 NOTE — PROGRESS NOTES
Willie Winkler : 1965 Primary: [facility]* Secondary:  Therapy Center at [facility] ARTIE BLAIR [address] [facility], [address] [place] [address],[address], [facility] [address] [phone]   [phone] OUTPATIENT PHYSICAL THERAPY:MD Note 2019 ICD-10: Treatment Diagnosis:  M75.01 Adhesive capsulitis of right shoulder, M75.02 Adhesive capsulitis of left shoulder, M19.012 Primary osteoarthritis, left shoulder, Pain in left shoulder (M25.512), Pain in right shoulder (M25.511) Precautions/Allergies: NKDA Fall Risk Score: 0 (? 5 = High Risk) MD Orders: Eval and treat, HEP, ROM, strengthening, all modalities  3x/wk for 6 weeks (written 19) Patient has attended 52 PT sessions from 10/9/18 to 19 with 4 missed sessions MEDICAL/REFERRING DIAGNOSIS: 
L shoulder DATE OF ONSET: 10/4/18, EUA of B shoulders on 18 REFERRING PHYSICIAN: Addy Zarate MD 
RETURN PHYSICIAN APPOINTMENT: 19 ASSESSMENT ( 19) :  Ms. Chiquita Flanagan  Is a 48year old R hand dominant female s/p EUA and manipulation of R shoulder, EUA and manipulation of L shoulder and L SAD, ADCR, and LOREN on 10/4/18. She presented with shoulder pain, decreased ROM in B shoulders, decreased strength in L shoulder and decreased functional use of B shoulders. Her ROM was improving in both arms though she continued to have pain. Her DASH score was improved. Both shoulders were then manipulated under anesthesia on 18. She continues to have pain in shoulder but has returned to work on (19). Her ROM and strength are gradually improving, but she does not have full strength and is specifically weak in shoulder ER. DASH score has changed very little lately. She could benefit from continued PT to address deficits and work toward goals. ROM: 
      PROM L shoulder flex 170, abduct 160, ER 70 and IR 70 at 90 degrees abduct (19)       AROM L shoulder flex 155, abduct 115, ER 34 and IR not quite to sacrum (2/12/19) AROM L shoulder flex 160, abduct 115, ER 32  and IR not quite to sacrum,    (2/26/18) Strength:   
      L shoulder flex 4+ , extn 5,  abduct 4+, IR 4+ and ER 4-  (2/26/19) Thank you for this referral, 
Taylor Denny, PT

## 2019-02-26 NOTE — PROGRESS NOTES
Teresa Dinero : 1965 Primary: [facility]* Secondary:  Therapy Center at [facility] ARTIE BLAIR [address] [facility], [address] [place] [address],[address], [facility]. [phone]   [phone] OUTPATIENT PHYSICAL THERAPY:Progress Report 2019 ICD-10: Treatment Diagnosis:  M75.01 Adhesive capsulitis of right shoulder, M75.02 Adhesive capsulitis of left shoulder, M19.012 Primary osteoarthritis, left shoulder, Pain in left shoulder (M25.512), Pain in right shoulder (M25.511) Precautions/Allergies: NKDA Fall Risk Score: 0 (? 5 = High Risk) MD Orders: Eval and treat, HEP, ROM, strengthening, all modalities  3x/wk for 6 weeks (written 19) Patient has attended 52 PT sessions from 10/9/18 to 19 with 4 missed sessions MEDICAL/REFERRING DIAGNOSIS: 
L shoulder DATE OF ONSET: 10/4/18, EUA of B shoulders on 18 REFERRING PHYSICIAN: Lisa Gentile., MD 
RETURN PHYSICIAN APPOINTMENT: 19 ASSESSMENT ( 19) :  Ms. Catherine Alvarenga  Is a 48year old R hand dominant female s/p EUA and manipulation of R shoulder, EUA and manipulation of L shoulder and L SAD, ADCR, and LOREN on 10/4/18. She presented with shoulder pain, decreased ROM in B shoulders, decreased strength in L shoulder and decreased functional use of B shoulders. Her ROM was improving in both arms though she continued to have pain. Her DASH score was improved. Both shoulders were then manipulated under anesthesia on 18. She continues to have pain in shoulder but has returned to work on (19). Her ROM and strength are gradually improving, but she does not have full strength and is specifically weak in shoulder ER. DASH score has changed very little lately. She could benefit from continued PT to address deficits and work toward goals. PROBLEM LIST (Impacting functional limitations): 1. Decreased Strength 2. Decreased ADL/Functional Activities 3. Increased Pain 4. Decreased Flexibility/Joint Mobility INTERVENTIONS PLANNED: 
1. Home Exercise Program (HEP) 2. Manual Therapy 3. Therapeutic Exercise/Strengthening 4. modals as needed TREATMENT PLAN: 
Effective Dates: 12/3/2018 TO 3/3/2019 (90 days). Frequency/Duration: 2-3 times a week for 90 Days GOALS: (Goals have been discussed and agreed upon with patient.) Patient's stated goal is to be able to use her arms. Short-Term Functional Goals: Time Frame: 6 weeks 1. Patient to be independent with HEP - MET 2. Patient to increase PROM L shoulder flex to 135 and PROM L shoulder ER to 30 degrees   MET (12/3/18) 3. Patient to report decrease in pain level to 5/10. - MET (2/26/19) Discharge Goals: Time Frame: 90 days - all in progress 1. Patient to report no more than minimal shoulder pain with all activity -Not MET yet 2. Patient to improve DASH score to 21 to demo improved use of B UEs  Not MET yet 3. Patient to improve AROM B shoulder flex to 150 degrees for improved functional use. MET for L, R not tested Rehabilitation Potential For Stated Goals: Good The information in this section was collected on 10/9/18 (except where otherwise noted). HISTORY:  
History of Present Injury/Illness (Reason for Referral): 
Patient reports that shoulder pain started last year. She went to her family doctor and was sent to PT. They tried dry needling and MD injected her. She didn't get better and requested a second opinion. She was sent to Dr. Laurence Espinoza and an MRI was done, and then she had surgery on 10/4/18. She was told to leave the dressings on until she returned to MD.  
She subsequently had B shoulder manipulations on 11/30/18. Past Medical History/Comorbidities: OA,  Benign hypertension with CKD (chronic kidney disease) stage III ([address] Diabetes ([address] Dyslipidemia; Endometrial cancer ([address] and Morbid obesity ([address]  knee arthroscopy (Right, 2015); appendectomy (1979), L ankle surgery (2007) L index finger surgery (2005) Social History/Living Environment:  
  Lives with family in one story home. Prior Level of Function/Work/Activity: 
 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 Dominant Side:  
      RIGHT Previous Treatment Approaches: PT prior to surgery Current Medications:  Glipizide, lisinopril, Norco, Meloxicam   Date Last Reviewed:  2/25/19 EXAMINATION:  
Observation/Orthostatic Postural Assessment:   
      No new ROM:   
      AROM R shoulder flex 135, Abduct 145, extn 50, ER 45 and IR to mid R buttock PROM R shoulder flex 130, abduct 110, ER 35 at 90 degree abduct PROM L shoulder flex 80, abduct 65, IR 50 (to abdomen) and ER -15 from neutral.  
 
      PROM R shoulder flex 160, abduct 140, ER 70 and IR 65 at 90 degree abduct (11/1/18) PROM L shoulder flex 130, abduct 110, ER 30 and IR 50 at 90 degrees abduct (11/1/18) PROM R shoulder flex 165, abduct 160, ER 75 and IR 75 at 90 degree abduct (12/3/18) PROM L shoulder flex 140, abduct 120, ER 45 and IR 60 at 90 degrees abduct (12/3/18) PROM R shoulder flex 160, abduct 150, ER 85, IR 80 at 90 degrees abduct (1/3/19) PROM L shoulder flex 170, abduct 160, ER 70 and IR 70 at 90 degrees abduct (2/12/19) AROM L shoulder flex 155, abduct 115, ER 34 and IR not quite to sacrum (2/12/19) AROM L shoulder flex 160, abduct 115, ER 32  and IR not quite to sacrum,    (2/26/18) Strength:   
      R shoulder 4+ to 5/5 all directions. L shoulder not tested. L shoulder flex 4+ , extn 5,  abduct 4+, IR 4+ and ER 4-  (2/26/19) Neurological Screen: 
      Sensation: light touch intact in B UEs Body Structures Involved: 1. Joints 2. Muscles Body Functions Affected: 1. Neuromusculoskeletal 
2. Movement Related Activities and Participation Affected: 1. General Tasks and Demands 2. Self Care 3. Community, Social and [place] CLINICAL DECISION MAKING:  
Outcome Measure: Tool Used: Disabilities of the Arm, Shoulder and Hand (DASH) Questionnaire - Quick Version Score:  Initial: 55/55   41/55 (Date: 11/1/18 )      45/55 (Date 12/3/18)      43/55 (1/3/19)      40/55 (1/31/19)     Most Recent: 41/55 (2/26/19) Interpretation of Score: The DASH is designed to measure the activities of daily living in person's with upper extremity dysfunction or pain. Each section is scored on a 1-5 scale, 5 representing the greatest disability. The scores of each section are added together for a total score of 55. Medical Necessity:  
· Patient demonstrates good rehab potential due to higher previous functional level. Reason for Services/Other Comments: 
· Patient continues to require skilled intervention due to need to regain use of B UEs.  
  
 
 
 
TREATMENT:  
(In addition to Assessment/Re-Assessment sessions the following treatments were rendered) Pre-treatment Symptoms/Complaints:  Patient reports that she has a hard time getting to the 4:45 appointments. She goes back to [place] tomorrow. Pain: Initial:  
Pain Intensity 1: 5   Post Session: patient did not report any change in pain Therapeutic Exercise: (10 Minutes):  Exercises to improve mobility and strength. Required minimal visual and verbal cues to ensure correct performance. AIS 3 sec x 10 each for L shoulder flex, abduct, ER at 45 and 90 degree abduct, IR at 90 degree abduct and at 90 degree flex, horizontal adduct, D1 and D2  Diagonals - all in supine. Date 1/14/19 Date 1/23/19 Date 1/31/19  Date 2/14/19 Date 2/18/19 Date 2/25/19 Activity/Exercise B serratus punch 4# B 2x15 4# 2x15 4# 2x15 4# 2x15 - 4# 2x15 B bicep curls 5# 2x15 5# 2x15 5# 2x15 5# 2x15 - -  
IR with band  Green 2x15 Green 2x15 Green 2x15 Green 2x15 Green 2x15 Green 2x15 ER with band  Red 2x15 Red 2x15 Red 2x15 Red 2x15 Red 2x15 Red 2x15 Side lying abduct 3# 2x15 3# 2x15 3# 2x15 3# 2x15 - 3# 2x15 Side lying ER 3# 2x15 3# 2x15 3# 2x15 3# 2x15 - 3# 2x15 Wall push ups Rail 2x10 Rail 2x15 Rail 2x15 Rail 2x10 [address] B Lat pull downs 10# 1x15 13# 2x15 13# 2x15 13# 2x15 13# 2x15 13# 2x15 B Cable rows 10# 2x10 13# 2x15 13# 2x15 13# 2x15 13# 2x15 13# 2x15 B cable press 10# 2x15 10# 2x15 10# 2x15 10# 2x15 10# 2x15 10# 2x15 B shldr flex with symmetry 2# 2x10 2# 2x10 - 2# 2x10 - 2# 2x10 B shldr abduct to <= 90 2# 2x10 2# 2x10 - 2# 2x10 - 2# 2x10 Rows with band Black 2x15 Black 2x15 Black 2x15 Black 2x15 - -  
B shldr extn with band Black 2x15 Black 2x15 Black 2x15 Black 2x15 - -  
D1/D2 diagonals on wall 2x10 ea 2x10 ea 2x10 ea 1# 2x10 1# 2x15 1# 2x10 ea Pass behind back  2x10 - 2x10 2x10 2x15 HEP - continue current HEP Manual Therapy (  20 minutes) -  Grade 2 to 4 physio mobs L shoulder flex, abduct, IR and ER at multiple angles. Grade 3 to 4 inferior and posterior shoulder glides. Contract/relax at end range IR, ER and flex. Therapeutic Modalities:    Patient took ice with her at end of session. .  
Treatment/Session Assessment:   
 
 
· Response to Treatment:  Patent with no increased pain reported. See full assessment above. · Compliance with Program/Exercises: yes · Recommendations/Intent for next treatment session: \"Next visit will focus on motion and strength in L shoulder . \". Total Treatment Duration: 30 minutes PT Patient Time In/Time Out Time In: 7844 Time Out: 6938 [facility], PT

## 2019-03-05 ENCOUNTER — HOSPITAL ENCOUNTER (OUTPATIENT)
Dept: PHYSICAL THERAPY | Age: 54
Discharge: HOME | End: 2019-03-05
Payer: COMMERCIAL

## 2019-03-05 NOTE — PROGRESS NOTES
Donovan Tapiabird  : 1965  Primary: [facility]*  Secondary:  [facility]rapy Center at [facility] ARTIE BRUMFIELD[address], [address], [address] [facility]  [phone]   [phone]          OUTPATIENT PHYSICAL Som Gresham  65. 3/5/2019   ICD-10: Treatment Diagnosis:  M75.01 Adhesive capsulitis of right shoulder, M75.02 Adhesive capsulitis of left shoulder, M19.012 Primary osteoarthritis, left shoulder, Pain in left shoulder (M25.512), Pain in right shoulder (M25.511)  Precautions/Allergies: NKDA      Fall Risk Score: 0 (? 5 = High Risk)  MD Orders: Eval and treat, HEP, ROM, strengthening, aggressive/all modalities  3x/wk for 6 weeks (written 19)      MEDICAL/REFERRING DIAGNOSIS:  L shoulder    DATE OF ONSET: 10/4/18, EUA of B shoulders on 18  REFERRING PHYSICIAN: Tyrell Jackson MD  RETURN PHYSICIAN APPOINTMENT: 4/10/19   Patient called to cancel scheduled appointment due to jury duty.

## 2019-03-06 ENCOUNTER — APPOINTMENT (OUTPATIENT)
Dept: PHYSICAL THERAPY | Age: 54
End: 2019-03-06
Payer: COMMERCIAL

## 2019-03-06 ENCOUNTER — HOSPITAL ENCOUNTER (OUTPATIENT)
Dept: PHYSICAL THERAPY | Age: 54
Discharge: HOME | End: 2019-03-06
Payer: COMMERCIAL

## 2019-03-06 PROCEDURE — 97110 THERAPEUTIC EXERCISES: CPT

## 2019-03-06 PROCEDURE — 97140 MANUAL THERAPY 1/> REGIONS: CPT

## 2019-03-06 NOTE — PROGRESS NOTES
Christos Del Castillo  : 1965  Primary: [facility] Andre Lancaster*  [facility]:  [facility], [address], [place] [address].  [phone]   [phone]          OUTPATIENT PHYSICAL THERAPY:Daily Note 3/6/2019   ICD-10: Treatment Diagnosis:  M75.01 Adhesive capsulitis of right shoulder, M75.02 Adhesive capsulitis of left shoulder, M19.012 Primary osteoarthritis, left shoulder, Pain in left shoulder (M25.512), Pain in right shoulder (M25.511)  Precautions/Allergies: NKDA      Fall Risk Score: 0 (? 5 = High Risk)  MD Orders: Eval and treat, HEP, ROM, strengthening, aggressive/all modalities  3x/wk for 6 weeks (written 19)      MEDICAL/REFERRING DIAGNOSIS:  L shoulder    DATE OF ONSET: 10/4/18, EUA of B shoulders on 18  REFERRING PHYSICIAN: Paulie Dai MD  RETURN PHYSICIAN APPOINTMENT: 4/10/19     ASSESSMENT ( 19) :  Ms. Ele Stallworth  Is a 48year old R hand dominant female s/p EUA and manipulation of R shoulder, EUA and manipulation of L shoulder and L SAD, ADCR, and LOREN on 10/4/18. She presented with shoulder pain, decreased ROM in B shoulders, decreased strength in L shoulder and decreased functional use of B shoulders. Her ROM was improving in both arms though she continued to have pain. Her DASH score was improved. Both shoulders were then manipulated under anesthesia on 18. She continues to have pain in shoulder but has returned to work on (19). Her ROM and strength are gradually improving, but she does not have full strength and is specifically weak in shoulder ER. DASH score has changed very little lately. She could benefit from continued PT to address deficits and work toward goals. PROBLEM LIST (Impacting functional limitations):  1. Decreased Strength  2. Decreased ADL/Functional Activities  3. Increased Pain  4. Decreased Flexibility/Joint Mobility INTERVENTIONS PLANNED:  1. Home Exercise Program (HEP)  2.  Manual Therapy  3. Therapeutic Exercise/Strengthening  4. modals as needed   TREATMENT PLAN:  Effective Dates: 3/4/2019 TO 5/3/2019 (60 days). Frequency/Duration: 2-3 times a week for 60 Days  GOALS: (Goals have been discussed and agreed upon with patient.)  Patient's stated goal is to be able to use her arms. Short-Term Functional Goals: Time Frame: 6 weeks  1. Patient to be independent with HEP - MET  2. Patient to increase PROM L shoulder flex to 135 and PROM L shoulder ER to 30 degrees   MET (12/3/18)     3. Patient to report decrease in pain level to 5/10. - MET (2/26/19)  Discharge Goals: Time Frame: 60 days - all in progress  1. Patient to report no more than minimal shoulder pain with all activity -Not MET yet  2. Patient to improve DASH score to 21 to demo improved use of B UEs  Not MET yet  3. Patient to improve AROM B shoulder flex to 150 degrees for improved functional use. MET for L, R not tested   Rehabilitation Potential For Stated Goals: Good                  The information in this section was collected on 10/9/18 (except where otherwise noted). HISTORY:   History of Present Injury/Illness (Reason for Referral):  Patient reports that shoulder pain started last year. She went to her family doctor and was sent to PT. They tried dry needling and MD injected her. She didn't get better and requested a second opinion. She was sent to Dr. Deandre Medrano and an MRI was done, and then she had surgery on 10/4/18. She was told to leave the dressings on until she returned to MD.   She subsequently had B shoulder manipulations on 11/30/18. Past Medical History/Comorbidities: OA,  Benign hypertension with CKD (chronic kidney disease) stage III ([address] Diabetes ([address] Dyslipidemia; Endometrial cancer ([address] and Morbid obesity ([address] knee arthroscopy (Right, 2015); appendectomy (1979), L ankle surgery (2007) L index finger surgery (2005)  Social History/Living Environment:     Lives with family in one story home.    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 Dominant Side:         RIGHT  Previous Treatment Approaches:          PT prior to surgery  Current Medications:  Glipizide, lisinopril, Tylenol, Meloxicam   Date Last Reviewed:  3/6/19   EXAMINATION:   Observation/Orthostatic Postural Assessment:          No new    ROM:          AROM R shoulder flex 135, Abduct 145, extn 50, ER 45 and IR to mid R buttock        PROM R shoulder flex 130, abduct 110, ER 35 at 90 degree abduct         PROM L shoulder flex 80, abduct 65, IR 50 (to abdomen) and ER -15 from neutral.           PROM R shoulder flex 160, abduct 140, ER 70 and IR 65 at 90 degree abduct (11/1/18)        PROM L shoulder flex 130, abduct 110, ER 30 and IR 50 at 90 degrees abduct (11/1/18)          PROM R shoulder flex 165, abduct 160, ER 75 and IR 75 at 90 degree abduct (12/3/18)        PROM L shoulder flex 140, abduct 120, ER 45 and IR 60 at 90 degrees abduct (12/3/18)          PROM R shoulder flex 160, abduct 150, ER 85, IR 80 at 90 degrees abduct (1/3/19)          PROM L shoulder flex 170, abduct 160, ER 70 and IR 70 at 90 degrees abduct (2/12/19)        AROM L shoulder flex 155, abduct 115, ER 34 and IR not quite to sacrum (2/12/19)          AROM L shoulder flex 160, abduct 115, ER 32  and IR not quite to sacrum,    (2/26/18)    Strength:          R shoulder 4+ to 5/5 all directions. L shoulder not tested. L shoulder flex 4+ , extn 5,  abduct 4+, IR 4+ and ER 4-  (2/26/19)  Neurological Screen:        Sensation: light touch intact in B UEs   Body Structures Involved:  1. Joints  2. Muscles Body Functions Affected:  1. Neuromusculoskeletal  2. Movement Related Activities and Participation Affected:  1. General Tasks and Demands  2. Self Care  3. Community, Social and Civic Life   CLINICAL DECISION MAKING:   Outcome Measure:    Tool Used: Disabilities of the Arm, Shoulder and Hand (DASH) Questionnaire - Quick Version  Score:  Initial: 55/55   41/55 (Date: 11/1/18 )      45/55 (Date 12/3/18)      43/55 (1/3/19)      40/55 (1/31/19)     Most Recent: 41/55 (2/26/19)    Interpretation of Score: The DASH is designed to measure the activities of daily living in person's with upper extremity dysfunction or pain. Each section is scored on a 1-5 scale, 5 representing the greatest disability. The scores of each section are added together for a total score of 55. Medical Necessity:   · Patient demonstrates good rehab potential due to higher previous functional level. Reason for Services/Other Comments:  · Patient continues to require skilled intervention due to need to regain use of B UEs.            TREATMENT:   (In addition to Assessment/Re-Assessment sessions the following treatments were rendered)  Pre-treatment Symptoms/Complaints:  Patient reports that she returned to MD and he ordered continued PT. He wrote a note for her to only work 40 hours each week, but work does not want to accept it. Pain: Initial:   Pain Intensity 1: 7   Post Session: patient reports 'no change' in pain levels today     Therapeutic Exercise: (10 Minutes):  Exercises to improve mobility and strength. Required minimal visual and verbal cues to ensure correct performance. AIS 3 sec x 10 each for L shoulder flex, abduct, ER at 45 and 90 degree abduct, IR at 90 degree abduct and at 90 degree flex, horizontal adduct, D1 and D2  Diagonals - all in supine.          Date  1/14/19 Date  1/23/19 Date  1/31/19  Date  2/14/19 Date  2/18/19 Date  2/25/19 Date  3/6/19   Activity/Exercise          B serratus punch 4# B 2x15 4# 2x15 4# 2x15 4# 2x15 - 4# 2x15    B bicep curls 5# 2x15 5# 2x15 5# 2x15 5# 2x15 - -    IR with band  Green 2x15 Green 2x15 Green 2x15 Green 2x15 Green 2x15 Green 2x15    ER with band  Red 2x15 Red 2x15 Red 2x15 Red 2x15 Red 2x15 Red 2x15    Side lying abduct 3# 2x15 3# 2x15 3# 2x15 3# 2x15 - 3# 2x15    Side lying ER 3# 2x15 3# 2x15 3# 2x15 3# 2x15 - 3# 2x15    Wall push ups Rail 2x10 Rail 2x15 Rail 2x15 Rail 2x10 Rail 2x15 Rail 2x15    B Lat pull downs 10# 1x15 13# 2x15 13# 2x15 13# 2x15 13# 2x15 13# 2x15    B Cable rows 10# 2x10 13# 2x15 13# 2x15 13# 2x15 13# 2x15 13# 2x15    B cable press 10# 2x15 10# 2x15 10# 2x15 10# 2x15 10# 2x15 10# 2x15    B shldr flex with symmetry 2# 2x10 2# 2x10 - 2# 2x10 - 2# 2x10    B shldr abduct to <= 90 2# 2x10 2# 2x10 - 2# 2x10 - 2# 2x10    Rows with band Black 2x15 Black 2x15 Black 2x15 Black 2x15 - -    B shldr extn with band Black 2x15 Black 2x15 Black 2x15 Black 2x15 - -    D1/D2 diagonals on wall 2x10 ea 2x10 ea 2x10 ea 1# 2x10 1# 2x15 1# 2x10 ea    Pass behind back  2x10 - 2x10 2x10 2x15        HEP - continue current HEP  Manual Therapy ( 30 minutes) -  Grade 2 to 4 physio mobs L shoulder flex, abduct, IR and ER at multiple angles. Grade 3 to 4 inferior and posterior shoulder glides. Contract/relax at end range IR, ER and flexion. Therapeutic Modalities:    Patient took ice with her at end of session. Left Shoulder Heat  Type: Moist pack  Duration : 20 minutes  Patient Position: Supine                                                                                     . Treatment/Session Assessment:        · Response to Treatment:  Patent was initially stiff today after not having PT in a week. Focused on ROM per MD orders to be aggressive with ROM. · Compliance with Program/Exercises: yes  · Recommendations/Intent for next treatment session: \"Next visit will focus on motion and strength in L shoulder . \".          Total Treatment Duration: 40 minutes    PT Patient Time In/Time Out  Time In: 1700  Time Out: [address]

## 2019-03-11 ENCOUNTER — HOSPITAL ENCOUNTER (OUTPATIENT)
Dept: PHYSICAL THERAPY | Age: 54
Discharge: HOME | End: 2019-03-11
Payer: COMMERCIAL

## 2019-03-11 PROCEDURE — 97140 MANUAL THERAPY 1/> REGIONS: CPT

## 2019-03-11 PROCEDURE — 97110 THERAPEUTIC EXERCISES: CPT

## 2019-03-11 NOTE — PROGRESS NOTES
Siria Peralta  : 1965  Primary: [facility]*  Secondary:  [facility]  [address] [place] [address], [address], [address] [facility]  [phone]   [phone]          OUTPATIENT PHYSICAL THERAPY:Daily Note 3/11/2019   ICD-10: Treatment Diagnosis:  M75.01 Adhesive capsulitis of right shoulder, M75.02 Adhesive capsulitis of left shoulder, M19.012 Primary osteoarthritis, left shoulder, Pain in left shoulder (M25.512), Pain in right shoulder (M25.511)  Precautions/Allergies: NKDA      Fall Risk Score: 0 (? 5 = High Risk)  MD Orders: Eval and treat, HEP, ROM, strengthening, aggressive/all modalities  3x/wk for 6 weeks (written 19)      MEDICAL/REFERRING DIAGNOSIS:  L shoulder    DATE OF ONSET: 10/4/18, EUA of B shoulders on 18  REFERRING PHYSICIAN: Sindhu Collins MD  RETURN PHYSICIAN APPOINTMENT: 4/10/19     ASSESSMENT ( 19) :  Ms. Candy Vaz  Is a 48year old R hand dominant female s/p EUA and manipulation of R shoulder, EUA and manipulation of L shoulder and L SAD, ADCR, and LOREN on 10/4/18. She presented with shoulder pain, decreased ROM in B shoulders, decreased strength in L shoulder and decreased functional use of B shoulders. Her ROM was improving in both arms though she continued to have pain. Her DASH score was improved. Both shoulders were then manipulated under anesthesia on 18. She continues to have pain in shoulder but has returned to work on (19). Her ROM and strength are gradually improving, but she does not have full strength and is specifically weak in shoulder ER. DASH score has changed very little lately. She could benefit from continued PT to address deficits and work toward goals. PROBLEM LIST (Impacting functional limitations):  1. Decreased Strength  2. Decreased ADL/Functional Activities  3. Increased Pain  4. Decreased Flexibility/Joint Mobility INTERVENTIONS PLANNED:  1. Home Exercise Program (HEP)  2.  Manual Therapy  3. Therapeutic Exercise/Strengthening  4. modals as needed   TREATMENT PLAN:  Effective Dates: 3/4/2019 TO 5/3/2019 (60 days). Frequency/Duration: 2-3 times a week for 60 Days  GOALS: (Goals have been discussed and agreed upon with patient.)  Patient's stated goal is to be able to use her arms. Short-Term Functional Goals: Time Frame: 6 weeks  1. Patient to be independent with HEP - MET  2. Patient to increase PROM L shoulder flex to 135 and PROM L shoulder ER to 30 degrees   MET (12/3/18)     3. Patient to report decrease in pain level to 5/10. - MET (2/26/19)  Discharge Goals: Time Frame: 60 days - all in progress  1. Patient to report no more than minimal shoulder pain with all activity -Not MET yet  2. Patient to improve DASH score to 21 to demo improved use of B UEs  Not MET yet  3. Patient to improve AROM B shoulder flex to 150 degrees for improved functional use. MET for L, R not tested   Rehabilitation Potential For Stated Goals: Good                  The information in this section was collected on 10/9/18 (except where otherwise noted). HISTORY:   History of Present Injury/Illness (Reason for Referral):  Patient reports that shoulder pain started last year. She went to her family doctor and was sent to PT. They tried dry needling and MD injected her. She didn't get better and requested a second opinion. She was sent to Dr. Constantino Michaud and an MRI was done, and then she had surgery on 10/4/18. She was told to leave the dressings on until she returned to MD.   She subsequently had B shoulder manipulations on 11/30/18. Past Medical History/Comorbidities: OA,  Benign hypertension with CKD (chronic kidney disease) stage III ([address] Diabetes ([address] Dyslipidemia; Endometrial cancer ([address] and Morbid obesity ([address] knee arthroscopy (Right, 2015); appendectomy (1979), L ankle surgery (2007) L index finger surgery (2005)  Social History/Living Environment:     Lives with family in one story home.    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 Dominant Side:         RIGHT  Previous Treatment Approaches:          PT prior to surgery  Current Medications:  Glipizide, lisinopril, Tylenol, Meloxicam   Date Last Reviewed:  3/11/19   EXAMINATION:   Observation/Orthostatic Postural Assessment:          No new    ROM:          AROM R shoulder flex 135, Abduct 145, extn 50, ER 45 and IR to mid R buttock        PROM R shoulder flex 130, abduct 110, ER 35 at 90 degree abduct         PROM L shoulder flex 80, abduct 65, IR 50 (to abdomen) and ER -15 from neutral.           PROM R shoulder flex 160, abduct 140, ER 70 and IR 65 at 90 degree abduct (11/1/18)        PROM L shoulder flex 130, abduct 110, ER 30 and IR 50 at 90 degrees abduct (11/1/18)          PROM R shoulder flex 165, abduct 160, ER 75 and IR 75 at 90 degree abduct (12/3/18)        PROM L shoulder flex 140, abduct 120, ER 45 and IR 60 at 90 degrees abduct (12/3/18)          PROM R shoulder flex 160, abduct 150, ER 85, IR 80 at 90 degrees abduct (1/3/19)          PROM L shoulder flex 170, abduct 160, ER 70 and IR 70 at 90 degrees abduct (2/12/19)        AROM L shoulder flex 155, abduct 115, ER 34 and IR not quite to sacrum (2/12/19)          AROM L shoulder flex 160, abduct 115, ER 32  and IR not quite to sacrum,    (2/26/18)    Strength:          R shoulder 4+ to 5/5 all directions. L shoulder not tested. L shoulder flex 4+ , extn 5,  abduct 4+, IR 4+ and ER 4-  (2/26/19)  Neurological Screen:        Sensation: light touch intact in B UEs   Body Structures Involved:  1. Joints  2. Muscles Body Functions Affected:  1. Neuromusculoskeletal  2. Movement Related Activities and Participation Affected:  1. General Tasks and Demands  2. Self Care  3. Community, Social and Civic Life   CLINICAL DECISION MAKING:   Outcome Measure:    Tool Used: Disabilities of the Arm, Shoulder and Hand (DASH) Questionnaire - Quick Version  Score:  Initial: 55/55   41/55 (Date: 11/1/18 )      45/55 (Date 12/3/18)      43/55 (1/3/19)      40/55 (1/31/19)     Most Recent: 41/55 (2/26/19)    Interpretation of Score: The DASH is designed to measure the activities of daily living in person's with upper extremity dysfunction or pain. Each section is scored on a 1-5 scale, 5 representing the greatest disability. The scores of each section are added together for a total score of 55. Medical Necessity:   · Patient demonstrates good rehab potential due to higher previous functional level. Reason for Services/Other Comments:  · Patient continues to require skilled intervention due to need to regain use of B UEs.            TREATMENT:   (In addition to Assessment/Re-Assessment sessions the following treatments were rendered)  Pre-treatment Symptoms/Complaints:  Patient reports that she continues to have a lot of pain in her shoulder. She feels stiff and sore from working all day. Pain: Initial:   Pain Intensity 1: 7   Post Session: patient reports that pain stayed at 7. Therapeutic Exercise: (10 Minutes):  Exercises to improve mobility and strength. Required minimal visual and verbal cues to ensure correct performance. AIS 3 sec x 10 each for L shoulder flex, abduct, ER at 45 and 90 degree abduct, IR at 90 degree abduct and at 90 degree flex, horizontal adduct, D1 and D2  Diagonals - all in supine.          Date  1/14/19 Date  1/23/19 Date  1/31/19  Date  2/14/19 Date  2/18/19 Date  2/25/19 Date      Activity/Exercise          B serratus punch 4# B 2x15 4# 2x15 4# 2x15 4# 2x15 - 4# 2x15    B bicep curls 5# 2x15 5# 2x15 5# 2x15 5# 2x15 - -    IR with band  Green 2x15 Green 2x15 Green 2x15 Green 2x15 Green 2x15 Green 2x15    ER with band  Red 2x15 Red 2x15 Red 2x15 Red 2x15 Red 2x15 Red 2x15    Side lying abduct 3# 2x15 3# 2x15 3# 2x15 3# 2x15 - 3# 2x15    Side lying ER 3# 2x15 3# 2x15 3# 2x15 3# 2x15 - 3# 2x15 Wall push ups Rail 2x10 Rail 2x15 Rail 2x15 Rail 2x10 Rail 2x15 Rail 2x15    B Lat pull downs 10# 1x15 13# 2x15 13# 2x15 13# 2x15 13# 2x15 13# 2x15    B Cable rows 10# 2x10 13# 2x15 13# 2x15 13# 2x15 13# 2x15 13# 2x15    B cable press 10# 2x15 10# 2x15 10# 2x15 10# 2x15 10# 2x15 10# 2x15    B shldr flex with symmetry 2# 2x10 2# 2x10 - 2# 2x10 - 2# 2x10    B shldr abduct to <= 90 2# 2x10 2# 2x10 - 2# 2x10 - 2# 2x10    Rows with band Black 2x15 Black 2x15 Black 2x15 Black 2x15 - -    B shldr extn with band Black 2x15 Black 2x15 Black 2x15 Black 2x15 - -    D1/D2 diagonals on wall 2x10 ea 2x10 ea 2x10 ea 1# 2x10 1# 2x15 1# 2x10 ea    Pass behind back  2x10 - 2x10 2x10 2x15        HEP - continue current HEP  Manual Therapy ( 30 minutes) -  Grade 2 to 4 physio mobs L shoulder flex, abduct, IR and ER at multiple angles. Grade 3 to 4 inferior and posterior shoulder glides. Contract/relax at end range IR, ER and flexion. Therapeutic Modalities:    Patient took ice with her at end of session. Left Shoulder Heat  Type: Moist pack  Duration : 15 minutes  Patient Position: Supine                                                                                     . Treatment/Session Assessment:        · Response to Treatment:  Patent was sore today after work. Requested to work on motion today and resume strengthening tomorrow. · Compliance with Program/Exercises: yes  · Recommendations/Intent for next treatment session: \"Next visit will focus on motion and strength in L shoulder . \".          Total Treatment Duration: 40 minutes    PT Patient Time In/Time Out  Time In: 5271  Time Out: 12[address] [facility]

## 2019-03-12 ENCOUNTER — HOSPITAL ENCOUNTER (OUTPATIENT)
Dept: PHYSICAL THERAPY | Age: 54
Discharge: HOME | End: 2019-03-12
Payer: COMMERCIAL

## 2019-03-12 PROCEDURE — 97110 THERAPEUTIC EXERCISES: CPT

## 2019-03-12 NOTE — PROGRESS NOTES
Gabi Alvarenga  : 1965  Primary: [facility]  Secondary:  [facility]rapy Center at [address] [place] [address] Rd  [address] [facility], [address], [place] [address].  [phone]   [phone]          OUTPATIENT PHYSICAL THERAPY:Daily Note 3/12/2019   ICD-10: Treatment Diagnosis:  M75.01 Adhesive capsulitis of right shoulder, M75.02 Adhesive capsulitis of left shoulder, M19.012 Primary osteoarthritis, left shoulder, Pain in left shoulder (M25.512), Pain in right shoulder (M25.511)  Precautions/Allergies: NKDA      Fall Risk Score: 0 (? 5 = High Risk)  MD Orders: Eval and treat, HEP, ROM, strengthening, aggressive/all modalities  3x/wk for 6 weeks (written 19)      MEDICAL/REFERRING DIAGNOSIS:  L shoulder    DATE OF ONSET: 10/4/18, EUA of B shoulders on 18  REFERRING PHYSICIAN: Sindi Gallego., MD  RETURN PHYSICIAN APPOINTMENT: 4/10/19     ASSESSMENT ( 19) :  Ms. Juan Antonio Mendoza  Is a 48year old R hand dominant female s/p EUA and manipulation of R shoulder, EUA and manipulation of L shoulder and L SAD, ADCR, and LOREN on 10/4/18. She presented with shoulder pain, decreased ROM in B shoulders, decreased strength in L shoulder and decreased functional use of B shoulders. Her ROM was improving in both arms though she continued to have pain. Her DASH score was improved. Both shoulders were then manipulated under anesthesia on 18. She continues to have pain in shoulder but has returned to work on (19). Her ROM and strength are gradually improving, but she does not have full strength and is specifically weak in shoulder ER. DASH score has changed very little lately. She could benefit from continued PT to address deficits and work toward goals. PROBLEM LIST (Impacting functional limitations):  1. Decreased Strength  2. Decreased ADL/Functional Activities  3. Increased Pain  4. Decreased Flexibility/Joint Mobility INTERVENTIONS PLANNED:  1. Home Exercise Program (HEP)  2.  Manual Therapy  3. Therapeutic Exercise/Strengthening  4. modals as needed   TREATMENT PLAN:  Effective Dates: 3/4/2019 TO 5/3/2019 (60 days). Frequency/Duration: 2-3 times a week for 60 Days  GOALS: (Goals have been discussed and agreed upon with patient.)  Patient's stated goal is to be able to use her arms. Short-Term Functional Goals: Time Frame: 6 weeks  1. Patient to be independent with HEP - MET  2. Patient to increase PROM L shoulder flex to 135 and PROM L shoulder ER to 30 degrees   MET (12/3/18)     3. Patient to report decrease in pain level to 5/10. - MET (2/26/19)  Discharge Goals: Time Frame: 60 days - all in progress  1. Patient to report no more than minimal shoulder pain with all activity -Not MET yet  2. Patient to improve DASH score to 21 to demo improved use of B UEs  Not MET yet  3. Patient to improve AROM B shoulder flex to 150 degrees for improved functional use. MET for L, R not tested   Rehabilitation Potential For Stated Goals: Good                  The information in this section was collected on 10/9/18 (except where otherwise noted). HISTORY:   History of Present Injury/Illness (Reason for Referral):  Patient reports that shoulder pain started last year. She went to her family doctor and was sent to PT. They tried dry needling and MD injected her. She didn't get better and requested a second opinion. She was sent to Dr. Brigido Madison and an MRI was done, and then she had surgery on 10/4/18. She was told to leave the dressings on until she returned to MD.   She subsequently had B shoulder manipulations on 11/30/18. Past Medical History/Comorbidities: OA,  Benign hypertension with CKD (chronic kidney disease) stage III ([address] Diabetes ([address] Dyslipidemia; Endometrial cancer ([address] and Morbid obesity ([address] knee arthroscopy (Right, 2015); appendectomy (1979), L ankle surgery (2007) L index finger surgery (2005)  Social History/Living Environment:     Lives with family in one story home.    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 Dominant Side:         RIGHT  Previous Treatment Approaches:          PT prior to surgery  Current Medications:  Glipizide, lisinopril, Tylenol, Meloxicam   Date Last Reviewed:  3/11/19   EXAMINATION:   Observation/Orthostatic Postural Assessment:          No new    ROM:          AROM R shoulder flex 135, Abduct 145, extn 50, ER 45 and IR to mid R buttock        PROM R shoulder flex 130, abduct 110, ER 35 at 90 degree abduct         PROM L shoulder flex 80, abduct 65, IR 50 (to abdomen) and ER -15 from neutral.           PROM R shoulder flex 160, abduct 140, ER 70 and IR 65 at 90 degree abduct (11/1/18)        PROM L shoulder flex 130, abduct 110, ER 30 and IR 50 at 90 degrees abduct (11/1/18)          PROM R shoulder flex 165, abduct 160, ER 75 and IR 75 at 90 degree abduct (12/3/18)        PROM L shoulder flex 140, abduct 120, ER 45 and IR 60 at 90 degrees abduct (12/3/18)          PROM R shoulder flex 160, abduct 150, ER 85, IR 80 at 90 degrees abduct (1/3/19)          PROM L shoulder flex 170, abduct 160, ER 70 and IR 70 at 90 degrees abduct (2/12/19)        AROM L shoulder flex 155, abduct 115, ER 34 and IR not quite to sacrum (2/12/19)          AROM L shoulder flex 160, abduct 115, ER 32  and IR not quite to sacrum,    (2/26/18)    Strength:          R shoulder 4+ to 5/5 all directions. L shoulder not tested. L shoulder flex 4+ , extn 5,  abduct 4+, IR 4+ and ER 4-  (2/26/19)  Neurological Screen:        Sensation: light touch intact in B UEs   Body Structures Involved:  1. Joints  2. Muscles Body Functions Affected:  1. Neuromusculoskeletal  2. Movement Related Activities and Participation Affected:  1. General Tasks and Demands  2. Self Care  3. Community, Social and Civic Life   CLINICAL DECISION MAKING:   Outcome Measure:    Tool Used: Disabilities of the Arm, Shoulder and Hand (DASH) Questionnaire - Quick Version  Score:  Initial: 55/55   41/55 (Date: 11/1/18 )      45/55 (Date 12/3/18)      43/55 (1/3/19)      40/55 (1/31/19)     Most Recent: 41/55 (2/26/19)    Interpretation of Score: The DASH is designed to measure the activities of daily living in person's with upper extremity dysfunction or pain. Each section is scored on a 1-5 scale, 5 representing the greatest disability. The scores of each section are added together for a total score of 55. Medical Necessity:   · Patient demonstrates good rehab potential due to higher previous functional level. Reason for Services/Other Comments:  · Patient continues to require skilled intervention due to need to regain use of B UEs.            TREATMENT:   (In addition to Assessment/Re-Assessment sessions the following treatments were rendered)  Pre-treatment Symptoms/Complaints:  Patient reports that pain level is about the same as yesterday. No changes. Pain: Initial:   Pain Intensity 1: 7   Post Session: patient reports that shoulder feels better, but did not quantify pain level. Therapeutic Exercise: (40 Minutes):  Exercises to improve mobility and strength. Required minimal visual and verbal cues to ensure correct performance. Progressed resistance and complexity as indicated  AIS 3 sec x 10 each for L shoulder flex, abduct, ER at 45 and 90 degree abduct, IR at 90 degree abduct and at 90 degree flex, horizontal adduct, D1 and D2  Diagonals - all in supine.          Date  1/14/19 Date  1/23/19 Date  1/31/19  Date  2/14/19 Date  2/18/19 Date  2/25/19 Date  3/12/19    Activity/Exercise          B serratus punch 4# B 2x15 4# 2x15 4# 2x15 4# 2x15 - 4# 2x15 5# 2x15   B bicep curls 5# 2x15 5# 2x15 5# 2x15 5# 2x15 - - -   IR with band  Green 2x15 Green 2x15 Green 2x15 Green 2x15 Green 2x15 Green 2x15 Green 2x15   ER with band  Red 2x15 Red 2x15 Red 2x15 Red 2x15 Red 2x15 Red 2x15 Red 2x15   Side lying abduct 3# 2x15 3# 2x15 3# 2x15 3# 2x15 - 3# 2x15 3# 2x15   Side lying ER 3# 2x15 3# 2x15 3# 2x15 3# 2x15 - 3# 2x15 3# 2x15   Wall push ups Rail 2x10 Rail 2x15 Rail 2x15 Rail 2x10 Rail 2x15 Rail 2x15 Rail 2x15   B Lat pull downs 10# 1x15 13# 2x15 13# 2x15 13# 2x15 13# 2x15 13# 2x15 17# 2x15   B Cable rows 10# 2x10 13# 2x15 13# 2x15 13# 2x15 13# 2x15 13# 2x15 17# 2x15   B cable press 10# 2x15 10# 2x15 10# 2x15 10# 2x15 10# 2x15 10# 2x15 10# 2x15   B shldr flex with symmetry 2# 2x10 2# 2x10 - 2# 2x10 - 2# 2x10 2# 2x10   B shldr abduct to <= 90 2# 2x10 2# 2x10 - 2# 2x10 - 2# 2x10 2# 2x10   Rows with band Black 2x15 Black 2x15 Black 2x15 Black 2x15 - -    B shldr extn with band Black 2x15 Black 2x15 Black 2x15 Black 2x15 - -    D1/D2 diagonals on wall 2x10 ea 2x10 ea 2x10 ea 1# 2x10 1# 2x15 1# 2x10 ea 1# 2x10 ea   Pass behind back  2x10 - 2x10 2x10 2x15 2x15   T's with band       Red 2x15   Bent over rows       8# 2x15   HEP - continue current HEP  Manual Therapy ( 0 minutes) -  None today    Therapeutic Modalities: For pain. Game Ready device at 42 degrees and light compression                         Left Shoulder Cold  Type: Cryocuff(with vasoneumatic compression)  Duration : 10 minutes  Patient Position: Sitting                                                                      . Treatment/Session Assessment:        · Response to Treatment:  Patent was able to perform exercises as asked. Had some difficulty with increased weight on cable exercises, but was able to perform them. · Compliance with Program/Exercises: yes  · Recommendations/Intent for next treatment session: \"Next visit will focus on motion and strength in L shoulder . \".        Total Treatment Duration: 50 minutes    PT Patient Time In/Time Out  Time In: 1653  Time Out: [address] Yumiko Marin

## 2019-03-14 ENCOUNTER — HOSPITAL ENCOUNTER (OUTPATIENT)
Dept: PHYSICAL THERAPY | Age: 54
Discharge: HOME | End: 2019-03-14
Payer: COMMERCIAL

## 2019-03-14 PROCEDURE — 97110 THERAPEUTIC EXERCISES: CPT

## 2019-03-14 PROCEDURE — 97140 MANUAL THERAPY 1/> REGIONS: CPT

## 2019-03-14 NOTE — PROGRESS NOTES
Kayden Vitale  : 1965  Primary: [facility]*  [facility]:  [facility], [address], [place] [address].  [phone]   [phone]          OUTPATIENT PHYSICAL THERAPY:Daily Note 3/14/2019   ICD-10: Treatment Diagnosis:  M75.01 Adhesive capsulitis of right shoulder, M75.02 Adhesive capsulitis of left shoulder, M19.012 Primary osteoarthritis, left shoulder, Pain in left shoulder (M25.512), Pain in right shoulder (M25.511)  Precautions/Allergies: NKDA      Fall Risk Score: 0 (? 5 = High Risk)  MD Orders: Eval and treat, HEP, ROM, strengthening, aggressive/all modalities  3x/wk for 6 weeks (written 19)      MEDICAL/REFERRING DIAGNOSIS:  L shoulder    DATE OF ONSET: 10/4/18, EUA of B shoulders on 18  REFERRING PHYSICIAN: Alethea Guadarrama MD  RETURN PHYSICIAN APPOINTMENT: 4/10/19     ASSESSMENT ( 19) :  Ms. Farshad Garcia  Is a 48year old R hand dominant female s/p EUA and manipulation of R shoulder, EUA and manipulation of L shoulder and L SAD, ADCR, and LOREN on 10/4/18. She presented with shoulder pain, decreased ROM in B shoulders, decreased strength in L shoulder and decreased functional use of B shoulders. Her ROM was improving in both arms though she continued to have pain. Her DASH score was improved. Both shoulders were then manipulated under anesthesia on 18. She continues to have pain in shoulder but has returned to work on (19). Her ROM and strength are gradually improving, but she does not have full strength and is specifically weak in shoulder ER. DASH score has changed very little lately. She could benefit from continued PT to address deficits and work toward goals. PROBLEM LIST (Impacting functional limitations):  1. Decreased Strength  2. Decreased ADL/Functional Activities  3. Increased Pain  4. Decreased Flexibility/Joint Mobility INTERVENTIONS PLANNED:  1. Home Exercise Program (HEP)  2.  Manual Therapy  3. Therapeutic Exercise/Strengthening  4. modals as needed   TREATMENT PLAN:  Effective Dates: 3/4/2019 TO 5/3/2019 (60 days). Frequency/Duration: 2-3 times a week for 60 Days  GOALS: (Goals have been discussed and agreed upon with patient.)  Patient's stated goal is to be able to use her arms. Short-Term Functional Goals: Time Frame: 6 weeks  1. Patient to be independent with HEP - MET  2. Patient to increase PROM L shoulder flex to 135 and PROM L shoulder ER to 30 degrees   MET (12/3/18)     3. Patient to report decrease in pain level to 5/10. - MET (2/26/19)  Discharge Goals: Time Frame: 60 days - all in progress  1. Patient to report no more than minimal shoulder pain with all activity -Not MET yet  2. Patient to improve DASH score to 21 to demo improved use of B UEs  Not MET yet  3. Patient to improve AROM B shoulder flex to 150 degrees for improved functional use. MET for L, R not tested   Rehabilitation Potential For Stated Goals: Good                  The information in this section was collected on 10/9/18 (except where otherwise noted). HISTORY:   History of Present Injury/Illness (Reason for Referral):  Patient reports that shoulder pain started last year. She went to her family doctor and was sent to PT. They tried dry needling and MD injected her. She didn't get better and requested a second opinion. She was sent to Dr. Pradhan Monday and an MRI was done, and then she had surgery on 10/4/18. She was told to leave the dressings on until she returned to MD.   She subsequently had B shoulder manipulations on 11/30/18. Past Medical History/Comorbidities: OA,  Benign hypertension with CKD (chronic kidney disease) stage III ([address] Diabetes ([address] Dyslipidemia; Endometrial cancer ([address] and Morbid obesity ([address] knee arthroscopy (Right, 2015); appendectomy (1979), L ankle surgery (2007) L index finger surgery (2005)  Social History/Living Environment:     Lives with family in one story home.    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 Dominant Side:         RIGHT  Previous Treatment Approaches:          PT prior to surgery  Current Medications:  Glipizide, lisinopril, Tylenol, Meloxicam   Date Last Reviewed:  3/11/19   EXAMINATION:   Observation/Orthostatic Postural Assessment:          No new    ROM:          AROM R shoulder flex 135, Abduct 145, extn 50, ER 45 and IR to mid R buttock        PROM R shoulder flex 130, abduct 110, ER 35 at 90 degree abduct         PROM L shoulder flex 80, abduct 65, IR 50 (to abdomen) and ER -15 from neutral.           PROM R shoulder flex 160, abduct 140, ER 70 and IR 65 at 90 degree abduct (11/1/18)        PROM L shoulder flex 130, abduct 110, ER 30 and IR 50 at 90 degrees abduct (11/1/18)          PROM R shoulder flex 165, abduct 160, ER 75 and IR 75 at 90 degree abduct (12/3/18)        PROM L shoulder flex 140, abduct 120, ER 45 and IR 60 at 90 degrees abduct (12/3/18)          PROM R shoulder flex 160, abduct 150, ER 85, IR 80 at 90 degrees abduct (1/3/19)          PROM L shoulder flex 170, abduct 160, ER 70 and IR 70 at 90 degrees abduct (2/12/19)        AROM L shoulder flex 155, abduct 115, ER 34 and IR not quite to sacrum (2/12/19)          AROM L shoulder flex 160, abduct 115, ER 32  and IR not quite to sacrum,    (2/26/18)    Strength:          R shoulder 4+ to 5/5 all directions. L shoulder not tested. L shoulder flex 4+ , extn 5,  abduct 4+, IR 4+ and ER 4-  (2/26/19)  Neurological Screen:        Sensation: light touch intact in B UEs   Body Structures Involved:  1. Joints  2. Muscles Body Functions Affected:  1. Neuromusculoskeletal  2. Movement Related Activities and Participation Affected:  1. General Tasks and Demands  2. Self Care  3. Community, Social and Civic Life   CLINICAL DECISION MAKING:   Outcome Measure:    Tool Used: Disabilities of the Arm, Shoulder and Hand (DASH) Questionnaire - Quick Version  Score:  Initial: 55/55   41/55 (Date: 11/1/18 )      45/55 (Date 12/3/18)      43/55 (1/3/19)      40/55 (1/31/19)     Most Recent: 41/55 (2/26/19)    Interpretation of Score: The DASH is designed to measure the activities of daily living in person's with upper extremity dysfunction or pain. Each section is scored on a 1-5 scale, 5 representing the greatest disability. The scores of each section are added together for a total score of 55. Medical Necessity:   · Patient demonstrates good rehab potential due to higher previous functional level. Reason for Services/Other Comments:  · Patient continues to require skilled intervention due to need to regain use of B UEs.            TREATMENT:   (In addition to Assessment/Re-Assessment sessions the following treatments were rendered)  Pre-treatment Symptoms/Complaints:  Patient reports that she worked today. No significant news to report. Pain: Initial:   Pain Intensity 1: 6   Post Session: No change in pain reported by patient. Therapeutic Exercise: (10 Minutes):  Exercises to improve mobility and strength. Required minimal visual and verbal cues to ensure correct performance. AIS 3 sec x 10 each for L shoulder flex, abduct, ER at 45 and 90 degree abduct, IR at 90 degree abduct and at 90 degree flex, horizontal adduct, D1 and D2  Diagonals - all in supine.          Date  1/14/19 Date  1/23/19 Date  1/31/19  Date  2/14/19 Date  2/18/19 Date  2/25/19 Date  3/12/19    Activity/Exercise          B serratus punch 4# B 2x15 4# 2x15 4# 2x15 4# 2x15 - 4# 2x15 5# 2x15   B bicep curls 5# 2x15 5# 2x15 5# 2x15 5# 2x15 - - -   IR with band  Green 2x15 Green 2x15 Green 2x15 Green 2x15 Green 2x15 Green 2x15 Green 2x15   ER with band  Red 2x15 Red 2x15 Red 2x15 Red 2x15 Red 2x15 Red 2x15 Red 2x15   Side lying abduct 3# 2x15 3# 2x15 3# 2x15 3# 2x15 - 3# 2x15 3# 2x15   Side lying ER 3# 2x15 3# 2x15 3# 2x15 3# 2x15 - 3# 2x15 3# 2x15   Wall push ups Rail 2x10 Rail 2x15 Rail 2x15 Rail 2x10 Rail 2x15 Rail 2x15 Rail 2x15   B Lat pull downs 10# 1x15 13# 2x15 13# 2x15 13# 2x15 13# 2x15 13# 2x15 17# 2x15   B Cable rows 10# 2x10 13# 2x15 13# 2x15 13# 2x15 13# 2x15 13# 2x15 17# 2x15   B cable press 10# 2x15 10# 2x15 10# 2x15 10# 2x15 10# 2x15 10# 2x15 10# 2x15   B shldr flex with symmetry 2# 2x10 2# 2x10 - 2# 2x10 - 2# 2x10 2# 2x10   B shldr abduct to <= 90 2# 2x10 2# 2x10 - 2# 2x10 - 2# 2x10 2# 2x10   Rows with band Black 2x15 Black 2x15 Black 2x15 Black 2x15 - -    B shldr extn with band Black 2x15 Black 2x15 Black 2x15 Black 2x15 - -    D1/D2 diagonals on wall 2x10 ea 2x10 ea 2x10 ea 1# 2x10 1# 2x15 1# 2x10 ea 1# 2x10 ea   Pass behind back  2x10 - 2x10 2x10 2x15 2x15   T's with band       Red 2x15   Bent over rows       8# 2x15   HEP - continue current HEP  Manual Therapy (20 minutes) -  Grade 2 to 4 physio mobs L shoulder flex, abduct, IR and ER. Grade 4 inferior and posterior shoulder glides. Contract-relax technique at end range flex, IR and ER. Therapeutic Modalities:    Heat for thermal effects at end of session Patient took ice with her at end of session. Left Shoulder Heat  Type: Moist pack  Duration : 15 minutes  Patient Position: Supine                                                                                     . Treatment/Session Assessment:        · Response to Treatment:  Isac continues to report pain in L shoulder. Did not do exercises today as she had just worked all day, so focused on motion instead. She is still stiff. · Compliance with Program/Exercises: yes  · Recommendations/Intent for next treatment session: \"Next visit will focus on motion and strength in L shoulder . \".          Total Treatment Duration: 30 minutes    PT Patient Time In/Time Out  Time In: 1653  Time Out: [address] [facility]

## 2019-03-18 ENCOUNTER — HOSPITAL ENCOUNTER (OUTPATIENT)
Dept: PHYSICAL THERAPY | Age: 54
Discharge: HOME | End: 2019-03-18
Payer: COMMERCIAL

## 2019-03-18 PROCEDURE — 97110 THERAPEUTIC EXERCISES: CPT

## 2019-03-18 PROCEDURE — 97140 MANUAL THERAPY 1/> REGIONS: CPT

## 2019-03-18 NOTE — PROGRESS NOTES
Ezequiel Guerra  : 1965  Primary: [facility]  Secondary:  [facility] ARTIE BRUMFIELD[address], [address], [address] [facility]  [phone]   [phone]          OUTPATIENT PHYSICAL THERAPY:Daily Note 3/18/2019   ICD-10: Treatment Diagnosis:  M75.01 Adhesive capsulitis of right shoulder, M75.02 Adhesive capsulitis of left shoulder, M19.012 Primary osteoarthritis, left shoulder, Pain in left shoulder (M25.512), Pain in right shoulder (M25.511)  Precautions/Allergies: NKDA      Fall Risk Score: 0 (? 5 = High Risk)  MD Orders: Eval and treat, HEP, ROM, strengthening, aggressive/all modalities  3x/wk for 6 weeks (written 19)      MEDICAL/REFERRING DIAGNOSIS:  L shoulder    DATE OF ONSET: 10/4/18, EUA of B shoulders on 18  REFERRING PHYSICIAN: Mnedoza Machado MD  RETURN PHYSICIAN APPOINTMENT: 4/10/19     ASSESSMENT ( 19) :  Ms. Jessica Malone  Is a 48year old R hand dominant female s/p EUA and manipulation of R shoulder, EUA and manipulation of L shoulder and L SAD, ADCR, and LOREN on 10/4/18. She presented with shoulder pain, decreased ROM in B shoulders, decreased strength in L shoulder and decreased functional use of B shoulders. Her ROM was improving in both arms though she continued to have pain. Her DASH score was improved. Both shoulders were then manipulated under anesthesia on 18. She continues to have pain in shoulder but has returned to work on (19). Her ROM and strength are gradually improving, but she does not have full strength and is specifically weak in shoulder ER. DASH score has changed very little lately. She could benefit from continued PT to address deficits and work toward goals. PROBLEM LIST (Impacting functional limitations):  1. Decreased Strength  2. Decreased ADL/Functional Activities  3. Increased Pain  4. Decreased Flexibility/Joint Mobility INTERVENTIONS PLANNED:  1. Home Exercise Program (HEP)  2.  Manual Therapy  3. Therapeutic Exercise/Strengthening  4. modals as needed   TREATMENT PLAN:  Effective Dates: 3/4/2019 TO 5/3/2019 (60 days). Frequency/Duration: 2-3 times a week for 60 Days  GOALS: (Goals have been discussed and agreed upon with patient.)  Patient's stated goal is to be able to use her arms. Short-Term Functional Goals: Time Frame: 6 weeks  1. Patient to be independent with HEP - MET  2. Patient to increase PROM L shoulder flex to 135 and PROM L shoulder ER to 30 degrees   MET (12/3/18)     3. Patient to report decrease in pain level to 5/10. - MET (2/26/19)  Discharge Goals: Time Frame: 60 days - all in progress  1. Patient to report no more than minimal shoulder pain with all activity -Not MET yet  2. Patient to improve DASH score to 21 to demo improved use of B UEs  Not MET yet  3. Patient to improve AROM B shoulder flex to 150 degrees for improved functional use. MET for L, R not tested   Rehabilitation Potential For Stated Goals: Good                  The information in this section was collected on 10/9/18 (except where otherwise noted). HISTORY:   History of Present Injury/Illness (Reason for Referral):  Patient reports that shoulder pain started last year. She went to her family doctor and was sent to PT. They tried dry needling and MD injected her. She didn't get better and requested a second opinion. She was sent to Dr. Jeff Larsen and an MRI was done, and then she had surgery on 10/4/18. She was told to leave the dressings on until she returned to MD.   She subsequently had B shoulder manipulations on 11/30/18. Past Medical History/Comorbidities: OA,  Benign hypertension with CKD (chronic kidney disease) stage III ([address] Diabetes ([address] Dyslipidemia; Endometrial cancer ([address] and Morbid obesity ([address] knee arthroscopy (Right, 2015); appendectomy (1979), L ankle surgery (2007) L index finger surgery (2005)  Social History/Living Environment:     Lives with family in one story home.    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 Dominant Side:         RIGHT  Previous Treatment Approaches:          PT prior to surgery  Current Medications:  Glipizide, lisinopril, Tylenol, Meloxicam   Date Last Reviewed:  3/18/19   EXAMINATION:   Observation/Orthostatic Postural Assessment:          No new    ROM:          AROM R shoulder flex 135, Abduct 145, extn 50, ER 45 and IR to mid R buttock        PROM R shoulder flex 130, abduct 110, ER 35 at 90 degree abduct         PROM L shoulder flex 80, abduct 65, IR 50 (to abdomen) and ER -15 from neutral.           PROM R shoulder flex 160, abduct 140, ER 70 and IR 65 at 90 degree abduct (11/1/18)        PROM L shoulder flex 130, abduct 110, ER 30 and IR 50 at 90 degrees abduct (11/1/18)          PROM R shoulder flex 165, abduct 160, ER 75 and IR 75 at 90 degree abduct (12/3/18)        PROM L shoulder flex 140, abduct 120, ER 45 and IR 60 at 90 degrees abduct (12/3/18)          PROM R shoulder flex 160, abduct 150, ER 85, IR 80 at 90 degrees abduct (1/3/19)          PROM L shoulder flex 170, abduct 160, ER 70 and IR 70 at 90 degrees abduct (2/12/19)        AROM L shoulder flex 155, abduct 115, ER 34 and IR not quite to sacrum (2/12/19)          AROM L shoulder flex 160, abduct 115, ER 32  and IR not quite to sacrum,    (2/26/18)    Strength:          R shoulder 4+ to 5/5 all directions. L shoulder not tested. L shoulder flex 4+ , extn 5,  abduct 4+, IR 4+ and ER 4-  (2/26/19)  Neurological Screen:        Sensation: light touch intact in B UEs   Body Structures Involved:  1. Joints  2. Muscles Body Functions Affected:  1. Neuromusculoskeletal  2. Movement Related Activities and Participation Affected:  1. General Tasks and Demands  2. Self Care  3. Community, Social and Civic Life   CLINICAL DECISION MAKING:   Outcome Measure:    Tool Used: Disabilities of the Arm, Shoulder and Hand (DASH) Questionnaire - Quick Version  Score:  Initial: 55/55   41/55 (Date: 11/1/18 )      45/55 (Date 12/3/18)      43/55 (1/3/19)      40/55 (1/31/19)     Most Recent: 41/55 (2/26/19)    Interpretation of Score: The DASH is designed to measure the activities of daily living in person's with upper extremity dysfunction or pain. Each section is scored on a 1-5 scale, 5 representing the greatest disability. The scores of each section are added together for a total score of 55. Medical Necessity:   · Patient demonstrates good rehab potential due to higher previous functional level. Reason for Services/Other Comments:  · Patient continues to require skilled intervention due to need to regain use of B UEs.            TREATMENT:   (In addition to Assessment/Re-Assessment sessions the following treatments were rendered)  Pre-treatment Symptoms/Complaints:  Patient reports that she worked today and they are wanting people to work 6 days some weeks and 7 days other weeks. She plans to go upstairs and tty to Dr. Margherita Mcardle tomorrow after PT. Pain: Initial:   Pain Intensity 1: 6   Post Session: No change in pain reported by patient. Therapeutic Exercise: (10 Minutes):  Exercises to improve mobility and strength. Required minimal visual and verbal cues to ensure correct performance. AIS 3 sec x 10 each for L shoulder flex, abduct, ER at 45 and 90 degree abduct, IR at 90 degree abduct and at 90 degree flex, horizontal adduct, D1 and D2  Diagonals - all in supine.          Date  1/14/19 Date  1/23/19 Date  1/31/19  Date  2/14/19 Date  2/18/19 Date  2/25/19 Date  3/12/19    Activity/Exercise          B serratus punch 4# B 2x15 4# 2x15 4# 2x15 4# 2x15 - 4# 2x15 5# 2x15   B bicep curls 5# 2x15 5# 2x15 5# 2x15 5# 2x15 - - -   IR with band  Green 2x15 Green 2x15 Green 2x15 Green 2x15 Green 2x15 Green 2x15 Green 2x15   ER with band  Red 2x15 Red 2x15 Red 2x15 Red 2x15 Red 2x15 Red 2x15 Red 2x15   Side lying abduct 3# 2x15 3# 2x15 3# 2x15 3# 2x15 - 3# 2x15 3# 2x15   Side lying ER 3# 2x15 3# 2x15 3# 2x15 3# 2x15 - 3# 2x15 3# 2x15   Wall push ups Rail 2x10 Rail 2x15 Rail 2x15 Rail 2x10 Rail 2x15 Rail 2x15 Rail 2x15   B Lat pull downs 10# 1x15 13# 2x15 13# 2x15 13# 2x15 13# 2x15 13# 2x15 17# 2x15   B Cable rows 10# 2x10 13# 2x15 13# 2x15 13# 2x15 13# 2x15 13# 2x15 17# 2x15   B cable press 10# 2x15 10# 2x15 10# 2x15 10# 2x15 10# 2x15 10# 2x15 10# 2x15   B shldr flex with symmetry 2# 2x10 2# 2x10 - 2# 2x10 - 2# 2x10 2# 2x10   B shldr abduct to <= 90 2# 2x10 2# 2x10 - 2# 2x10 - 2# 2x10 2# 2x10   Rows with band Black 2x15 Black 2x15 Black 2x15 Black 2x15 - -    B shldr extn with band Black 2x15 Black 2x15 Black 2x15 Black 2x15 - -    D1/D2 diagonals on wall 2x10 ea 2x10 ea 2x10 ea 1# 2x10 1# 2x15 1# 2x10 ea 1# 2x10 ea   Pass behind back  2x10 - 2x10 2x10 2x15 2x15   T's with band       Red 2x15   Bent over rows       8# 2x15   HEP - continue current HEP  Manual Therapy (20 minutes) -  Grade 2 to 4 physio mobs L shoulder flex, abduct, IR and ER. Grade 4 inferior and posterior shoulder glides. Contract-relax technique at end range flex, IR and ER. Therapeutic Modalities:    Heat for thermal effects during manual therapy. Patient took ice with her at end of session. Left Shoulder Heat  Type: Moist pack  Duration : 20 minutes  Patient Position: Supine                                                                                     . Treatment/Session Assessment:        · Response to Treatment:  Isac continues to report pain in L shoulder and is frustrated that work wants her to work so many days. She worked today prior to PT so focus was on ROM. She is off of work tomorrow so will plan to do strengthening when she returns in the morning. · Compliance with Program/Exercises: yes  · Recommendations/Intent for next treatment session: \"Next visit will focus on motion and strength in L shoulder . \".          Total Treatment Duration: 30 minutes    PT Patient Time In/Time Out  Time In: 1654  Time Out: [address] [facility] Felicita Cronin, PT

## 2019-03-19 ENCOUNTER — HOSPITAL ENCOUNTER (OUTPATIENT)
Dept: PHYSICAL THERAPY | Age: 54
Discharge: HOME | End: 2019-03-19
Payer: COMMERCIAL

## 2019-03-19 PROCEDURE — 97110 THERAPEUTIC EXERCISES: CPT

## 2019-03-19 NOTE — PROGRESS NOTES
Juan John  : 1965  Primary: [facility]  Secondary:  [facility] ARTIE BRUMFIELD[address], [address], [facility].  [phone]   [phone]          OUTPATIENT PHYSICAL THERAPY:Daily Note 3/19/2019   ICD-10: Treatment Diagnosis:  M75.01 Adhesive capsulitis of right shoulder, M75.02 Adhesive capsulitis of left shoulder, M19.012 Primary osteoarthritis, left shoulder, Pain in left shoulder (M25.512), Pain in right shoulder (M25.511)  Precautions/Allergies: NKDA      Fall Risk Score: 0 (? 5 = High Risk)  MD Orders: Eval and treat, HEP, ROM, strengthening, aggressive/all modalities  3x/wk for 6 weeks (written 19)      MEDICAL/REFERRING DIAGNOSIS:  L shoulder    DATE OF ONSET: 10/4/18, EUA of B shoulders on 18  REFERRING PHYSICIAN: Jud Kohler MD  RETURN PHYSICIAN APPOINTMENT: 4/10/19     ASSESSMENT ( 19) :  Ms. Rosemary Pascual  Is a 48year old R hand dominant female s/p EUA and manipulation of R shoulder, EUA and manipulation of L shoulder and L SAD, ADCR, and LOREN on 10/4/18. She presented with shoulder pain, decreased ROM in B shoulders, decreased strength in L shoulder and decreased functional use of B shoulders. Her ROM was improving in both arms though she continued to have pain. Her DASH score was improved. Both shoulders were then manipulated under anesthesia on 18. She continues to have pain in shoulder but has returned to work on (19). Her ROM and strength are gradually improving, but she does not have full strength and is specifically weak in shoulder ER. DASH score has changed very little lately. She could benefit from continued PT to address deficits and work toward goals. PROBLEM LIST (Impacting functional limitations):  1. Decreased Strength  2. Decreased ADL/Functional Activities  3. Increased Pain  4. Decreased Flexibility/Joint Mobility INTERVENTIONS PLANNED:  1. Home Exercise Program (HEP)  2.  Manual Therapy  3. Therapeutic Exercise/Strengthening  4. modals as needed   TREATMENT PLAN:  Effective Dates: 3/4/2019 TO 5/3/2019 (60 days). Frequency/Duration: 2-3 times a week for 60 Days  GOALS: (Goals have been discussed and agreed upon with patient.)  Patient's stated goal is to be able to use her arms. Short-Term Functional Goals: Time Frame: 6 weeks  1. Patient to be independent with HEP - MET  2. Patient to increase PROM L shoulder flex to 135 and PROM L shoulder ER to 30 degrees   MET (12/3/18)     3. Patient to report decrease in pain level to 5/10. - MET (2/26/19)  Discharge Goals: Time Frame: 60 days - all in progress  1. Patient to report no more than minimal shoulder pain with all activity -Not MET yet  2. Patient to improve DASH score to 21 to demo improved use of B UEs  Not MET yet  3. Patient to improve AROM B shoulder flex to 150 degrees for improved functional use. MET for L, R not tested   Rehabilitation Potential For Stated Goals: Good                  The information in this section was collected on 10/9/18 (except where otherwise noted). HISTORY:   History of Present Injury/Illness (Reason for Referral):  Patient reports that shoulder pain started last year. She went to her family doctor and was sent to PT. They tried dry needling and MD injected her. She didn't get better and requested a second opinion. She was sent to Dr. Mikayla Mccauley and an MRI was done, and then she had surgery on 10/4/18. She was told to leave the dressings on until she returned to MD.   She subsequently had B shoulder manipulations on 11/30/18. Past Medical History/Comorbidities: OA,  Benign hypertension with CKD (chronic kidney disease) stage III ([address] Diabetes ([address] Dyslipidemia; Endometrial cancer ([address] and Morbid obesity ([address] knee arthroscopy (Right, 2015); appendectomy (1979), L ankle surgery (2007) L index finger surgery (2005)  Social History/Living Environment:     Lives with family in one story home.    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 Dominant Side:         RIGHT  Previous Treatment Approaches:          PT prior to surgery  Current Medications:  Glipizide, lisinopril, Tylenol, Meloxicam   Date Last Reviewed:  3/18/19   EXAMINATION:   Observation/Orthostatic Postural Assessment:          No new    ROM:          AROM R shoulder flex 135, Abduct 145, extn 50, ER 45 and IR to mid R buttock        PROM R shoulder flex 130, abduct 110, ER 35 at 90 degree abduct         PROM L shoulder flex 80, abduct 65, IR 50 (to abdomen) and ER -15 from neutral.           PROM R shoulder flex 160, abduct 140, ER 70 and IR 65 at 90 degree abduct (11/1/18)        PROM L shoulder flex 130, abduct 110, ER 30 and IR 50 at 90 degrees abduct (11/1/18)          PROM R shoulder flex 165, abduct 160, ER 75 and IR 75 at 90 degree abduct (12/3/18)        PROM L shoulder flex 140, abduct 120, ER 45 and IR 60 at 90 degrees abduct (12/3/18)          PROM R shoulder flex 160, abduct 150, ER 85, IR 80 at 90 degrees abduct (1/3/19)          PROM L shoulder flex 170, abduct 160, ER 70 and IR 70 at 90 degrees abduct (2/12/19)        AROM L shoulder flex 155, abduct 115, ER 34 and IR not quite to sacrum (2/12/19)          AROM L shoulder flex 160, abduct 115, ER 32  and IR not quite to sacrum,    (2/26/18)    Strength:          R shoulder 4+ to 5/5 all directions. L shoulder not tested. L shoulder flex 4+ , extn 5,  abduct 4+, IR 4+ and ER 4-  (2/26/19)  Neurological Screen:        Sensation: light touch intact in B UEs   Body Structures Involved:  1. Joints  2. Muscles Body Functions Affected:  1. Neuromusculoskeletal  2. Movement Related Activities and Participation Affected:  1. General Tasks and Demands  2. Self Care  3. Community, Social and Civic Life   CLINICAL DECISION MAKING:   Outcome Measure:    Tool Used: Disabilities of the Arm, Shoulder and Hand (DASH) Questionnaire - Quick Version  Score:  Initial: 55/55   41/55 (Date: 11/1/18 )      45/55 (Date 12/3/18)      43/55 (1/3/19)      40/55 (1/31/19)     Most Recent: 41/55 (2/26/19)    Interpretation of Score: The DASH is designed to measure the activities of daily living in person's with upper extremity dysfunction or pain. Each section is scored on a 1-5 scale, 5 representing the greatest disability. The scores of each section are added together for a total score of 55. Medical Necessity:   · Patient demonstrates good rehab potential due to higher previous functional level. Reason for Services/Other Comments:  · Patient continues to require skilled intervention due to need to regain use of B UEs.            TREATMENT:   (In addition to Assessment/Re-Assessment sessions the following treatments were rendered)  Pre-treatment Symptoms/Complaints:  Patient reports that she is still sore today. Plans to go upstairs and talk to MD about her work, after PT today. Pain: Initial:   Pain Intensity 1: 6   Post Session: No change in pain reported by patient at end of session. Therapeutic Exercise: (40 Minutes):  Exercises to improve mobility and strength. Required minimal visual and verbal cues to ensure correct performance. Progressed resistance and complexity as indicated. AIS 3 sec x 10 each for L shoulder flex, abduct, ER at 45 and 90 degree abduct, IR at 90 degree abduct and at 90 degree flex, horizontal adduct, D1 and D2  Diagonals - all in supine.          Date  1/14/19 Date  1/23/19 Date  1/31/19  Date  2/14/19 Date  2/18/19 Date  2/25/19 Date  3/12/19  Date  3/19/19   Activity/Exercise           B serratus punch 4# B 2x15 4# 2x15 4# 2x15 4# 2x15 - 4# 2x15 5# 2x15 5# 2x15   B bicep curls 5# 2x15 5# 2x15 5# 2x15 5# 2x15 - - - 6# 2x15   IR with band  Green 2x15 Green 2x15 Green 2x15 Green 2x15 Green 2x15 Green 2x15 Green 2x15 Green 2x15   ER with band  Red 2x15 Red 2x15 Red 2x15 Red 2x15 Red 2x15 Red 2x15 Red 2x15 Red 2x15   Side lying abduct 3# 2x15 3# 2x15 3# 2x15 3# 2x15 - 3# 2x15 3# 2x15 4# 2x15   Side lying ER 3# 2x15 3# 2x15 3# 2x15 3# 2x15 - 3# 2x15 3# 2x15 3# 2x15   Wall push ups Rail 2x10 Rail 2x15 Rail 2x15 Rail 2x10 Rail 2x15 Rail 2x15 Rail 2x15 Rail 2x15   B Lat pull downs 10# 1x15 13# 2x15 13# 2x15 13# 2x15 13# 2x15 13# 2x15 17# 2x15 17# 2x15   B Cable rows 10# 2x10 13# 2x15 13# 2x15 13# 2x15 13# 2x15 13# 2x15 17# 2x15 17# 2x15   B cable press 10# 2x15 10# 2x15 10# 2x15 10# 2x15 10# 2x15 10# 2x15 10# 2x15 10# 2x15   B shldr flex with symmetry 2# 2x10 2# 2x10 - 2# 2x10 - 2# 2x10 2# 2x10 2# 2x10   B shldr abduct to <= 90 2# 2x10 2# 2x10 - 2# 2x10 - 2# 2x10 2# 2x10 2# 2x10   Rows with band Black 2x15 Black 2x15 Black 2x15 Black 2x15 - - - Black 2x15   B shldr extn with band Black 2x15 Black 2x15 Black 2x15 Black 2x15 - - - Black 2x15   D1/D2 diagonals on wall 2x10 ea 2x10 ea 2x10 ea 1# 2x10 1# 2x15 1# 2x10 ea 1# 2x10 ea 1.5# 2x10 ea   Pass behind back  2x10 - 2x10 2x10 2x15 2x15 2x15   T's with band       Red 2x15 Red 2x15   Bent over rows       8# 2x15 8# 2x15   Hugs with band        Green 2x15              HEP - continue current HEP  Manual Therapy ( minutes) -  None today    Therapeutic Modalities: For pain - Game Ready Device at 42 degrees and light compression. Left Shoulder Cold  Type: Cryocuff(with vasoneumatic compression)  Duration : 10 minutes  Patient Position: Sitting                                                                      . Treatment/Session Assessment:    · Response to Treatment:  Patent continues to report pain in L shoulder and feels it may be from working too much. She plans to talk to MD about it. Did okay with exercises today. · Compliance with Program/Exercises: yes  · Recommendations/Intent for next treatment session: \"Next visit will focus on motion and strength in L shoulder . \".          Total Treatment Duration: 50 minutes    PT Patient Time In/Time Out  Time In: 0941  Time Out: 59[address] [facility] Olivia Valerio

## 2019-03-21 ENCOUNTER — HOSPITAL ENCOUNTER (OUTPATIENT)
Dept: PHYSICAL THERAPY | Age: 54
Discharge: HOME | End: 2019-03-21
Payer: COMMERCIAL

## 2019-03-21 PROCEDURE — 97110 THERAPEUTIC EXERCISES: CPT

## 2019-03-21 PROCEDURE — 97140 MANUAL THERAPY 1/> REGIONS: CPT

## 2019-03-21 NOTE — PROGRESS NOTES
Gabi Alvarenga : 1965 Primary: [facility] [employer] [facility]* Secondary:  Therapy Center at [facility] ARTIE BLAIR [address] [facility], [address] [place] [address],[address], [place] [address]. [phone]   [phone] OUTPATIENT PHYSICAL THERAPY:Daily Note 3/21/2019 ICD-10: Treatment Diagnosis:  M75.01 Adhesive capsulitis of right shoulder, M75.02 Adhesive capsulitis of left shoulder, M19.012 Primary osteoarthritis, left shoulder, Pain in left shoulder (M25.512), Pain in right shoulder (M25.511) Precautions/Allergies: NKDA Fall Risk Score: 0 (? 5 = High Risk) MD Orders: Eval and treat, HEP, ROM, strengthening, aggressive/all modalities  3x/wk for 6 weeks (written 19) MEDICAL/REFERRING DIAGNOSIS: 
L shoulder DATE OF ONSET: 10/4/18, EUA of B shoulders on 18 REFERRING PHYSICIAN: Sindi Gallego., MD 
RETURN PHYSICIAN APPOINTMENT: 4/10/19 ASSESSMENT ( 19) :  Ms. Juan Antonio Mendoza  Is a 48year old R hand dominant female s/p EUA and manipulation of R shoulder, EUA and manipulation of L shoulder and L SAD, ADCR, and LOREN on 10/4/18. She presented with shoulder pain, decreased ROM in B shoulders, decreased strength in L shoulder and decreased functional use of B shoulders. Her ROM was improving in both arms though she continued to have pain. Her DASH score was improved. Both shoulders were then manipulated under anesthesia on 18. She continues to have pain in shoulder but has returned to work on (19). Her ROM and strength are gradually improving, but she does not have full strength and is specifically weak in shoulder ER. DASH score has changed very little lately. She could benefit from continued PT to address deficits and work toward goals. PROBLEM LIST (Impacting functional limitations): 1. Decreased Strength 2. Decreased ADL/Functional Activities 3. Increased Pain 4. Decreased Flexibility/Joint Mobility INTERVENTIONS PLANNED: 
1. Home Exercise Program (HEP) 2. Manual Therapy 3. Therapeutic Exercise/Strengthening 4. modals as needed TREATMENT PLAN: 
Effective Dates: 3/4/2019 TO 5/3/2019 (60 days). Frequency/Duration: 2-3 times a week for 60 Days GOALS: (Goals have been discussed and agreed upon with patient.) Patient's stated goal is to be able to use her arms. Short-Term Functional Goals: Time Frame: 6 weeks 1. Patient to be independent with HEP - MET 2. Patient to increase PROM L shoulder flex to 135 and PROM L shoulder ER to 30 degrees   MET (12/3/18) 3. Patient to report decrease in pain level to 5/10. - MET (2/26/19) Discharge Goals: Time Frame: 60 days - all in progress 1. Patient to report no more than minimal shoulder pain with all activity -Not MET yet 2. Patient to improve DASH score to 21 to demo improved use of B UEs  Not MET yet 3. Patient to improve AROM B shoulder flex to 150 degrees for improved functional use. MET for L, R not tested Rehabilitation Potential For Stated Goals: Good The information in this section was collected on 10/9/18 (except where otherwise noted). HISTORY:  
History of Present Injury/Illness (Reason for Referral): 
Patient reports that shoulder pain started last year. She went to her family doctor and was sent to PT. They tried dry needling and MD injected her. She didn't get better and requested a second opinion. She was sent to Dr. Macey Rogers and an MRI was done, and then she had surgery on 10/4/18. She was told to leave the dressings on until she returned to MD.  
She subsequently had B shoulder manipulations on 11/30/18. Past Medical History/Comorbidities: OA,  Benign hypertension with CKD (chronic kidney disease) stage III ([address] Diabetes ([address] Dyslipidemia; Endometrial cancer ([address] and Morbid obesity ([address] knee arthroscopy (Right, 2015); appendectomy (1979), L ankle surgery (2007) L index finger surgery (2005) Social History/Living Environment:  
  Lives with family in one story home. Prior Level of Function/Work/Activity: 
 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 Dominant Side:  
      RIGHT Previous Treatment Approaches: PT prior to surgery Current Medications:  Glipizide, lisinopril, Tylenol, Meloxicam   Date Last Reviewed:  3/18/19 EXAMINATION:  
Observation/Orthostatic Postural Assessment:   
      No new ROM:   
      AROM R shoulder flex 135, Abduct 145, extn 50, ER 45 and IR to mid R buttock PROM R shoulder flex 130, abduct 110, ER 35 at 90 degree abduct PROM L shoulder flex 80, abduct 65, IR 50 (to abdomen) and ER -15 from neutral.  
 
      PROM R shoulder flex 160, abduct 140, ER 70 and IR 65 at 90 degree abduct (11/1/18) PROM L shoulder flex 130, abduct 110, ER 30 and IR 50 at 90 degrees abduct (11/1/18) PROM R shoulder flex 165, abduct 160, ER 75 and IR 75 at 90 degree abduct (12/3/18) PROM L shoulder flex 140, abduct 120, ER 45 and IR 60 at 90 degrees abduct (12/3/18) PROM R shoulder flex 160, abduct 150, ER 85, IR 80 at 90 degrees abduct (1/3/19) PROM L shoulder flex 170, abduct 160, ER 70 and IR 70 at 90 degrees abduct (2/12/19) AROM L shoulder flex 155, abduct 115, ER 34 and IR not quite to sacrum (2/12/19) AROM L shoulder flex 160, abduct 115, ER 32  and IR not quite to sacrum,    (2/26/18) Strength:   
      R shoulder 4+ to 5/5 all directions. L shoulder not tested. L shoulder flex 4+ , extn 5,  abduct 4+, IR 4+ and ER 4-  (2/26/19) Neurological Screen: 
      Sensation: light touch intact in B UEs Body Structures Involved: 1. Joints 2. Muscles Body Functions Affected: 1. Neuromusculoskeletal 
2. Movement Related Activities and Participation Affected: 1. General Tasks and Demands 2. Self Care 3. Community, Social and [place] CLINICAL DECISION MAKING:  
Outcome Measure: Tool Used: Disabilities of the Arm, Shoulder and Hand (DASH) Questionnaire - Quick Version Score:  Initial: 55/55   41/55 (Date: 11/1/18 )      45/55 (Date 12/3/18)      43/55 (1/3/19)      40/55 (1/31/19)     Most Recent: 41/55 (2/26/19) Interpretation of Score: The DASH is designed to measure the activities of daily living in person's with upper extremity dysfunction or pain. Each section is scored on a 1-5 scale, 5 representing the greatest disability. The scores of each section are added together for a total score of 55. Medical Necessity:  
· Patient demonstrates good rehab potential due to higher previous functional level. Reason for Services/Other Comments: 
· Patient continues to require skilled intervention due to need to regain use of B UEs.  
  
 
 
 
TREATMENT:  
(In addition to Assessment/Re-Assessment sessions the following treatments were rendered) Pre-treatment Symptoms/Complaints:  Patient reports that she saw MD and got a note to take to work about light duty. She talked with HR and they agreed that she should not work more than 5 days per week on light duty. Pain: Initial:  
Pain Intensity 1: 6   Post Session: No change in pain reported by patient at end of session. It went up with manual therapy, but came back down with ice. Therapeutic Exercise: (10 Minutes):  Exercises to improve mobility and strength. Required minimal visual and verbal cues to ensure correct performance. AIS 3 sec x 10 each for L shoulder flex, abduct, ER at 45 and 90 degree abduct, IR at 90 degree abduct and at 90 degree flex, horizontal adduct, D1 and D2  Diagonals - all in supine. Date 1/14/19 Date 1/23/19 Date 1/31/19  Date 2/14/19 Date 2/18/19 Date 2/25/19 Date 3/12/19  Date 3/19/19 Activity/Exercise B serratus punch 4# B 2x15 4# 2x15 4# 2x15 4# 2x15 - 4# 2x15 5# 2x15 5# 2x15 B bicep curls 5# 2x15 5# 2x15 5# 2x15 5# 2x15 - - - 6# 2x15 IR with band  Green 2x15 Green 2x15 Green 2x15 Green 2x15 Green 2x15 Green 2x15 Green 2x15 [employer] ER with band  Red 2x15 Red 2x15 Red 2x15 Red 2x15 Red 2x15 Red 2x15 Red 2x15 Red 2x15 Side lying abduct 3# 2x15 3# 2x15 3# 2x15 3# 2x15 - 3# 2x15 3# 2x15 4# 2x15 Side lying ER 3# 2x15 3# 2x15 3# 2x15 3# 2x15 - 3# 2x15 3# 2x15 3# 2x15 Wall push ups Rail 2x10 Rail 2x15 Rail 2x15 Rail 2x10 [employer] [address] [place] [address] [place] [address] [place] 2x15 B Lat pull downs 10# 1x15 13# 2x15 13# 2x15 13# 2x15 13# 2x15 13# 2x15 17# 2x15 17# 2x15 B Cable rows 10# 2x10 13# 2x15 13# 2x15 13# 2x15 13# 2x15 13# 2x15 17# 2x15 17# 2x15 B cable press 10# 2x15 10# 2x15 10# 2x15 10# 2x15 10# 2x15 10# 2x15 10# 2x15 10# 2x15 B shldr flex with symmetry 2# 2x10 2# 2x10 - 2# 2x10 - 2# 2x10 2# 2x10 2# 2x10 B shldr abduct to <= 90 2# 2x10 2# 2x10 - 2# 2x10 - 2# 2x10 2# 2x10 2# 2x10 Rows with band Black 2x15 Black 2x15 Black 2x15 Black 2x15 - - - Black 2x15 B shldr extn with band Black 2x15 Black 2x15 Black 2x15 Black 2x15 - - - Black 2x15 D1/D2 diagonals on wall 2x10 ea 2x10 ea 2x10 ea 1# 2x10 1# 2x15 1# 2x10 ea 1# 2x10 ea 1.5# 2x10 ea Pass behind back  2x10 - 2x10 2x10 2x15 2x15 2x15 T's with band       Red 2x15 Red 2x15 Bent over rows       8# 2x15 8# 2x15 Hugs with band        Green 2x15 HEP - continue current HEP Manual Therapy ( 20 minutes) -  Grade 2 to 4 physio mobs L shoulder flex, abduct, IR and ER. Contract/relax at end range IR, ER and flex. Therapeutic Modalities: For pain - Heat during manual therapy,  Cold after treatment. Game Ready Device at 42 degrees and light compression. Left Shoulder Heat Type: Moist pack Duration : 20 minutes Patient Position: Supine              Left Shoulder Cold Type: Cryocuff(with vasoneumatic compression) Duration : 15 minutes Patient Position: Sitting Benna Him Treatment/Session Assessment:   
· Response to Treatment:  Isac continues to report pain in L shoulder. She tends to seem stiff when coming in late afternoon. Not much overall change today. · Compliance with Program/Exercises: yes · Recommendations/Intent for next treatment session: \"Next visit will focus on motion and strength in L shoulder . \". Total Treatment Duration: 45 minutes PT Patient Time In/Time Out Time In: 0538 Time Out: 1740 Rocky Hogue PT

## 2019-03-25 ENCOUNTER — HOSPITAL ENCOUNTER (OUTPATIENT)
Dept: PHYSICAL THERAPY | Age: 54
Discharge: HOME | End: 2019-03-25
Payer: COMMERCIAL

## 2019-03-25 PROCEDURE — 97140 MANUAL THERAPY 1/> REGIONS: CPT

## 2019-03-25 PROCEDURE — 97110 THERAPEUTIC EXERCISES: CPT

## 2019-03-25 NOTE — PROGRESS NOTES
Dayanara Francois : 1965 Primary: [facility] [employer] [facility]* Secondary:  Therapy Center at [facility] ARTIE BLAIR [address] [place] [address], [address] [place] [address],[address], [place] [address]. [phone]   [phone] OUTPATIENT PHYSICAL THERAPY:Daily Note 3/25/2019 ICD-10: Treatment Diagnosis:  M75.01 Adhesive capsulitis of right shoulder, M75.02 Adhesive capsulitis of left shoulder, M19.012 Primary osteoarthritis, left shoulder, Pain in left shoulder (M25.512), Pain in right shoulder (M25.511) Precautions/Allergies: NKDA Fall Risk Score: 0 (? 5 = High Risk) MD Orders: Eval and treat, HEP, ROM, strengthening, aggressive/all modalities  3x/wk for 6 weeks (written 19) MEDICAL/REFERRING DIAGNOSIS: 
L shoulder DATE OF ONSET: 10/4/18, EUA of B shoulders on 18 REFERRING PHYSICIAN: Kushal Griffin MD 
RETURN PHYSICIAN APPOINTMENT: 4/10/19 ASSESSMENT ( 19) :  Ms. Fuentes Erazo  Is a 48year old R hand dominant female s/p EUA and manipulation of R shoulder, EUA and manipulation of L shoulder and L SAD, ADCR, and LOREN on 10/4/18. She presented with shoulder pain, decreased ROM in B shoulders, decreased strength in L shoulder and decreased functional use of B shoulders. Her ROM was improving in both arms though she continued to have pain. Her DASH score was improved. Both shoulders were then manipulated under anesthesia on 18. She continues to have pain in shoulder but has returned to work on (19). Her ROM and strength are gradually improving, but she does not have full strength and is specifically weak in shoulder ER. DASH score has changed very little lately. She could benefit from continued PT to address deficits and work toward goals. PROBLEM LIST (Impacting functional limitations): 1. Decreased Strength 2. Decreased ADL/Functional Activities 3. Increased Pain 4. Decreased Flexibility/Joint Mobility INTERVENTIONS PLANNED: 
1. Home Exercise Program (HEP) 2. Manual Therapy 3. Therapeutic Exercise/Strengthening 4. modals as needed TREATMENT PLAN: 
Effective Dates: 3/4/2019 TO 5/3/2019 (60 days). Frequency/Duration: 2-3 times a week for 60 Days GOALS: (Goals have been discussed and agreed upon with patient.) Patient's stated goal is to be able to use her arms. Short-Term Functional Goals: Time Frame: 6 weeks 1. Patient to be independent with HEP - MET 2. Patient to increase PROM L shoulder flex to 135 and PROM L shoulder ER to 30 degrees   MET (12/3/18) 3. Patient to report decrease in pain level to 5/10. - MET (2/26/19) Discharge Goals: Time Frame: 60 days - all in progress 1. Patient to report no more than minimal shoulder pain with all activity -Not MET yet 2. Patient to improve DASH score to 21 to demo improved use of B UEs  Not MET yet 3. Patient to improve AROM B shoulder flex to 150 degrees for improved functional use. MET for L, R not tested Rehabilitation Potential For Stated Goals: Good The information in this section was collected on 10/9/18 (except where otherwise noted). HISTORY:  
History of Present Injury/Illness (Reason for Referral): 
Patient reports that shoulder pain started last year. She went to her family doctor and was sent to PT. They tried dry needling and MD injected her. She didn't get better and requested a second opinion. She was sent to Dr. Danish Parra and an MRI was done, and then she had surgery on 10/4/18. She was told to leave the dressings on until she returned to MD.  
She subsequently had B shoulder manipulations on 11/30/18. Past Medical History/Comorbidities: OA,  Benign hypertension with CKD (chronic kidney disease) stage III ([address] Diabetes ([address] Dyslipidemia; Endometrial cancer ([address] and Morbid obesity ([address] knee arthroscopy (Right, 2015); appendectomy (1979), L ankle surgery (2007) L index finger surgery (2005) Social History/Living Environment:  
  Lives with family in one story home. Prior Level of Function/Work/Activity: 
 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 Dominant Side:  
      RIGHT Previous Treatment Approaches: PT prior to surgery Current Medications:  Glipizide, lisinopril, Tylenol, Meloxicam   Date Last Reviewed:  3/25/19 EXAMINATION:  
Observation/Orthostatic Postural Assessment:   
      No new ROM:   
      AROM R shoulder flex 135, Abduct 145, extn 50, ER 45 and IR to mid R buttock PROM R shoulder flex 130, abduct 110, ER 35 at 90 degree abduct PROM L shoulder flex 80, abduct 65, IR 50 (to abdomen) and ER -15 from neutral.  
 
      PROM R shoulder flex 160, abduct 140, ER 70 and IR 65 at 90 degree abduct (11/1/18) PROM L shoulder flex 130, abduct 110, ER 30 and IR 50 at 90 degrees abduct (11/1/18) PROM R shoulder flex 165, abduct 160, ER 75 and IR 75 at 90 degree abduct (12/3/18) PROM L shoulder flex 140, abduct 120, ER 45 and IR 60 at 90 degrees abduct (12/3/18) PROM R shoulder flex 160, abduct 150, ER 85, IR 80 at 90 degrees abduct (1/3/19) PROM L shoulder flex 170, abduct 160, ER 70 and IR 70 at 90 degrees abduct (2/12/19) AROM L shoulder flex 155, abduct 115, ER 34 and IR not quite to sacrum (2/12/19) AROM L shoulder flex 160, abduct 115, ER 32  and IR not quite to sacrum,    (2/26/18) Strength:   
      R shoulder 4+ to 5/5 all directions. L shoulder not tested. L shoulder flex 4+ , extn 5,  abduct 4+, IR 4+ and ER 4-  (2/26/19) Neurological Screen: 
      Sensation: light touch intact in B UEs Body Structures Involved: 1. Joints 2. Muscles Body Functions Affected: 1. Neuromusculoskeletal 
2. Movement Related Activities and Participation Affected: 1. General Tasks and Demands 2. Self Care 3. Community, Social and [place] CLINICAL DECISION MAKING:  
Outcome Measure: Tool Used: Disabilities of the Arm, Shoulder and Hand (DASH) Questionnaire - Quick Version Score:  Initial: 55/55   41/55 (Date: 11/1/18 )      45/55 (Date 12/3/18)      43/55 (1/3/19)      40/55 (1/31/19)     Most Recent: 41/55 (2/26/19) Interpretation of Score: The DASH is designed to measure the activities of daily living in person's with upper extremity dysfunction or pain. Each section is scored on a 1-5 scale, 5 representing the greatest disability. The scores of each section are added together for a total score of 55. Medical Necessity:  
· Patient demonstrates good rehab potential due to higher previous functional level. Reason for Services/Other Comments: 
· Patient continues to require skilled intervention due to need to regain use of B UEs.  
  
 
 
 
TREATMENT:  
(In addition to Assessment/Re-Assessment sessions the following treatments were rendered) Pre-treatment Symptoms/Complaints:  Patient reports that shoulder hurts more than usual today, and in fact both shoulders hurt. 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  
Pain Intensity 1: 7   Post Session:No change in pain noted by patient. Therapeutic Exercise: (10 Minutes):  Exercises to improve mobility and strength. Required minimal visual and verbal cues to ensure correct performance. AIS 3 sec x 10 each for L shoulder flex, abduct, ER at 45 and 90 degree abduct, IR at 90 degree abduct and at 90 degree flex, horizontal adduct, D1 and D2  Diagonals - all in supine. Date 1/31/19  Date 2/14/19 Date 2/18/19 Date 2/25/19 Date 3/12/19  Date 3/19/19 Date Activity/Exercise B serratus punch 4# 2x15 4# 2x15 - 4# 2x15 5# 2x15 5# 2x15 B bicep curls 5# 2x15 5# 2x15 - - - 6# 2x15 IR with band  Green 2x15 Green 2x15 Green 2x15 Green 2x15 Green 2x15 Green 2x15 ER with band  Red 2x15 Red 2x15 Red 2x15 Red 2x15 Red 2x15 Red 2x15 Side lying abduct 3# 2x15 3# 2x15 - 3# 2x15 3# 2x15 4# 2x15 Side lying ER 3# 2x15 3# 2x15 - 3# 2x15 3# 2x15 3# 2x15 Wall push ups Rail 2x15 Rail 2x10 Rail [address] [place] [address] [place] [address] B Lat pull downs 13# 2x15 13# 2x15 13# 2x15 13# 2x15 17# 2x15 17# 2x15 B Cable rows 13# 2x15 13# 2x15 13# 2x15 13# 2x15 17# 2x15 17# 2x15 B cable press 10# 2x15 10# 2x15 10# 2x15 10# 2x15 10# 2x15 10# 2x15 B shldr flex with symmetry - 2# 2x10 - 2# 2x10 2# 2x10 2# 2x10 B shldr abduct to <= 90 - 2# 2x10 - 2# 2x10 2# 2x10 2# 2x10 Rows with band Black 2x15 Black 2x15 - - - Black 2x15 B shldr extn with band Black 2x15 Black 2x15 - - - Black 2x15 D1/D2 diagonals on wall 2x10 ea 1# 2x10 1# 2x15 1# 2x10 ea 1# 2x10 ea 1.5# 2x10 ea Pass behind back - 2x10 2x10 2x15 2x15 2x15 T's with band     Red 2x15 Red 2x15 Bent over rows     8# 2x15 8# 2x15 Hugs with band      Green 2x15 HEP - continue current HEP Manual Therapy ( 30 minutes) -  Grade 2 to 4 physio mobs L shoulder flex, abduct, IR and ER. Contract/relax at end range of L shoulder IR, ER and flex. Grade 4 inferior and posterior shoulder glides. Therapeutic Modalities: For pain - Heat during manual therapy,  Patient took ice with her at end of session. Left Shoulder Heat Type: Moist pack Duration : 20 minutes Patient Position: Supine [facility] Treatment/Session Assessment:   
· Response to Treatment:  Patent reported pain in L shoulder today and some in R as well. Not sure if it is due to her work or the rainy weather today. She seemed more frustrated than usual with the continued pain. ·  
· Not much overall change today. · Compliance with Program/Exercises: yes · Recommendations/Intent for next treatment session: \"Next visit will focus on motion and strength in L shoulder .  \".    
 
 
 Total Treatment Duration: 40 minutes PT Patient Time In/Time Out Time In: 7223 Time Out: 6916 Chasity Zhang, PT

## 2019-03-27 ENCOUNTER — HOSPITAL ENCOUNTER (OUTPATIENT)
Dept: PHYSICAL THERAPY | Age: 54
Discharge: HOME | End: 2019-03-27
Payer: COMMERCIAL

## 2019-03-27 PROCEDURE — 97110 THERAPEUTIC EXERCISES: CPT

## 2019-03-27 PROCEDURE — 97140 MANUAL THERAPY 1/> REGIONS: CPT

## 2019-03-27 NOTE — PROGRESS NOTES
Lillian Kamla : 1965 Primary: [facility] [employer] [facility]* Secondary:  Therapy Center at [facility] ARTIE BLAIR [address] [facility], [address] [place] [address],[address], [place] [address]. [phone]   [phone] OUTPATIENT PHYSICAL THERAPY:Progress Report 3/27/2019 ICD-10: Treatment Diagnosis:  M75.01 Adhesive capsulitis of right shoulder, M75.02 Adhesive capsulitis of left shoulder, M19.012 Primary osteoarthritis, left shoulder, Pain in left shoulder (M25.512), Pain in right shoulder (M25.511) Precautions/Allergies: NKDA Fall Risk Score: 0 (? 5 = High Risk) MD Orders: Eval and treat, HEP, ROM, strengthening, aggressive/all modalities  3x/wk for 6 weeks (written 19) Patient has attended 64 PT sessions from 10/9/18 to 3/27/19 with 5 missed sessions MEDICAL/REFERRING DIAGNOSIS: 
L shoulder DATE OF ONSET: 10/4/18, EUA of B shoulders on 18 REFERRING PHYSICIAN: Jaime Franklin MD 
RETURN PHYSICIAN APPOINTMENT: 4/10/19 ASSESSMENT ( 3/27/19) :  Ms. Quan Maddox  Is a 48year old R hand dominant female s/p EUA and manipulation of R shoulder, EUA and manipulation of L shoulder and L SAD, ADCR, and LOREN on 10/4/18. She presented with shoulder pain, decreased ROM in B shoulders, decreased strength in L shoulder and decreased functional use of B shoulders. Her ROM was improving in both arms though she continued to have pain. Her DASH score was improved. Both shoulders were then manipulated under anesthesia on 18. She continues to have pain in shoulder but has returned to work on (19). Her ROM and strength were gradually improving, but she does not have full strength and she has actually slightly decreased ROM since last measured. Shoulder tends to be stiff initially on days that she comes to PT after work. She could benefit from continued PT to address deficits and work toward goals. PROBLEM LIST (Impacting functional limitations): 1. Decreased Strength 2. Decreased ADL/Functional Activities 3. Increased Pain 4. Decreased Flexibility/Joint Mobility INTERVENTIONS PLANNED: 
1. Home Exercise Program (HEP) 2. Manual Therapy 3. Therapeutic Exercise/Strengthening 4. modals as needed TREATMENT PLAN: 
Effective Dates: 3/4/2019 TO 5/3/2019 (60 days). Frequency/Duration: 2-3 times a week for 60 Days GOALS: (Goals have been discussed and agreed upon with patient.) Patient's stated goal is to be able to use her arms. Short-Term Functional Goals: Time Frame: 6 weeks 1. Patient to be independent with HEP - MET 2. Patient to increase PROM L shoulder flex to 135 and PROM L shoulder ER to 30 degrees   MET (12/3/18) 3. Patient to report decrease in pain level to 5/10. - MET (2/26/19) but pain has been up again lately (3/27/19) Discharge Goals: Time Frame: 60 days - all in progress 1. Patient to report no more than minimal shoulder pain with all activity - Not MET yet 2. Patient to improve DASH score to 21 to demo improved use of B UEs  Not MET yet 3. Patient to improve AROM B shoulder flex to 150 degrees for improved functional use. MET for L, R not tested Rehabilitation Potential For Stated Goals: Good The information in this section was collected on 10/9/18 (except where otherwise noted). HISTORY:  
History of Present Injury/Illness (Reason for Referral): 
Patient reports that shoulder pain started last year. She went to her family doctor and was sent to PT. They tried dry needling and MD injected her. She didn't get better and requested a second opinion. She was sent to Dr. Florentin Rodriguez and an MRI was done, and then she had surgery on 10/4/18. She was told to leave the dressings on until she returned to MD.  
She subsequently had B shoulder manipulations on 11/30/18. Past Medical History/Comorbidities: OA,  Benign hypertension with CKD (chronic kidney disease) stage III ([address] Diabetes ([address]  Dyslipidemia; Endometrial cancer ([facility] and Morbid obesity ([facility] [address] knee arthroscopy (Right, 2015); appendectomy (1979), L ankle surgery (2007) L index finger surgery (2005) Social History/Living Environment:  
  Lives with family in one story home. Prior Level of Function/Work/Activity: 
 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 Dominant Side:  
      RIGHT Previous Treatment Approaches: PT prior to surgery Current Medications:  Glipizide, lisinopril, Tylenol, Meloxicam   Date Last Reviewed:  3/25/19 EXAMINATION:  
Observation/Orthostatic Postural Assessment:   
      No new ROM:   
      AROM R shoulder flex 135, Abduct 145, extn 50, ER 45 and IR to mid R buttock PROM R shoulder flex 130, abduct 110, ER 35 at 90 degree abduct PROM L shoulder flex 80, abduct 65, IR 50 (to abdomen) and ER -15 from neutral.  
 
      PROM R shoulder flex 160, abduct 140, ER 70 and IR 65 at 90 degree abduct (11/1/18) PROM L shoulder flex 130, abduct 110, ER 30 and IR 50 at 90 degrees abduct (11/1/18) PROM R shoulder flex 165, abduct 160, ER 75 and IR 75 at 90 degree abduct (12/3/18) PROM L shoulder flex 140, abduct 120, ER 45 and IR 60 at 90 degrees abduct (12/3/18) PROM R shoulder flex 160, abduct 150, ER 85, IR 80 at 90 degrees abduct (1/3/19) PROM L shoulder flex 170, abduct 160, ER 70 and IR 70 at 90 degrees abduct (2/12/19) AROM L shoulder flex 155, abduct 115, ER 34 and IR not quite to sacrum (2/12/19) AROM L shoulder flex 160, abduct 115, ER 32  and IR not quite to sacrum,    (2/26/18) PROM L shoulder flex 160, abduct 160, ER ER 70 and IR 70 at 90 degrees abduct (3/27/19) AROM L shoulder flex 160, extn 46, abduct 115, ER 20 and IR to upper L buttock (3/27/19) Strength:   
      R shoulder 4+ to 5/5 all directions. L shoulder not tested. L shoulder flex 4+ , extn 5,  abduct 4+, IR 4+ and ER 4-  (2/26/19) Neurological Screen: 
      Sensation: light touch intact in B UEs Body Structures Involved: 1. Joints 2. Muscles Body Functions Affected: 1. Neuromusculoskeletal 
2. Movement Related Activities and Participation Affected: 1. General Tasks and Demands 2. Self Care 3. Community, Social and [place] CLINICAL DECISION MAKING:  
Outcome Measure: Tool Used: Disabilities of the Arm, Shoulder and Hand (DASH) Questionnaire - Quick Version Score:  Initial: 55/55   41/55 (Date: 11/1/18 )      45/55 (Date 12/3/18)      43/55 (1/3/19)      40/55 (1/31/19)     Most Recent: 41/55 (2/26/19) Interpretation of Score: The DASH is designed to measure the activities of daily living in person's with upper extremity dysfunction or pain. Each section is scored on a 1-5 scale, 5 representing the greatest disability. The scores of each section are added together for a total score of 55. Medical Necessity:  
· Patient demonstrates good rehab potential due to higher previous functional level. Reason for Services/Other Comments: 
· Patient continues to require skilled intervention due to need to regain use of B UEs.  
  
 
 
 
TREATMENT:  
(In addition to Assessment/Re-Assessment sessions the following treatments were rendered) Pre-treatment Symptoms/Complaints:  Patient reports that shoulder is stiff and sore today. Pain: Initial:  
Pain Intensity 1: 7   Post Session:pain decreased by \"one level\" according to patient. Therapeutic Exercise: (10 Minutes):  Exercises to improve mobility and strength. Required minimal visual and verbal cues to ensure correct performance. AIS 3 sec x 10 each for L shoulder flex, abduct, ER at 45 and 90 degree abduct, IR at 90 degree abduct and at 90 degree flex, horizontal adduct, D1 and D2  Diagonals - all in supine. Date 1/31/19  Date 2/14/19 Date 2/18/19 Date 2/25/19 Date 3/12/19  Date 3/19/19 Date Activity/Exercise B serratus punch 4# 2x15 4# 2x15 - 4# 2x15 5# 2x15 5# 2x15 B bicep curls 5# 2x15 5# 2x15 - - - 6# 2x15 IR with band  Green 2x15 Green 2x15 Green 2x15 Green 2x15 Green 2x15 Green 2x15 ER with band  Red 2x15 Red 2x15 Red 2x15 Red 2x15 Red 2x15 Red 2x15 Side lying abduct 3# 2x15 3# 2x15 - 3# 2x15 3# 2x15 4# 2x15 Side lying ER 3# 2x15 3# 2x15 - 3# 2x15 3# 2x15 3# 2x15 Wall push ups Rail 2x15 Rail 2x10 Rail [address] [place] [address] [place] B Lat pull downs 13# 2x15 13# 2x15 13# 2x15 13# 2x15 17# 2x15 17# 2x15 B Cable rows 13# 2x15 13# 2x15 13# 2x15 13# 2x15 17# 2x15 17# 2x15 B cable press 10# 2x15 10# 2x15 10# 2x15 10# 2x15 10# 2x15 10# 2x15 B shldr flex with symmetry - 2# 2x10 - 2# 2x10 2# 2x10 2# 2x10 B shldr abduct to <= 90 - 2# 2x10 - 2# 2x10 2# 2x10 2# 2x10 Rows with band Black 2x15 Black 2x15 - - - Black 2x15 B shldr extn with band Black 2x15 Black 2x15 - - - Black 2x15 D1/D2 diagonals on wall 2x10 ea 1# 2x10 1# 2x15 1# 2x10 ea 1# 2x10 ea 1.5# 2x10 ea Pass behind back - 2x10 2x10 2x15 2x15 2x15 T's with band     Red 2x15 Red 2x15 Bent over rows     8# 2x15 8# 2x15 Hugs with band      Green 2x15 HEP - continue current HEP Manual Therapy ( 30 minutes) -  Grade 2 to 4 physio mobs L shoulder flex, abduct, IR and ER. Contract/relax at end range of L shoulder IR, ER and flex. Grade 4 inferior and posterior shoulder glides. Therapeutic Modalities: For pain - Heat during manual therapy,  Patient took ice with her at end of session. Left Shoulder Heat Type: Moist pack Duration : 20 minutes Patient Position: Supine John Rogers Treatment/Session Assessment:   
· Response to Treatment:  Patent reported pain in L shoulder as slightly decreased after treatment. See full assessment above. · Compliance with Program/Exercises: yes · Recommendations/Intent for next treatment session: \"Next visit will focus on motion and strength in L shoulder . \". Total Treatment Duration: 40 minutes PT Patient Time In/Time Out Time In: 8510 Time Out: 6252 Sailaja Bernal, PT

## 2019-03-28 ENCOUNTER — HOSPITAL ENCOUNTER (OUTPATIENT)
Dept: PHYSICAL THERAPY | Age: 54
Discharge: HOME | End: 2019-03-28
Payer: COMMERCIAL

## 2019-03-28 PROCEDURE — 97110 THERAPEUTIC EXERCISES: CPT

## 2019-03-28 PROCEDURE — 97140 MANUAL THERAPY 1/> REGIONS: CPT

## 2019-03-28 NOTE — PROGRESS NOTES
Jono Boland : 1965 Primary: [facility] [employer] [facility]* Secondary:  Therapy Center at [facility] ARTIE BLAIR [address] [facility], [address] [place] [address],[address], [facility] [address] [phone]   [phone] OUTPATIENT PHYSICAL THERAPY:Daily Note 3/28/2019 ICD-10: Treatment Diagnosis:  M75.01 Adhesive capsulitis of right shoulder, M75.02 Adhesive capsulitis of left shoulder, M19.012 Primary osteoarthritis, left shoulder, Pain in left shoulder (M25.512), Pain in right shoulder (M25.511) Precautions/Allergies: NKDA Fall Risk Score: 0 (? 5 = High Risk) MD Orders: Eval and treat, HEP, ROM, strengthening, aggressive/all modalities  3x/wk for 6 weeks (written 19) MEDICAL/REFERRING DIAGNOSIS: 
L shoulder DATE OF ONSET: 10/4/18, EUA of B shoulders on 18 REFERRING PHYSICIAN: Cassandra Aguayo MD 
RETURN PHYSICIAN APPOINTMENT: 4/10/19 ASSESSMENT ( 3/27/19) :  Ms. Ilan Lee  Is a 48year old R hand dominant female s/p EUA and manipulation of R shoulder, EUA and manipulation of L shoulder and L SAD, ADCR, and LOREN on 10/4/18. She presented with shoulder pain, decreased ROM in B shoulders, decreased strength in L shoulder and decreased functional use of B shoulders. Her ROM was improving in both arms though she continued to have pain. Her DASH score was improved. Both shoulders were then manipulated under anesthesia on 18. She continues to have pain in shoulder but has returned to work on (19). Her ROM and strength were gradually improving, but she does not have full strength and she has actually slightly decreased ROM since last measured. Shoulder tends to be stiff initially on days that she comes to PT after work. She could benefit from continued PT to address deficits and work toward goals. PROBLEM LIST (Impacting functional limitations): 1. Decreased Strength 2. Decreased ADL/Functional Activities 3. Increased Pain 4. Decreased Flexibility/Joint Mobility INTERVENTIONS PLANNED: 
1. Home Exercise Program (HEP) 2. Manual Therapy 3. Therapeutic Exercise/Strengthening 4. modals as needed TREATMENT PLAN: 
Effective Dates: 3/4/2019 TO 5/3/2019 (60 days). Frequency/Duration: 2-3 times a week for 60 Days GOALS: (Goals have been discussed and agreed upon with patient.) Patient's stated goal is to be able to use her arms. Short-Term Functional Goals: Time Frame: 6 weeks 1. Patient to be independent with HEP - MET 2. Patient to increase PROM L shoulder flex to 135 and PROM L shoulder ER to 30 degrees   MET (12/3/18) 3. Patient to report decrease in pain level to 5/10. - MET (2/26/19) but pain has been up again lately (3/27/19) Discharge Goals: Time Frame: 60 days - all in progress 1. Patient to report no more than minimal shoulder pain with all activity - Not MET yet 2. Patient to improve DASH score to 21 to demo improved use of B UEs  Not MET yet 3. Patient to improve AROM B shoulder flex to 150 degrees for improved functional use. MET for L, R not tested Rehabilitation Potential For Stated Goals: Good The information in this section was collected on 10/9/18 (except where otherwise noted). HISTORY:  
History of Present Injury/Illness (Reason for Referral): 
Patient reports that shoulder pain started last year. She went to her family doctor and was sent to PT. They tried dry needling and MD injected her. She didn't get better and requested a second opinion. She was sent to Dr. Silver Montes and an MRI was done, and then she had surgery on 10/4/18. She was told to leave the dressings on until she returned to MD.  
She subsequently had B shoulder manipulations on 11/30/18. Past Medical History/Comorbidities: OA,  Benign hypertension with CKD (chronic kidney disease) stage III ([address] Diabetes ([address] Dyslipidemia; Endometrial cancer ([address] and Morbid obesity ([address]  knee arthroscopy (Right, 2015); appendectomy (1979), L ankle surgery (2007) L index finger surgery (2005) Social History/Living Environment:  
  Lives with family in one story home. Prior Level of Function/Work/Activity: 
 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 Dominant Side:  
      RIGHT Previous Treatment Approaches: PT prior to surgery Current Medications:  Glipizide, lisinopril, Tylenol, Meloxicam   Date Last Reviewed:  3/25/19 EXAMINATION:  
Observation/Orthostatic Postural Assessment:   
      No new ROM:   
      AROM R shoulder flex 135, Abduct 145, extn 50, ER 45 and IR to mid R buttock PROM R shoulder flex 130, abduct 110, ER 35 at 90 degree abduct PROM L shoulder flex 80, abduct 65, IR 50 (to abdomen) and ER -15 from neutral.  
 
      PROM R shoulder flex 160, abduct 140, ER 70 and IR 65 at 90 degree abduct (11/1/18) PROM L shoulder flex 130, abduct 110, ER 30 and IR 50 at 90 degrees abduct (11/1/18) PROM R shoulder flex 165, abduct 160, ER 75 and IR 75 at 90 degree abduct (12/3/18) PROM L shoulder flex 140, abduct 120, ER 45 and IR 60 at 90 degrees abduct (12/3/18) PROM R shoulder flex 160, abduct 150, ER 85, IR 80 at 90 degrees abduct (1/3/19) PROM L shoulder flex 170, abduct 160, ER 70 and IR 70 at 90 degrees abduct (2/12/19) AROM L shoulder flex 155, abduct 115, ER 34 and IR not quite to sacrum (2/12/19) AROM L shoulder flex 160, abduct 115, ER 32  and IR not quite to sacrum,    (2/26/18) PROM L shoulder flex 160, abduct 160, ER ER 70 and IR 70 at 90 degrees abduct (3/27/19) AROM L shoulder flex 160, extn 46, abduct 115, ER 20 and IR to upper L buttock (3/27/19) Strength:   
      R shoulder 4+ to 5/5 all directions. L shoulder not tested. L shoulder flex 4+ , extn 5,  abduct 4+, IR 4+ and ER 4-  (2/26/19) Neurological Screen: Sensation: light touch intact in B UEs Body Structures Involved: 1. Joints 2. Muscles Body Functions Affected: 1. Neuromusculoskeletal 
2. Movement Related Activities and Participation Affected: 1. General Tasks and Demands 2. Self Care 3. Community, Social and [place] CLINICAL DECISION MAKING:  
Outcome Measure: Tool Used: Disabilities of the Arm, Shoulder and Hand (DASH) Questionnaire - Quick Version Score:  Initial: 55/55   41/55 (Date: 11/1/18 )      45/55 (Date 12/3/18)      43/55 (1/3/19)      40/55 (1/31/19)      41/55 (2/26/19)   Most Recent: 38/55 (3/28/19) Interpretation of Score: The DASH is designed to measure the activities of daily living in person's with upper extremity dysfunction or pain. Each section is scored on a 1-5 scale, 5 representing the greatest disability. The scores of each section are added together for a total score of 55. Medical Necessity:  
· Patient demonstrates good rehab potential due to higher previous functional level. Reason for Services/Other Comments: 
· Patient continues to require skilled intervention due to need to regain use of B UEs.  
  
 
 
 
TREATMENT:  
(In addition to Assessment/Re-Assessment sessions the following treatments were rendered) Pre-treatment Symptoms/Complaints:  Patient reports that shoulder is stiff and sore today. Pain: Initial:  
Pain Intensity 1: 5   Post Session:pain \"the same\" according to patient. Therapeutic Exercise: (25 Minutes):  Exercises to improve mobility and strength. Required minimal visual and verbal cues to ensure correct performance. AIS 3 sec x 10 each for L shoulder flex, abduct, ER at 45 and 90 degree abduct, IR at 90 degree abduct and at 90 degree flex, horizontal adduct, D1 and D2  Diagonals - all in supine. Date 1/31/19  Date 2/14/19 Date 2/18/19 Date 2/25/19 Date 3/12/19  Date 3/19/19 Date 3/28/19 Activity/Exercise B serratus punch 4# 2x15 4# 2x15 - 4# 2x15 5# 2x15 5# 2x15 5# 2x15 B bicep curls 5# 2x15 5# 2x15 - - - 6# 2x15 -  
IR with band  Green 2x15 Green 2x15 Green 2x15 Green 2x15 Green 2x15 Green 2x15 [employer] ER with band  Red 2x15 Red 2x15 Red 2x15 Red 2x15 Red 2x15 Red 2x15 Red 2x15 Side lying abduct 3# 2x15 3# 2x15 - 3# 2x15 3# 2x15 4# 2x15 - Side lying ER 3# 2x15 3# 2x15 - 3# 2x15 3# 2x15 3# 2x15 3# 2x15 Wall push ups Rail 2x15 Rail 2x10 Rail [address] [employer] 2x15 Rail 2x15 Rail 2x15 - B Lat pull downs 13# 2x15 13# 2x15 13# 2x15 13# 2x15 17# 2x15 17# 2x15 17# 2x15 B Cable rows 13# 2x15 13# 2x15 13# 2x15 13# 2x15 17# 2x15 17# 2x15 17# 2x15 B cable press 10# 2x15 10# 2x15 10# 2x15 10# 2x15 10# 2x15 10# 2x15 10# 2x15 B shldr flex with symmetry - 2# 2x10 - 2# 2x10 2# 2x10 2# 2x10 2# 2x10 B shldr abduct to <= 90 - 2# 2x10 - 2# 2x10 2# 2x10 2# 2x10 2# 2x10 Rows with band Black 2x15 Black 2x15 - - - Black 2x15 Black 2x15 B shldr extn with band Black 2x15 Black 2x15 - - - Black 2x15 Black 2x15 D1/D2 diagonals on wall 2x10 ea 1# 2x10 1# 2x15 1# 2x10 ea 1# 2x10 ea 1.5# 2x10 ea - Pass behind back - 2x10 2x10 2x15 2x15 2x15 -  
T's with band     Red 2x15 Red 2x15 - Bent over rows     8# 2x15 8# 2x15 - Hugs with band      Green 2x15 -  
         
HEP - continue current HEP Manual Therapy ( 15 minutes) -  Grade 2 to 4 physio mobs L shoulder flex, abduct, IR and ER. Contract/relax at end range of L shoulder IR, ER and flex. Grade 4 inferior and posterior shoulder glides. Therapeutic Modalities: For pain - Heat during manual therapy,  Patient took ice with her at end of session. Left Shoulder Heat Type: Moist pack Duration : 15 minutes Patient Position: Supine Katherene Means   
Treatment/Session Assessment:   
· Response to Treatment:  Patent reported pain in L shoulder as unchanged despite doing her exercises today. She is to rest shoulder tomorrow and do HEP on Saturday. · Compliance with Program/Exercises: yes · Recommendations/Intent for next treatment session: \"Next visit will focus on motion and strength in L shoulder . \". Total Treatment Duration: 40 minutes PT Patient Time In/Time Out Time In: 8622 Time Out: 5536 Alexandr Hunt, PT

## 2019-04-01 ENCOUNTER — HOSPITAL ENCOUNTER (OUTPATIENT)
Dept: PHYSICAL THERAPY | Age: 54
Discharge: HOME | End: 2019-04-01
Payer: COMMERCIAL

## 2019-04-01 PROCEDURE — 97110 THERAPEUTIC EXERCISES: CPT

## 2019-04-01 PROCEDURE — 97140 MANUAL THERAPY 1/> REGIONS: CPT

## 2019-04-01 NOTE — PROGRESS NOTES
Nubia Turner : 1965 Primary: [facility]* Secondary:  Therapy Center at [facility] ARTIE BLAIR [address] [facility], [address] [place] [address],[address], [facility]. [phone]   [phone] OUTPATIENT PHYSICAL THERAPY:Daily Note 2019 ICD-10: Treatment Diagnosis:  M75.01 Adhesive capsulitis of right shoulder, M75.02 Adhesive capsulitis of left shoulder, M19.012 Primary osteoarthritis, left shoulder, Pain in left shoulder (M25.512), Pain in right shoulder (M25.511) Precautions/Allergies: NKDA Fall Risk Score: 0 (? 5 = High Risk) MD Orders: Eval and treat, HEP, ROM, strengthening, aggressive/all modalities  3x/wk for 6 weeks (written 19) MEDICAL/REFERRING DIAGNOSIS: 
L shoulder DATE OF ONSET: 10/4/18, EUA of B shoulders on 18 REFERRING PHYSICIAN: Valentin Dumont MD 
RETURN PHYSICIAN APPOINTMENT: 4/10/19 ASSESSMENT ( 3/27/19) :  Ms. Jenny Babinski  Is a 48year old R hand dominant female s/p EUA and manipulation of R shoulder, EUA and manipulation of L shoulder and L SAD, ADCR, and LOREN on 10/4/18. She presented with shoulder pain, decreased ROM in B shoulders, decreased strength in L shoulder and decreased functional use of B shoulders. Her ROM was improving in both arms though she continued to have pain. Her DASH score was improved. Both shoulders were then manipulated under anesthesia on 18. She continues to have pain in shoulder but has returned to work on (19). Her ROM and strength were gradually improving, but she does not have full strength and she has actually slightly decreased ROM since last measured. Shoulder tends to be stiff initially on days that she comes to PT after work. She could benefit from continued PT to address deficits and work toward goals. PROBLEM LIST (Impacting functional limitations): 1. Decreased Strength 2. Decreased ADL/Functional Activities 3. Increased Pain 4. Decreased Flexibility/Joint Mobility INTERVENTIONS PLANNED: 
1. Home Exercise Program (HEP) 2. Manual Therapy 3. Therapeutic Exercise/Strengthening 4. modals as needed TREATMENT PLAN: 
Effective Dates: 3/4/2019 TO 5/3/2019 (60 days). Frequency/Duration: 2-3 times a week for 60 Days GOALS: (Goals have been discussed and agreed upon with patient.) Patient's stated goal is to be able to use her arms. Short-Term Functional Goals: Time Frame: 6 weeks 1. Patient to be independent with HEP - MET 2. Patient to increase PROM L shoulder flex to 135 and PROM L shoulder ER to 30 degrees   MET (12/3/18) 3. Patient to report decrease in pain level to 5/10. - MET (2/26/19) but pain has been up again lately (3/27/19) Discharge Goals: Time Frame: 60 days - all in progress 1. Patient to report no more than minimal shoulder pain with all activity - Not MET yet 2. Patient to improve DASH score to 21 to demo improved use of B UEs  Not MET yet 3. Patient to improve AROM B shoulder flex to 150 degrees for improved functional use. MET for L, R not tested Rehabilitation Potential For Stated Goals: Good The information in this section was collected on 10/9/18 (except where otherwise noted). HISTORY:  
History of Present Injury/Illness (Reason for Referral): 
Patient reports that shoulder pain started last year. She went to her family doctor and was sent to PT. They tried dry needling and MD injected her. She didn't get better and requested a second opinion. She was sent to Dr. Karl Almaguer and an MRI was done, and then she had surgery on 10/4/18. She was told to leave the dressings on until she returned to MD.  
She subsequently had B shoulder manipulations on 11/30/18. Past Medical History/Comorbidities: OA,  Benign hypertension with CKD (chronic kidney disease) stage III ([address] Diabetes ([address] Dyslipidemia; Endometrial cancer ([address] and Morbid obesity ([address]  knee arthroscopy (Right, 2015); appendectomy (1979), L ankle surgery (2007) L index finger surgery (2005) Social History/Living Environment:  
  Lives with family in one story home. Prior Level of Function/Work/Activity: 
 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 Dominant Side:  
      RIGHT Previous Treatment Approaches: PT prior to surgery Current Medications:  Glipizide, lisinopril, Tylenol, Meloxicam   Date Last Reviewed:  4/1/19 EXAMINATION:  
Observation/Orthostatic Postural Assessment:   
      No new ROM:   
      AROM R shoulder flex 135, Abduct 145, extn 50, ER 45 and IR to mid R buttock PROM R shoulder flex 130, abduct 110, ER 35 at 90 degree abduct PROM L shoulder flex 80, abduct 65, IR 50 (to abdomen) and ER -15 from neutral.  
 
      PROM R shoulder flex 160, abduct 140, ER 70 and IR 65 at 90 degree abduct (11/1/18) PROM L shoulder flex 130, abduct 110, ER 30 and IR 50 at 90 degrees abduct (11/1/18) PROM R shoulder flex 165, abduct 160, ER 75 and IR 75 at 90 degree abduct (12/3/18) PROM L shoulder flex 140, abduct 120, ER 45 and IR 60 at 90 degrees abduct (12/3/18) PROM R shoulder flex 160, abduct 150, ER 85, IR 80 at 90 degrees abduct (1/3/19) PROM L shoulder flex 170, abduct 160, ER 70 and IR 70 at 90 degrees abduct (2/12/19) AROM L shoulder flex 155, abduct 115, ER 34 and IR not quite to sacrum (2/12/19) AROM L shoulder flex 160, abduct 115, ER 32  and IR not quite to sacrum,    (2/26/18) PROM L shoulder flex 160, abduct 160, ER ER 70 and IR 70 at 90 degrees abduct (3/27/19) AROM L shoulder flex 160, extn 46, abduct 115, ER 20 and IR to upper L buttock (3/27/19) Strength:   
      R shoulder 4+ to 5/5 all directions. L shoulder not tested. L shoulder flex 4+ , extn 5,  abduct 4+, IR 4+ and ER 4-  (2/26/19) Neurological Screen: Sensation: light touch intact in B UEs Body Structures Involved: 1. Joints 2. Muscles Body Functions Affected: 1. Neuromusculoskeletal 
2. Movement Related Activities and Participation Affected: 1. General Tasks and Demands 2. Self Care 3. Community, Social and [place] CLINICAL DECISION MAKING:  
Outcome Measure: Tool Used: Disabilities of the Arm, Shoulder and Hand (DASH) Questionnaire - Quick Version Score:  Initial: 55/55   41/55 (Date: 11/1/18 )      45/55 (Date 12/3/18)      43/55 (1/3/19)      40/55 (1/31/19)      41/55 (2/26/19)   Most Recent: 38/55 (3/28/19) Interpretation of Score: The DASH is designed to measure the activities of daily living in person's with upper extremity dysfunction or pain. Each section is scored on a 1-5 scale, 5 representing the greatest disability. The scores of each section are added together for a total score of 55. Medical Necessity:  
· Patient demonstrates good rehab potential due to higher previous functional level. Reason for Services/Other Comments: 
· Patient continues to require skilled intervention due to need to regain use of B UEs.  
  
 
 
 
TREATMENT:  
(In addition to Assessment/Re-Assessment sessions the following treatments were rendered) Pre-treatment Symptoms/Complaints:  Patient reports that shoulder is still sore. She didn't get much sleep last night and then worked today. Pain: Initial:  
Pain Intensity 1: 7   Post Session:pain \"the same\" according to patient. No change better or worse. Therapeutic Exercise: (25 Minutes):  Exercises to improve mobility and strength. Required minimal visual and verbal cues to ensure correct performance. AIS 3 sec x 10 each for L shoulder flex, abduct, ER at 45 and 90 degree abduct, IR at 90 degree abduct and at 90 degree flex, horizontal adduct, D1 and D2  Diagonals - all in supine. Date 1/31/19  Date 2/14/19 Date 2/18/19 Date 2/25/19 Date 3/12/19  Date 3/19/19 Date 3/28/19  Date 4/1/19 Activity/Exercise B serratus punch 4# 2x15 4# 2x15 - 4# 2x15 5# 2x15 5# 2x15 5# 2x15 5# 2x15 B bicep curls 5# 2x15 5# 2x15 - - - 6# 2x15 - -  
IR with band  Green 2x15 Green 2x15 Green 2x15 Green 2x15 Green 2x15 Green 2x15 Green 2x15 [employer] ER with band  Red 2x15 Red 2x15 Red 2x15 Red 2x15 Red 2x15 Red 2x15 Red 2x15 Red 2x15 Side lying abduct 3# 2x15 3# 2x15 - 3# 2x15 3# 2x15 4# 2x15 - - Side lying ER 3# 2x15 3# 2x15 - 3# 2x15 3# 2x15 3# 2x15 3# 2x15 3# 2x15 Wall push ups Rail 2x15 Rail 2x10 Rail [address] [place] 2x15 Rail 2x15 Rail 2x15 - [address] 2x15 B Lat pull downs 13# 2x15 13# 2x15 13# 2x15 13# 2x15 17# 2x15 17# 2x15 17# 2x15 17# 2x15 B Cable rows 13# 2x15 13# 2x15 13# 2x15 13# 2x15 17# 2x15 17# 2x15 17# 2x15 17# 2x15 B cable press 10# 2x15 10# 2x15 10# 2x15 10# 2x15 10# 2x15 10# 2x15 10# 2x15 10# 2x15 B shldr flex with symmetry - 2# 2x10 - 2# 2x10 2# 2x10 2# 2x10 2# 2x10 -  
B shldr abduct to <= 90 - 2# 2x10 - 2# 2x10 2# 2x10 2# 2x10 2# 2x10 - Rows with band Black 2x15 Black 2x15 - - - Black 2x15 Black 2x15 Black 2x15 B shldr extn with band Black 2x15 Black 2x15 - - - Black 2x15 Black 2x15 Black 2x15 D1/D2 diagonals on wall 2x10 ea 1# 2x10 1# 2x15 1# 2x10 ea 1# 2x10 ea 1.5# 2x10 ea - 1.5# 2x10 ea Pass behind back - 2x10 2x10 2x15 2x15 2x15 - -  
T's with band     Red 2x15 Red 2x15 - - Bent over rows     8# 2x15 8# 2x15 - - Hugs with band      Green 2x15 - -  
          
HEP - continue current HEP Manual Therapy ( 15 minutes) -  Grade 2 to 4 physio mobs L shoulder flex, abduct, IR and ER. . Grade 4 inferior and posterior shoulder glides. Therapeutic Modalities: For pain - Heat during manual therapy,  Patient took ice with her at end of session. Left Shoulder Heat Type: Moist pack Duration : 15 minutes Patient Position: Supine [place] Treatment/Session Assessment:   
· Response to Treatment:  Very little change in pain levels lately. Patient has returned to work, and she does exercises as asked, but still reports high pain. She returns to MD next week. · Compliance with Program/Exercises: yes · Recommendations/Intent for next treatment session: \"Next visit will focus on motion and strength in L shoulder . \". Total Treatment Duration: 40 minutes PT Patient Time In/Time Out Time In: 4120 Time Out: 9244 Ada Geiger, PT

## 2019-04-03 ENCOUNTER — HOSPITAL ENCOUNTER (OUTPATIENT)
Dept: PHYSICAL THERAPY | Age: 54
Discharge: HOME | End: 2019-04-03
Payer: COMMERCIAL

## 2019-04-03 PROCEDURE — 97110 THERAPEUTIC EXERCISES: CPT

## 2019-04-03 PROCEDURE — 97140 MANUAL THERAPY 1/> REGIONS: CPT

## 2019-04-03 NOTE — PROGRESS NOTES
Trell Tevin : 1965 Primary: [facility]* Secondary:  Therapy Center at [address] [place] [address] [address] [facility], [address] [place] [address],[address], [address] [phone]   [phone] OUTPATIENT PHYSICAL THERAPY:Daily Note 4/3/2019 ICD-10: Treatment Diagnosis:  M75.01 Adhesive capsulitis of right shoulder, M75.02 Adhesive capsulitis of left shoulder, M19.012 Primary osteoarthritis, left shoulder, Pain in left shoulder (M25.512), Pain in right shoulder (M25.511) Precautions/Allergies: NKDA Fall Risk Score: 0 (? 5 = High Risk) MD Orders: Eval and treat, HEP, ROM, strengthening, aggressive/all modalities  3x/wk for 6 weeks (written 19) MEDICAL/REFERRING DIAGNOSIS: 
L shoulder DATE OF ONSET: 10/4/18, EUA of B shoulders on 18 REFERRING PHYSICIAN: Alysha Mcelroy MD 
RETURN PHYSICIAN APPOINTMENT: 4/10/19 ASSESSMENT ( 3/27/19) :  Ms. Luis Galicia  Is a 48year old R hand dominant female s/p EUA and manipulation of R shoulder, EUA and manipulation of L shoulder and L SAD, ADCR, and LOREN on 10/4/18. She presented with shoulder pain, decreased ROM in B shoulders, decreased strength in L shoulder and decreased functional use of B shoulders. Her ROM was improving in both arms though she continued to have pain. Her DASH score was improved. Both shoulders were then manipulated under anesthesia on 18. She continues to have pain in shoulder but has returned to work on (19). Her ROM and strength were gradually improving, but she does not have full strength and she has actually slightly decreased ROM since last measured. Shoulder tends to be stiff initially on days that she comes to PT after work. She could benefit from continued PT to address deficits and work toward goals. PROBLEM LIST (Impacting functional limitations): 1. Decreased Strength 2. Decreased ADL/Functional Activities 3. Increased Pain 4. Decreased Flexibility/Joint Mobility INTERVENTIONS PLANNED: 
1. Home Exercise Program (HEP) 2. Manual Therapy 3. Therapeutic Exercise/Strengthening 4. modals as needed TREATMENT PLAN: 
Effective Dates: 3/4/2019 TO 5/3/2019 (60 days). Frequency/Duration: 2-3 times a week for 60 Days GOALS: (Goals have been discussed and agreed upon with patient.) Patient's stated goal is to be able to use her arms. Short-Term Functional Goals: Time Frame: 6 weeks 1. Patient to be independent with HEP - MET 2. Patient to increase PROM L shoulder flex to 135 and PROM L shoulder ER to 30 degrees   MET (12/3/18) 3. Patient to report decrease in pain level to 5/10. - MET (2/26/19) but pain has been up again lately (3/27/19) Discharge Goals: Time Frame: 60 days - all in progress 1. Patient to report no more than minimal shoulder pain with all activity - Not MET yet 2. Patient to improve DASH score to 21 to demo improved use of B UEs  Not MET yet 3. Patient to improve AROM B shoulder flex to 150 degrees for improved functional use. MET for L, R not tested Rehabilitation Potential For Stated Goals: Good The information in this section was collected on 10/9/18 (except where otherwise noted). HISTORY:  
History of Present Injury/Illness (Reason for Referral): 
Patient reports that shoulder pain started last year. She went to her family doctor and was sent to PT. They tried dry needling and MD injected her. She didn't get better and requested a second opinion. She was sent to Dr. Guanaco Johnson and an MRI was done, and then she had surgery on 10/4/18. She was told to leave the dressings on until she returned to MD.  
She subsequently had B shoulder manipulations on 11/30/18. Past Medical History/Comorbidities: OA,  Benign hypertension with CKD (chronic kidney disease) stage III ([address] Diabetes ([address] Dyslipidemia; Endometrial cancer ([address] and Morbid obesity ([address]  knee arthroscopy (Right, 2015); appendectomy (1979), L ankle surgery (2007) L index finger surgery (2005) Social History/Living Environment:  
  Lives with family in one story home. Prior Level of Function/Work/Activity: 
 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 Dominant Side:  
      RIGHT Previous Treatment Approaches: PT prior to surgery Current Medications:  Glipizide, lisinopril, Tylenol, Meloxicam   Date Last Reviewed:  4/1/19 EXAMINATION:  
Observation/Orthostatic Postural Assessment:   
      No new ROM:   
      AROM R shoulder flex 135, Abduct 145, extn 50, ER 45 and IR to mid R buttock PROM R shoulder flex 130, abduct 110, ER 35 at 90 degree abduct PROM L shoulder flex 80, abduct 65, IR 50 (to abdomen) and ER -15 from neutral.  
 
      PROM R shoulder flex 160, abduct 140, ER 70 and IR 65 at 90 degree abduct (11/1/18) PROM L shoulder flex 130, abduct 110, ER 30 and IR 50 at 90 degrees abduct (11/1/18) PROM R shoulder flex 165, abduct 160, ER 75 and IR 75 at 90 degree abduct (12/3/18) PROM L shoulder flex 140, abduct 120, ER 45 and IR 60 at 90 degrees abduct (12/3/18) PROM R shoulder flex 160, abduct 150, ER 85, IR 80 at 90 degrees abduct (1/3/19) PROM L shoulder flex 170, abduct 160, ER 70 and IR 70 at 90 degrees abduct (2/12/19) AROM L shoulder flex 155, abduct 115, ER 34 and IR not quite to sacrum (2/12/19) AROM L shoulder flex 160, abduct 115, ER 32  and IR not quite to sacrum,    (2/26/18) PROM L shoulder flex 160, abduct 160, ER ER 70 and IR 70 at 90 degrees abduct (3/27/19) AROM L shoulder flex 160, extn 46, abduct 115, ER 20 and IR to upper L buttock (3/27/19) Strength:   
      R shoulder 4+ to 5/5 all directions. L shoulder not tested. L shoulder flex 4+ , extn 5,  abduct 4+, IR 4+ and ER 4-  (2/26/19) Neurological Screen: Sensation: light touch intact in B UEs Body Structures Involved: 1. Joints 2. Muscles Body Functions Affected: 1. Neuromusculoskeletal 
2. Movement Related Activities and Participation Affected: 1. General Tasks and Demands 2. Self Care 3. Community, Social and [place] CLINICAL DECISION MAKING:  
Outcome Measure: Tool Used: Disabilities of the Arm, Shoulder and Hand (DASH) Questionnaire - Quick Version Score:  Initial: 55/55   41/55 (Date: 11/1/18 )      45/55 (Date 12/3/18)      43/55 (1/3/19)      40/55 (1/31/19)      41/55 (2/26/19)   Most Recent: 38/55 (3/28/19) Interpretation of Score: The DASH is designed to measure the activities of daily living in person's with upper extremity dysfunction or pain. Each section is scored on a 1-5 scale, 5 representing the greatest disability. The scores of each section are added together for a total score of 55. Medical Necessity:  
· Patient demonstrates good rehab potential due to higher previous functional level. Reason for Services/Other Comments: 
· Patient continues to require skilled intervention due to need to regain use of B UEs.  
  
 
 
 
TREATMENT:  
(In addition to Assessment/Re-Assessment sessions the following treatments were rendered) Pre-treatment Symptoms/Complaints:  Patient reports that she is very sore today and just wants to be stretched out. She worked hard all day at work today. Pain: Initial:  
Pain Intensity 1: 6   Post Session:pain rated as 'down a little' at end of session. Therapeutic Exercise: (10 Minutes):  Exercises to improve mobility and strength. Required minimal verbal cues to ensure correct performance. AIS 3 sec x 10 each for L shoulder flex, abduct, ER at 45 and 90 degree abduct, IR at 90 degree abduct and at 90 degree flex, horizontal adduct, D1 and D2  Diagonals - all in supine. Date 1/31/19  Date 2/14/19 Date 2/18/19 Date 2/25/19 Date 3/12/19  Date 3/19/19 Date 3/28/19  Date 4/1/19 Activity/Exercise B serratus punch 4# 2x15 4# 2x15 - 4# 2x15 5# 2x15 5# 2x15 5# 2x15 5# 2x15 B bicep curls 5# 2x15 5# 2x15 - - - 6# 2x15 - -  
IR with band  Green 2x15 Green 2x15 Green 2x15 Green 2x15 Green 2x15 Green 2x15 Green 2x15 [employer] ER with band  Red 2x15 Red 2x15 Red 2x15 Red 2x15 Red 2x15 Red 2x15 Red 2x15 Red 2x15 Side lying abduct 3# 2x15 3# 2x15 - 3# 2x15 3# 2x15 4# 2x15 - - Side lying ER 3# 2x15 3# 2x15 - 3# 2x15 3# 2x15 3# 2x15 3# 2x15 3# 2x15 Wall push ups Rail 2x15 Rail 2x10 Rail [address] [place] 2x15 Rail 2x15 Rail 2x15 - [address] 2x15 B Lat pull downs 13# 2x15 13# 2x15 13# 2x15 13# 2x15 17# 2x15 17# 2x15 17# 2x15 17# 2x15 B Cable rows 13# 2x15 13# 2x15 13# 2x15 13# 2x15 17# 2x15 17# 2x15 17# 2x15 17# 2x15 B cable press 10# 2x15 10# 2x15 10# 2x15 10# 2x15 10# 2x15 10# 2x15 10# 2x15 10# 2x15 B shldr flex with symmetry - 2# 2x10 - 2# 2x10 2# 2x10 2# 2x10 2# 2x10 -  
B shldr abduct to <= 90 - 2# 2x10 - 2# 2x10 2# 2x10 2# 2x10 2# 2x10 - Rows with band Black 2x15 Black 2x15 - - - Black 2x15 Black 2x15 Black 2x15 B shldr extn with band Black 2x15 Black 2x15 - - - Black 2x15 Black 2x15 Black 2x15 D1/D2 diagonals on wall 2x10 ea 1# 2x10 1# 2x15 1# 2x10 ea 1# 2x10 ea 1.5# 2x10 ea - 1.5# 2x10 ea Pass behind back - 2x10 2x10 2x15 2x15 2x15 - -  
T's with band     Red 2x15 Red 2x15 - - Bent over rows     8# 2x15 8# 2x15 - - Hugs with band      Green 2x15 - -  
          
HEP - continue current HEP Manual Therapy ( 20 minutes) -  Grade 2 to 4 physio mobs L shoulder flex, abduct, IR and ER. . Grade 4 inferior and posterior shoulder glides. Contract/relax at end range IR, ER and flex. Therapeutic Modalities: For pain - Heat during manual therapy,  Game ready device at end of session 38 degrees and light compression. Left Shoulder Heat Type: Moist pack Duration : 15 minutes Patient Position: Supine              Left Shoulder Cold Type: Cold/ice pack(with vasoneumatic compression) Duration : 15 minutes Patient Position: Sitting Nirmala Christian Treatment/Session Assessment:   
· Response to Treatment:  Patient still painful at start of session, with slight decrease in pain by end of session. She returns to MD next week and is concerned that work is not really giving her light duty. .  
· Compliance with Program/Exercises: yes · Recommendations/Intent for next treatment session: \"Next visit will focus on motion and strength in L shoulder . \". Total Treatment Duration: 45 minutes PT Patient Time In/Time Out Time In: 4505 Time Out: 1700 Andry Meza PT

## 2019-04-11 ENCOUNTER — HOSPITAL ENCOUNTER (OUTPATIENT)
Dept: PHYSICAL THERAPY | Age: 54
Discharge: HOME | End: 2019-04-11
Payer: COMMERCIAL

## 2019-04-11 PROCEDURE — 97110 THERAPEUTIC EXERCISES: CPT

## 2019-04-11 PROCEDURE — 97140 MANUAL THERAPY 1/> REGIONS: CPT

## 2019-04-11 NOTE — PROGRESS NOTES
Miguel Tay : 1965 Primary: [facility] [employer] [facility]* Secondary:  Therapy Center at [facility] ARTIE BLAIR [address] [facility], [address] [place] [address],[address], [facility] [address] [phone]   [phone] OUTPATIENT PHYSICAL THERAPY:Daily Note 2019 ICD-10: Treatment Diagnosis:  M75.01 Adhesive capsulitis of right shoulder, M75.02 Adhesive capsulitis of left shoulder, M19.012 Primary osteoarthritis, left shoulder, Pain in left shoulder (M25.512), Pain in right shoulder (M25.511) Precautions/Allergies: NKDA Fall Risk Score: 0 (? 5 = High Risk) MD Orders: Eval and treat, HEP, ROM, strengthening, aggressive/all modalities  3x/wk for 6 weeks (written 4/10/19) MEDICAL/REFERRING DIAGNOSIS: 
L shoulder DATE OF ONSET: 10/4/18, EUA of B shoulders on 18 REFERRING PHYSICIAN: Sofia Gonzalez MD 
RETURN PHYSICIAN APPOINTMENT: 19 ASSESSMENT ( 3/27/19) :  Ms. Manuel Blue  Is a 48year old R hand dominant female s/p EUA and manipulation of R shoulder, EUA and manipulation of L shoulder and L SAD, ADCR, and LOREN on 10/4/18. She presented with shoulder pain, decreased ROM in B shoulders, decreased strength in L shoulder and decreased functional use of B shoulders. Her ROM was improving in both arms though she continued to have pain. Her DASH score was improved. Both shoulders were then manipulated under anesthesia on 18. She continues to have pain in shoulder but has returned to work on (19). Her ROM and strength were gradually improving, but she does not have full strength and she has actually slightly decreased ROM since last measured. Shoulder tends to be stiff initially on days that she comes to PT after work. She could benefit from continued PT to address deficits and work toward goals. PROBLEM LIST (Impacting functional limitations): 1. Decreased Strength 2. Decreased ADL/Functional Activities 3. Increased Pain 4. Decreased Flexibility/Joint Mobility INTERVENTIONS PLANNED: 
1. Home Exercise Program (HEP) 2. Manual Therapy 3. Therapeutic Exercise/Strengthening 4. modals as needed TREATMENT PLAN: 
Effective Dates: 3/4/2019 TO 5/3/2019 (60 days). Frequency/Duration: 2-3 times a week for 60 Days GOALS: (Goals have been discussed and agreed upon with patient.) Patient's stated goal is to be able to use her arms. Short-Term Functional Goals: Time Frame: 6 weeks 1. Patient to be independent with HEP - MET 2. Patient to increase PROM L shoulder flex to 135 and PROM L shoulder ER to 30 degrees   MET (12/3/18) 3. Patient to report decrease in pain level to 5/10. - MET (2/26/19) but pain has been up again lately (3/27/19) Discharge Goals: Time Frame: 60 days - all in progress 1. Patient to report no more than minimal shoulder pain with all activity - Not MET yet 2. Patient to improve DASH score to 21 to demo improved use of B UEs  Not MET yet 3. Patient to improve AROM B shoulder flex to 150 degrees for improved functional use. MET for L, R not tested Rehabilitation Potential For Stated Goals: Good The information in this section was collected on 10/9/18 (except where otherwise noted). HISTORY:  
History of Present Injury/Illness (Reason for Referral): 
Patient reports that shoulder pain started last year. She went to her family doctor and was sent to PT. They tried dry needling and MD injected her. She didn't get better and requested a second opinion. She was sent to Dr. Maritza Rowan and an MRI was done, and then she had surgery on 10/4/18. She was told to leave the dressings on until she returned to MD.  
She subsequently had B shoulder manipulations on 11/30/18. Past Medical History/Comorbidities: OA,  Benign hypertension with CKD (chronic kidney disease) stage III ([address] Diabetes ([address] Dyslipidemia; Endometrial cancer ([address] and Morbid obesity ([address]  knee arthroscopy (Right, 2015); appendectomy (1979), L ankle surgery (2007) L index finger surgery (2005) Social History/Living Environment:  
  Lives with family in one story home. Prior Level of Function/Work/Activity: 
 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 Dominant Side:  
      RIGHT Previous Treatment Approaches: PT prior to surgery Current Medications:  Glipizide, lisinopril, Tylenol  Date Last Reviewed:  4/11/19 EXAMINATION:  
Observation/Orthostatic Postural Assessment:   
      No new ROM:   
      AROM R shoulder flex 135, Abduct 145, extn 50, ER 45 and IR to mid R buttock PROM R shoulder flex 130, abduct 110, ER 35 at 90 degree abduct PROM L shoulder flex 80, abduct 65, IR 50 (to abdomen) and ER -15 from neutral.  
 
      PROM R shoulder flex 160, abduct 140, ER 70 and IR 65 at 90 degree abduct (11/1/18) PROM L shoulder flex 130, abduct 110, ER 30 and IR 50 at 90 degrees abduct (11/1/18) PROM R shoulder flex 165, abduct 160, ER 75 and IR 75 at 90 degree abduct (12/3/18) PROM L shoulder flex 140, abduct 120, ER 45 and IR 60 at 90 degrees abduct (12/3/18) PROM R shoulder flex 160, abduct 150, ER 85, IR 80 at 90 degrees abduct (1/3/19) PROM L shoulder flex 170, abduct 160, ER 70 and IR 70 at 90 degrees abduct (2/12/19) AROM L shoulder flex 155, abduct 115, ER 34 and IR not quite to sacrum (2/12/19) AROM L shoulder flex 160, abduct 115, ER 32  and IR not quite to sacrum,    (2/26/18) PROM L shoulder flex 160, abduct 160, ER ER 70 and IR 70 at 90 degrees abduct (3/27/19) AROM L shoulder flex 160, extn 46, abduct 115, ER 20 and IR to upper L buttock (3/27/19) AROM L shoulder flex 145, extn 50, abduct 110, ER 18, IR to upper L buttock (4/4/19) Strength:   
      R shoulder 4+ to 5/5 all directions. L shoulder not tested. L shoulder flex 4+ , extn 5,  abduct 4+, IR 4+ and ER 4-  (2/26/19) Neurological Screen: 
      Sensation: light touch intact in B UEs Body Structures Involved: 1. Joints 2. Muscles Body Functions Affected: 1. Neuromusculoskeletal 
2. Movement Related Activities and Participation Affected: 1. General Tasks and Demands 2. Self Care 3. Community, Social and [place] CLINICAL DECISION MAKING:  
Outcome Measure: Tool Used: Disabilities of the Arm, Shoulder and Hand (DASH) Questionnaire - Quick Version Score:  Initial: 55/55   41/55 (Date: 11/1/18 )      45/55 (Date 12/3/18)      43/55 (1/3/19)      40/55 (1/31/19)      41/55 (2/26/19)   Most Recent: 38/55 (3/28/19) Interpretation of Score: The DASH is designed to measure the activities of daily living in person's with upper extremity dysfunction or pain. Each section is scored on a 1-5 scale, 5 representing the greatest disability. The scores of each section are added together for a total score of 55. Medical Necessity:  
· Patient demonstrates good rehab potential due to higher previous functional level. Reason for Services/Other Comments: 
· Patient continues to require skilled intervention due to need to regain use of B UEs.  
  
 
 
 
TREATMENT:  
(In addition to Assessment/Re-Assessment sessions the following treatments were rendered) Pre-treatment Symptoms/Complaints:  Patient reports that she returned to MD yesterday and he injected her shoulder, but it made her sick afterward. She is not noticing any relief yet. He pulled her back out of work so shoulder could rest more. Said MD was pleased with her motion, but not the strength or her pain level. Pain: Initial:  
Pain Intensity 1: 6   Post Session:patient said pain was 'no worse' at end of session. Therapeutic Exercise: (10 Minutes):  Exercises to improve mobility and strength. Required minimal verbal cues to ensure correct performance. AIS 3 sec x 10 each for L shoulder flex, abduct, ER at 45 and 90 degree abduct, IR at 90 degree abduct and at 90 degree flex, horizontal adduct, D1 and D2  Diagonals - all in supine. Date 2/18/19 Date 2/25/19 Date 3/12/19  Date 3/19/19 Date 3/28/19  Date 4/1/19 Date Activity/Exercise B serratus punch - 4# 2x15 5# 2x15 5# 2x15 5# 2x15 5# 2x15 B bicep curls - - - 6# 2x15 - -   
IR with band  Green 2x15 Green 2x15 Green 2x15 Green 2x15 Green 2x15 Green 2x15 ER with band  Red 2x15 Red 2x15 Red 2x15 Red 2x15 Red 2x15 Red 2x15 Side lying abduct - 3# 2x15 3# 2x15 4# 2x15 - - Side lying ER - 3# 2x15 3# 2x15 3# 2x15 3# 2x15 3# 2x15 Wall push ups Rail [address] [place] 2x15 Rail 2x15 Rail 2x15 - [address] 2x15 B Lat pull downs 13# 2x15 13# 2x15 17# 2x15 17# 2x15 17# 2x15 17# 2x15 B Cable rows 13# 2x15 13# 2x15 17# 2x15 17# 2x15 17# 2x15 17# 2x15 B cable press 10# 2x15 10# 2x15 10# 2x15 10# 2x15 10# 2x15 10# 2x15 B shldr flex with symmetry - 2# 2x10 2# 2x10 2# 2x10 2# 2x10 -   
B shldr abduct to <= 90 - 2# 2x10 2# 2x10 2# 2x10 2# 2x10 - Rows with band - - - Black 2x15 Black 2x15 Black 2x15 B shldr extn with band - - - Black 2x15 Black 2x15 Black 2x15 D1/D2 diagonals on wall 1# 2x15 1# 2x10 ea 1# 2x10 ea 1.5# 2x10 ea - 1.5# 2x10 ea Pass behind back 2x10 2x15 2x15 2x15 - -   
T's with band   Red 2x15 Red 2x15 - - Bent over rows   8# 2x15 8# 2x15 - - Hugs with band    Green 2x15 - -   
         
HEP - continue current HEP Manual Therapy ( 30 minutes) -  Grade 2 to 4 physio mobs L shoulder flex, abduct, IR and ER. . Grade 4 inferior and posterior shoulder glides. Therapeutic Modalities: For pain - Heat during manual therapy,   Patient took ice with her at end of session. Left Shoulder Heat Type: Moist pack Duration : 15 minutes Patient Position: Supine [facility] Treatment/Session Assessment:   
· Response to Treatment:  Focused on ROM today since shoulder was just injected yesterday afternoon and patient was still very painful. Will plan to resume strengthening next week. · Compliance with Program/Exercises: yes · Recommendations/Intent for next treatment session: \"Next visit will focus on motion and strength in L shoulder . \". Total Treatment Duration:40 minutes PT Patient Time In/Time Out Time In: 1120 Time Out: 7635 Sobeida Hernandez, PT

## 2019-04-15 ENCOUNTER — HOSPITAL ENCOUNTER (OUTPATIENT)
Dept: PHYSICAL THERAPY | Age: 54
Discharge: HOME | End: 2019-04-15
Payer: COMMERCIAL

## 2019-04-15 PROCEDURE — 97140 MANUAL THERAPY 1/> REGIONS: CPT

## 2019-04-15 PROCEDURE — 97110 THERAPEUTIC EXERCISES: CPT

## 2019-04-15 NOTE — PROGRESS NOTES
Danelle Arreola : 1965 Primary: [employer] [facility]* Secondary:  Therapy Center at [facility] ARTIE BLAIR [address] [facility], [address] [place] [address],[address], [place] [address]. [phone]   [phone] OUTPATIENT PHYSICAL THERAPY:Daily Note 4/15/2019 ICD-10: Treatment Diagnosis:  M75.01 Adhesive capsulitis of right shoulder, M75.02 Adhesive capsulitis of left shoulder, M19.012 Primary osteoarthritis, left shoulder, Pain in left shoulder (M25.512), Pain in right shoulder (M25.511) Precautions/Allergies: NKDA Fall Risk Score: 0 (? 5 = High Risk) MD Orders: Eval and treat, HEP, ROM, strengthening, aggressive/all modalities  3x/wk for 6 weeks (written 4/10/19) MEDICAL/REFERRING DIAGNOSIS: 
L shoulder DATE OF ONSET: 10/4/18, EUA of B shoulders on 18 REFERRING PHYSICIAN: Marely Acharya MD 
RETURN PHYSICIAN APPOINTMENT: 19 ASSESSMENT ( 3/27/19) :  Ms. Hamilton Padilla  Is a 48year old R hand dominant female s/p EUA and manipulation of R shoulder, EUA and manipulation of L shoulder and L SAD, ADCR, and LOREN on 10/4/18. She presented with shoulder pain, decreased ROM in B shoulders, decreased strength in L shoulder and decreased functional use of B shoulders. Her ROM was improving in both arms though she continued to have pain. Her DASH score was improved. Both shoulders were then manipulated under anesthesia on 18. She continues to have pain in shoulder but has returned to work on (19). Her ROM and strength were gradually improving, but she does not have full strength and she has actually slightly decreased ROM since last measured. Shoulder tends to be stiff initially on days that she comes to PT after work. She could benefit from continued PT to address deficits and work toward goals. PROBLEM LIST (Impacting functional limitations): 1. Decreased Strength 2. Decreased ADL/Functional Activities 3. Increased Pain 4. Decreased Flexibility/Joint Mobility INTERVENTIONS PLANNED: 
1. Home Exercise Program (HEP) 2. Manual Therapy 3. Therapeutic Exercise/Strengthening 4. modals as needed TREATMENT PLAN: 
Effective Dates: 3/4/2019 TO 5/3/2019 (60 days). Frequency/Duration: 2-3 times a week for 60 Days GOALS: (Goals have been discussed and agreed upon with patient.) Patient's stated goal is to be able to use her arms. Short-Term Functional Goals: Time Frame: 6 weeks 1. Patient to be independent with HEP - MET 2. Patient to increase PROM L shoulder flex to 135 and PROM L shoulder ER to 30 degrees   MET (12/3/18) 3. Patient to report decrease in pain level to 5/10. - MET (2/26/19) but pain has been up again lately (3/27/19) Discharge Goals: Time Frame: 60 days - all in progress 1. Patient to report no more than minimal shoulder pain with all activity - Not MET yet 2. Patient to improve DASH score to 21 to demo improved use of B UEs  Not MET yet 3. Patient to improve AROM B shoulder flex to 150 degrees for improved functional use. MET for L, R not tested Rehabilitation Potential For Stated Goals: Good The information in this section was collected on 10/9/18 (except where otherwise noted). HISTORY:  
History of Present Injury/Illness (Reason for Referral): 
Patient reports that shoulder pain started last year. She went to her family doctor and was sent to PT. They tried dry needling and MD injected her. She didn't get better and requested a second opinion. She was sent to Dr. Maura Bob and an MRI was done, and then she had surgery on 10/4/18. She was told to leave the dressings on until she returned to MD.  
She subsequently had B shoulder manipulations on 11/30/18. Past Medical History/Comorbidities: OA,  Benign hypertension with CKD (chronic kidney disease) stage III ([address] Diabetes ([address] Dyslipidemia; Endometrial cancer ([address] and Morbid obesity ([address]  knee arthroscopy (Right, 2015); appendectomy (1979), L ankle surgery (2007) L index finger surgery (2005) Social History/Living Environment:  
  Lives with family in one story home. Prior Level of Function/Work/Activity: 
 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 Dominant Side:  
      RIGHT Previous Treatment Approaches: PT prior to surgery Current Medications:  Glipizide, lisinopril, Tylenol  Date Last Reviewed:  4/15/19 EXAMINATION:  
Observation/Orthostatic Postural Assessment:   
      No new ROM:   
      AROM R shoulder flex 135, Abduct 145, extn 50, ER 45 and IR to mid R buttock PROM R shoulder flex 130, abduct 110, ER 35 at 90 degree abduct PROM L shoulder flex 80, abduct 65, IR 50 (to abdomen) and ER -15 from neutral.  
 
      PROM R shoulder flex 160, abduct 140, ER 70 and IR 65 at 90 degree abduct (11/1/18) PROM L shoulder flex 130, abduct 110, ER 30 and IR 50 at 90 degrees abduct (11/1/18) PROM R shoulder flex 165, abduct 160, ER 75 and IR 75 at 90 degree abduct (12/3/18) PROM L shoulder flex 140, abduct 120, ER 45 and IR 60 at 90 degrees abduct (12/3/18) PROM R shoulder flex 160, abduct 150, ER 85, IR 80 at 90 degrees abduct (1/3/19) PROM L shoulder flex 170, abduct 160, ER 70 and IR 70 at 90 degrees abduct (2/12/19) AROM L shoulder flex 155, abduct 115, ER 34 and IR not quite to sacrum (2/12/19) AROM L shoulder flex 160, abduct 115, ER 32  and IR not quite to sacrum,    (2/26/18) PROM L shoulder flex 160, abduct 160, ER ER 70 and IR 70 at 90 degrees abduct (3/27/19) AROM L shoulder flex 160, extn 46, abduct 115, ER 20 and IR to upper L buttock (3/27/19) AROM L shoulder flex 145, extn 50, abduct 110, ER 18, IR to upper L buttock (4/4/19) Strength:   
      R shoulder 4+ to 5/5 all directions. L shoulder not tested. L shoulder flex 4+ , extn 5,  abduct 4+, IR 4+ and ER 4-  (2/26/19) Neurological Screen: 
      Sensation: light touch intact in B UEs Body Structures Involved: 1. Joints 2. Muscles Body Functions Affected: 1. Neuromusculoskeletal 
2. Movement Related Activities and Participation Affected: 1. General Tasks and Demands 2. Self Care 3. Community, Social and [place] CLINICAL DECISION MAKING:  
Outcome Measure: Tool Used: Disabilities of the Arm, Shoulder and Hand (DASH) Questionnaire - Quick Version Score:  Initial: 55/55   41/55 (Date: 11/1/18 )      45/55 (Date 12/3/18)      43/55 (1/3/19)      40/55 (1/31/19)      41/55 (2/26/19)   Most Recent: 38/55 (3/28/19) Interpretation of Score: The DASH is designed to measure the activities of daily living in person's with upper extremity dysfunction or pain. Each section is scored on a 1-5 scale, 5 representing the greatest disability. The scores of each section are added together for a total score of 55. Medical Necessity:  
· Patient demonstrates good rehab potential due to higher previous functional level. Reason for Services/Other Comments: 
· Patient continues to require skilled intervention due to need to regain use of B UEs.  
  
 
 
 
TREATMENT:  
(In addition to Assessment/Re-Assessment sessions the following treatments were rendered) Pre-treatment Symptoms/Complaints:  Patient reports that she had a rough weekend from pain and from the medicine making her a little sick. She would like to work on motion and get stretched out today and then do her strengthening exercises tomorrow. Pain: Initial:  
Pain Intensity 1: 6   Post Session:patient said pain was \"down one notch\" at end of session. Therapeutic Exercise: (10 Minutes):  Exercises to improve mobility and strength. Required minimal verbal cues to ensure correct performance.     
AIS 3 sec x 10 each for L shoulder flex, abduct, ER at 45 and 90 degree abduct, IR at 90 degree abduct and at 90 degree flex, horizontal adduct, D1 and D2  Diagonals - all in supine. Date 2/18/19 Date 2/25/19 Date 3/12/19  Date 3/19/19 Date 3/28/19  Date 4/1/19 Date Activity/Exercise B serratus punch - 4# 2x15 5# 2x15 5# 2x15 5# 2x15 5# 2x15 B bicep curls - - - 6# 2x15 - -   
IR with band  Green 2x15 Green 2x15 Green 2x15 Green 2x15 Green 2x15 Green 2x15 ER with band  Red 2x15 Red 2x15 Red 2x15 Red 2x15 Red 2x15 Red 2x15 Side lying abduct - 3# 2x15 3# 2x15 4# 2x15 - - Side lying ER - 3# 2x15 3# 2x15 3# 2x15 3# 2x15 3# 2x15 Wall push ups Rail [address] [place] 2x15 Rail 2x15 Rail 2x15 - [address] 2x15 B Lat pull downs 13# 2x15 13# 2x15 17# 2x15 17# 2x15 17# 2x15 17# 2x15 B Cable rows 13# 2x15 13# 2x15 17# 2x15 17# 2x15 17# 2x15 17# 2x15 B cable press 10# 2x15 10# 2x15 10# 2x15 10# 2x15 10# 2x15 10# 2x15 B shldr flex with symmetry - 2# 2x10 2# 2x10 2# 2x10 2# 2x10 -   
B shldr abduct to <= 90 - 2# 2x10 2# 2x10 2# 2x10 2# 2x10 - Rows with band - - - Black 2x15 Black 2x15 Black 2x15 B shldr extn with band - - - Black 2x15 Black 2x15 Black 2x15 D1/D2 diagonals on wall 1# 2x15 1# 2x10 ea 1# 2x10 ea 1.5# 2x10 ea - 1.5# 2x10 ea Pass behind back 2x10 2x15 2x15 2x15 - -   
T's with band   Red 2x15 Red 2x15 - - Bent over rows   8# 2x15 8# 2x15 - - Hugs with band    Green 2x15 - -   
         
HEP - continue current HEP Manual Therapy ( 30 minutes) -  Grade 2 to 4 physio mobs L shoulder flex, abduct, IR and ER. . Grade 4 inferior and posterior shoulder glides. Contract/relax at end rang L shoulder flex, IR and ER. Therapeutic Modalities: For pain - Heat during manual therapy,   Patient took ice with her at end of session. Left Shoulder Heat Type: Moist pack Duration : 20 minutes Patient Position: Supine [facility] Means Treatment/Session Assessment:   
· Response to Treatment:  Focused on ROM today and will do strengthening tomorrow. She noted that her blood sugar had been up after her shoulder was injected even it wasn't a steroid. · Compliance with Program/Exercises: yes · Recommendations/Intent for next treatment session: \"Next visit will focus on motion and strength in L shoulder . \". Total Treatment Duration:  40 minutes PT Patient Time In/Time Out Time In: 0900 Time Out: 5110 Arielle Shearer, PT

## 2019-04-16 ENCOUNTER — HOSPITAL ENCOUNTER (OUTPATIENT)
Dept: PHYSICAL THERAPY | Age: 54
Discharge: HOME | End: 2019-04-16
Payer: COMMERCIAL

## 2019-04-16 NOTE — PROGRESS NOTES
Marguerite Houser : 1965 Primary: [facility] Secondary:  Therapy Center at [address] [place] [address] [address] [facility], [address] [place] [address] [address] [phone]   [phone] OUTPATIENT PHYSICAL THERAPY:Cancellation 2019 ICD-10: Treatment Diagnosis:  M75.01 Adhesive capsulitis of right shoulder, M75.02 Adhesive capsulitis of left shoulder, M19.012 Primary osteoarthritis, left shoulder, Pain in left shoulder (M25.512), Pain in right shoulder (M25.511) Precautions/Allergies: NKDA Fall Risk Score: 0 (? 5 = High Risk) MD Orders: Eval and treat, HEP, ROM, strengthening, aggressive/all modalities  3x/wk for 6 weeks (written 4/10/19) MEDICAL/REFERRING DIAGNOSIS: 
L shoulder DATE OF ONSET: 10/4/18, EUA of B shoulders on 18 REFERRING PHYSICIAN: Reynaldo Cruz MD 
RETURN PHYSICIAN APPOINTMENT: 19 Patient called to cancel scheduled appointment due to going out of town earlier than expected.

## 2019-04-22 ENCOUNTER — HOSPITAL ENCOUNTER (OUTPATIENT)
Dept: PHYSICAL THERAPY | Age: 54
Discharge: HOME | End: 2019-04-22
Payer: COMMERCIAL

## 2019-04-22 PROCEDURE — 97110 THERAPEUTIC EXERCISES: CPT
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
## 2019-04-23 ENCOUNTER — HOSPITAL ENCOUNTER (OUTPATIENT)
Dept: PHYSICAL THERAPY | Age: 54
Discharge: HOME | End: 2019-04-23
Payer: COMMERCIAL

## 2019-04-23 PROCEDURE — 97140 MANUAL THERAPY 1/> REGIONS: CPT

## 2019-04-23 PROCEDURE — 97110 THERAPEUTIC EXERCISES: CPT
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
## 2019-04-25 ENCOUNTER — HOSPITAL ENCOUNTER (OUTPATIENT)
Dept: PHYSICAL THERAPY | Age: 54
Discharge: HOME | End: 2019-04-25
Payer: COMMERCIAL

## 2019-04-25 PROCEDURE — 97140 MANUAL THERAPY 1/> REGIONS: CPT

## 2019-04-25 PROCEDURE — 97110 THERAPEUTIC EXERCISES: CPT

## 2019-04-25 NOTE — PROGRESS NOTES
Maria Alejandra Michelle : 1965 Primary: [facility] [place] [facility]* Secondary:  [facility] at [facility] ARTIE BLAIR [address] [facility], [address] [place] [address],[address], [facility] [address] [phone]   [phone] OUTPATIENT PHYSICAL THERAPY:Daily Note 2019 ICD-10: Treatment Diagnosis:  M75.01 Adhesive capsulitis of right shoulder, M75.02 Adhesive capsulitis of left shoulder, M19.012 Primary osteoarthritis, left shoulder, Pain in left shoulder (M25.512), Pain in right shoulder (M25.511) Precautions/Allergies: NKDA Fall Risk Score: 0 (? 5 = High Risk) MD Orders: Eval and treat, HEP, ROM, strengthening, aggressive/all modalities  3x/wk for 6 weeks (written 4/10/19) MEDICAL/REFERRING DIAGNOSIS: 
L shoulder DATE OF ONSET: 10/4/18, EUA of B shoulders on 18 REFERRING PHYSICIAN: Shannon Gabriel MD 
RETURN PHYSICIAN APPOINTMENT: 19 ASSESSMENT ( 3/27/19) :  Ms. Madelin Aase  Is a 48year old R hand dominant female s/p EUA and manipulation of R shoulder, EUA and manipulation of L shoulder and L SAD, ADCR, and LOREN on 10/4/18. She presented with shoulder pain, decreased ROM in B shoulders, decreased strength in L shoulder and decreased functional use of B shoulders. Her ROM was improving in both arms though she continued to have pain. Her DASH score was improved. Both shoulders were then manipulated under anesthesia on 18. She continues to have pain in shoulder but has returned to work on (19). Her ROM and strength were gradually improving, but she does not have full strength and she has actually slightly decreased ROM since last measured. Shoulder tends to be stiff initially on days that she comes to PT after work. She could benefit from continued PT to address deficits and work toward goals. PROBLEM LIST (Impacting functional limitations): 1. Decreased Strength 2. Decreased ADL/Functional Activities 3. Increased Pain 4. Decreased Flexibility/Joint Mobility INTERVENTIONS PLANNED: 
1. Home Exercise Program (HEP) 2. Manual Therapy 3. Therapeutic Exercise/Strengthening 4. modals as needed TREATMENT PLAN: 
Effective Dates: 3/4/2019 TO 5/3/2019 (60 days). Frequency/Duration: 2-3 times a week for 60 Days GOALS: (Goals have been discussed and agreed upon with patient.) Patient's stated goal is to be able to use her arms. Short-Term Functional Goals: Time Frame: 6 weeks 1. Patient to be independent with HEP - MET 2. Patient to increase PROM L shoulder flex to 135 and PROM L shoulder ER to 30 degrees   MET (12/3/18) 3. Patient to report decrease in pain level to 5/10. - MET (2/26/19) but pain has been up again lately (3/27/19) Discharge Goals: Time Frame: 60 days - all in progress 1. Patient to report no more than minimal shoulder pain with all activity - Not MET yet 2. Patient to improve DASH score to 21 to demo improved use of B UEs  Not MET yet 3. Patient to improve AROM B shoulder flex to 150 degrees for improved functional use. MET for L, R not tested Rehabilitation Potential For Stated Goals: Good The information in this section was collected on 10/9/18 (except where otherwise noted). HISTORY:  
History of Present Injury/Illness (Reason for Referral): 
Patient reports that shoulder pain started last year. She went to her family doctor and was sent to PT. They tried dry needling and MD injected her. She didn't get better and requested a second opinion. She was sent to Dr. Shelley Ferrara and an MRI was done, and then she had surgery on 10/4/18. She was told to leave the dressings on until she returned to MD.  
She subsequently had B shoulder manipulations on 11/30/18. Past Medical History/Comorbidities: OA,  Benign hypertension with CKD (chronic kidney disease) stage III ([address] Diabetes ([address] Dyslipidemia; Endometrial cancer ([address] and Morbid obesity ([address]  knee arthroscopy (Right, 2015); appendectomy (1979), L ankle surgery (2007) L index finger surgery (2005) Social History/Living Environment:  
  Lives with family in one story home. Prior Level of Function/Work/Activity: 
 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 Dominant Side:  
      RIGHT Previous Treatment Approaches: PT prior to surgery Current Medications:  Glipizide, lisinopril, Tylenol  Date Last Reviewed:  4/22/19 EXAMINATION:  
Observation/Orthostatic Postural Assessment:   
      No new ROM:   
      AROM R shoulder flex 135, Abduct 145, extn 50, ER 45 and IR to mid R buttock PROM R shoulder flex 130, abduct 110, ER 35 at 90 degree abduct PROM L shoulder flex 80, abduct 65, IR 50 (to abdomen) and ER -15 from neutral.  
 
      PROM R shoulder flex 160, abduct 140, ER 70 and IR 65 at 90 degree abduct (11/1/18) PROM L shoulder flex 130, abduct 110, ER 30 and IR 50 at 90 degrees abduct (11/1/18) PROM R shoulder flex 165, abduct 160, ER 75 and IR 75 at 90 degree abduct (12/3/18) PROM L shoulder flex 140, abduct 120, ER 45 and IR 60 at 90 degrees abduct (12/3/18) PROM R shoulder flex 160, abduct 150, ER 85, IR 80 at 90 degrees abduct (1/3/19) PROM L shoulder flex 170, abduct 160, ER 70 and IR 70 at 90 degrees abduct (2/12/19) AROM L shoulder flex 155, abduct 115, ER 34 and IR not quite to sacrum (2/12/19) AROM L shoulder flex 160, abduct 115, ER 32  and IR not quite to sacrum,    (2/26/18) PROM L shoulder flex 160, abduct 160, ER ER 70 and IR 70 at 90 degrees abduct (3/27/19) AROM L shoulder flex 160, extn 46, abduct 115, ER 20 and IR to upper L buttock (3/27/19) AROM L shoulder flex 145, extn 50, abduct 110, ER 18, IR to upper L buttock (4/4/19) Strength:   
      R shoulder 4+ to 5/5 all directions. L shoulder not tested. L shoulder flex 4+ , extn 5,  abduct 4+, IR 4+ and ER 4-  (2/26/19) Neurological Screen: 
      Sensation: light touch intact in B UEs Body Structures Involved: 1. Joints 2. Muscles Body Functions Affected: 1. Neuromusculoskeletal 
2. Movement Related Activities and Participation Affected: 1. General Tasks and Demands 2. Self Care 3. Community, Social and [place] CLINICAL DECISION MAKING:  
Outcome Measure: Tool Used: Disabilities of the Arm, Shoulder and Hand (DASH) Questionnaire - Quick Version Score:  Initial: 55/55   41/55 (Date: 11/1/18 )      45/55 (Date 12/3/18)      43/55 (1/3/19)      40/55 (1/31/19)      41/55 (2/26/19)   Most Recent: 38/55 (3/28/19) Interpretation of Score: The DASH is designed to measure the activities of daily living in person's with upper extremity dysfunction or pain. Each section is scored on a 1-5 scale, 5 representing the greatest disability. The scores of each section are added together for a total score of 55. Medical Necessity:  
· Patient demonstrates good rehab potential due to higher previous functional level. Reason for Services/Other Comments: 
· Patient continues to require skilled intervention due to need to regain use of B UEs.  
  
 
 
 
TREATMENT:  
(In addition to Assessment/Re-Assessment sessions the following treatments were rendered) Pre-treatment Symptoms/Complaints:  Patient reports that she has some pain in L shoulder at the top and the back, and R shoulder pain is along R bicep and front of R shoulder. .  
Pain: Initial: no VAS Post Session:No VAS Therapeutic Exercise: ( 30 minutes):  Exercises to improve mobility and strength. Required minimal verbal cues to ensure correct performance. Occasional cues to maintain scapular positioning with performance of therapeutic exercises. Date 2/18/19 Date 2/25/19 Date 3/12/19  Date 3/19/19 Date 3/28/19  Date 4/1/19 Date 4/22/19  Date 4-25-19 Activity/Exercise B serratus punch - 4# 2x15 5# 2x15 5# 2x15 5# 2x15 5# 2x15 6# 2x15 B bicep curls - - - 6# 2x15 - - -   
IR with band  Green 2x15 Green 2x15 Green 2x15 Green 2x15 Green 2x15 Green 2x15 [employer] ER with band  Red 2x15 Red 2x15 Red 2x15 Red 2x15 Red 2x15 Red 2x15 Green 2x15 Side lying abduct - 3# 2x15 3# 2x15 4# 2x15 - - 4# 2x15 Side lying ER - 3# 2x15 3# 2x15 3# 2x15 3# 2x15 3# 2x15 4# 2x15 4# 1 x 12 
4# 2 x 10 Wall push ups Rail 2x15 Rail 2x15 Rail 2x15 Rail 2x15 - Rail 2x15 Rail 2x15 B Lat pull downs 13# 2x15 13# 2x15 17# 2x15 17# 2x15 17# 2x15 17# 2x15 17# 2x15 17# B x 15 
20# B B Cable rows 13# 2x15 13# 2x15 17# 2x15 17# 2x15 17# 2x15 17# 2x15 17# 2x15 17# 3 x 12 B B cable press 10# 2x15 10# 2x15 10# 2x15 10# 2x15 10# 2x15 10# 2x15 10# 2x15 10# 3 x 10 B B shldr flex with symmetry - 2# 2x10 2# 2x10 2# 2x10 2# 2x10 - - L 2# 3 x 10 B shldr abduct to <= 90 - 2# 2x10 2# 2x10 2# 2x10 2# 2x10 - - Scaption L 2# 3 x 10 Rows with band - - - Black 2x15 Black 2x15 Black 2x15 Black 2x15 B shldr extn with band - - - Black 2x15 Black 2x15 Black 2x15 Black 2x15 D1/D2 diagonals on wall 1# 2x15 1# 2x10 ea 1# 2x10 ea 1.5# 2x10 ea - 1.5# 2x10 ea 1.5# 2x10 ea Pass behind back 2x10 2x15 2x15 2x15 - - -   
T's with band   Red 2x15 Red 2x15 - - Red 2x15 Bent over rows   8# 2x15 8# 2x15 - - 8# 2x15 6# 1 x 12 
7#  2 x 12 Hugs with band    Green 2x15 - - Green 2x15 Supine press        6# 1 x 12  
7# 2 x 12 Middle trapezius        Prone 0# 3 x 10 Lower trapezius        Prone 0# 3 x 8 HEP - continue current HEP Manual Therapy ( 10 minutes) to improve L shoulder motion. Grade 3-4 physio mobilizations to improve L shoulder ER, IR and forward elevation. Grade 3-4 accessory glenohumeral joint mobilizations with posterior, inferior and anterior humeral glides.  
 
Therapeutic Modalities (10 minutes): GameReady device at 40 degrees with light compression at conclusion of PT to L shoulder for reduced pain. . Treatment/Session Assessment:   
· Response to Treatment: Did well with therapeutic exercises today. Required cues for posture to prevent patient from slouching with performance of therapeutic exercises. Pt had tendency to shrug L shoulder with performance of front raises and scaption. · Compliance with Program/Exercises: yes · Recommendations/Intent for next treatment session: \"Next visit will focus on motion and strength in L shoulder . \". Resume strengthening. Total Treatment Duration:  50 minutes PT Patient Time In/Time Out Time In: 5779 Time Out: 9580 Mack Patten PT

## 2019-04-29 ENCOUNTER — HOSPITAL ENCOUNTER (OUTPATIENT)
Dept: PHYSICAL THERAPY | Age: 54
Discharge: HOME | End: 2019-04-29
Payer: COMMERCIAL

## 2019-04-29 PROCEDURE — 97110 THERAPEUTIC EXERCISES: CPT

## 2019-04-29 NOTE — PROGRESS NOTES
Violetta Bradley : 1965 Primary: [facility]* Secondary:  [facility] Center at [facility] ARTIE BRUMFIELDA [address] [facility], [address] [place] [address],[address], [address] [facility] [phone]   [phone] OUTPATIENT PHYSICAL THERAPY:Progress Report 2019 ICD-10: Treatment Diagnosis:  M75.01 Adhesive capsulitis of right shoulder, M75.02 Adhesive capsulitis of left shoulder, M19.012 Primary osteoarthritis, left shoulder, Pain in left shoulder (M25.512), Pain in right shoulder (M25.511) Precautions/Allergies: NKDA Fall Risk Score: 0 (? 5 = High Risk) MD Orders: Eval and treat, HEP, ROM, strengthening, aggressive/all modalities  3x/wk for 6 weeks (written 4/10/19) Patient has attended 77 PT sessions from 10/9/18 to 19 with 6 missed sessions MEDICAL/REFERRING DIAGNOSIS: 
L shoulder DATE OF ONSET: 10/4/18, EUA of B shoulders on 18 REFERRING PHYSICIAN: Bettie Stout MD 
RETURN PHYSICIAN APPOINTMENT: 19 ASSESSMENT ( 19) :  Ms. Tabitha Bermudez  Is a 48year old R hand dominant female s/p EUA and manipulation of R shoulder, EUA and manipulation of L shoulder and L SAD, ADCR, and LOREN on 10/4/18. She presented with shoulder pain, decreased ROM in B shoulders, decreased strength in L shoulder and decreased functional use of B shoulders. Her ROM was improving in both arms though she continued to have pain. Her DASH score was improved. Both shoulders were then manipulated under anesthesia on 18. She returned to work on 19 but pain increased and so she is off of work again now. She continues to have pain in L shoulder. Her ROM and strength are improving and DASH score continues to slowly improve. AROM flexion is now [facility], though she is still a little stiff in rotation. She could benefit from continued PT to address deficits and work toward goals. PROBLEM LIST (Impacting functional limitations): 1. Decreased Strength 2. Decreased ADL/Functional Activities 3. Increased Pain 4. Decreased Flexibility/Joint Mobility INTERVENTIONS PLANNED: 
1. Home Exercise Program (HEP) 2. Manual Therapy 3. Therapeutic Exercise/Strengthening 4. modals as needed TREATMENT PLAN: 
Effective Dates: 3/4/2019 TO 5/3/2019 (60 days). Frequency/Duration: 2-3 times a week for 60 Days GOALS: (Goals have been discussed and agreed upon with patient.) Patient's stated goal is to be able to use her arms. Short-Term Functional Goals: Time Frame: 6 weeks 1. Patient to be independent with HEP - MET 2. Patient to increase PROM L shoulder flex to 135 and PROM L shoulder ER to 30 degrees   MET (12/3/18) 3. Patient to report decrease in pain level to 5/10. - MET (2/26/19) , then had increased pain after returning to work, but goal MET again (4/29/19) now that patient is off of work. Discharge Goals: Time Frame: 60 days - all in progress 1. Patient to report no more than minimal shoulder pain with all activity - Not MET yet 2. Patient to improve DASH score to 21 to demo improved use of B UEs  Not MET yet 3. Patient to improve AROM B shoulder flex to 150 degrees for improved functional use. MET for L, R not tested Rehabilitation Potential For Stated Goals: Good The information in this section was collected on 10/9/18 (except where otherwise noted). HISTORY:  
History of Present Injury/Illness (Reason for Referral): 
Patient reports that shoulder pain started last year. She went to her family doctor and was sent to PT. They tried dry needling and MD injected her. She didn't get better and requested a second opinion. She was sent to Dr. Lesly Tran and an MRI was done, and then she had surgery on 10/4/18. She was told to leave the dressings on until she returned to MD.  
She subsequently had B shoulder manipulations on 11/30/18. Past Medical History/Comorbidities: OA,  Benign hypertension with CKD (chronic kidney disease) stage III ([facility] Diabetes ([facility]  Dyslipidemia; Endometrial cancer ([facility] and Morbid obesity ([facility] knee arthroscopy (Right, 2015); appendectomy (1979), L ankle surgery (2007) L index finger surgery (2005) Social History/Living Environment:  
  Lives with family in one story home. Prior Level of Function/Work/Activity: 
 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 Dominant Side:  
      RIGHT Previous Treatment Approaches: PT prior to surgery Current Medications:  Glipizide, lisinopril, Tylenol  Date Last Reviewed:  4/29/19 EXAMINATION:  
Observation/Orthostatic Postural Assessment:   
      No new ROM:   
      AROM R shoulder flex 135, Abduct 145, extn 50, ER 45 and IR to mid R buttock PROM R shoulder flex 130, abduct 110, ER 35 at 90 degree abduct PROM L shoulder flex 80, abduct 65, IR 50 (to abdomen) and ER -15 from neutral.  
 
      PROM R shoulder flex 160, abduct 140, ER 70 and IR 65 at 90 degree abduct (11/1/18) PROM L shoulder flex 130, abduct 110, ER 30 and IR 50 at 90 degrees abduct (11/1/18) PROM R shoulder flex 165, abduct 160, ER 75 and IR 75 at 90 degree abduct (12/3/18) PROM L shoulder flex 140, abduct 120, ER 45 and IR 60 at 90 degrees abduct (12/3/18) PROM R shoulder flex 160, abduct 150, ER 85, IR 80 at 90 degrees abduct (1/3/19) PROM L shoulder flex 170, abduct 160, ER 70 and IR 70 at 90 degrees abduct (2/12/19) AROM L shoulder flex 155, abduct 115, ER 34 and IR not quite to sacrum (2/12/19) AROM L shoulder flex 160, abduct 115, ER 32  and IR not quite to sacrum,    (2/26/18) PROM L shoulder flex 160, abduct 160, ER 70 and IR 70 at 90 degrees abduct (3/27/19) AROM L shoulder flex 160, extn 46, abduct 115, ER 20 and IR to upper L buttock (3/27/19) AROM L shoulder flex 145, extn 50, abduct 110, ER 18, IR to upper L buttock (4/4/19) PROM L shoulder flex 165, abduct 165, ER 77 and IR 75 at 90 degree abduct (4/29/19) AROM L shoulder flex 165, extn 60, abduct 140, ER 35, and IR to L4 on back (4/29/19) Strength:   
      R shoulder 4+ to 5/5 all directions. L shoulder not tested. L shoulder flex 4+ , extn 5,  abduct 4+, IR 4+ and ER 4-  (2/26/19) Neurological Screen: 
      Sensation: light touch intact in B UEs Body Structures Involved: 1. Joints 2. Muscles Body Functions Affected: 1. Neuromusculoskeletal 
2. Movement Related Activities and Participation Affected: 1. General Tasks and Demands 2. Self Care 3. Community, Social and [place] CLINICAL DECISION MAKING:  
Outcome Measure: Tool Used: Disabilities of the Arm, Shoulder and Hand (DASH) Questionnaire - Quick Version Score:  Initial: 55/55   41/55 (Date: 11/1/18 )      45/55 (Date 12/3/18)      43/55 (1/3/19)      40/55 (1/31/19)      41/55 (2/26/19)   38/55 (3/28/19)  Most Recent: 35/55 (4/29/19) Interpretation of Score: The DASH is designed to measure the activities of daily living in person's with upper extremity dysfunction or pain. Each section is scored on a 1-5 scale, 5 representing the greatest disability. The scores of each section are added together for a total score of 55. Medical Necessity:  
· Patient demonstrates good rehab potential due to higher previous functional level. Reason for Services/Other Comments: 
· Patient continues to require skilled intervention due to need to regain use of B UEs.  
  
 
 
 
TREATMENT:  
(In addition to Assessment/Re-Assessment sessions the following treatments were rendered) Pre-treatment Symptoms/Complaints:  Patient reports that her shoulder was sore all weekend. She did try to lift more with her L arm to build strength. .  
Pain: Initial: no VAS Pain Intensity 1: 5   Post Session:No change in pain Therapeutic Exercise: (40 Minutes minutes):  Exercises to improve mobility and strength. Required minimal verbal cues to ensure correct performance. AIS 3 sec x 10 each for L shoulder flex, abduct, ER at 45 and 90 degree abduct, IR at 90 degree abduct and at 90 degree flex, horizontal adduct, D1 and D2  Diagonals - all in supine. Date 3/12/19  Date 3/19/19 Date 3/28/19  Date 4/1/19 Date 4/22/19  Date 4-25-19 Date 4/29/19 Activity/Exercise B serratus punch 5# 2x15 5# 2x15 5# 2x15 5# 2x15 6# 2x15  -  
B bicep curls - 6# 2x15 - - -  -  
IR with band  Green 2x15 Green 2x15 Green 2x15 Green 2x15 Green 2x15  [employer] ER with band  Red 2x15 Red 2x15 Red 2x15 Red 2x15 Green 2x15  Green 2x15 Side lying abduct 3# 2x15 4# 2x15 - - 4# 2x15  4# 2x15 Side lying ER 3# 2x15 3# 2x15 3# 2x15 3# 2x15 4# 2x15 4# 1 x 12 
4# 2 x 10  4# 2x15 Wall push ups Rail 2x15 Rail 2x15 - Rail 2x15 Rail 2x15  Rail 2x15 B Lat pull downs 17# 2x15 17# 2x15 17# 2x15 17# 2x15 17# 2x15 17# B x 15 
20# B  17# 2x15 B Cable rows 17# 2x15 17# 2x15 17# 2x15 17# 2x15 17# 2x15 17# 3 x 12 B 17# 2x15 B cable press 10# 2x15 10# 2x15 10# 2x15 10# 2x15 10# 2x15 10# 3 x 10 B 10# 2x15 B shldr flex with symmetry 2# 2x10 2# 2x10 2# 2x10 - - L 2# 3 x 10  -  
B shldr abduct to <= 90 2# 2x10 2# 2x10 2# 2x10 - - Scaption L 2# 3 x 10  - Rows with band - Black 2x15 Black 2x15 Black 2x15 Black 2x15  Black 2x15 B shldr extn with band - Black 2x15 Black 2x15 Black 2x15 Black 2x15  Black 2x15 D1/D2 diagonals on wall 1# 2x10 ea 1.5# 2x10 ea - 1.5# 2x10 ea 1.5# 2x10 ea  1.5# 2x10 ea Pass behind back 2x15 2x15 - - -  -  
T's with band Red 2x15 Red 2x15 - - Red 2x15  Red 2x15 Bent over rows 8# 2x15 8# 2x15 - - 8# 2x15 6# 1 x 12 
7#  2 x 12 - Hugs with band  Green 2x15 - - Green 2x15  Axis [place] 2x15 Supine press      6# 1 x 12  
7# 2 x 12 - Middle trapezius      Prone 0# 3 x 10 - Lower trapezius      Prone 0# 3 x 8 -  
HEP - continue current HEP Manual Therapy ( 0 minutes) none today Therapeutic Modalities (15 minutes): GameReady device at 40 degrees with light compression at conclusion of PT to L shoulder for pain. Left Shoulder Cold Type: Cold/ice pack(with vasoneumatic compression) Duration : 15 minutes Patient Position: Sitting Jacinda Alanis Treatment/Session Assessment:   
· Response to Treatment: Did well with therapeutic exercises today. See full assessment above. · Compliance with Program/Exercises: yes · Recommendations/Intent for next treatment session: \"Next visit will focus on motion and strength in L shoulder . \". Total Treatment Duration:  55 minutes PT Patient Time In/Time Out Time In: 0901 Time Out: 1001 Ada Geiger, PT

## 2019-04-29 NOTE — THERAPY RECERTIFICATION
Zaira Bernard : 1965 Primary: [facility]* Secondary:  Therapy Center at [facility] ARTIE BLAIR [address] [facility], [address] [place] [address],[address], [address] [facility] [phone]   [phone] OUTPATIENT PHYSICAL THERAPY:Recertification [id number] ICD-10: Treatment Diagnosis:  M75.01 Adhesive capsulitis of right shoulder, M75.02 Adhesive capsulitis of left shoulder, M19.012 Primary osteoarthritis, left shoulder, Pain in left shoulder (M25.512), Pain in right shoulder (M25.511) Precautions/Allergies: NKDA Fall Risk Score: 0 (? 5 = High Risk) MD Orders: Eval and treat, HEP, ROM, strengthening, aggressive/all modalities  3x/wk for 6 weeks (written 4/10/19) Patient has attended 77 PT sessions from 10/9/18 to 19 with 6 missed sessions MEDICAL/REFERRING DIAGNOSIS: 
L shoulder DATE OF ONSET: 10/4/18, EUA of B shoulders on 18 REFERRING PHYSICIAN: Yarelis Sharma., MD 
RETURN PHYSICIAN APPOINTMENT: 19 ASSESSMENT ( 19) :  Ms. Nahun Laguna  Is a 48year old R hand dominant female s/p EUA and manipulation of R shoulder, EUA and manipulation of L shoulder and L SAD, ADCR, and LOREN on 10/4/18. She presented with shoulder pain, decreased ROM in B shoulders, decreased strength in L shoulder and decreased functional use of B shoulders. Her ROM was improving in both arms though she continued to have pain. Her DASH score was improved. Both shoulders were then manipulated under anesthesia on 18. She returned to work on 19 but pain increased and so she is off of work again now. She continues to have pain in L shoulder. Her ROM and strength are improving and DASH score continues to slowly improve. AROM flexion is now [facility], though she is still a little stiff in rotation. She could benefit from continued PT to address deficits and work toward goals. PROBLEM LIST (Impacting functional limitations): 1. Decreased Strength 2. Decreased ADL/Functional Activities 3. Increased Pain 4. Decreased Flexibility/Joint Mobility INTERVENTIONS PLANNED: 
1. Home Exercise Program (HEP) 2. Manual Therapy 3. Therapeutic Exercise/Strengthening 4. modals as needed TREATMENT PLAN: 
Effective Dates: 4/30/2019 TO 6/30/2019 (60 days). Frequency/Duration: 2-3 times a week for 60 Days GOALS: (Goals have been discussed and agreed upon with patient.) Patient's stated goal is to be able to use her arms. Short-Term Functional Goals: Time Frame: 6 weeks 1. Patient to be independent with HEP - MET 2. Patient to increase PROM L shoulder flex to 135 and PROM L shoulder ER to 30 degrees   MET (12/3/18) 3. Patient to report decrease in pain level to 5/10. - MET (2/26/19) , then had increased pain after returning to work, but goal MET again (4/29/19) now that patient is off of work. Discharge Goals: Time Frame: 60 days - all in progress 1. Patient to report no more than minimal shoulder pain with all activity - Not MET yet 2. Patient to improve DASH score to 21 to demo improved use of B UEs  Not MET yet 3. Patient to improve AROM B shoulder flex to 150 degrees for improved functional use. MET for L, R not tested Rehabilitation Potential For Stated Goals: Good Regarding Latesha Perea's therapy, I certify that the treatment plan above will be carried out by a therapist or under their direction. Thank you for this referral, 
Alexandr Hunt PT Referring Physician Signature: Severo Wylie., MD         Date The information in this section was collected on 10/9/18 (except where otherwise noted). HISTORY:  
History of Present Injury/Illness (Reason for Referral): 
Patient reports that shoulder pain started last year. She went to her family doctor and was sent to PT. They tried dry needling and MD injected her. She didn't get better and requested a second opinion. She was sent to Dr. Maura Bob and an MRI was done, and then she had surgery on 10/4/18.  She was told to leave the dressings on until she returned to MD.  
She subsequently had B shoulder manipulations on 11/30/18. Past Medical History/Comorbidities: OA,  Benign hypertension with CKD (chronic kidney disease) stage III ([facility] Diabetes ([facility] Dyslipidemia; Endometrial cancer ([facility] and Morbid obesity ([facility] knee arthroscopy (Right, 2015); appendectomy (1979), L ankle surgery (2007) L index finger surgery (2005) Social History/Living Environment:  
  Lives with family in one story home. Prior Level of Function/Work/Activity: 
 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 Dominant Side:  
      RIGHT Previous Treatment Approaches: PT prior to surgery Current Medications:  Glipizide, lisinopril, Tylenol  Date Last Reviewed:  4/29/19 EXAMINATION:  
ROM:   
      AROM R shoulder flex 135, Abduct 145, extn 50, ER 45 and IR to mid R buttock PROM R shoulder flex 130, abduct 110, ER 35 at 90 degree abduct PROM L shoulder flex 80, abduct 65, IR 50 (to abdomen) and ER -15 from neutral.  
 
       
      PROM L shoulder flex 165, abduct 165, ER 77 and IR 75 at 90 degree abduct (4/29/19) AROM L shoulder flex 165, extn 60, abduct 140, ER 35, and IR to L4 on back (4/29/19) Strength:   
      R shoulder 4+ to 5/5 all directions. L shoulder not tested. L shoulder flex 4+ , extn 5,  abduct 4+, IR 4+ and ER 4-  (2/26/19) Neurological Screen: 
      Sensation: light touch intact in B UEs Body Structures Involved: 1. Joints 2. Muscles Body Functions Affected: 1. Neuromusculoskeletal 
2. Movement Related Activities and Participation Affected: 1. General Tasks and Demands 2. Self Care 3. Community, Social and [place] CLINICAL DECISION MAKING:  
Outcome Measure: Tool Used: Disabilities of the Arm, Shoulder and Hand (DASH) Questionnaire - Quick Version Score:  Initial: 55/55   41/55 (Date: 11/1/18 )      45/55 (Date 12/3/18) 43/55 (1/3/19)      40/55 (1/31/19)      41/55 (2/26/19)   38/55 (3/28/19)  Most Recent: 35/55 (4/29/19) Interpretation of Score: The DASH is designed to measure the activities of daily living in person's with upper extremity dysfunction or pain. Each section is scored on a 1-5 scale, 5 representing the greatest disability. The scores of each section are added together for a total score of 55. Medical Necessity:  
· Patient demonstrates good rehab potential due to higher previous functional level. Reason for Services/Other Comments: 
· Patient continues to require skilled intervention due to need to regain use of B UEs.

## 2019-04-30 ENCOUNTER — HOSPITAL ENCOUNTER (OUTPATIENT)
Dept: PHYSICAL THERAPY | Age: 54
Discharge: HOME | End: 2019-04-30
Payer: COMMERCIAL

## 2019-04-30 PROCEDURE — 97140 MANUAL THERAPY 1/> REGIONS: CPT

## 2019-04-30 PROCEDURE — 97110 THERAPEUTIC EXERCISES: CPT

## 2019-04-30 NOTE — PROGRESS NOTES
Eric Solomon : 1965 Primary: [facility]* Secondary:  Therapy Center at [facility] ARTIE BLAIR [address] [facility], [address] [place] [address],[address], [address] [facility] [phone]   [phone] OUTPATIENT PHYSICAL THERAPY:Daily Note 2019 ICD-10: Treatment Diagnosis:  M75.01 Adhesive capsulitis of right shoulder, M75.02 Adhesive capsulitis of left shoulder, M19.012 Primary osteoarthritis, left shoulder, Pain in left shoulder (M25.512), Pain in right shoulder (M25.511) Precautions/Allergies: NKDA Fall Risk Score: 0 (? 5 = High Risk) MD Orders: Eval and treat, HEP, ROM, strengthening, aggressive/all modalities  3x/wk for 6 weeks (written 4/10/19) MEDICAL/REFERRING DIAGNOSIS: 
L shoulder DATE OF ONSET: 10/4/18, EUA of B shoulders on 18 REFERRING PHYSICIAN: Quique Gaming MD 
RETURN PHYSICIAN APPOINTMENT: 19 ASSESSMENT ( 19) :  Ms. Florin Davies  Is a 48year old R hand dominant female s/p EUA and manipulation of R shoulder, EUA and manipulation of L shoulder and L SAD, ADCR, and LOREN on 10/4/18. She presented with shoulder pain, decreased ROM in B shoulders, decreased strength in L shoulder and decreased functional use of B shoulders. Her ROM was improving in both arms though she continued to have pain. Her DASH score was improved. Both shoulders were then manipulated under anesthesia on 18. She returned to work on 19 but pain increased and so she is off of work again now. She continues to have pain in L shoulder. Her ROM and strength are improving and DASH score continues to slowly improve. AROM flexion is now [facility], though she is still a little stiff in rotation. She could benefit from continued PT to address deficits and work toward goals. PROBLEM LIST (Impacting functional limitations): 1. Decreased Strength 2. Decreased ADL/Functional Activities 3. Increased Pain 4.  Decreased Flexibility/Joint Mobility INTERVENTIONS PLANNED: 
 1. Home Exercise Program (HEP) 2. Manual Therapy 3. Therapeutic Exercise/Strengthening 4. modals as needed TREATMENT PLAN: 
Effective Dates: 4/30/2019 TO 6/30/2019 (60 days). Frequency/Duration: 2-3 times a week for 60 Days GOALS: (Goals have been discussed and agreed upon with patient.) Patient's stated goal is to be able to use her arms. Short-Term Functional Goals: Time Frame: 6 weeks 1. Patient to be independent with HEP - MET 2. Patient to increase PROM L shoulder flex to 135 and PROM L shoulder ER to 30 degrees   MET (12/3/18) 3. Patient to report decrease in pain level to 5/10. - MET (2/26/19) , then had increased pain after returning to work, but goal MET again (4/29/19) now that patient is off of work. Discharge Goals: Time Frame: 60 days - all in progress 1. Patient to report no more than minimal shoulder pain with all activity - Not MET yet 2. Patient to improve DASH score to 21 to demo improved use of B UEs  Not MET yet 3. Patient to improve AROM B shoulder flex to 150 degrees for improved functional use. MET for L, R not tested Rehabilitation Potential For Stated Goals: Good The information in this section was collected on 10/9/18 (except where otherwise noted). HISTORY:  
History of Present Injury/Illness (Reason for Referral): 
Patient reports that shoulder pain started last year. She went to her family doctor and was sent to PT. They tried dry needling and MD injected her. She didn't get better and requested a second opinion. She was sent to Dr. Santos Mc and an MRI was done, and then she had surgery on 10/4/18. She was told to leave the dressings on until she returned to MD.  
She subsequently had B shoulder manipulations on 11/30/18. Past Medical History/Comorbidities: OA,  Benign hypertension with CKD (chronic kidney disease) stage III ([address] Diabetes ([address] Dyslipidemia; Endometrial cancer ([address] and Morbid obesity ([address]  knee arthroscopy (Right, 2015); appendectomy (1979), L ankle surgery (2007) L index finger surgery (2005) Social History/Living Environment:  
  Lives with family in one story home. Prior Level of Function/Work/Activity: 
 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 Dominant Side:  
      RIGHT Previous Treatment Approaches: PT prior to surgery Current Medications:  Glipizide, lisinopril, Tylenol  Date Last Reviewed:  4/29/19 EXAMINATION:  
Observation/Orthostatic Postural Assessment:   
      No new ROM:   
      AROM R shoulder flex 135, Abduct 145, extn 50, ER 45 and IR to mid R buttock PROM R shoulder flex 130, abduct 110, ER 35 at 90 degree abduct PROM L shoulder flex 80, abduct 65, IR 50 (to abdomen) and ER -15 from neutral.  
 
      PROM R shoulder flex 160, abduct 140, ER 70 and IR 65 at 90 degree abduct (11/1/18) PROM L shoulder flex 130, abduct 110, ER 30 and IR 50 at 90 degrees abduct (11/1/18) PROM R shoulder flex 165, abduct 160, ER 75 and IR 75 at 90 degree abduct (12/3/18) PROM L shoulder flex 140, abduct 120, ER 45 and IR 60 at 90 degrees abduct (12/3/18) PROM R shoulder flex 160, abduct 150, ER 85, IR 80 at 90 degrees abduct (1/3/19) PROM L shoulder flex 170, abduct 160, ER 70 and IR 70 at 90 degrees abduct (2/12/19) AROM L shoulder flex 155, abduct 115, ER 34 and IR not quite to sacrum (2/12/19) AROM L shoulder flex 160, abduct 115, ER 32  and IR not quite to sacrum,    (2/26/18) PROM L shoulder flex 160, abduct 160, ER 70 and IR 70 at 90 degrees abduct (3/27/19) AROM L shoulder flex 160, extn 46, abduct 115, ER 20 and IR to upper L buttock (3/27/19) AROM L shoulder flex 145, extn 50, abduct 110, ER 18, IR to upper L buttock (4/4/19) PROM L shoulder flex 165, abduct 165, ER 77 and IR 75 at 90 degree abduct (4/29/19) AROM L shoulder flex 165, extn 60, abduct 140, ER 35, and IR to L4 on back (4/29/19) Strength:   
      R shoulder 4+ to 5/5 all directions. L shoulder not tested. L shoulder flex 4+ , extn 5,  abduct 4+, IR 4+ and ER 4-  (2/26/19) Neurological Screen: 
      Sensation: light touch intact in B UEs Body Structures Involved: 1. Joints 2. Muscles Body Functions Affected: 1. Neuromusculoskeletal 
2. Movement Related Activities and Participation Affected: 1. General Tasks and Demands 2. Self Care 3. Community, Social and [place] CLINICAL DECISION MAKING:  
Outcome Measure: Tool Used: Disabilities of the Arm, Shoulder and Hand (DASH) Questionnaire - Quick Version Score:  Initial: 55/55   41/55 (Date: 11/1/18 )      45/55 (Date 12/3/18)      43/55 (1/3/19)      40/55 (1/31/19)      41/55 (2/26/19)   38/55 (3/28/19)  Most Recent: 35/55 (4/29/19) Interpretation of Score: The DASH is designed to measure the activities of daily living in person's with upper extremity dysfunction or pain. Each section is scored on a 1-5 scale, 5 representing the greatest disability. The scores of each section are added together for a total score of 55. Medical Necessity:  
· Patient demonstrates good rehab potential due to higher previous functional level. Reason for Services/Other Comments: 
· Patient continues to require skilled intervention due to need to regain use of B UEs.  
  
 
 
 
TREATMENT:  
(In addition to Assessment/Re-Assessment sessions the following treatments were rendered) Pre-treatment Symptoms/Complaints:  Patient reports that her shoulder tends to hurt less in the morning, but it increases as the day progresses. Earle Shillings Pain: Initial:  
Pain Intensity 1: 4   Post Session:No increased in pain Therapeutic Exercise: (10 Minutes ):  Exercises to improve mobility and strength. Required minimal verbal cues to ensure correct performance. AIS 3 sec x 10 each for L shoulder flex, abduct, ER at 45 and 90 degree abduct, IR at 90 degree abduct and at 90 degree flex, horizontal adduct, D1 and D2  Diagonals - all in supine. Date 3/12/19  Date 3/19/19 Date 3/28/19  Date 4/1/19 Date 4/22/19  Date 4-25-19 Date 4/29/19 Activity/Exercise B serratus punch 5# 2x15 5# 2x15 5# 2x15 5# 2x15 6# 2x15  -  
B bicep curls - 6# 2x15 - - -  -  
IR with band  Green 2x15 Green 2x15 Green 2x15 Green 2x15 Green 2x15  [email or web address] with band  Red 2x15 Red 2x15 Red 2x15 Red 2x15 Green 2x15  Green 2x15 Side lying abduct 3# 2x15 4# 2x15 - - 4# 2x15  4# 2x15 Side lying ER 3# 2x15 3# 2x15 3# 2x15 3# 2x15 4# 2x15 4# 1 x 12 
4# 2 x 10  4# 2x15 Wall push ups Rail 2x15 Rail 2x15 - Rail 2x15 Rail 2x15  Rail 2x15 B Lat pull downs 17# 2x15 17# 2x15 17# 2x15 17# 2x15 17# 2x15 17# B x 15 
20# B  17# 2x15 B Cable rows 17# 2x15 17# 2x15 17# 2x15 17# 2x15 17# 2x15 17# 3 x 12 B 17# 2x15 B cable press 10# 2x15 10# 2x15 10# 2x15 10# 2x15 10# 2x15 10# 3 x 10 B 10# 2x15 B shldr flex with symmetry 2# 2x10 2# 2x10 2# 2x10 - - L 2# 3 x 10  -  
B shldr abduct to <= 90 2# 2x10 2# 2x10 2# 2x10 - - Scaption L 2# 3 x 10  - Rows with band - Black 2x15 Black 2x15 Black 2x15 Black 2x15  Black 2x15 B shldr extn with band - Black 2x15 Black 2x15 Black 2x15 Black 2x15  Black 2x15 D1/D2 diagonals on wall 1# 2x10 ea 1.5# 2x10 ea - 1.5# 2x10 ea 1.5# 2x10 ea  1.5# 2x10 ea Pass behind back 2x15 2x15 - - -  -  
T's with band Red 2x15 Red 2x15 - - Red 2x15  Red 2x15 Bent over rows 8# 2x15 8# 2x15 - - 8# 2x15 6# 1 x 12 
7#  2 x 12 - Hugs with band  Green 2x15 - - Green 2x15  Leldon Krystal 2x15 Supine press      6# 1 x 12  
7# 2 x 12 - Middle trapezius      Prone 0# 3 x 10 - Lower trapezius      Prone 0# 3 x 8 -  
HEP - continue current HEP Manual Therapy ( 30 minutes) grade 2 to 4 physio mobs L shoulder flex, abduct, IR and ER. Grade 2 to 4 inferior and posterior shoulder glides. Contract/relax at end range flex, IR and ER. Therapeutic Modalities (0  minutes): patient took ice with her at end of session Chris Jj Treatment/Session Assessment:   
· Response to Treatment: Focused on motion today since strengthening was done yesterday. No increased pain at end of session. Will plan to resume strengthening. · Compliance with Program/Exercises: yes · Recommendations/Intent for next treatment session: \"Next visit will focus on motion and strength in L shoulder . \". Total Treatment Duration:  40 minutes PT Patient Time In/Time Out Time In: 4188 Time Out: 1029 Tia Grewal PT

## 2019-05-02 ENCOUNTER — HOSPITAL ENCOUNTER (OUTPATIENT)
Dept: PHYSICAL THERAPY | Age: 54
Discharge: HOME | End: 2019-05-02
Payer: COMMERCIAL

## 2019-05-02 PROCEDURE — 97110 THERAPEUTIC EXERCISES: CPT

## 2019-05-02 NOTE — PROGRESS NOTES
Dutch Sawant : 1965 Primary: [facility]* Secondary:  [facility] at [facility] ARTIE BLAIR [address] [facility], [address] [place] [address],[address], [address] [facility] [phone]   [phone] OUTPATIENT PHYSICAL THERAPY:Daily Note 2019 ICD-10: Treatment Diagnosis:  M75.01 Adhesive capsulitis of right shoulder, M75.02 Adhesive capsulitis of left shoulder, M19.012 Primary osteoarthritis, left shoulder, Pain in left shoulder (M25.512), Pain in right shoulder (M25.511) Precautions/Allergies: NKDA Fall Risk Score: 0 (? 5 = High Risk) MD Orders: Eval and treat, HEP, ROM, strengthening, aggressive/all modalities  3x/wk for 6 weeks (written 4/10/19) MEDICAL/REFERRING DIAGNOSIS: 
L shoulder DATE OF ONSET: 10/4/18, EUA of B shoulders on 18 REFERRING PHYSICIAN: Roseann Sosa MD 
RETURN PHYSICIAN APPOINTMENT: 19 ASSESSMENT ( 19) :  Ms. Boby Larose  Is a 48year old R hand dominant female s/p EUA and manipulation of R shoulder, EUA and manipulation of L shoulder and L SAD, ADCR, and LOREN on 10/4/18. She presented with shoulder pain, decreased ROM in B shoulders, decreased strength in L shoulder and decreased functional use of B shoulders. Her ROM was improving in both arms though she continued to have pain. Her DASH score was improved. Both shoulders were then manipulated under anesthesia on 18. She returned to work on 19 but pain increased and so she is off of work again now. She continues to have pain in L shoulder. Her ROM and strength are improving and DASH score continues to slowly improve. AROM flexion is now [facility], though she is still a little stiff in rotation. She could benefit from continued PT to address deficits and work toward goals. PROBLEM LIST (Impacting functional limitations): 1. Decreased Strength 2. Decreased ADL/Functional Activities 3. Increased Pain 4.  Decreased Flexibility/Joint Mobility INTERVENTIONS PLANNED: 
 1. Home Exercise Program (HEP) 2. Manual Therapy 3. Therapeutic Exercise/Strengthening 4. modals as needed TREATMENT PLAN: 
Effective Dates: 4/30/2019 TO 6/30/2019 (60 days). Frequency/Duration: 2-3 times a week for 60 Days GOALS: (Goals have been discussed and agreed upon with patient.) Patient's stated goal is to be able to use her arms. Short-Term Functional Goals: Time Frame: 6 weeks 1. Patient to be independent with HEP - MET 2. Patient to increase PROM L shoulder flex to 135 and PROM L shoulder ER to 30 degrees   MET (12/3/18) 3. Patient to report decrease in pain level to 5/10. - MET (2/26/19) , then had increased pain after returning to work, but goal MET again (4/29/19) now that patient is off of work. Discharge Goals: Time Frame: 60 days - all in progress 1. Patient to report no more than minimal shoulder pain with all activity - Not MET yet 2. Patient to improve DASH score to 21 to demo improved use of B UEs  Not MET yet 3. Patient to improve AROM B shoulder flex to 150 degrees for improved functional use. MET for L, R not tested Rehabilitation Potential For Stated Goals: Good The information in this section was collected on 10/9/18 (except where otherwise noted). HISTORY:  
History of Present Injury/Illness (Reason for Referral): 
Patient reports that shoulder pain started last year. She went to her family doctor and was sent to PT. They tried dry needling and MD injected her. She didn't get better and requested a second opinion. She was sent to Dr. Job James and an MRI was done, and then she had surgery on 10/4/18. She was told to leave the dressings on until she returned to MD.  
She subsequently had B shoulder manipulations on 11/30/18. Past Medical History/Comorbidities: OA,  Benign hypertension with CKD (chronic kidney disease) stage III ([address] Diabetes ([address] Dyslipidemia; Endometrial cancer ([address] and Morbid obesity ([address]  knee arthroscopy (Right, 2015); appendectomy (1979), L ankle surgery (2007) L index finger surgery (2005) Social History/Living Environment:  
  Lives with family in one story home. Prior Level of Function/Work/Activity: 
 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 Dominant Side:  
      RIGHT Previous Treatment Approaches: PT prior to surgery Current Medications:  Glipizide, lisinopril, Tylenol  Date Last Reviewed:  4/29/19 EXAMINATION:  
Observation/Orthostatic Postural Assessment:   
      No new ROM:   
      AROM R shoulder flex 135, Abduct 145, extn 50, ER 45 and IR to mid R buttock PROM R shoulder flex 130, abduct 110, ER 35 at 90 degree abduct PROM L shoulder flex 80, abduct 65, IR 50 (to abdomen) and ER -15 from neutral.  
 
      PROM R shoulder flex 160, abduct 140, ER 70 and IR 65 at 90 degree abduct (11/1/18) PROM L shoulder flex 130, abduct 110, ER 30 and IR 50 at 90 degrees abduct (11/1/18) PROM R shoulder flex 165, abduct 160, ER 75 and IR 75 at 90 degree abduct (12/3/18) PROM L shoulder flex 140, abduct 120, ER 45 and IR 60 at 90 degrees abduct (12/3/18) PROM R shoulder flex 160, abduct 150, ER 85, IR 80 at 90 degrees abduct (1/3/19) PROM L shoulder flex 170, abduct 160, ER 70 and IR 70 at 90 degrees abduct (2/12/19) AROM L shoulder flex 155, abduct 115, ER 34 and IR not quite to sacrum (2/12/19) AROM L shoulder flex 160, abduct 115, ER 32  and IR not quite to sacrum,    (2/26/18) PROM L shoulder flex 160, abduct 160, ER 70 and IR 70 at 90 degrees abduct (3/27/19) AROM L shoulder flex 160, extn 46, abduct 115, ER 20 and IR to upper L buttock (3/27/19) AROM L shoulder flex 145, extn 50, abduct 110, ER 18, IR to upper L buttock (4/4/19) PROM L shoulder flex 165, abduct 165, ER 77 and IR 75 at 90 degree abduct (4/29/19) AROM L shoulder flex 165, extn 60, abduct 140, ER 35, and IR to L4 on back (4/29/19) Strength:   
      R shoulder 4+ to 5/5 all directions. L shoulder not tested. L shoulder flex 4+ , extn 5,  abduct 4+, IR 4+ and ER 4-  (2/26/19) Neurological Screen: 
      Sensation: light touch intact in B UEs Body Structures Involved: 1. Joints 2. Muscles Body Functions Affected: 1. Neuromusculoskeletal 
2. Movement Related Activities and Participation Affected: 1. General Tasks and Demands 2. Self Care 3. Community, Social and Ritchie Nottingham CLINICAL DECISION MAKING:  
Outcome Measure: Tool Used: Disabilities of the Arm, Shoulder and Hand (DASH) Questionnaire - Quick Version Score:  Initial: 55/55   41/55 (Date: 11/1/18 )      45/55 (Date 12/3/18)      43/55 (1/3/19)      40/55 (1/31/19)      41/55 (2/26/19)   38/55 (3/28/19)  Most Recent: 35/55 (4/29/19) Interpretation of Score: The DASH is designed to measure the activities of daily living in person's with upper extremity dysfunction or pain. Each section is scored on a 1-5 scale, 5 representing the greatest disability. The scores of each section are added together for a total score of 55. Medical Necessity:  
· Patient demonstrates good rehab potential due to higher previous functional level. Reason for Services/Other Comments: 
· Patient continues to require skilled intervention due to need to regain use of B UEs.  
  
 
 
 
TREATMENT:  
(In addition to Assessment/Re-Assessment sessions the following treatments were rendered) Pre-treatment Symptoms/Complaints:  Patient reports that it took 2 weeks to get her sugar down after her shoulder was injected. It was 118 this morning. Shoulder feels okay. Pain: Initial:  
Pain Intensity 1: 4   Post Session:No change in pain level Therapeutic Exercise: (40 Minutes ):  Exercises to improve mobility and strength. Required minimal verbal cues to ensure correct performance. Increased resistance on wall diagonals. AIS 3 sec x 10 each for L shoulder flex, abduct, ER at 45 and 90 degree abduct, IR at 90 degree abduct and at 90 degree flex, horizontal adduct, D1 and D2  Diagonals - all in supine. Date 3/28/19  Date 4/1/19 Date 4/22/19  Date 4-25-19 Date 4/29/19 Date 5/2/19 Activity/Exercise B serratus punch 5# 2x15 5# 2x15 6# 2x15  - 6# 2x15 IR with band  Green 2x15 Green 2x15 Green 2x15  Blue 2x15 Blue 2x15 ER with band  Red 2x15 Red 2x15 Green 2x15  Green 2x15 Green 2x15 Side lying abduct - - 4# 2x15  4# 2x15 4# 2x15 Side lying ER 3# 2x15 3# 2x15 4# 2x15 4# 1 x 12 
4# 2 x 10  4# 2x15 4# 2x15 Wall push ups - Rail 2x15 Rail 2x15  Rail 2x15 Rail 2x15 B Lat pull downs 17# 2x15 17# 2x15 17# 2x15 17# B x 15 
20# B  17# 2x15 17# 2x15 B Cable rows 17# 2x15 17# 2x15 17# 2x15 17# 3 x 12 B 17# 2x15 17# 2x15 B cable press 10# 2x15 10# 2x15 10# 2x15 10# 3 x 10 B 10# 2x15 10# 2x15 B shldr flex with symmetry 2# 2x10 - - L 2# 3 x 10  - 2# 2x10 B shldr abduct to <= 90 2# 2x10 - - Scaption L 2# 3 x 10  - 2# 2x10 Rows with band Black 2x15 Black 2x15 Black 2x15  Black 2x15 Black 2x15 B shldr extn with band Black 2x15 Black 2x15 Black 2x15  Black 2x15 Black 2x15 D1/D2 diagonals on wall - 1.5# 2x10 ea 1.5# 2x10 ea  1.5# 2x10 ea 2# 2x10 ea T's with band - - Red 2x15  Red 2x15 Red 2x15 Bent over rows - - 8# 2x15 6# 1 x 12 
7#  2 x 12 - 8# 2x15 Hugs with band - - Green 2x15  Green 2x15 Green 2x15 Supine press    6# 1 x 12  
7# 2 x 12 - - Middle trapezius    Prone 0# 3 x 10 - - Lower trapezius    Prone 0# 3 x 8 - -  
HEP - continue current HEP Manual Therapy ( 0 minutes) none today Therapeutic Modalities: for pain - Game ready device at 40 degrees and mild compression. Left Shoulder Cold Type: Cold/ice pack(with vasoneumatic compression) Duration : 15 minutes Patient Position: Sitting Tonia Castro Treatment/Session Assessment:   
· Response to Treatment: Ptient did well again with strengthening. She works hard at exercises. · Compliance with Program/Exercises: yes · Recommendations/Intent for next treatment session: \"Next visit will focus on motion and strength in L shoulder . \". Total Treatment Duration:  55 minutes PT Patient Time In/Time Out Time In: 0901 Time Out: 1000 Kaden Krause PT

## 2019-05-06 ENCOUNTER — HOSPITAL ENCOUNTER (OUTPATIENT)
Dept: PHYSICAL THERAPY | Age: 54
Discharge: HOME | End: 2019-05-06
Payer: COMMERCIAL

## 2019-05-06 PROCEDURE — 97110 THERAPEUTIC EXERCISES: CPT

## 2019-05-06 PROCEDURE — 97140 MANUAL THERAPY 1/> REGIONS: CPT

## 2019-05-06 NOTE — PROGRESS NOTES
Maria Alejandra Michelle : 1965 Primary: [employer] [facility]* Secondary:  [facility] at [facility] ARTIE BLAIR [address] [facility], [address] [place] [address],[address], [facility] [address] [phone]   [phone] OUTPATIENT PHYSICAL THERAPY:Daily Note 2019 ICD-10: Treatment Diagnosis:  M75.01 Adhesive capsulitis of right shoulder, M75.02 Adhesive capsulitis of left shoulder, M19.012 Primary osteoarthritis, left shoulder, Pain in left shoulder (M25.512), Pain in right shoulder (M25.511) Precautions/Allergies: NKDA Fall Risk Score: 0 (? 5 = High Risk) MD Orders: Eval and treat, HEP, ROM, strengthening, aggressive/all modalities  3x/wk for 6 weeks (written 4/10/19) MEDICAL/REFERRING DIAGNOSIS: 
L shoulder DATE OF ONSET: 10/4/18, EUA of B shoulders on 18 REFERRING PHYSICIAN: Shannon Gabriel MD 
RETURN PHYSICIAN APPOINTMENT: 19 ASSESSMENT ( 19) :  Ms. Madelin Aase  Is a 48year old R hand dominant female s/p EUA and manipulation of R shoulder, EUA and manipulation of L shoulder and L SAD, ADCR, and LOREN on 10/4/18. She presented with shoulder pain, decreased ROM in B shoulders, decreased strength in L shoulder and decreased functional use of B shoulders. Her ROM was improving in both arms though she continued to have pain. Her DASH score was improved. Both shoulders were then manipulated under anesthesia on 18. She returned to work on 19 but pain increased and so she is off of work again now. She continues to have pain in L shoulder. Her ROM and strength are improving and DASH score continues to slowly improve. AROM flexion is now [facility], though she is still a little stiff in rotation. She could benefit from continued PT to address deficits and work toward goals. PROBLEM LIST (Impacting functional limitations): 1. Decreased Strength 2. Decreased ADL/Functional Activities 3. Increased Pain 4.  Decreased Flexibility/Joint Mobility INTERVENTIONS PLANNED: 
 1. Home Exercise Program (HEP) 2. Manual Therapy 3. Therapeutic Exercise/Strengthening 4. modals as needed TREATMENT PLAN: 
Effective Dates: 4/30/2019 TO 6/30/2019 (60 days). Frequency/Duration: 2-3 times a week for 60 Days GOALS: (Goals have been discussed and agreed upon with patient.) Patient's stated goal is to be able to use her arms. Short-Term Functional Goals: Time Frame: 6 weeks 1. Patient to be independent with HEP - MET 2. Patient to increase PROM L shoulder flex to 135 and PROM L shoulder ER to 30 degrees   MET (12/3/18) 3. Patient to report decrease in pain level to 5/10. - MET (2/26/19) , then had increased pain after returning to work, but goal MET again (4/29/19) now that patient is off of work. Discharge Goals: Time Frame: 60 days - all in progress 1. Patient to report no more than minimal shoulder pain with all activity - Not MET yet 2. Patient to improve DASH score to 21 to demo improved use of B UEs  Not MET yet 3. Patient to improve AROM B shoulder flex to 150 degrees for improved functional use. MET for L, R not tested Rehabilitation Potential For Stated Goals: Good The information in this section was collected on 10/9/18 (except where otherwise noted). HISTORY:  
History of Present Injury/Illness (Reason for Referral): 
Patient reports that shoulder pain started last year. She went to her family doctor and was sent to PT. They tried dry needling and MD injected her. She didn't get better and requested a second opinion. She was sent to Dr. Naresh Hines and an MRI was done, and then she had surgery on 10/4/18. She was told to leave the dressings on until she returned to MD.  
She subsequently had B shoulder manipulations on 11/30/18. Past Medical History/Comorbidities: OA,  Benign hypertension with CKD (chronic kidney disease) stage III ([address] Diabetes ([address] Dyslipidemia; Endometrial cancer ([address] and Morbid obesity ([address]  knee arthroscopy (Right, 2015); appendectomy (1979), L ankle surgery (2007) L index finger surgery (2005) Social History/Living Environment:  
  Lives with family in one story home. Prior Level of Function/Work/Activity: 
 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 Dominant Side:  
      RIGHT Previous Treatment Approaches: PT prior to surgery Current Medications:  Glipizide, lisinopril, Tylenol  Date Last Reviewed:  5/6/19 EXAMINATION:  
Observation/Orthostatic Postural Assessment:   
      No new ROM:   
      AROM R shoulder flex 135, Abduct 145, extn 50, ER 45 and IR to mid R buttock PROM R shoulder flex 130, abduct 110, ER 35 at 90 degree abduct PROM L shoulder flex 80, abduct 65, IR 50 (to abdomen) and ER -15 from neutral.  
 
      PROM R shoulder flex 160, abduct 140, ER 70 and IR 65 at 90 degree abduct (11/1/18) PROM L shoulder flex 130, abduct 110, ER 30 and IR 50 at 90 degrees abduct (11/1/18) PROM R shoulder flex 165, abduct 160, ER 75 and IR 75 at 90 degree abduct (12/3/18) PROM L shoulder flex 140, abduct 120, ER 45 and IR 60 at 90 degrees abduct (12/3/18) PROM R shoulder flex 160, abduct 150, ER 85, IR 80 at 90 degrees abduct (1/3/19) PROM L shoulder flex 170, abduct 160, ER 70 and IR 70 at 90 degrees abduct (2/12/19) AROM L shoulder flex 155, abduct 115, ER 34 and IR not quite to sacrum (2/12/19) AROM L shoulder flex 160, abduct 115, ER 32  and IR not quite to sacrum,    (2/26/18) PROM L shoulder flex 160, abduct 160, ER 70 and IR 70 at 90 degrees abduct (3/27/19) AROM L shoulder flex 160, extn 46, abduct 115, ER 20 and IR to upper L buttock (3/27/19) AROM L shoulder flex 145, extn 50, abduct 110, ER 18, IR to upper L buttock (4/4/19) PROM L shoulder flex 165, abduct 165, ER 77 and IR 75 at 90 degree abduct (4/29/19) AROM L shoulder flex 165, extn 60, abduct 140, ER 35, and IR to L4 on back (4/29/19) Strength:   
      R shoulder 4+ to 5/5 all directions. L shoulder not tested. L shoulder flex 4+ , extn 5,  abduct 4+, IR 4+ and ER 4-  (2/26/19) Neurological Screen: 
      Sensation: light touch intact in B UEs Body Structures Involved: 1. Joints 2. Muscles Body Functions Affected: 1. Neuromusculoskeletal 
2. Movement Related Activities and Participation Affected: 1. General Tasks and Demands 2. Self Care 3. Community, Social and Rabun Tyner CLINICAL DECISION MAKING:  
Outcome Measure: Tool Used: Disabilities of the Arm, Shoulder and Hand (DASH) Questionnaire - Quick Version Score:  Initial: 55/55   41/55 (Date: 11/1/18 )      45/55 (Date 12/3/18)      43/55 (1/3/19)      40/55 (1/31/19)      41/55 (2/26/19)   38/55 (3/28/19)  Most Recent: 35/55 (4/29/19) Interpretation of Score: The DASH is designed to measure the activities of daily living in person's with upper extremity dysfunction or pain. Each section is scored on a 1-5 scale, 5 representing the greatest disability. The scores of each section are added together for a total score of 55. Medical Necessity:  
· Patient demonstrates good rehab potential due to higher previous functional level. Reason for Services/Other Comments: 
· Patient continues to require skilled intervention due to need to regain use of B UEs.  
  
 
 
 
TREATMENT:  
(In addition to Assessment/Re-Assessment sessions the following treatments were rendered) Pre-treatment Symptoms/Complaints:  Patient reports that MD upped the dosage of her diabetes medications. Shoulder continues to hurt. No new problems. Pain: Initial:  
Pain Intensity 1: 4   Post Session:No change in pain level according to patient. Therapeutic Exercise: (10 Minutes ):  Exercises to improve mobility and strength. Required minimal verbal cues to ensure correct performance. AIS 3 sec x 10 each for L shoulder flex, abduct, ER at 45 and 90 degree abduct, IR at 90 degree abduct and at 90 degree flex, horizontal adduct, D1 and D2  Diagonals - all in supine. Date 3/28/19  Date 4/1/19 Date 4/22/19  Date 4-25-19 Date 4/29/19 Date 5/2/19 Activity/Exercise B serratus punch 5# 2x15 5# 2x15 6# 2x15  - 6# 2x15 IR with band  Green 2x15 Green 2x15 Green 2x15  Blue 2x15 Blue 2x15 ER with band  Red 2x15 Red 2x15 Green 2x15  Green 2x15 Green 2x15 Side lying abduct - - 4# 2x15  4# 2x15 4# 2x15 Side lying ER 3# 2x15 3# 2x15 4# 2x15 4# 1 x 12 
4# 2 x 10  4# 2x15 4# 2x15 Wall push ups - Rail 2x15 Rail 2x15  Rail 2x15 Rail 2x15 B Lat pull downs 17# 2x15 17# 2x15 17# 2x15 17# B x 15 
20# B  17# 2x15 17# 2x15 B Cable rows 17# 2x15 17# 2x15 17# 2x15 17# 3 x 12 B 17# 2x15 17# 2x15 B cable press 10# 2x15 10# 2x15 10# 2x15 10# 3 x 10 B 10# 2x15 10# 2x15 B shldr flex with symmetry 2# 2x10 - - L 2# 3 x 10  - 2# 2x10 B shldr abduct to <= 90 2# 2x10 - - Scaption L 2# 3 x 10  - 2# 2x10 Rows with band Black 2x15 Black 2x15 Black 2x15  Black 2x15 Black 2x15 B shldr extn with band Black 2x15 Black 2x15 Black 2x15  Black 2x15 Black 2x15 D1/D2 diagonals on wall - 1.5# 2x10 ea 1.5# 2x10 ea  1.5# 2x10 ea 2# 2x10 ea T's with band - - Red 2x15  Red 2x15 Red 2x15 Bent over rows - - 8# 2x15 6# 1 x 12 
7#  2 x 12 - 8# 2x15 Hugs with band - - Green 2x15  Green 2x15 Green 2x15 Supine press    6# 1 x 12  
7# 2 x 12 - - Middle trapezius    Prone 0# 3 x 10 - - Lower trapezius    Prone 0# 3 x 8 - -  
HEP - continue current HEP Manual Therapy ( 30 minutes) grade 2 to 4 physio mobs L shouldr flex, abduct, IR and ER. Grade 2 to 4 inferior and posterior L shoulder glides. Contract/relax at end range for L shoulder flex, IR and ER. Therapeutic Modalities: patient took ice with her at end of session. Araceli Kim Treatment/Session Assessment:   
· Response to Treatment: focused on ROM today and will plan to do strengthening tomorrow and Thursday. No increased pain with manual therapy today. · Compliance with Program/Exercises: yes · Recommendations/Intent for next treatment session: \"Next visit will focus on motion and strength in L shoulder . \". Total Treatment Duration:  40 minutes PT Patient Time In/Time Out Time In: 0908 Time Out: 0168 Shaye Lawson, PT

## 2019-05-07 ENCOUNTER — HOSPITAL ENCOUNTER (OUTPATIENT)
Dept: PHYSICAL THERAPY | Age: 54
Discharge: HOME | End: 2019-05-07
Payer: COMMERCIAL

## 2019-05-07 PROCEDURE — 97110 THERAPEUTIC EXERCISES: CPT

## 2019-05-07 NOTE — PROGRESS NOTES
Nubia Turner : 1965 Primary: [facility] Scheduling [employer]* Secondary:  [facility] [address] [facility], [address] [place] [address],[address], [place] [address]. [phone]   [phone] OUTPATIENT PHYSICAL THERAPY:Daily Note 2019 ICD-10: Treatment Diagnosis:  M75.01 Adhesive capsulitis of right shoulder, M75.02 Adhesive capsulitis of left shoulder, M19.012 Primary osteoarthritis, left shoulder, Pain in left shoulder (M25.512), Pain in right shoulder (M25.511) Precautions/Allergies: NKDA Fall Risk Score: 0 (? 5 = High Risk) MD Orders: Eval and treat, HEP, ROM, strengthening, aggressive/all modalities  3x/wk for 6 weeks (written 4/10/19) MEDICAL/REFERRING DIAGNOSIS: 
L shoulder DATE OF ONSET: 10/4/18, EUA of B shoulders on 18 REFERRING PHYSICIAN: Valentin Dumont MD 
RETURN PHYSICIAN APPOINTMENT: 19 ASSESSMENT ( 19) :  Ms. Jenny Babinski  Is a 48year old R hand dominant female s/p EUA and manipulation of R shoulder, EUA and manipulation of L shoulder and L SAD, ADCR, and LOREN on 10/4/18. She presented with shoulder pain, decreased ROM in B shoulders, decreased strength in L shoulder and decreased functional use of B shoulders. Her ROM was improving in both arms though she continued to have pain. Her DASH score was improved. Both shoulders were then manipulated under anesthesia on 18. She returned to work on 19 but pain increased and so she is off of work again now. She continues to have pain in L shoulder. Her ROM and strength are improving and DASH score continues to slowly improve. AROM flexion is now [facility], though she is still a little stiff in rotation. She could benefit from continued PT to address deficits and work toward goals. PROBLEM LIST (Impacting functional limitations): 1. Decreased Strength 2. Decreased ADL/Functional Activities 3. Increased Pain 4.  Decreased Flexibility/Joint Mobility INTERVENTIONS PLANNED: 
 1. Home Exercise Program (HEP) 2. Manual Therapy 3. Therapeutic Exercise/Strengthening 4. modals as needed TREATMENT PLAN: 
Effective Dates: 4/30/2019 TO 6/30/2019 (60 days). Frequency/Duration: 2-3 times a week for 60 Days GOALS: (Goals have been discussed and agreed upon with patient.) Patient's stated goal is to be able to use her arms. Short-Term Functional Goals: Time Frame: 6 weeks 1. Patient to be independent with HEP - MET 2. Patient to increase PROM L shoulder flex to 135 and PROM L shoulder ER to 30 degrees   MET (12/3/18) 3. Patient to report decrease in pain level to 5/10. - MET (2/26/19) , then had increased pain after returning to work, but goal MET again (4/29/19) now that patient is off of work. Discharge Goals: Time Frame: 60 days - all in progress 1. Patient to report no more than minimal shoulder pain with all activity - Not MET yet 2. Patient to improve DASH score to 21 to demo improved use of B UEs  Not MET yet 3. Patient to improve AROM B shoulder flex to 150 degrees for improved functional use. MET for L, R not tested Rehabilitation Potential For Stated Goals: Good The information in this section was collected on 10/9/18 (except where otherwise noted). HISTORY:  
History of Present Injury/Illness (Reason for Referral): 
Patient reports that shoulder pain started last year. She went to her family doctor and was sent to PT. They tried dry needling and MD injected her. She didn't get better and requested a second opinion. She was sent to Dr. Shelley Ferrara and an MRI was done, and then she had surgery on 10/4/18. She was told to leave the dressings on until she returned to MD.  
She subsequently had B shoulder manipulations on 11/30/18. Past Medical History/Comorbidities: OA,  Benign hypertension with CKD (chronic kidney disease) stage III ([address] Diabetes ([address] Dyslipidemia; Endometrial cancer ([address] and Morbid obesity ([address]  knee arthroscopy (Right, 2015); appendectomy (1979), L ankle surgery (2007) L index finger surgery (2005) Social History/Living Environment:  
  Lives with family in one story home. Prior Level of Function/Work/Activity: 
 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 Dominant Side:  
      RIGHT Previous Treatment Approaches: PT prior to surgery Current Medications:  Glipizide, lisinopril, Tylenol  Date Last Reviewed:  5/6/19 EXAMINATION:  
Observation/Orthostatic Postural Assessment:   
      No new ROM:   
      AROM R shoulder flex 135, Abduct 145, extn 50, ER 45 and IR to mid R buttock PROM R shoulder flex 130, abduct 110, ER 35 at 90 degree abduct PROM L shoulder flex 80, abduct 65, IR 50 (to abdomen) and ER -15 from neutral.  
 
      PROM R shoulder flex 160, abduct 140, ER 70 and IR 65 at 90 degree abduct (11/1/18) PROM L shoulder flex 130, abduct 110, ER 30 and IR 50 at 90 degrees abduct (11/1/18) PROM R shoulder flex 165, abduct 160, ER 75 and IR 75 at 90 degree abduct (12/3/18) PROM L shoulder flex 140, abduct 120, ER 45 and IR 60 at 90 degrees abduct (12/3/18) PROM R shoulder flex 160, abduct 150, ER 85, IR 80 at 90 degrees abduct (1/3/19) PROM L shoulder flex 170, abduct 160, ER 70 and IR 70 at 90 degrees abduct (2/12/19) AROM L shoulder flex 155, abduct 115, ER 34 and IR not quite to sacrum (2/12/19) AROM L shoulder flex 160, abduct 115, ER 32  and IR not quite to sacrum,    (2/26/18) PROM L shoulder flex 160, abduct 160, ER 70 and IR 70 at 90 degrees abduct (3/27/19) AROM L shoulder flex 160, extn 46, abduct 115, ER 20 and IR to upper L buttock (3/27/19) AROM L shoulder flex 145, extn 50, abduct 110, ER 18, IR to upper L buttock (4/4/19) PROM L shoulder flex 165, abduct 165, ER 77 and IR 75 at 90 degree abduct (4/29/19) AROM L shoulder flex 165, extn 60, abduct 140, ER 35, and IR to L4 on back (4/29/19) Strength:   
      R shoulder 4+ to 5/5 all directions. L shoulder not tested. L shoulder flex 4+ , extn 5,  abduct 4+, IR 4+ and ER 4-  (2/26/19) Neurological Screen: 
      Sensation: light touch intact in B UEs Body Structures Involved: 1. Joints 2. Muscles Body Functions Affected: 1. Neuromusculoskeletal 
2. Movement Related Activities and Participation Affected: 1. General Tasks and Demands 2. Self Care 3. Community, Social and [place] CLINICAL DECISION MAKING:  
Outcome Measure: Tool Used: Disabilities of the Arm, Shoulder and Hand (DASH) Questionnaire - Quick Version Score:  Initial: 55/55   41/55 (Date: 11/1/18 )      45/55 (Date 12/3/18)      43/55 (1/3/19)      40/55 (1/31/19)      41/55 (2/26/19)   38/55 (3/28/19)  Most Recent: 35/55 (4/29/19) Interpretation of Score: The DASH is designed to measure the activities of daily living in person's with upper extremity dysfunction or pain. Each section is scored on a 1-5 scale, 5 representing the greatest disability. The scores of each section are added together for a total score of 55. Medical Necessity:  
· Patient demonstrates good rehab potential due to higher previous functional level. Reason for Services/Other Comments: 
· Patient continues to require skilled intervention due to need to regain use of B UEs.  
  
 
 
 
TREATMENT:  
(In addition to Assessment/Re-Assessment sessions the following treatments were rendered) Pre-treatment Symptoms/Complaints:  Patient reports that she has a little more pain than yesterday. Doesn't recall doing much yesterday afternoon. Pain: Initial:  
Pain Intensity 1: 5   Post Session: Pain decreased by one level according to patient. Therapeutic Exercise: (40 Minutes ):  Exercises to improve mobility and strength. Required minimal verbal cues to ensure correct performance. Progressed resistance as indicated. AIS 3 sec x 10 each for L shoulder flex, abduct, ER at 45 and 90 degree abduct, IR at 90 degree abduct and at 90 degree flex, horizontal adduct, D1 and D2  Diagonals - all in supine. Date 3/28/19  Date 4/1/19 Date 4/22/19  Date 4-25-19 Date 4/29/19 Date 5/2/19 Date 5/7/19 Activity/Exercise B serratus punch 5# 2x15 5# 2x15 6# 2x15  - 6# 2x15 6# 2x15 IR with band  Green 2x15 Green 2x15 Green 2x15  Blue 2x15 Blue 2x15 Blue 2x15 ER with band  Red 2x15 Red 2x15 Green 2x15  Green 2x15 Green 2x15 Green 2x15 Side lying abduct - - 4# 2x15  4# 2x15 4# 2x15 4# 2x15 Side lying ER 3# 2x15 3# 2x15 4# 2x15 4# 1 x 12 
4# 2 x 10  4# 2x15 4# 2x15 4# 2x15 Wall push ups - Rail 2x15 Rail 2x15  Rail 2x15 Rail 2x15 Rail 2x15 B Lat pull downs 17# 2x15 17# 2x15 17# 2x15 17# B x 15 
20# B  17# 2x15 17# 2x15 20# 2x15 B Cable rows 17# 2x15 17# 2x15 17# 2x15 17# 3 x 12 B 17# 2x15 17# 2x15 17# 2x15 B cable press 10# 2x15 10# 2x15 10# 2x15 10# 3 x 10 B 10# 2x15 10# 2x15 10# 2x15 B shldr flex with symmetry 2# 2x10 - - L 2# 3 x 10  - 2# 2x10 2# 2x10 B shldr abduct to <= 90 2# 2x10 - - Scaption L 2# 3 x 10  - 2# 2x10 2# 2x1 Rows with band Black 2x15 Black 2x15 Black 2x15  Black 2x15 Black 2x15 Black 2x15 B shldr extn with band Black 2x15 Black 2x15 Black 2x15  Black 2x15 Black 2x15 Black 2x15 D1/D2 diagonals on wall - 1.5# 2x10 ea 1.5# 2x10 ea  1.5# 2x10 ea 2# 2x10 ea 2# 2x10 ea T's with band - - Red 2x15  Red 2x15 Red 2x15 Green 2x15 Bent over rows - - 8# 2x15 6# 1 x 12 
7#  2 x 12 - 8# 2x15 8# 2x15 Hugs with band - - Green 2x15  Green 2x15 Green 2x15 Green 2x15 Supine press    6# 1 x 12  
7# 2 x 12 - - - Middle trapezius    Prone 0# 3 x 10 - - - Lower trapezius    Prone 0# 3 x 8 - - -  
HEP - continue current HEP Manual Therapy ( 0 minutes) none today Therapeutic Modalities: for pain - Game Ready device at 40 degrees and mild compression. Left Shoulder Cold Type: Cold/ice pack(with vasoneumatic compression) Duration : 15 minutes Patient Position: Sitting Oris Cooper Treatment/Session Assessment:   
· Response to Treatment: Patient did well with exercises. She is starting to do them faster, even with increases in resistance. Pain decreased after cryo. · Compliance with Program/Exercises: yes · Recommendations/Intent for next treatment session: \"Next visit will focus on motion and strength in L shoulder . \". Total Treatment Duration:  55 minutes PT Patient Time In/Time Out Time In: 4783 Time Out: 7070 Babs Willis, PT

## 2019-05-09 ENCOUNTER — HOSPITAL ENCOUNTER (OUTPATIENT)
Dept: PHYSICAL THERAPY | Age: 54
Discharge: HOME | End: 2019-05-09
Payer: COMMERCIAL

## 2019-05-09 PROCEDURE — 97110 THERAPEUTIC EXERCISES: CPT

## 2019-05-09 NOTE — PROGRESS NOTES
Leobardoclayton Adame : 1965 Primary: [employer]* Secondary:  Therapy Center at [facility] ARTIE BLAIR [address] [facility], [address] [place] [address] [address], [facility] UNilda Duarte. [phone]   [phone] OUTPATIENT PHYSICAL THERAPY:Daily Note 2019 ICD-10: Treatment Diagnosis:  M75.01 Adhesive capsulitis of right shoulder, M75.02 Adhesive capsulitis of left shoulder, M19.012 Primary osteoarthritis, left shoulder, Pain in left shoulder (M25.512), Pain in right shoulder (M25.511) Precautions/Allergies: NKDA Fall Risk Score: 0 (? 5 = High Risk) MD Orders: Eval and treat, HEP, ROM, strengthening, aggressive/all modalities  3x/wk for 6 weeks (written 4/10/19) MEDICAL/REFERRING DIAGNOSIS: 
L shoulder DATE OF ONSET: 10/4/18, EUA of B shoulders on 18 REFERRING PHYSICIAN: Lincoln Roberts MD 
RETURN PHYSICIAN APPOINTMENT: 19 ASSESSMENT ( 19) :  Ms. Melonie Varela  Is a 48year old R hand dominant female s/p EUA and manipulation of R shoulder, EUA and manipulation of L shoulder and L SAD, ADCR, and LOREN on 10/4/18. She presented with shoulder pain, decreased ROM in B shoulders, decreased strength in L shoulder and decreased functional use of B shoulders. Her ROM was improving in both arms though she continued to have pain. Her DASH score was improved. Both shoulders were then manipulated under anesthesia on 18. She returned to work on 19 but pain increased and so she is off of work again now. She continues to have pain in L shoulder. Her ROM and strength are improving and DASH score continues to slowly improve. AROM flexion is now [facility], though she is still a little stiff in rotation. She could benefit from continued PT to address deficits and work toward goals. PROBLEM LIST (Impacting functional limitations): 1. Decreased Strength 2. Decreased ADL/Functional Activities 3. Increased Pain 4.  Decreased Flexibility/Joint Mobility INTERVENTIONS PLANNED: 
 1. Home Exercise Program (HEP) 2. Manual Therapy 3. Therapeutic Exercise/Strengthening 4. modals as needed TREATMENT PLAN: 
Effective Dates: 4/30/2019 TO 6/30/2019 (60 days). Frequency/Duration: 2-3 times a week for 60 Days GOALS: (Goals have been discussed and agreed upon with patient.) Patient's stated goal is to be able to use her arms. Short-Term Functional Goals: Time Frame: 6 weeks 1. Patient to be independent with HEP - MET 2. Patient to increase PROM L shoulder flex to 135 and PROM L shoulder ER to 30 degrees   MET (12/3/18) 3. Patient to report decrease in pain level to 5/10. - MET (2/26/19) , then had increased pain after returning to work, but goal MET again (4/29/19) now that patient is off of work. Discharge Goals: Time Frame: 60 days - all in progress 1. Patient to report no more than minimal shoulder pain with all activity - Not MET yet 2. Patient to improve DASH score to 21 to demo improved use of B UEs  Not MET yet 3. Patient to improve AROM B shoulder flex to 150 degrees for improved functional use. MET for L, R not tested Rehabilitation Potential For Stated Goals: Good The information in this section was collected on 10/9/18 (except where otherwise noted). HISTORY:  
History of Present Injury/Illness (Reason for Referral): 
Patient reports that shoulder pain started last year. She went to her family doctor and was sent to PT. They tried dry needling and MD injected her. She didn't get better and requested a second opinion. She was sent to Dr. Lina Azar and an MRI was done, and then she had surgery on 10/4/18. She was told to leave the dressings on until she returned to MD.  
She subsequently had B shoulder manipulations on 11/30/18. Past Medical History/Comorbidities: OA,  Benign hypertension with CKD (chronic kidney disease) stage III ([address] Diabetes ([address] Dyslipidemia; Endometrial cancer ([address] and Morbid obesity ([address]  knee arthroscopy (Right, 2015); appendectomy (1979), L ankle surgery (2007) L index finger surgery (2005) Social History/Living Environment:  
  Lives with family in one story home. Prior Level of Function/Work/Activity: 
 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 Dominant Side:  
      RIGHT Previous Treatment Approaches: PT prior to surgery Current Medications:  Glipizide, lisinopril, Tylenol  Date Last Reviewed:  5/6/19 EXAMINATION:  
Observation/Orthostatic Postural Assessment:   
      No new ROM:   
      AROM R shoulder flex 135, Abduct 145, extn 50, ER 45 and IR to mid R buttock PROM R shoulder flex 130, abduct 110, ER 35 at 90 degree abduct PROM L shoulder flex 80, abduct 65, IR 50 (to abdomen) and ER -15 from neutral.  
 
      PROM R shoulder flex 160, abduct 140, ER 70 and IR 65 at 90 degree abduct (11/1/18) PROM L shoulder flex 130, abduct 110, ER 30 and IR 50 at 90 degrees abduct (11/1/18) PROM R shoulder flex 165, abduct 160, ER 75 and IR 75 at 90 degree abduct (12/3/18) PROM L shoulder flex 140, abduct 120, ER 45 and IR 60 at 90 degrees abduct (12/3/18) PROM R shoulder flex 160, abduct 150, ER 85, IR 80 at 90 degrees abduct (1/3/19) PROM L shoulder flex 170, abduct 160, ER 70 and IR 70 at 90 degrees abduct (2/12/19) AROM L shoulder flex 155, abduct 115, ER 34 and IR not quite to sacrum (2/12/19) AROM L shoulder flex 160, abduct 115, ER 32  and IR not quite to sacrum,    (2/26/18) PROM L shoulder flex 160, abduct 160, ER 70 and IR 70 at 90 degrees abduct (3/27/19) AROM L shoulder flex 160, extn 46, abduct 115, ER 20 and IR to upper L buttock (3/27/19) AROM L shoulder flex 145, extn 50, abduct 110, ER 18, IR to upper L buttock (4/4/19) PROM L shoulder flex 165, abduct 165, ER 77 and IR 75 at 90 degree abduct (4/29/19) AROM L shoulder flex 165, extn 60, abduct 140, ER 35, and IR to L4 on back (19) Strength:   
      R shoulder 4+ to 5/5 all directions. L shoulder not tested. L shoulder flex 4+ , extn 5,  abduct 4+, IR 4+ and ER 4-  (19) Neurological Screen: 
      Sensation: light touch intact in B UEs Body Structures Involved: 1. Joints 2. Muscles Body Functions Affected: 1. Neuromusculoskeletal 
2. Movement Related Activities and Participation Affected: 1. General Tasks and Demands 2. Self Care 3. Community, Social and [place] CLINICAL DECISION MAKING:  
Outcome Measure: Tool Used: Disabilities of the Arm, Shoulder and Hand (DASH) Questionnaire - Quick Version Score:  Initial: 55/   41/ (Date: 18 )      45/ (Date 12/3/18)      43/ (1/3/19)      40/ (19)      41/ (19)   38 (3/28/19)  Most Recent:  (19) Interpretation of Score: The DASH is designed to measure the activities of daily living in person's with upper extremity dysfunction or pain. Each section is scored on a 1-5 scale, 5 representing the greatest disability. The scores of each section are added together for a total score of 55. Medical Necessity:  
· Patient demonstrates good rehab potential due to higher previous functional level. Reason for Services/Other Comments: 
· Patient continues to require skilled intervention due to need to regain use of B UEs.  
  
 
 
 
TREATMENT:  
(In addition to Assessment/Re-Assessment sessions the following treatments were rendered) Pre-treatment Symptoms/Complaints:  Patient reports that she has tried doing a little more at home, raking, lifting trash bag, etc., and that made her shoulder sore. Reports that one of her good friends  on Tuesday. Also had a cousin die and is going to the  on Tuesday. Pain: Initial:  
Pain Intensity 1: 4   Post Session: No change in pain according to patient. Therapeutic Exercise: (40 Minutes ):  Exercises to improve mobility and strength. Required minimal verbal cues to ensure correct performance. Progressed resistance as indicated. AIS 3 sec x 10 each for L shoulder flex, abduct, ER at 45 and 90 degree abduct, IR at 90 degree abduct and at 90 degree flex, horizontal adduct, D1 and D2  Diagonals - all in supine. Date 3/28/19  Date 4/1/19 Date 4/22/19  Date 4-25-19 Date 4/29/19 Date 5/2/19 Date 5/7/19 Date 
5/9/19 Activity/Exercise B serratus punch 5# 2x15 5# 2x15 6# 2x15  - 6# 2x15 6# 2x15 7# 2x15 IR with band  Green 2x15 Green 2x15 Green 2x15  Blue 2x15 Blue 2x15 Blue 2x15 Blue 2x15 ER with band  Red 2x15 Red 2x15 Green 2x15  Green 2x15 Green 2x15 Green 2x15 Green 2x15 Side lying abduct - - 4# 2x15  4# 2x15 4# 2x15 4# 2x15 4# 2x15 Side lying ER 3# 2x15 3# 2x15 4# 2x15 4# 1 x 12 
4# 2 x 10  4# 2x15 4# 2x15 4# 2x15 4# 2x15 Wall push ups - Rail 2x15 Rail 2x15  Rail 2x15 Rail 2x15 Rail 2x15 [address] B Lat pull downs 17# 2x15 17# 2x15 17# 2x15 17# B x 15 
20# B  17# 2x15 17# 2x15 20# 2x15 20# 2x15 B Cable rows 17# 2x15 17# 2x15 17# 2x15 17# 3 x 12 B 17# 2x15 17# 2x15 17# 2x15 17# 2x15 B cable press 10# 2x15 10# 2x15 10# 2x15 10# 3 x 10 B 10# 2x15 10# 2x15 10# 2x15 10# 2x15 B shldr flex with symmetry 2# 2x10 - - L 2# 3 x 10  - 2# 2x10 2# 2x10 2# 2x10 B shldr abduct to <= 90 2# 2x10 - - Scaption L 2# 3 x 10  - 2# 2x10 2# 2x10 2# 2x10 Rows with band Black 2x15 Black 2x15 Black 2x15  Black 2x15 Black 2x15 Black 2x15 Black 2x15 B shldr extn with band Black 2x15 Black 2x15 Black 2x15  Black 2x15 Black 2x15 Black 2x15 Black 2x15 D1/D2 diagonals on wall - 1.5# 2x10 ea 1.5# 2x10 ea  1.5# 2x10 ea 2# 2x10 ea 2# 2x10 ea -  
T's with band - - Red 2x15  Red 2x15 Red 2x15 Green 2x15 Green 2x15 Bent over rows - - 8# 2x15 6# 1 x 12 
7#  2 x 12 - 8# 2x15 8# 2x15 8# 2x15 Hugs with band - - Green 2x15  Green 2x15 Green 2x15 Green 2x15 Green 2x15 Supine press    6# 1 x 12  
7# 2 x 12 - - - Middle trapezius    Prone 0# 3 x 10 - - - Lower trapezius    Prone 0# 3 x 8 - - -   
HEP - continue current HEP Manual Therapy ( 0 minutes) none today Therapeutic Modalities: for pain - Game Ready device at 40 degrees and mild compression. Left Shoulder Cold Type: Cold/ice pack(with vasoneumatic compression) Duration : 15 minutes Patient Position: Sitting Tonia Castro Treatment/Session Assessment:   
· Response to Treatment: Patient did exercises more slowly today and admitted to being very tired. Needed more frequent rest breaks than she usually does. · Compliance with Program/Exercises: yes · Recommendations/Intent for next treatment session: \"Next visit will focus on motion and strength in L shoulder . \". Total Treatment Duration:  55 minutes PT Patient Time In/Time Out Time In: 3287 Time Out: 1005 Kaden Krause PT

## 2019-05-13 ENCOUNTER — HOSPITAL ENCOUNTER (OUTPATIENT)
Dept: PHYSICAL THERAPY | Age: 54
Discharge: HOME | End: 2019-05-13
Payer: COMMERCIAL

## 2019-05-13 PROCEDURE — 97110 THERAPEUTIC EXERCISES: CPT

## 2019-05-13 NOTE — PROGRESS NOTES
Jono Boland : 1965 Primary: [facility] [employer] [facility]* Secondary:  Therapy Center at [facility] ARTIE BLAIR [address] [facility], [address] [place] [address],[address], [address] [facility] [phone]   [phone] OUTPATIENT PHYSICAL THERAPY:Daily Note 2019 ICD-10: Treatment Diagnosis:  M75.01 Adhesive capsulitis of right shoulder, M75.02 Adhesive capsulitis of left shoulder, M19.012 Primary osteoarthritis, left shoulder, Pain in left shoulder (M25.512), Pain in right shoulder (M25.511) Precautions/Allergies: NKDA Fall Risk Score: 0 (? 5 = High Risk) MD Orders: Eval and treat, HEP, ROM, strengthening, aggressive/all modalities  3x/wk for 6 weeks (written 4/10/19) MEDICAL/REFERRING DIAGNOSIS: 
L shoulder DATE OF ONSET: 10/4/18, EUA of B shoulders on 18 REFERRING PHYSICIAN: Cassandra Aguayo MD 
RETURN PHYSICIAN APPOINTMENT: 19 ASSESSMENT ( 19) :  Ms. Ilan Lee  Is a 48year old R hand dominant female s/p EUA and manipulation of R shoulder, EUA and manipulation of L shoulder and L SAD, ADCR, and LOREN on 10/4/18. She presented with shoulder pain, decreased ROM in B shoulders, decreased strength in L shoulder and decreased functional use of B shoulders. Her ROM was improving in both arms though she continued to have pain. Her DASH score was improved. Both shoulders were then manipulated under anesthesia on 18. She returned to work on 19 but pain increased and so she is off of work again now. She continues to have pain in L shoulder. Her ROM and strength are improving and DASH score continues to slowly improve. AROM flexion is now [facility], though she is still a little stiff in rotation. She could benefit from continued PT to address deficits and work toward goals. PROBLEM LIST (Impacting functional limitations): 1. Decreased Strength 2. Decreased ADL/Functional Activities 3. Increased Pain 4.  Decreased Flexibility/Joint Mobility INTERVENTIONS PLANNED: 
 1. Home Exercise Program (HEP) 2. Manual Therapy 3. Therapeutic Exercise/Strengthening 4. modals as needed TREATMENT PLAN: 
Effective Dates: 4/30/2019 TO 6/30/2019 (60 days). Frequency/Duration: 2-3 times a week for 60 Days GOALS: (Goals have been discussed and agreed upon with patient.) Patient's stated goal is to be able to use her arms. Short-Term Functional Goals: Time Frame: 6 weeks 1. Patient to be independent with HEP - MET 2. Patient to increase PROM L shoulder flex to 135 and PROM L shoulder ER to 30 degrees   MET (12/3/18) 3. Patient to report decrease in pain level to 5/10. - MET (2/26/19) , then had increased pain after returning to work, but goal MET again (4/29/19) now that patient is off of work. Discharge Goals: Time Frame: 60 days - all in progress 1. Patient to report no more than minimal shoulder pain with all activity - Not MET yet 2. Patient to improve DASH score to 21 to demo improved use of B UEs  Not MET yet 3. Patient to improve AROM B shoulder flex to 150 degrees for improved functional use. MET for L, R not tested Rehabilitation Potential For Stated Goals: Good The information in this section was collected on 10/9/18 (except where otherwise noted). HISTORY:  
History of Present Injury/Illness (Reason for Referral): 
Patient reports that shoulder pain started last year. She went to her family doctor and was sent to PT. They tried dry needling and MD injected her. She didn't get better and requested a second opinion. She was sent to Dr. Christina Cunningham and an MRI was done, and then she had surgery on 10/4/18. She was told to leave the dressings on until she returned to MD.  
She subsequently had B shoulder manipulations on 11/30/18. Past Medical History/Comorbidities: OA,  Benign hypertension with CKD (chronic kidney disease) stage III ([address] Diabetes ([address] Dyslipidemia; Endometrial cancer ([address] and Morbid obesity ([address]  knee arthroscopy (Right, 2015); appendectomy (1979), L ankle surgery (2007) L index finger surgery (2005) Social History/Living Environment:  
  Lives with family in one story home. Prior Level of Function/Work/Activity: 
 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 Dominant Side:  
      RIGHT Previous Treatment Approaches: PT prior to surgery Current Medications:  Glipizide, lisinopril, Tylenol  Date Last Reviewed:  5/13/19 EXAMINATION:  
Observation/Orthostatic Postural Assessment:   
      No new ROM:   
      AROM R shoulder flex 135, Abduct 145, extn 50, ER 45 and IR to mid R buttock PROM R shoulder flex 130, abduct 110, ER 35 at 90 degree abduct PROM L shoulder flex 80, abduct 65, IR 50 (to abdomen) and ER -15 from neutral.  
 
      PROM R shoulder flex 160, abduct 140, ER 70 and IR 65 at 90 degree abduct (11/1/18) PROM L shoulder flex 130, abduct 110, ER 30 and IR 50 at 90 degrees abduct (11/1/18) PROM R shoulder flex 165, abduct 160, ER 75 and IR 75 at 90 degree abduct (12/3/18) PROM L shoulder flex 140, abduct 120, ER 45 and IR 60 at 90 degrees abduct (12/3/18) PROM R shoulder flex 160, abduct 150, ER 85, IR 80 at 90 degrees abduct (1/3/19) PROM L shoulder flex 170, abduct 160, ER 70 and IR 70 at 90 degrees abduct (2/12/19) AROM L shoulder flex 155, abduct 115, ER 34 and IR not quite to sacrum (2/12/19) AROM L shoulder flex 160, abduct 115, ER 32  and IR not quite to sacrum,    (2/26/18) PROM L shoulder flex 160, abduct 160, ER 70 and IR 70 at 90 degrees abduct (3/27/19) AROM L shoulder flex 160, extn 46, abduct 115, ER 20 and IR to upper L buttock (3/27/19) AROM L shoulder flex 145, extn 50, abduct 110, ER 18, IR to upper L buttock (4/4/19) PROM L shoulder flex 165, abduct 165, ER 77 and IR 75 at 90 degree abduct (4/29/19) AROM L shoulder flex 165, extn 60, abduct 140, ER 35, and IR to L4 on back (4/29/19) Strength:   
      R shoulder 4+ to 5/5 all directions. L shoulder not tested. L shoulder flex 4+ , extn 5,  abduct 4+, IR 4+ and ER 4-  (2/26/19) Neurological Screen: 
      Sensation: light touch intact in B UEs Body Structures Involved: 1. Joints 2. Muscles Body Functions Affected: 1. Neuromusculoskeletal 
2. Movement Related Activities and Participation Affected: 1. General Tasks and Demands 2. Self Care 3. Community, Social and [place] CLINICAL DECISION MAKING:  
Outcome Measure: Tool Used: Disabilities of the Arm, Shoulder and Hand (DASH) Questionnaire - Quick Version Score:  Initial: 55/55   41/55 (Date: 11/1/18 )      45/55 (Date 12/3/18)      43/55 (1/3/19)      40/55 (1/31/19)      41/55 (2/26/19)   38/55 (3/28/19)  Most Recent: 35/55 (4/29/19) Interpretation of Score: The DASH is designed to measure the activities of daily living in person's with upper extremity dysfunction or pain. Each section is scored on a 1-5 scale, 5 representing the greatest disability. The scores of each section are added together for a total score of 55. Medical Necessity:  
· Patient demonstrates good rehab potential due to higher previous functional level. Reason for Services/Other Comments: 
· Patient continues to require skilled intervention due to need to regain use of B UEs.  
  
 
 
 
TREATMENT:  
(In addition to Assessment/Re-Assessment sessions the following treatments were rendered) Pre-treatment Symptoms/Complaints:  Patient reports that both shoulders are sore from putting up a back splash this weekend. No new problems. Pain: Initial:  
Pain Intensity 1: 4   Post Session: No increase or decrease in pain according to patient. Therapeutic Exercise: (40 Minutes ):  Exercises to improve mobility and strength. Required minimal verbal cues to ensure correct performance. No major changes to exercises today. AIS 3 sec x 10 each for L shoulder flex, abduct, ER at 45 and 90 degree abduct, IR at 90 degree abduct and at 90 degree flex, horizontal adduct, D1 and D2  Diagonals - all in supine. Date 4/29/19 Date 5/2/19 Date 5/7/19 Date 
5/9/19 Date 5/13/19 Activity/Exercise B serratus punch - 6# 2x15 6# 2x15 7# 2x15 7# 2x15 IR with band  Blue 2x15 Blue 2x15 Blue 2x15 Blue 2x15 bllue 2x15 ER with band  Green 2x15 Green 2x15 Green 2x15 Green 2x15 Green 2x15 Side lying abduct 4# 2x15 4# 2x15 4# 2x15 4# 2x15 4# 2x15 Side lying ER 4# 2x15 4# 2x15 4# 2x15 4# 2x15 4# 2x15 Wall push ups Rail [address] Gigi Irwin 2x15 Rail 2x15 Rail [address] B Lat pull downs 17# 2x15 17# 2x15 20# 2x15 20# 2x15 20# 2x15 B Cable rows 17# 2x15 17# 2x15 17# 2x15 17# 2x15 17# 2x15 B cable press 10# 2x15 10# 2x15 10# 2x15 10# 2x15 10# 2x15 B shldr flex with symmetry - 2# 2x10 2# 2x10 2# 2x10 2# 1x10 B shldr abduct to <= 90 - 2# 2x10 2# 2x10 2# 2x10 2# 1x10 Rows with band Black 2x15 Black 2x15 Black 2x15 Black 2x15 Black 2x15 B shldr extn with band Black 2x15 Black 2x15 Black 2x15 Black 2x15 Black 2x15 D1/D2 diagonals on wall 1.5# 2x10 ea 2# 2x10 ea 2# 2x10 ea -   
T's with band Red 2x15 Red 2x15 Green 2x15 Green 2x15 Green 2x15 Bent over rows - 8# 2x15 8# 2x15 8# 2x15 8# 2x15 Hugs with band Green 2x15 Green 2x15 Green 2x15 Green 2x15 Green 2x15 HEP - continue current HEP Manual Therapy ( 0 minutes) none today Therapeutic Modalities: patient took ice with her at end of session. . Treatment/Session Assessment:   
· Response to Treatment: Patient continues to work at exercises. No major changes today. · Compliance with Program/Exercises: yes · Recommendations/Intent for next treatment session: \"Next visit will focus on motion and strength in L shoulder .  \".     
 
 
 Total Treatment Duration:  40 minutes PT Patient Time In/Time Out Time In: 8464 Time Out: 0395 Jessica Murillo, PT

## 2019-05-14 ENCOUNTER — HOSPITAL ENCOUNTER (OUTPATIENT)
Dept: PHYSICAL THERAPY | Age: 54
Discharge: HOME | End: 2019-05-14
Payer: COMMERCIAL

## 2019-05-14 PROCEDURE — 97140 MANUAL THERAPY 1/> REGIONS: CPT

## 2019-05-14 PROCEDURE — 97110 THERAPEUTIC EXERCISES: CPT

## 2019-05-14 NOTE — PROGRESS NOTES
Dutch Washington : 1965 Primary: [facility] [employer] [facility]* Secondary:  Therapy Center at [facility] ARTIE BLAIR [address] [facility], [address] [place] [address],[address], [facility]. [phone]   [phone] OUTPATIENT PHYSICAL THERAPY:Daily Note 2019 ICD-10: Treatment Diagnosis:  M75.01 Adhesive capsulitis of right shoulder, M75.02 Adhesive capsulitis of left shoulder, M19.012 Primary osteoarthritis, left shoulder, Pain in left shoulder (M25.512), Pain in right shoulder (M25.511) Precautions/Allergies: NKDA Fall Risk Score: 0 (? 5 = High Risk) MD Orders: Eval and treat, HEP, ROM, strengthening, aggressive/all modalities  3x/wk for 6 weeks (written 4/10/19) MEDICAL/REFERRING DIAGNOSIS: 
L shoulder DATE OF ONSET: 10/4/18, EUA of B shoulders on 18 REFERRING PHYSICIAN: Roseann Sosa MD 
RETURN PHYSICIAN APPOINTMENT: 19 ASSESSMENT ( 19) :  Ms. Boby Larose  Is a 48year old R hand dominant female s/p EUA and manipulation of R shoulder, EUA and manipulation of L shoulder and L SAD, ADCR, and LOREN on 10/4/18. She presented with shoulder pain, decreased ROM in B shoulders, decreased strength in L shoulder and decreased functional use of B shoulders. Her ROM was improving in both arms though she continued to have pain. Her DASH score was improved. Both shoulders were then manipulated under anesthesia on 18. She returned to work on 19 but pain increased and so she is off of work again now. She continues to have pain in L shoulder. Her ROM and strength are improving and DASH score continues to slowly improve. AROM flexion is now [facility], though she is still a little stiff in rotation. She could benefit from continued PT to address deficits and work toward goals. PROBLEM LIST (Impacting functional limitations): 1. Decreased Strength 2. Decreased ADL/Functional Activities 3. Increased Pain 4.  Decreased Flexibility/Joint Mobility INTERVENTIONS PLANNED: 
 1. Home Exercise Program (HEP) 2. Manual Therapy 3. Therapeutic Exercise/Strengthening 4. modals as needed TREATMENT PLAN: 
Effective Dates: 4/30/2019 TO 6/30/2019 (60 days). Frequency/Duration: 2-3 times a week for 60 Days GOALS: (Goals have been discussed and agreed upon with patient.) Patient's stated goal is to be able to use her arms. Short-Term Functional Goals: Time Frame: 6 weeks 1. Patient to be independent with HEP - MET 2. Patient to increase PROM L shoulder flex to 135 and PROM L shoulder ER to 30 degrees   MET (12/3/18) 3. Patient to report decrease in pain level to 5/10. - MET (2/26/19) , then had increased pain after returning to work, but goal MET again (4/29/19) now that patient is off of work. Discharge Goals: Time Frame: 60 days - all in progress 1. Patient to report no more than minimal shoulder pain with all activity - Not MET yet 2. Patient to improve DASH score to 21 to demo improved use of B UEs  Not MET yet 3. Patient to improve AROM B shoulder flex to 150 degrees for improved functional use. MET for L, R not tested Rehabilitation Potential For Stated Goals: Good The information in this section was collected on 10/9/18 (except where otherwise noted). HISTORY:  
History of Present Injury/Illness (Reason for Referral): 
Patient reports that shoulder pain started last year. She went to her family doctor and was sent to PT. They tried dry needling and MD injected her. She didn't get better and requested a second opinion. She was sent to Dr. Shelbi Fernandez and an MRI was done, and then she had surgery on 10/4/18. She was told to leave the dressings on until she returned to MD.  
She subsequently had B shoulder manipulations on 11/30/18. Past Medical History/Comorbidities: OA,  Benign hypertension with CKD (chronic kidney disease) stage III ([address] Diabetes ([address] Dyslipidemia; Endometrial cancer ([address] and Morbid obesity ([address]  knee arthroscopy (Right, 2015); appendectomy (1979), L ankle surgery (2007) L index finger surgery (2005) Social History/Living Environment:  
  Lives with family in one story home. Prior Level of Function/Work/Activity: 
 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 Dominant Side:  
      RIGHT Previous Treatment Approaches: PT prior to surgery Current Medications:  Glipizide, lisinopril, Tylenol  Date Last Reviewed:  5/13/19 EXAMINATION:  
Observation/Orthostatic Postural Assessment:   
      No new ROM:   
      AROM R shoulder flex 135, Abduct 145, extn 50, ER 45 and IR to mid R buttock PROM R shoulder flex 130, abduct 110, ER 35 at 90 degree abduct PROM L shoulder flex 80, abduct 65, IR 50 (to abdomen) and ER -15 from neutral.  
 
      PROM R shoulder flex 160, abduct 140, ER 70 and IR 65 at 90 degree abduct (11/1/18) PROM L shoulder flex 130, abduct 110, ER 30 and IR 50 at 90 degrees abduct (11/1/18) PROM R shoulder flex 165, abduct 160, ER 75 and IR 75 at 90 degree abduct (12/3/18) PROM L shoulder flex 140, abduct 120, ER 45 and IR 60 at 90 degrees abduct (12/3/18) PROM R shoulder flex 160, abduct 150, ER 85, IR 80 at 90 degrees abduct (1/3/19) PROM L shoulder flex 170, abduct 160, ER 70 and IR 70 at 90 degrees abduct (2/12/19) AROM L shoulder flex 155, abduct 115, ER 34 and IR not quite to sacrum (2/12/19) AROM L shoulder flex 160, abduct 115, ER 32  and IR not quite to sacrum,    (2/26/18) PROM L shoulder flex 160, abduct 160, ER 70 and IR 70 at 90 degrees abduct (3/27/19) AROM L shoulder flex 160, extn 46, abduct 115, ER 20 and IR to upper L buttock (3/27/19) AROM L shoulder flex 145, extn 50, abduct 110, ER 18, IR to upper L buttock (4/4/19) PROM L shoulder flex 165, abduct 165, ER 77 and IR 75 at 90 degree abduct (4/29/19) AROM L shoulder flex 165, extn 60, abduct 140, ER 35, and IR to L4 on back (4/29/19) Strength:   
      R shoulder 4+ to 5/5 all directions. L shoulder not tested. L shoulder flex 4+ , extn 5,  abduct 4+, IR 4+ and ER 4-  (2/26/19) Neurological Screen: 
      Sensation: light touch intact in B UEs Body Structures Involved: 1. Joints 2. Muscles Body Functions Affected: 1. Neuromusculoskeletal 
2. Movement Related Activities and Participation Affected: 1. General Tasks and Demands 2. Self Care 3. Community, Social and [place] CLINICAL DECISION MAKING:  
Outcome Measure: Tool Used: Disabilities of the Arm, Shoulder and Hand (DASH) Questionnaire - Quick Version Score:  Initial: 55/55   41/55 (Date: 11/1/18 )      45/55 (Date 12/3/18)      43/55 (1/3/19)      40/55 (1/31/19)      41/55 (2/26/19)   38/55 (3/28/19)  Most Recent: 35/55 (4/29/19) Interpretation of Score: The DASH is designed to measure the activities of daily living in person's with upper extremity dysfunction or pain. Each section is scored on a 1-5 scale, 5 representing the greatest disability. The scores of each section are added together for a total score of 55. Medical Necessity:  
· Patient demonstrates good rehab potential due to higher previous functional level. Reason for Services/Other Comments: 
· Patient continues to require skilled intervention due to need to regain use of B UEs.  
  
 
 
 
TREATMENT:  
(In addition to Assessment/Re-Assessment sessions the following treatments were rendered) Pre-treatment Symptoms/Complaints:  Patient reports that pain is about the same. It doesn't seem to go up or down. She did mow the lawn and cut hedges after PT yesterday. Pain: Initial:  
Pain Intensity 1: 4   Post Session: No change in pain according to patient.    
 
Therapeutic Exercise: (10 Minutes ):  Exercises to improve mobility and strength. Required minimal verbal cues to ensure correct performance. No major changes to exercises today. AIS 3 sec x 10 each for L shoulder flex, abduct, ER at 45 and 90 degree abduct, IR at 90 degree abduct and at 90 degree flex, horizontal adduct, D1 and D2  Diagonals - all in supine. Date 4/29/19 Date 5/2/19 Date 5/7/19 Date 
5/9/19 Date 5/13/19 Activity/Exercise B serratus punch - 6# 2x15 6# 2x15 7# 2x15 7# 2x15 IR with band  Blue 2x15 Blue 2x15 Blue 2x15 Blue 2x15 bllue 2x15 ER with band  Green 2x15 Green 2x15 Green 2x15 Green 2x15 Green 2x15 Side lying abduct 4# 2x15 4# 2x15 4# 2x15 4# 2x15 4# 2x15 Side lying ER 4# 2x15 4# 2x15 4# 2x15 4# 2x15 4# 2x15 Wall push ups Rail [address] Gigi Normanna 2x15 Rail 2x15 Rail [address] B Lat pull downs 17# 2x15 17# 2x15 20# 2x15 20# 2x15 20# 2x15 B Cable rows 17# 2x15 17# 2x15 17# 2x15 17# 2x15 17# 2x15 B cable press 10# 2x15 10# 2x15 10# 2x15 10# 2x15 10# 2x15 B shldr flex with symmetry - 2# 2x10 2# 2x10 2# 2x10 2# 1x10 B shldr abduct to <= 90 - 2# 2x10 2# 2x10 2# 2x10 2# 1x10 Rows with band Black 2x15 Black 2x15 Black 2x15 Black 2x15 Black 2x15 B shldr extn with band Black 2x15 Black 2x15 Black 2x15 Black 2x15 Black 2x15 D1/D2 diagonals on wall 1.5# 2x10 ea 2# 2x10 ea 2# 2x10 ea -   
T's with band Red 2x15 Red 2x15 Green 2x15 Green 2x15 Green 2x15 Bent over rows - 8# 2x15 8# 2x15 8# 2x15 8# 2x15 Hugs with band Green 2x15 Green 2x15 Green 2x15 Green 2x15 Green 2x15 HEP - continue current HEP Manual Therapy ( 30 minutes) - for motion - grade 2 to 4 physio mobs L shoulder flex, abduct, IR and ER. Grade 3 to 4 inferior and posterior shoulder glides. Contract/relax at end range for flex, IR and ER. Therapeutic Modalities: patient took ice with her at end of session. . Treatment/Session Assessment: · Response to Treatment: Focused on ROM today since patient had done strengthening yesterday. She has 2 funerals to go to today. · Compliance with Program/Exercises: yes · Recommendations/Intent for next treatment session: \"Next visit will focus on motion and strength in L shoulder . \". Total Treatment Duration:  40 minutes PT Patient Time In/Time Out Time In: 2766 Time Out: 3404 Min Baca PT

## 2019-05-16 ENCOUNTER — HOSPITAL ENCOUNTER (OUTPATIENT)
Dept: PHYSICAL THERAPY | Age: 54
Discharge: HOME | End: 2019-05-16
Payer: COMMERCIAL

## 2019-05-16 PROCEDURE — 97110 THERAPEUTIC EXERCISES: CPT

## 2019-05-16 NOTE — PROGRESS NOTES
Kyaw Álvarez : 1965 Primary: [facility] [employer] [facility] [place]* Secondary:  [facility]rapy Center at [facility] ARTIE BLAIR [address] [facility], [address] [place] [address],[address], Robel ETHEL Duarte. [phone]   [phone] OUTPATIENT PHYSICAL THERAPY:Daily Note 2019 ICD-10: Treatment Diagnosis:  M75.01 Adhesive capsulitis of right shoulder, M75.02 Adhesive capsulitis of left shoulder, M19.012 Primary osteoarthritis, left shoulder, Pain in left shoulder (M25.512), Pain in right shoulder (M25.511) Precautions/Allergies: NKDA Fall Risk Score: 0 (? 5 = High Risk) MD Orders: Eval and treat, HEP, ROM, strengthening, aggressive/all modalities  3x/wk for 6 weeks (written 4/10/19) MEDICAL/REFERRING DIAGNOSIS: 
L shoulder DATE OF ONSET: 10/4/18, EUA of B shoulders on 18 REFERRING PHYSICIAN: Juan Brunner., MD 
RETURN PHYSICIAN APPOINTMENT: 19 ASSESSMENT ( 19) :  Ms. Suyapa Barroso  Is a 48year old R hand dominant female s/p EUA and manipulation of R shoulder, EUA and manipulation of L shoulder and L SAD, ADCR, and LOREN on 10/4/18. She presented with shoulder pain, decreased ROM in B shoulders, decreased strength in L shoulder and decreased functional use of B shoulders. Her ROM was improving in both arms though she continued to have pain. Her DASH score was improved. Both shoulders were then manipulated under anesthesia on 18. She returned to work on 19 but pain increased and so she is off of work again now. She continues to have pain in L shoulder. Her ROM and strength are improving and DASH score continues to slowly improve. AROM flexion is now [facility], though she is still a little stiff in rotation. She could benefit from continued PT to address deficits and work toward goals. PROBLEM LIST (Impacting functional limitations): 1. Decreased Strength 2. Decreased ADL/Functional Activities 3. Increased Pain 4.  Decreased Flexibility/Joint Mobility INTERVENTIONS PLANNED: 
 1. Home Exercise Program (HEP) 2. Manual Therapy 3. Therapeutic Exercise/Strengthening 4. modals as needed TREATMENT PLAN: 
Effective Dates: 4/30/2019 TO 6/30/2019 (60 days). Frequency/Duration: 2-3 times a week for 60 Days GOALS: (Goals have been discussed and agreed upon with patient.) Patient's stated goal is to be able to use her arms. Short-Term Functional Goals: Time Frame: 6 weeks 1. Patient to be independent with HEP - MET 2. Patient to increase PROM L shoulder flex to 135 and PROM L shoulder ER to 30 degrees   MET (12/3/18) 3. Patient to report decrease in pain level to 5/10. - MET (2/26/19) , then had increased pain after returning to work, but goal MET again (4/29/19) now that patient is off of work. Discharge Goals: Time Frame: 60 days - all in progress 1. Patient to report no more than minimal shoulder pain with all activity - Not MET yet 2. Patient to improve DASH score to 21 to demo improved use of B UEs  Not MET yet 3. Patient to improve AROM B shoulder flex to 150 degrees for improved functional use. MET for L, R not tested Rehabilitation Potential For Stated Goals: Good The information in this section was collected on 10/9/18 (except where otherwise noted). HISTORY:  
History of Present Injury/Illness (Reason for Referral): 
Patient reports that shoulder pain started last year. She went to her family doctor and was sent to PT. They tried dry needling and MD injected her. She didn't get better and requested a second opinion. She was sent to Dr. Deepali Salter and an MRI was done, and then she had surgery on 10/4/18. She was told to leave the dressings on until she returned to MD.  
She subsequently had B shoulder manipulations on 11/30/18. Past Medical History/Comorbidities: OA,  Benign hypertension with CKD (chronic kidney disease) stage III ([address] Diabetes ([address] Dyslipidemia; Endometrial cancer ([address] and Morbid obesity ([address]  knee arthroscopy (Right, 2015); appendectomy (1979), L ankle surgery (2007) L index finger surgery (2005) Social History/Living Environment:  
  Lives with family in one story home. Prior Level of Function/Work/Activity: 
 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 Dominant Side:  
      RIGHT Previous Treatment Approaches: PT prior to surgery Current Medications:  Glipizide, lisinopril, Tylenol  Date Last Reviewed:  5/13/19 EXAMINATION:  
Observation/Orthostatic Postural Assessment:   
      No new ROM:   
      AROM R shoulder flex 135, Abduct 145, extn 50, ER 45 and IR to mid R buttock PROM R shoulder flex 130, abduct 110, ER 35 at 90 degree abduct PROM L shoulder flex 80, abduct 65, IR 50 (to abdomen) and ER -15 from neutral.  
 
      PROM R shoulder flex 160, abduct 140, ER 70 and IR 65 at 90 degree abduct (11/1/18) PROM L shoulder flex 130, abduct 110, ER 30 and IR 50 at 90 degrees abduct (11/1/18) PROM R shoulder flex 165, abduct 160, ER 75 and IR 75 at 90 degree abduct (12/3/18) PROM L shoulder flex 140, abduct 120, ER 45 and IR 60 at 90 degrees abduct (12/3/18) PROM R shoulder flex 160, abduct 150, ER 85, IR 80 at 90 degrees abduct (1/3/19) PROM L shoulder flex 170, abduct 160, ER 70 and IR 70 at 90 degrees abduct (2/12/19) AROM L shoulder flex 155, abduct 115, ER 34 and IR not quite to sacrum (2/12/19) AROM L shoulder flex 160, abduct 115, ER 32  and IR not quite to sacrum,    (2/26/18) PROM L shoulder flex 160, abduct 160, ER 70 and IR 70 at 90 degrees abduct (3/27/19) AROM L shoulder flex 160, extn 46, abduct 115, ER 20 and IR to upper L buttock (3/27/19) AROM L shoulder flex 145, extn 50, abduct 110, ER 18, IR to upper L buttock (4/4/19) PROM L shoulder flex 165, abduct 165, ER 77 and IR 75 at 90 degree abduct (4/29/19) AROM L shoulder flex 165, extn 60, abduct 140, ER 35, and IR to L4 on back (4/29/19) Strength:   
      R shoulder 4+ to 5/5 all directions. L shoulder not tested. L shoulder flex 4+ , extn 5,  abduct 4+, IR 4+ and ER 4-  (2/26/19) Neurological Screen: 
      Sensation: light touch intact in B UEs Body Structures Involved: 1. Joints 2. Muscles Body Functions Affected: 1. Neuromusculoskeletal 
2. Movement Related Activities and Participation Affected: 1. General Tasks and Demands 2. Self Care 3. Community, Social and [place] CLINICAL DECISION MAKING:  
Outcome Measure: Tool Used: Disabilities of the Arm, Shoulder and Hand (DASH) Questionnaire - Quick Version Score:  Initial: 55/55   41/55 (Date: 11/1/18 )      45/55 (Date 12/3/18)      43/55 (1/3/19)      40/55 (1/31/19)      41/55 (2/26/19)   38/55 (3/28/19)  Most Recent: 35/55 (4/29/19) Interpretation of Score: The DASH is designed to measure the activities of daily living in person's with upper extremity dysfunction or pain. Each section is scored on a 1-5 scale, 5 representing the greatest disability. The scores of each section are added together for a total score of 55. Medical Necessity:  
· Patient demonstrates good rehab potential due to higher previous functional level. Reason for Services/Other Comments: 
· Patient continues to require skilled intervention due to need to regain use of B UEs.  
  
 
 
 
TREATMENT:  
(In addition to Assessment/Re-Assessment sessions the following treatments were rendered) Pre-treatment Symptoms/Complaints:  Patient reports that her doctor put her on a cholesterol medicine and it is upsetting her stomach. Wants to keep her visit short today as a result. Stomach has been upset since Tuesday. Pain: Initial:  
Pain Intensity 1: 3   Post Session: No change in pain according to patient. Still a 3.    
 
Therapeutic Exercise: (40 Minutes ):  Exercises to improve mobility and strength. Required minimal verbal cues to ensure correct performance. AIS 3 sec x 10 each for L shoulder flex, abduct, ER at 45 and 90 degree abduct, IR at 90 degree abduct and at 90 degree flex, horizontal adduct, D1 and D2  Diagonals - all in supine. Date 4/29/19 Date 5/2/19 Date 5/7/19 Date 
5/9/19 Date 5/13/19 Date 5/16/19 Activity/Exercise B serratus punch - 6# 2x15 6# 2x15 7# 2x15 7# 2x15 7# 2x15 IR with band  Blue 2x15 Blue 2x15 Blue 2x15 Blue 2x15 blue 2x15 Blue 2x15 ER with band  Green 2x15 Green 2x15 Green 2x15 Green 2x15 Green 2x15 [employer] Side lying abduct 4# 2x15 4# 2x15 4# 2x15 4# 2x15 4# 2x15 4# 2x15 Side lying ER 4# 2x15 4# 2x15 4# 2x15 4# 2x15 4# 2x15 4# 2x15 Wall push ups Rail 2x15 Rail 2x15 Rail 2x15 Rail 2x15 Rail [address] Lat pull downs 17# 2x15 17# 2x15 20# 2x15 20# 2x15 20# 2x15 20# 2x15 B Cable rows 17# 2x15 17# 2x15 17# 2x15 17# 2x15 17# 2x15 17# 2x15 B cable press 10# 2x15 10# 2x15 10# 2x15 10# 2x15 10# 2x15 10# 2x15 B shldr flex with symmetry - 2# 2x10 2# 2x10 2# 2x10 2# 1x10 -  
B shldr abduct to <= 90 - 2# 2x10 2# 2x10 2# 2x10 2# 1x10 - Rows with band Black 2x15 Black 2x15 Black 2x15 Black 2x15 Black 2x15 Black 2x15 B shldr extn with band Black 2x15 Black 2x15 Black 2x15 Black 2x15 Black 2x15 Black 2x15 D1/D2 diagonals on wall 1.5# 2x10 ea 2# 2x10 ea 2# 2x10 ea -  2# 2x10 ea T's with band Red 2x15 Red 2x15 Green 2x15 Green 2x15 Green 2x15 - Bent over rows - 8# 2x15 8# 2x15 8# 2x15 8# 2x15 Hugs with band Green 2x15 Green 2x15 Green 2x15 Green 2x15 Green 2x15 -  
HEP - continue current HEP- 
 
Manual Therapy ( minutes) - none today Therapeutic Modalities: patient took ice with her at end of session.   
                                                                                           
 
. Treatment/Session Assessment:   
 
· Response to Treatment: Did strengthening as able today since patient had an upset stomach. She still did relatively well with exercises, though she moved more slowly. · Compliance with Program/Exercises: yes · Recommendations/Intent for next treatment session: \"Next visit will focus on motion and strength in L shoulder . \". Total Treatment Duration:  40 minutes PT Patient Time In/Time Out Time In: 0900 Time Out: 9167 Leora Barba, PT

## 2019-05-20 ENCOUNTER — HOSPITAL ENCOUNTER (OUTPATIENT)
Dept: PHYSICAL THERAPY | Age: 54
Discharge: HOME | End: 2019-05-20
Payer: COMMERCIAL

## 2019-05-20 PROCEDURE — 97110 THERAPEUTIC EXERCISES: CPT

## 2019-05-20 NOTE — PROGRESS NOTES
Halima Kyle : 1965 Primary: [facility] [employer] [facility] [place]* Secondary:  Therapy Center at [facility] ARTIE BLAIR [address] [facility], [address] [place] [address],[address], [facility]. [phone]   [phone] OUTPATIENT PHYSICAL THERAPY:Daily Note 2019 ICD-10: Treatment Diagnosis:  M75.01 Adhesive capsulitis of right shoulder, M75.02 Adhesive capsulitis of left shoulder, M19.012 Primary osteoarthritis, left shoulder, Pain in left shoulder (M25.512), Pain in right shoulder (M25.511) Precautions/Allergies: NKDA Fall Risk Score: 0 (? 5 = High Risk) MD Orders: Eval and treat, HEP, ROM, strengthening, aggressive/all modalities  3x/wk for 6 weeks (written 4/10/19) MEDICAL/REFERRING DIAGNOSIS: 
L shoulder DATE OF ONSET: 10/4/18, EUA of B shoulders on 18 REFERRING PHYSICIAN: Karla Garza MD 
RETURN PHYSICIAN APPOINTMENT: 19 ASSESSMENT ( 19) :  Ms. Yeimi Cooper  Is a 48year old R hand dominant female s/p EUA and manipulation of R shoulder, EUA and manipulation of L shoulder and L SAD, ADCR, and LOREN on 10/4/18. She presented with shoulder pain, decreased ROM in B shoulders, decreased strength in L shoulder and decreased functional use of B shoulders. Her ROM was improving in both arms though she continued to have pain. Her DASH score was improved. Both shoulders were then manipulated under anesthesia on 18. She returned to work on 19 but pain increased and so she is off of work again now. She continues to have pain in L shoulder. Her ROM and strength are improving and DASH score continues to slowly improve. AROM flexion is now [facility], though she is still a little stiff in rotation. She could benefit from continued PT to address deficits and work toward goals. PROBLEM LIST (Impacting functional limitations): 1. Decreased Strength 2. Decreased ADL/Functional Activities 3. Increased Pain 4.  Decreased Flexibility/Joint Mobility INTERVENTIONS PLANNED: 
 1. Home Exercise Program (HEP) 2. Manual Therapy 3. Therapeutic Exercise/Strengthening 4. modals as needed TREATMENT PLAN: 
Effective Dates: 4/30/2019 TO 6/30/2019 (60 days). Frequency/Duration: 2-3 times a week for 60 Days GOALS: (Goals have been discussed and agreed upon with patient.) Patient's stated goal is to be able to use her arms. Short-Term Functional Goals: Time Frame: 6 weeks 1. Patient to be independent with HEP - MET 2. Patient to increase PROM L shoulder flex to 135 and PROM L shoulder ER to 30 degrees   MET (12/3/18) 3. Patient to report decrease in pain level to 5/10. - MET (2/26/19) , then had increased pain after returning to work, but goal MET again (4/29/19) now that patient is off of work. Discharge Goals: Time Frame: 60 days - all in progress 1. Patient to report no more than minimal shoulder pain with all activity - Not MET yet 2. Patient to improve DASH score to 21 to demo improved use of B UEs  Not MET yet 3. Patient to improve AROM B shoulder flex to 150 degrees for improved functional use. MET for L, R not tested Rehabilitation Potential For Stated Goals: Good The information in this section was collected on 10/9/18 (except where otherwise noted). HISTORY:  
History of Present Injury/Illness (Reason for Referral): 
Patient reports that shoulder pain started last year. She went to her family doctor and was sent to PT. They tried dry needling and MD injected her. She didn't get better and requested a second opinion. She was sent to Dr. Silver Montes and an MRI was done, and then she had surgery on 10/4/18. She was told to leave the dressings on until she returned to MD.  
She subsequently had B shoulder manipulations on 11/30/18. Past Medical History/Comorbidities: OA,  Benign hypertension with CKD (chronic kidney disease) stage III ([address] Diabetes ([address] Dyslipidemia; Endometrial cancer ([address] and Morbid obesity ([address]  knee arthroscopy (Right, 2015); appendectomy (1979), L ankle surgery (2007) L index finger surgery (2005) Social History/Living Environment:  
  Lives with family in one story home. Prior Level of Function/Work/Activity: 
 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 Dominant Side:  
      RIGHT Previous Treatment Approaches: PT prior to surgery Current Medications:  Glipizide, lisinopril, Tylenol  Date Last Reviewed:  5/20/19 EXAMINATION:  
Observation/Orthostatic Postural Assessment:   
      No new ROM:   
      AROM R shoulder flex 135, Abduct 145, extn 50, ER 45 and IR to mid R buttock PROM R shoulder flex 130, abduct 110, ER 35 at 90 degree abduct PROM L shoulder flex 80, abduct 65, IR 50 (to abdomen) and ER -15 from neutral.  
 
      PROM R shoulder flex 160, abduct 140, ER 70 and IR 65 at 90 degree abduct (11/1/18) PROM L shoulder flex 130, abduct 110, ER 30 and IR 50 at 90 degrees abduct (11/1/18) PROM R shoulder flex 165, abduct 160, ER 75 and IR 75 at 90 degree abduct (12/3/18) PROM L shoulder flex 140, abduct 120, ER 45 and IR 60 at 90 degrees abduct (12/3/18) PROM R shoulder flex 160, abduct 150, ER 85, IR 80 at 90 degrees abduct (1/3/19) PROM L shoulder flex 170, abduct 160, ER 70 and IR 70 at 90 degrees abduct (2/12/19) AROM L shoulder flex 155, abduct 115, ER 34 and IR not quite to sacrum (2/12/19) AROM L shoulder flex 160, abduct 115, ER 32  and IR not quite to sacrum,    (2/26/18) PROM L shoulder flex 160, abduct 160, ER 70 and IR 70 at 90 degrees abduct (3/27/19) AROM L shoulder flex 160, extn 46, abduct 115, ER 20 and IR to upper L buttock (3/27/19) AROM L shoulder flex 145, extn 50, abduct 110, ER 18, IR to upper L buttock (4/4/19) PROM L shoulder flex 165, abduct 165, ER 77 and IR 75 at 90 degree abduct (4/29/19) AROM L shoulder flex 165, extn 60, abduct 140, ER 35, and IR to L4 on back (4/29/19) Strength:   
      R shoulder 4+ to 5/5 all directions. L shoulder not tested. L shoulder flex 4+ , extn 5,  abduct 4+, IR 4+ and ER 4-  (2/26/19) Neurological Screen: 
      Sensation: light touch intact in B UEs Body Structures Involved: 1. Joints 2. Muscles Body Functions Affected: 1. Neuromusculoskeletal 
2. Movement Related Activities and Participation Affected: 1. General Tasks and Demands 2. Self Care 3. Community, Social and [place] CLINICAL DECISION MAKING:  
Outcome Measure: Tool Used: Disabilities of the Arm, Shoulder and Hand (DASH) Questionnaire - Quick Version Score:  Initial: 55/55   41/55 (Date: 11/1/18 )      45/55 (Date 12/3/18)      43/55 (1/3/19)      40/55 (1/31/19)      41/55 (2/26/19)   38/55 (3/28/19)  Most Recent: 35/55 (4/29/19) Interpretation of Score: The DASH is designed to measure the activities of daily living in person's with upper extremity dysfunction or pain. Each section is scored on a 1-5 scale, 5 representing the greatest disability. The scores of each section are added together for a total score of 55. Medical Necessity:  
· Patient demonstrates good rehab potential due to higher previous functional level. Reason for Services/Other Comments: 
· Patient continues to require skilled intervention due to need to regain use of B UEs.  
  
 
 
 
TREATMENT:  
(In addition to Assessment/Re-Assessment sessions the following treatments were rendered) Pre-treatment Symptoms/Complaints:  Patient reports that she had a headache for a couple of days and then her stomach started hurting. Her sugar was down to 86 yesterday. She wore her glasses for a while and that seemed to help her headache. Pain: Initial:  
Pain Intensity 1: 4   Post Session: Pain decreased \"one level\" per patient Therapeutic Exercise: (40 Minutes ):  Exercises to improve mobility and strength. Required minimal verbal cues to ensure correct performance. AIS 3 sec x 10 each for L shoulder flex, abduct, ER at 45 and 90 degree abduct, IR at 90 degree abduct and at 90 degree flex, horizontal adduct, D1 and D2  Diagonals - all in supine. Date 4/29/19 Date 5/2/19 Date 5/7/19 Date 
5/9/19 Date 5/13/19 Date 5/16/19 Date 5/20/19 Activity/Exercise B serratus punch - 6# 2x15 6# 2x15 7# 2x15 7# 2x15 7# 2x15 7# 2x15 IR with band  Blue 2x15 Blue 2x15 Blue 2x15 Blue 2x15 blue 2x15 Blue 2x15 Blue 2x15 ER with band  Green 2x15 Green 2x15 Green 2x15 Green 2x15 Green 2x15 Green 2x15 [employer] Side lying abduct 4# 2x15 4# 2x15 4# 2x15 4# 2x15 4# 2x15 4# 2x15 4# 2x15 Side lying ER 4# 2x15 4# 2x15 4# 2x15 4# 2x15 4# 2x15 4# 2x15 4# 2x15 Wall push ups Rail 2x15 Rail 2x15 Rail 2x15 Rail 2x15 Rail 2x15 Rail [address] Lat pull downs 17# 2x15 17# 2x15 20# 2x15 20# 2x15 20# 2x15 20# 2x15 20# 2x15 B Cable rows 17# 2x15 17# 2x15 17# 2x15 17# 2x15 17# 2x15 17# 2x15 17# 2x15 B cable press 10# 2x15 10# 2x15 10# 2x15 10# 2x15 10# 2x15 10# 2x15 10# 2x15 B shldr flex with symmetry - 2# 2x10 2# 2x10 2# 2x10 2# 1x10 - -  
B shldr abduct to <= 90 - 2# 2x10 2# 2x10 2# 2x10 2# 1x10 - - Rows with band Black 2x15 Black 2x15 Black 2x15 Black 2x15 Black 2x15 Black 2x15 Black 2x15 B shldr extn with band Black 2x15 Black 2x15 Black 2x15 Black 2x15 Black 2x15 Black 2x15 Black 2x15 D1/D2 diagonals on wall 1.5# 2x10 ea 2# 2x10 ea 2# 2x10 ea -  2# 2x10 ea 2# 2x10 ea T's with band Red 2x15 Red 2x15 Green 2x15 Green 2x15 Green 2x15 - Green 2x15 Bent over rows - 8# 2x15 8# 2x15 8# 2x15 8# 2x15  8# 2x15 Hugs with band Green 2x15 Green 2x15 Green 2x15 Green 2x15 Green 2x15 - Green 2x15 HEP - continue current HEP- 
 
Manual Therapy ( minutes) - none today Therapeutic Modalities: for pain - Game Ready device at 40 degrees and light compression Left Shoulder Cold Type: Cold/ice pack(with vasoneumatic compression) Duration : 15 minutes Patient Position: Sitting Carisa Orozco Treatment/Session Assessment:   
 
· Response to Treatment: Did strengthening again today. Will plan to stretch and measure tomorrow before she returns to MD on Wednesday. · Compliance with Program/Exercises: yes · Recommendations/Intent for next treatment session: \"Next visit will focus on motion and strength in L shoulder . \". Total Treatment Duration:  55 minutes PT Patient Time In/Time Out Time In: 4892 Time Out: 1191 Min Baca, PT

## 2019-05-21 ENCOUNTER — HOSPITAL ENCOUNTER (OUTPATIENT)
Dept: PHYSICAL THERAPY | Age: 54
Discharge: HOME | End: 2019-05-21
Payer: COMMERCIAL

## 2019-05-21 PROCEDURE — 97110 THERAPEUTIC EXERCISES: CPT

## 2019-05-21 PROCEDURE — 97140 MANUAL THERAPY 1/> REGIONS: CPT

## 2019-05-21 NOTE — PROGRESS NOTES
Idania Duran  : 1965  Primary: [facility]  [facility]:  [facility], [address], [place] [address].  [phone]   [phone]          OUTPATIENT PHYSICAL THERAPY:Daily Note and Progress Report 2019   ICD-10: Treatment Diagnosis:  M75.01 Adhesive capsulitis of right shoulder, M75.02 Adhesive capsulitis of left shoulder, M19.012 Primary osteoarthritis, left shoulder, Pain in left shoulder (M25.512), Pain in right shoulder (M25.511)  Precautions/Allergies: NKDA      Fall Risk Score: 0 (? 5 = High Risk)  MD Orders: Eval and treat, HEP, ROM, strengthening, aggressive/all modalities  3x/wk for 6 weeks (written 4/10/19)    Patient has attended 76 PT sessions from 10/9/18 to 19 with 6 missed sessions    MEDICAL/REFERRING DIAGNOSIS:  L shoulder    DATE OF ONSET: 10/4/18, EUA of B shoulders on 18  REFERRING PHYSICIAN: Parth Coronado MD  RETURN PHYSICIAN APPOINTMENT: 19     ASSESSMENT ( 19) :  Ms. Angel Wood  Is a 48year old R hand dominant female s/p EUA and manipulation of R shoulder, EUA and manipulation of L shoulder and L SAD, ADCR, and LOREN on 10/4/18. She presented with shoulder pain, decreased ROM in B shoulders, decreased strength in L shoulder and decreased functional use of B shoulders. Her ROM was improving in both arms though she continued to have pain. Her DASH score was improved. Both shoulders were then manipulated under anesthesia on 18. She returned to work on 19 but pain increased and so she is off of work again now. She continues to have pain in L shoulder. Her ROM and strength are improving though there is still some weakness in ER. AROM IR on L shoulder, though limited, is comparable to R side. DASH score is mostly unchanged recently, though she reports being able to do more at home. She could benefit from continued PT to address deficits and work toward goals.      PROBLEM LIST (Impacting functional limitations):  1. Decreased Strength  2. Decreased ADL/Functional Activities  3. Increased Pain  4. Decreased Flexibility/Joint Mobility INTERVENTIONS PLANNED:  1. Home Exercise Program (HEP)  2. Manual Therapy  3. Therapeutic Exercise/Strengthening  4. modals as needed   TREATMENT PLAN:  Effective Dates: 4/30/2019 TO 6/30/2019 (60 days). Frequency/Duration: 2-3 times a week for 60 Days  GOALS: (Goals have been discussed and agreed upon with patient.)  Patient's stated goal is to be able to use her arms. Short-Term Functional Goals: Time Frame: 6 weeks  1. Patient to be independent with HEP - MET  2. Patient to increase PROM L shoulder flex to 135 and PROM L shoulder ER to 30 degrees   MET (12/3/18)     3. Patient to report decrease in pain level to 5/10. - MET (2/26/19) , then had increased pain after returning to work, but goal MET again (4/29/19) now that patient is off of work. Discharge Goals: Time Frame: 60 days - all in progress  1. Patient to report no more than minimal shoulder pain with all activity - Not MET yet  2. Patient to improve DASH score to 21 to demo improved use of B UEs  Not MET yet  3. Patient to improve AROM B shoulder flex to 150 degrees for improved functional use. MET for L, R not tested   Rehabilitation Potential For Stated Goals: Good                  The information in this section was collected on 10/9/18 (except where otherwise noted). HISTORY:   History of Present Injury/Illness (Reason for Referral):  Patient reports that shoulder pain started last year. She went to her family doctor and was sent to PT. They tried dry needling and MD injected her. She didn't get better and requested a second opinion. She was sent to Dr. Silver Montes and an MRI was done, and then she had surgery on 10/4/18. She was told to leave the dressings on until she returned to MD.   She subsequently had B shoulder manipulations on 11/30/18.    Past Medical History/Comorbidities: OA,  Benign hypertension with CKD (chronic kidney disease) stage III ([facility] Diabetes ([facility] [facility] Dyslipidemia; Endometrial cancer ([facility] [facility] and Morbid obesity ([facility] [address] knee arthroscopy (Right, 2015); appendectomy (1979), L ankle surgery (2007) L index finger surgery (2005)  Social History/Living Environment:     Lives with family in one story home. 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    Dominant Side:         RIGHT  Previous Treatment Approaches:          PT prior to surgery  Current Medications:  Glipizide, lisinopril, Tylenol  Date Last Reviewed:  5/20/19   EXAMINATION:   Observation/Orthostatic Postural Assessment:          No new    ROM:          AROM R shoulder flex 135, Abduct 145, extn 50, ER 45 and IR to mid R buttock        PROM R shoulder flex 130, abduct 110, ER 35 at 90 degree abduct         PROM L shoulder flex 80, abduct 65, IR 50 (to abdomen) and ER -15 from neutral.           PROM R shoulder flex 160, abduct 140, ER 70 and IR 65 at 90 degree abduct (11/1/18)        PROM L shoulder flex 130, abduct 110, ER 30 and IR 50 at 90 degrees abduct (11/1/18)          PROM R shoulder flex 165, abduct 160, ER 75 and IR 75 at 90 degree abduct (12/3/18)        PROM L shoulder flex 140, abduct 120, ER 45 and IR 60 at 90 degrees abduct (12/3/18)          PROM R shoulder flex 160, abduct 150, ER 85, IR 80 at 90 degrees abduct (1/3/19)          PROM L shoulder flex 170, abduct 160, ER 70 and IR 70 at 90 degrees abduct (2/12/19)        AROM L shoulder flex 155, abduct 115, ER 34 and IR not quite to sacrum (2/12/19)          AROM L shoulder flex 160, abduct 115, ER 32  and IR not quite to sacrum,    (2/26/18)          PROM L shoulder flex 160, abduct 160, ER 70 and IR 70 at 90 degrees abduct (3/27/19)        AROM L shoulder flex 160, extn 46, abduct 115, ER 20 and IR to upper L buttock (3/27/19)          AROM L shoulder flex 145, extn 50, abduct 110, ER 18, IR to upper L buttock (4/4/19)          PROM L shoulder flex 165, abduct 165, ER 77 and IR 75 at 90 degree abduct (4/29/19)        AROM L shoulder flex 165, extn 60, abduct 140, ER 35, and IR to L4 on back (4/29/19)          PROM L shoulder flex 167, abduct 167, ER 80 and IR 76 at 90 degree abduct (5/21/19)        AROM L shoulder flex 165, extn 57, abduct 138, ER 40, and IR to L4 on back (5/21/19)    Strength:          R shoulder 4+ to 5/5 all directions. L shoulder not tested. L shoulder flex 4+ , extn 5,  abduct 4+, IR 4+ and ER 4-  (2/26/19)        L shoulder flex 5, extn 5, abduct 5, IR5 and ER 4 (5/21/19)  Neurological Screen:        Sensation: light touch intact in B UEs   Body Structures Involved:  1. Joints  2. Muscles Body Functions Affected:  1. Neuromusculoskeletal  2. Movement Related Activities and Participation Affected:  1. General Tasks and Demands  2. Self Care  3. Community, Social and Civic Life   CLINICAL DECISION MAKING:   Outcome Measure: Tool Used: Disabilities of the Arm, Shoulder and Hand (DASH) Questionnaire - Quick Version  Score:  Initial: 55/55   45/55 (Date 12/3/18)      43/55 (1/3/19)      40/55 (1/31/19)      41/55 (2/26/19)   38/55 (3/28/19)  35/55 (4/29/19)  Most Recent: 3755 (5/21/19)   Interpretation of Score: The DASH is designed to measure the activities of daily living in person's with upper extremity dysfunction or pain. Each section is scored on a 1-5 scale, 5 representing the greatest disability. The scores of each section are added together for a total score of 55. Medical Necessity:   · Patient demonstrates good rehab potential due to higher previous functional level.   Reason for Services/Other Comments:  · Patient continues to require skilled intervention due to need to regain use of B UEs.            TREATMENT:   (In addition to Assessment/Re-Assessment sessions the following treatments were rendered)  Pre-treatment Symptoms/Complaints:  Patient reports that nothing has changed since yesterday. Pain: Initial:   Pain Intensity 1: 4   Post Session: No change in pain     Therapeutic Exercise: (10 Minutes ):  Exercises to improve mobility and strength. Required minimal verbal cues to ensure correct performance. AIS 3 sec x 10 each for L shoulder flex, abduct, ER at 45 and 90 degree abduct, IR at 90 degree abduct and at 90 degree flex, horizontal adduct, D1 and D2  Diagonals - all in supine.         Date  4/29/19 Date  5/2/19 Date  5/7/19 Date  5/9/19 Date  5/13/19 Date  5/16/19 Date  5/20/19   Activity/Exercise          B serratus punch - 6# 2x15 6# 2x15 7# 2x15 7# 2x15 7# 2x15 7# 2x15   IR with band  Blue 2x15 Blue 2x15 Blue 2x15 Blue 2x15 blue 2x15 Blue 2x15 Blue 2x15   ER with band  Green 2x15 Green 2x15 Green 2x15 Green 2x15 Green 2x15 Green 2x15 Green 2x15   Side lying abduct 4# 2x15 4# 2x15 4# 2x15 4# 2x15 4# 2x15 4# 2x15 4# 2x15   Side lying ER 4# 2x15 4# 2x15 4# 2x15 4# 2x15 4# 2x15 4# 2x15 4# 2x15   Wall push ups Rail 2x15 Rail 2x15 Rail 2x15 Rail 2x15 Rail 2x15 Rail 2x15 Rail 2x15   B Lat pull downs 17# 2x15 17# 2x15 20# 2x15 20# 2x15 20# 2x15 20# 2x15 20# 2x15   B Cable rows 17# 2x15 17# 2x15 17# 2x15 17# 2x15 17# 2x15 17# 2x15 17# 2x15   B cable press 10# 2x15 10# 2x15 10# 2x15 10# 2x15 10# 2x15 10# 2x15 10# 2x15   B shldr flex with symmetry - 2# 2x10 2# 2x10 2# 2x10 2# 1x10 - -   B shldr abduct to <= 90 - 2# 2x10 2# 2x10 2# 2x10 2# 1x10 - -   Rows with band Black 2x15 Black 2x15 Black 2x15 Black 2x15 Black 2x15 Black 2x15 Black 2x15   B shldr extn with band Black 2x15 Black 2x15 Black 2x15 Black 2x15 Black 2x15 Black 2x15 Black 2x15   D1/D2 diagonals on wall 1.5# 2x10 ea 2# 2x10 ea 2# 2x10 ea -  2# 2x10 ea 2# 2x10 ea   T's with band Red 2x15 Red 2x15 Green 2x15 Green 2x15 Green 2x15 - Green 2x15   Bent over rows - 8# 2x15 8# 2x15 8# 2x15 8# 2x15  8# 2x15   Hugs with band Green 2x15 Green 2x15 Green 2x15 Green 2x15 Green 2x15 - Green 2x15   HEP - continue current HEP-    Manual Therapy ( minutes) - none today    Therapeutic Modalities: for pain - patient took ice with her at end of session. . Treatment/Session Assessment:      · Response to Treatment: Focused on ROM today. See full assessment above. · Compliance with Program/Exercises: yes  · Recommendations/Intent for next treatment session: \"Next visit will focus on motion and strength in L shoulder . \".           Total Treatment Duration:  40 minutes    PT Patient Time In/Time Out  Time In: 0903  Time Out: [address], PT

## 2019-05-21 NOTE — PROGRESS NOTES
Eric Solomon  : 1965  Primary: [facility]*  Secondary:  [facility] [place] [address] [facility], [address], [place] [address].  [phone]   [phone]          OUTPATIENT PHYSICAL THERAPY:MD Note 2019   ICD-10: Treatment Diagnosis:  M75.01 Adhesive capsulitis of right shoulder, M75.02 Adhesive capsulitis of left shoulder, M19.012 Primary osteoarthritis, left shoulder, Pain in left shoulder (M25.512), Pain in right shoulder (M25.511)  Precautions/Allergies: NKDA      Fall Risk Score: 0 (? 5 = High Risk)  MD Orders: Eval and treat, HEP, ROM, strengthening, aggressive/all modalities  3x/wk for 6 weeks (written 4/10/19)    Patient has attended 76 PT sessions from 10/9/18 to 19 with 6 missed sessions    MEDICAL/REFERRING DIAGNOSIS:  L shoulder    DATE OF ONSET: 10/4/18, EUA of B shoulders on 18  REFERRING PHYSICIAN: Quique Gaming MD  RETURN PHYSICIAN APPOINTMENT: 19     ASSESSMENT ( 19) :  Ms. Florin Davies  Is a 48year old R hand dominant female s/p EUA and manipulation of R shoulder, EUA and manipulation of L shoulder and L SAD, ADCR, and LOREN on 10/4/18. She presented with shoulder pain, decreased ROM in B shoulders, decreased strength in L shoulder and decreased functional use of B shoulders. Her ROM was improving in both arms though she continued to have pain. Her DASH score was improved. Both shoulders were then manipulated under anesthesia on 18. She returned to work on 19 but pain increased and so she is off of work again now. She continues to have pain in L shoulder. Her ROM and strength are improving though there is still some weakness in ER. AROM IR on L shoulder, though limited, is comparable to R side. DASH score is mostly unchanged recently, though she reports being able to do more at home.                    PROM L shoulder flex 167, abduct 167, ER 80 and IR 76 at 90 degree abduct (19)        AROM L shoulder flex 165, extn 57, abduct 138, ER 40, and IR to L4 on back (5/21/19)          L shoulder flex 5, extn 5, abduct 5, IR 5 and ER 4 (5/21/19)      Thank you for this referral,  Leora Barba, PT

## 2019-05-23 ENCOUNTER — APPOINTMENT (OUTPATIENT)
Dept: PHYSICAL THERAPY | Age: 54
End: 2019-05-23
Payer: COMMERCIAL

## 2019-06-18 NOTE — THERAPY DISCHARGE
Violetta Shahab : 1965 Primary: [employer] [facility]* Secondary:  [facility] ARTIE BLAIR [address] [facility], [address] [place] [address],[address], [place] [address]. [phone]   [phone] OUTPATIENT PHYSICAL THERAPY:Discharge 2019 ICD-10: Treatment Diagnosis:  M75.01 Adhesive capsulitis of right shoulder, M75.02 Adhesive capsulitis of left shoulder, M19.012 Primary osteoarthritis, left shoulder, Pain in left shoulder (M25.512), Pain in right shoulder (M25.511) Precautions/Allergies: NKDA Fall Risk Score: 0 (? 5 = High Risk) MD Orders: Eval and treat, HEP, ROM, strengthening, aggressive/all modalities  3x/wk for 6 weeks (written 4/10/19) Patient attended 68 PT sessions from 10/9/18 to 19 with 6 missed sessions MEDICAL/REFERRING DIAGNOSIS: 
L shoulder DATE OF ONSET: 10/4/18, EUA of B shoulders on 18 REFERRING PHYSICIAN: Ligia Keyes MD 
RETURN PHYSICIAN APPOINTMENT: ???  
ASSESSMENT ( 19) :  Ms. Eufemia Villalobos  Is a 48year old R hand dominant female s/p EUA and manipulation of R shoulder, EUA and manipulation of L shoulder and L SAD, ADCR, and LOREN on 10/4/18. She presented with shoulder pain, decreased ROM in B shoulders, decreased strength in L shoulder and decreased functional use of B shoulders. Her ROM was improving in both arms though she continued to have pain. Her DASH score was improved. Both shoulders were then manipulated under anesthesia on 18. She returned to work on 19 but pain increased and so was taekn off work again. She continued to have pain in L shoulder. Her ROM and strength were improving though there was still some weakness in ER. AROM IR on L shoulder, though limited, was comparable to R side. DASH score was mostly unchanged recently, though she reported being able to do more at home. She returned to MD and no further PT was ordered so she will now be discharged from PT. Timothy Lehman     
TREATMENT PLAN:  Discharge PT 
 Goal status as of 5/21/19 GOALS: (Goals have been discussed and agreed upon with patient.) Patient's stated goal is to be able to use her arms. Short-Term Functional Goals: Time Frame: 6 weeks 1. Patient to be independent with HEP - MET 2. Patient to increase PROM L shoulder flex to 135 and PROM L shoulder ER to 30 degrees   MET (12/3/18) 3. Patient to report decrease in pain level to 5/10. - MET (2/26/19) , then had increased pain after returning to work, but goal MET again (4/29/19) now that patient is off of work. Discharge Goals: Time Frame: 60 days - all in progress 1. Patient to report no more than minimal shoulder pain with all activity - Not MET yet 2. Patient to improve DASH score to 21 to demo improved use of B UEs  Not MET yet 3. Patient to improve AROM B shoulder flex to 150 degrees for improved functional use. MET for L, R not tested Rehabilitation Potential For Stated Goals: Good The information in this section was collected on 10/9/18 (except where otherwise noted). HISTORY:  
History of Present Injury/Illness (Reason for Referral): 
Patient reports that shoulder pain started last year. She went to her family doctor and was sent to PT. They tried dry needling and MD injected her. She didn't get better and requested a second opinion. She was sent to Dr. Gus Arevalo and an MRI was done, and then she had surgery on 10/4/18. She was told to leave the dressings on until she returned to MD.  
She subsequently had B shoulder manipulations on 11/30/18. Past Medical History/Comorbidities: OA,  Benign hypertension with CKD (chronic kidney disease) stage III ([address] Diabetes ([address] Dyslipidemia; Endometrial cancer ([address] and Morbid obesity ([address] knee arthroscopy (Right, 2015); appendectomy (1979), L ankle surgery (2007) L index finger surgery (2005) Social History/Living Environment:  
  Lives with family in one story home. 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 Dominant Side:  
      RIGHT Previous Treatment Approaches: PT prior to surgery Current Medications:  Glipizide, lisinopril, Tylenol  Date Last Reviewed:  5/20/19 EXAMINATION:  
Observation/Orthostatic Postural Assessment:   
      No new ROM:   
      AROM R shoulder flex 135, Abduct 145, extn 50, ER 45 and IR to mid R buttock PROM R shoulder flex 130, abduct 110, ER 35 at 90 degree abduct PROM L shoulder flex 80, abduct 65, IR 50 (to abdomen) and ER -15 from neutral.  
 
      PROM R shoulder flex 160, abduct 140, ER 70 and IR 65 at 90 degree abduct (11/1/18) PROM L shoulder flex 130, abduct 110, ER 30 and IR 50 at 90 degrees abduct (11/1/18) PROM R shoulder flex 165, abduct 160, ER 75 and IR 75 at 90 degree abduct (12/3/18) PROM L shoulder flex 140, abduct 120, ER 45 and IR 60 at 90 degrees abduct (12/3/18) PROM R shoulder flex 160, abduct 150, ER 85, IR 80 at 90 degrees abduct (1/3/19) PROM L shoulder flex 170, abduct 160, ER 70 and IR 70 at 90 degrees abduct (2/12/19) AROM L shoulder flex 155, abduct 115, ER 34 and IR not quite to sacrum (2/12/19) AROM L shoulder flex 160, abduct 115, ER 32  and IR not quite to sacrum,    (2/26/18) PROM L shoulder flex 160, abduct 160, ER 70 and IR 70 at 90 degrees abduct (3/27/19) AROM L shoulder flex 160, extn 46, abduct 115, ER 20 and IR to upper L buttock (3/27/19) AROM L shoulder flex 145, extn 50, abduct 110, ER 18, IR to upper L buttock (4/4/19) PROM L shoulder flex 165, abduct 165, ER 77 and IR 75 at 90 degree abduct (4/29/19) AROM L shoulder flex 165, extn 60, abduct 140, ER 35, and IR to L4 on back (4/29/19) PROM L shoulder flex 167, abduct 167, ER 80 and IR 76 at 90 degree abduct (5/21/19) AROM L shoulder flex 165, extn 57, abduct 138, ER 40, and IR to L4 on back (5/21/19) Strength:   
      R shoulder 4+ to 5/5 all directions. L shoulder not tested. L shoulder flex 4+ , extn 5,  abduct 4+, IR 4+ and ER 4-  (2/26/19) L shoulder flex 5, extn 5, abduct 5, IR5 and ER 4 (5/21/19) Neurological Screen: 
      Sensation: light touch intact in B UEs Body Structures Involved: 1. Joints 2. Muscles Body Functions Affected: 1. Neuromusculoskeletal 
2. Movement Related Activities and Participation Affected: 1. General Tasks and Demands 2. Self Care 3. Community, Social and [place] CLINICAL DECISION MAKING:  
Outcome Measure: Tool Used: Disabilities of the Arm, Shoulder and Hand (DASH) Questionnaire - Quick Version Score:  Initial: 55/55   45/55 (Date 12/3/18)      43/55 (1/3/19)      40/55 (1/31/19)      41/55 (2/26/19)   38/55 (3/28/19)  35/55 (4/29/19)  Most Recent: 3755 (5/21/19) Interpretation of Score: The DASH is designed to measure the activities of daily living in person's with upper extremity dysfunction or pain. Each section is scored on a 1-5 scale, 5 representing the greatest disability. The scores of each section are added together for a total score of 55.

## 2020-12-08 ENCOUNTER — HOSPITAL ENCOUNTER (OUTPATIENT)
Dept: PHYSICAL THERAPY | Age: 55
Discharge: HOME | End: 2020-12-08
Payer: COMMERCIAL

## 2020-12-08 PROCEDURE — 97140 MANUAL THERAPY 1/> REGIONS: CPT

## 2020-12-08 PROCEDURE — 97161 PT EVAL LOW COMPLEX 20 MIN: CPT

## 2020-12-08 NOTE — THERAPY EVALUATION
Abdifatah Goodwin  : 1965  Primary: [facility] [place]  Secondary:  [facility] ARTIE BLAIR  [address] [facility], [address] [place] [address],[address], [address]  [phone]   [phone]       OUTPATIENT PHYSICAL THERAPY:Initial Assessment 2020     ICD-10: Treatment Diagnosis: Pain in left shoulder (M25.512), Cervicalgia (M54.2)     Precautions/Allergies: NKDA     TREATMENT PLAN:  Effective Dates: 2020 TO 3/8/2021 (90 days). Frequency/Duration: 1-2 times a week for 90 Day(s) MEDICAL/REFERRING DIAGNOSIS:  L shoulder, ne    DATE OF ONSET: about mid November  REFERRING PHYSICIAN: Katerin Diaz MD MD Orders: Price Mayag and treat, HEP, ROM, strengthening  Return MD Appointment: 20 to neck specialist     INITIAL ASSESSMENT:  Ms. Shirl Barthel is a 54year old R hand dominant female with complaints of L shoulder pain. She is known to PT from previous encounters. She presents with pain in neck and L shoulder and radiographic evidence of bulging discs in her neck. She has decreased cervical ROM and pain, but good strength in B UEs. She notes occasional numbness in L thumb. She could benefit from PT to address deficits and work toward goals. PROBLEM LIST (Impacting functional limitations):  1. Increased Pain  2. Decreased Flexibility/Joint Mobility   3. Decreased postural awareness INTERVENTIONS PLANNED: (Treatment may consist of any combination of the following)  1. Home Exercise Program (HEP)  2. Manual Therapy  3. Therapeutic Exercise/Strengthening  4. modals as needed   5. traction     GOALS: (Goals have been discussed and agreed upon with patient.)  Patient's stated goal is to get rid of the pain  Short-Term Functional Goals: Time Frame: 6 weeks  1. Patient to be independent with HEP  2. Patient to report decrease in resting pain levels from 8/10 to 5/10. Discharge Goals: Time Frame: 90 days  1. Patient to report no more than minimal neck pain with all activity  2.  Patient to improve NDI from 23 at eval to 10 to demo improved functional mobility    OUTCOME MEASURE:   Tool Used: Disabilities of the Arm, Shoulder and Hand (DASH) Questionnaire - Quick Version  Score:  Initial: 27/55  Most Recent: X/55 (Date: -- )   Interpretation of Score: The DASH is designed to measure the activities of daily living in person's with upper extremity dysfunction or pain. Each section is scored on a 1-5 scale, 5 representing the greatest disability. The scores of each section are added together for a total score of 55. Tool Used: Neck Disability Index (NDI)  Score:  Initial: 23/50  Most Recent: X/50 (Date: -- )   Interpretation of Score: The Neck Disability Index is a revised form of the Oswestry Low Back Pain Index and is designed to measure the activities of daily living in person's with neck pain. Each section is scored on a 0-5 scale, 5 representing the greatest disability. The scores of each section are added together for a total score of 50. MEDICAL NECESSITY:   · Patient demonstrates good rehab potential due to higher previous functional level. REASON FOR SERVICES/OTHER COMMENTS:  · Patient continues to require skilled intervention due to desire to return to previous level of function. Total Duration:   10 minutes manual therapy, 35 minutes evaluation  PT Patient Time In/Time Out  Time In: 1605  Time Out: 1650    Rehabilitation Potential For Stated Goals: Good  Regarding Carlos Perea's therapy, I certify that the treatment plan above will be carried out by a therapist or under their direction. Thank you for this referral,    Laura Vargas PT      Referring Physician Signature: Kassandra Beth MD No Signature is Required for this note. PAIN/SUBJECTIVE:   Initial: Pain Intensity 1: 8(8 now and at best.  10 at worst.)   Post Session:  \"Slightly better\", but not quantified.    HISTORY:   History of Injury/Illness (Reason for Referral):  Patient reports she had headaches for a while but thought it was migraines. About a month ago she started having consistent pain in neck or L shoulder and it seemed to move back and forth. She went to see Dr. Monroe Clarke and xrays and MRI suggest a bulging disc. She is being referred to a neck specialist, as well as PT. Past Medical History/Comorbidities: Adhesive capsulitis, OA,  Benign hypertension with CKD (chronic kidney disease) stage III ([address] Diabetes ([address] Dyslipidemia; Endometrial cancer ([facility] and Morbid obesity ([facility] knee arthroscopy (Right, 2015); appendectomy (1979), R ankle surgery (2007) R index finger surgery (2005)  Social History/Living Environment:     lives with spouse in house  Prior Level of Function/Work/Activity:  Works full time at [employer]. Repetitive motion, but no heavy lifting. Dominant Side:         RIGHT   Ambulatory/Rehab Services H2 Model Falls Risk Assessment   Risk Factors:       (1)  Dizziness/Vertigo       (1)  Any administered benzodiazepines Ability to Rise from Chair:       (0)  Ability to rise in a single movement   Falls Prevention Plan:       No modifications necessary   Total: (5 or greater = High Risk): 2   ©2010 [facility] [employer]. All Rights Reserved. [facility] Patent #[id number]. Federal Law prohibits the replication, distribution or use without written permission from [facility] [employer]   Current Medications: Gabapentin, Jardiance, Glipizide/Metformin, B12       Date Last Reviewed:  12/8/20   Number of Personal Factors/Comorbidities that affect the Plan of Care: 0: LOW COMPLEXITY   EXAMINATION:     Observation/Orthostatic Postural Assessment: forward head and rounded shoulder posture. Palpation: tight and tender in B upper traps and cervical paraspinals, especially lower cervical spine. ROM: cervical flex 75%, extn 50%, B rotation 75%, B side bend 50%            B shoulder ROM symmetrical except for decreased L ER.    Strength: 4+ to 5 in B UEs  Special Tests: + Spurlings test on R. Negative on L. Negative cervical distraction and compression tests. Neurological Screen: light touch intact in B UEs, but patient reports numbness in L thumb comes and goes. .  Functional Mobility:  independent          Body Structures Involved:  1. Joints  2. Muscles Body Functions Affected:  1. Neuromusculoskeletal  2. Movement Related Activities and Participation Affected:  1. General Tasks and Demands  2. Self Care  3. Community, Social and [place]   Number of elements (examined above) that affect the Plan of Care: 1-2: LOW COMPLEXITY   CLINICAL PRESENTATION:   Presentation: Stable and uncomplicated: LOW COMPLEXITY   CLINICAL DECISION MAKING:   Use of outcome tool(s) and clinical judgement create a POC that gives a: Questionable prediction of patient's progress: MODERATE COMPLEXITY     Instructed patient in posture checks against wall.

## 2020-12-08 NOTE — PROGRESS NOTES
Radha Rankin  : 1965  Payor: Yao Mcgee / Plan: [place] CROSS OF [address] [place] [address] Rd / Product Type: PPO /  [facility] at [facility] ARTIE BLAIR  [address] [facility], [address], [place] [address].  [phone]   [phone]       OUTPATIENT PHYSICAL THERAPY: Daily Treatment Note 2020  Visit Count: 1  ICD-10: Treatment Diagnosis: Pain in left shoulder (M25.512), Cervicalgia (M54.2)     Precautions/Allergies: NKDA     TREATMENT PLAN:  Effective Dates: 2020 TO 3/8/2021 (90 days). Frequency/Duration: 1-2 times a week for 90 Day(s) MEDICAL/REFERRING DIAGNOSIS:  L shoulder, ne    DATE OF ONSET: about mid November  REFERRING PHYSICIAN: Harper Cruz MD MD Orders: Pierce Links and treat, HEP, ROM, strengthening  Return MD Appointment: 20 to neck specialist            Pre-treatment Symptoms/Complaints: See initial evaluation  Pain: Initial: Pain Intensity 1: 8(8 now and at best.  10 at worst.)   Post Session:  Not rated   Medications Last Reviewed:  20    Updated Objective Findings:   See evaluation note from today     TREATMENT:     Manual Therapy ( 10 minutes) - for pain relief - manual cervical traction with patient supine on table. HEP: posture check against wall. [employer]    Treatment/Session Summary:    · Response to Treatment:  Patient noted that pain was slightly decreased after traction, but did not quantify pain level. · Communication/Consultation:  None today  · Equipment provided today:  None today  · Recommendations/Intent for next treatment session: Next visit will focus on pain relief.      Total Treatment Billable Duration:  10 minutes manual therapy, 35 minutes evaluation  PT Patient Time In/Time Out  Time In: 8843  Time Out: 1650    Tolu Frias, JEFF    Future Appointments   Date Time Provider Jeannette Cordova   2020  3:15 PM Ammy Miguel PT [facility]

## 2021-01-12 NOTE — THERAPY DISCHARGE
Joaquin Tolbert : 1965 Primary: [facility] [place]* Secondary:  [facility] at [facility] ARTIE BLAIR [address] [facility] [place] [address] [place] [address],[address], [address] [facility] [phone]   [phone] OUTPATIENT PHYSICAL THERAPY:Discontinuation Summary 2021 ICD-10: Treatment Diagnosis: Pain in left shoulder (M25.512), Cervicalgia (M54.2) Precautions/Allergies: NKDA TREATMENT PLAN:  Discontinue PT Patient attended initial PT eval only. MEDICAL/REFERRING DIAGNOSIS: 
L shoulder, ne   
DATE OF ONSET: about mid November REFERRING PHYSICIAN: Mayela Hicks MD MD Orders: Eval and treat, HEP, ROM, strengthening Return MD Appointment: unknown INITIAL ASSESSMENT:  Ms. Shakira Lee is a 54year old R hand dominant female with complaints of L shoulder pain. She is known to PT from previous encounters. She presented with pain in neck and L shoulder and radiographic evidence of bulging discs in her neck. She had decreased cervical ROM and pain, but good strength in B UEs. She noted occasional numbness in L thumb. She did not return to PT after initial evaluation and she will now be discontinued from PT.     
 
GOALS: (Goals have been discussed and agreed upon with patient.) Unable to assess progress toward goals. Patient's stated goal is to get rid of the pain Short-Term Functional Goals: Time Frame: 6 weeks 1. Patient to be independent with HEP 2. Patient to report decrease in resting pain levels from 8/10 to 5/10. Discharge Goals: Time Frame: 90 days 1. Patient to report no more than minimal neck pain with all activity 2. Patient to improve NDI from 23 at eval to 10 to demo improved functional mobility OUTCOME MEASURE:  
Tool Used: Disabilities of the Arm, Shoulder and Hand (DASH) Questionnaire - Quick Version Score:  Initial:   Most Recent:  (Date: -- ) Interpretation of Score: The DASH is designed to measure the activities of daily living in person's with upper extremity dysfunction or pain. Each section is scored on a 1-5 scale, 5 representing the greatest disability. The scores of each section are added together for a total score of 55. Tool Used: Neck Disability Index (NDI) Score:  Initial: 23/50  Most Recent: X/50 (Date: -- ) Interpretation of Score: The Neck Disability Index is a revised form of the Oswestry Low Back Pain Index and is designed to measure the activities of daily living in person's with neck pain. Each section is scored on a 0-5 scale, 5 representing the greatest disability. The scores of each section are added together for a total score of 50. Data from initial evaluation PAIN/SUBJECTIVE:  
Initial: Pain Intensity 1: 8(8 now and at best.  10 at worst.)   Post Session:  \"Slightly better\", but not quantified. HISTORY:  
History of Injury/Illness (Reason for Referral): 
Patient reports she had headaches for a while but thought it was migraines. About a month ago she started having consistent pain in neck or L shoulder and it seemed to move back and forth. She went to see Dr. Piper Falcon and xrays and MRI suggest a bulging disc. She is being referred to a neck specialist, as well as PT. Past Medical History/Comorbidities: Adhesive capsulitis, OA,  Benign hypertension with CKD (chronic kidney disease) stage III ([address] Diabetes ([address] Dyslipidemia; Endometrial cancer ([address] and Morbid obesity ([address] knee arthroscopy (Right, 2015); appendectomy (1979), R ankle surgery (2007) R index finger surgery (2005) Social History/Living Environment:  
  lives with spouse in house Prior Level of Function/Work/Activity: 
Works full time at [employer]. Repetitive motion, but no heavy lifting. Dominant Side:  
      RIGHT Current Medications: Gabapentin, Jardiance, Glipizide/Metformin, B12 Date Last Reviewed:  12/8/20 EXAMINATION:  
 
Observation/Orthostatic Postural Assessment: forward head and rounded shoulder posture. Palpation: tight and tender in B upper traps and cervical paraspinals, especially lower cervical spine. ROM: cervical flex 75%, extn 50%, B rotation 75%, B side bend 50% B shoulder ROM symmetrical except for decreased L ER. Strength: 4+ to 5 in B UEs Special Tests: + Spurlings test on R. Negative on L. Negative cervical distraction and compression tests. Neurological Screen: light touch intact in B UEs, but patient reports numbness in L thumb comes and goes. . 
Functional Mobility:  independent

## 2021-06-29 ENCOUNTER — HOSPITAL ENCOUNTER (OUTPATIENT)
Dept: PHYSICAL THERAPY | Age: 56
Discharge: HOME | End: 2021-06-29
Payer: COMMERCIAL

## 2021-06-29 PROCEDURE — 97140 MANUAL THERAPY 1/> REGIONS: CPT

## 2021-06-29 PROCEDURE — 97161 PT EVAL LOW COMPLEX 20 MIN: CPT

## 2021-06-29 NOTE — THERAPY EVALUATION
Dorothy Jean  : 1965  Primary: [facility]*  Secondary:  [facility] ARTIE BLAIR  [address] [facility], [address] [place] [address],[address], [address] [facility]  [phone]   [phone]          OUTPATIENT PHYSICAL THERAPY:Initial Assessment 2021   ICD-10: Treatment Diagnosis: Cervicalgia (M54.2)  Precautions/Allergies:   Patient has no known allergies. TREATMENT PLAN:  Effective Dates: 2021 TO 8/10/2021. Frequency/Duration: 1-2 visits per week for  8 weeks MEDICAL/REFERRING DIAGNOSIS:  neck pain   DATE OF ONSET: 3 months ago  REFERRING PHYSICIAN: Syed Mora*  MD Orders: Evaluate and treat  Return MD Appointment: TBD     INITIAL ASSESSMENT:  Ms. Yohana Vargas is a known patient to this clinic who returns secondary to neck pain. She exhibits increased muscle tightness, pain and stiffness with cervical flexion, and subjective reports of activity limitation secondary to neck pain. Patient is likely to benefit from continued PT to improve her neck symptoms and improve her quality of life. PROBLEM LIST (Impacting functional limitations):  1. Decreased ADL/Functional Activities  2. Increased Pain  3. Decreased Activity Tolerance  4. Decreased Flexibility/Joint Mobility  5. Decreased [place] with Home Exercise Program INTERVENTIONS PLANNED: (Treatment may consist of any combination of the following)  1. Home Exercise Program (HEP)  2. Manual Therapy  3. Range of Motion (ROM)  4. Therapeutic Exercise/Strengthening  5. Ultrasound (US)  6. Traction   7. Dry needling     GOALS: (Goals have been discussed and agreed upon with patient.)    Discharge Goals: Time Frame: 6 weeks  1. Patient will improve score on NDI to <30%. 2. Patient will be able to sleep without interruption from neck pain. 3. Patient will be able to perform cervical flexion, extension and rotation through available range of motion. 4. Patient will be independent with HEP.     OUTCOME MEASURE:   Tool Used: Neck Disability Index (NDI)  Score:  Initial: 28/50 or 56%  Most Recent: X/50 (Date: -- )   Interpretation of Score: The Neck Disability Index is a revised form of the Oswestry Low Back Pain Index and is designed to measure the activities of daily living in person's with neck pain. Each section is scored on a 0-5 scale, 5 representing the greatest disability. The scores of each section are added together for a total score of 50. MEDICAL NECESSITY:   · Skilled intervention continues to be required due to neck pain that limits her activity participation and quality of life. REASON FOR SERVICES/OTHER COMMENTS:  · Patient continues to require skilled intervention due to neck pain that restricts her quality of life. .  Total Duration: 40 minutes  PT Patient Time In/Time Out  Time In: 1610  Time Out: 1650    Rehabilitation Potential For Stated Goals: Good  Regarding Carlos Perea's therapy, I certify that the treatment plan above will be carried out by a therapist or under their direction. Thank you for this referral,  Faith Wolf, PT     Referring Physician Signature: Mikhail CREWS* No Signature is Required for this note. PAIN/SUBJECTIVE:   Initial:   8/10 Post Session:  No VAS   HISTORY:   History of Injury/Illness (Reason for Referral):  Patient reports that she began having neck pain approximately 3 months ago. She also has headaches, and neck pain and headaches interrupt her sleep. MD recommended injections or surgery but patient has declined these options at this time. Has occasional L thumb numbness and burning. Underwent L reverse shoulder arthroplasty in 2018. Has burning to L shoulder. Past Medical History/Comorbidities: per EMR  Ms. Anibal Jacobson  has a past medical history of Adhesive capsulitis, Arthritis, Benign hypertension with CKD (chronic kidney disease) stage III ([address] Diabetes ([address] Dyslipidemia, Endometrial cancer ([address] History of anemia, and Morbid obesity ([address]  She also has no past medical history of Aneurysm ([address] Arrhythmia, Asthma, Autoimmune disease ([address] CAD (coronary artery disease), Chronic obstructive pulmonary disease ([address] Chronic pain, Coagulation disorder ([address] Endocarditis, GERD (gastroesophageal reflux disease), Heart failure ([address] Liver disease, Nicotine vapor product user, Non-nicotine vapor product user, Psychiatric disorder, PUD (peptic ulcer disease), Rheumatic fever, Seizures ([address] Sleep apnea, Stroke ([address] Thromboembolus ([address] or Thyroid disease. Ms. Rashad Garcias  has a past surgical history that includes hx gyn (2015); hx knee arthroscopy (Right, 2015); hx orthopaedic (Left); hx other surgical (Right); hx appendectomy (1979); and hx shoulder arthroscopy (Left, 10/04/2018). Social History/Living Environment:    Patient lives in a [place] house with her son. Prior Level of Function/Work/Activity:  Patient works full-time at [employer]. Patient is R handed. Ambulatory/Rehab Services H2 Model Falls Risk Assessment   Risk Factors:       No Risk Factors Identified Ability to Rise from Chair:       (0)  Ability to rise in a single movement   Falls Prevention Plan:       No modifications necessary   Total: (5 or greater = High Risk): 0   ©2010 [facility] of [employer]. All Rights Reserved. [facility] Patent #[id number].  Federal Law prohibits the replication, distribution or use without written permission from [facility] [employer]   Current Medications: per patient intake form      Current Outpatient Medications:     Jardiance 10 mg tablet, , Disp: , Rfl:     atorvastatin (LIPITOR) 20 mg tablet, , Disp: , Rfl:     glipiZIDE-metFORMIN (METAGLIP) 5-500 mg per tablet, Take 2 Tablets by mouth., Disp: , Rfl:    Date Last Reviewed:  6/29/21   Number of Personal Factors/Comorbidities that affect the Plan of Care: 1-2: MODERATE COMPLEXITY   EXAMINATION:   6/29/21    Observation/Orthostatic Postural Assessment:          Patient demonstrates forward head posture and kyphotic posture. Tender to cervical paraspinals and bilateral upper trapezius with palpable knots, particularly in R upper trapezius. Palpation:         Sensation to light touch symmetrical and intact to B UE dermatomes. ROM:          Cervical AROM WFL for all motions. Bilateral shoulder range of motion grossly within functional limits. Strength:          B shoulder flexion 5/5        B shoulder abduction 5/5  Special Tests:          Negative cervical compression and Spurling's to each side. Symptom relief with cervical distraction. Body Structures Involved:  1. Nerves  2. Bones  3. Joints  4. Muscles  5. Ligaments Body Functions Affected:  1. Sensory/Pain  2. Neuromusculoskeletal  3. Movement Related Activities and Participation Affected:  1. General Tasks and Demands  2. Self Care  3.  Domestic Life   Number of elements (examined above) that affect the Plan of Care: 4+: HIGH COMPLEXITY   CLINICAL PRESENTATION:   Presentation: Stable and uncomplicated: LOW COMPLEXITY   CLINICAL DECISION MAKING:   Use of outcome tool(s) and clinical judgement create a POC that gives a: Clear prediction of patient's progress: LOW COMPLEXITY

## 2021-06-30 NOTE — PROGRESS NOTES
Ras Rocha  : 1965  Payor: Gita Julian / Plan: [facility] CROSS OF [address] [place] [address] Rd / Product Type: PPO /  [facility]rapy Center at [facility] ARTIE BLAIR  [address] [facility], [address], [facility] UN[place]arte.  [phone]   [phone]       OUTPATIENT PHYSICAL THERAPY: Daily Treatment Note 21  Visit Count: 1     ICD-10: Treatment Diagnosis: Cervicalgia (M54.5)  Precautions/Allergies:   Patient has no known allergies. TREATMENT PLAN:  Effective Dates: 2021 TO 8/10/2021. Frequency/Duration: 1-2 visits per week for 8 weeks MEDICAL/REFERRING DIAGNOSIS:  neck pain   DATE OF ONSET: 3 months ago  REFERRING PHYSICIAN: Raul SMILEY MD Orders: Evaluate and treat  Return MD Appointment: TBD       Pre-treatment Symptoms/Complaints:  See evaluation note  Pain: Initial:   8/10 Post Session:  No VAS   Medications Last Reviewed:  2021    Updated Objective Findings:   See evaluation note from today     TREATMENT:     THERAPEUTIC EXERCISE: (5 minutes) to reduce cervical discomfort. Chin tucks x 10 performed in supine with verbal cues and demonstration provided as needed. MANUAL THERAPY (10 minutes) to improve neck symptoms. Manual cervical traction and cervical paraspinal soft tissue mobilizations. HEP: Added supine cervical retractions and scapular retractions to HEP. Treatment/Session Summary:    · Response to Treatment:  Patient required some cueing with cervical retractions. Symptoms improved with cervical manual traction. .  · Communication/Consultation:  None today  · Equipment provided today:  None today  · Recommendations/Intent for next treatment session: Next visit will focus on reducing cervical discomfort. Total Treatment Billable Duration:  15 minutes + 25 minutes for evaulation  PT Patient Time In/Time Out  Time In: 1610  Time Out: 601 [facility],     No future appointments.

## 2021-07-09 ENCOUNTER — HOSPITAL ENCOUNTER (OUTPATIENT)
Dept: PHYSICAL THERAPY | Age: 56
Discharge: HOME | End: 2021-07-09
Payer: COMMERCIAL

## 2021-07-09 PROCEDURE — 97012 MECHANICAL TRACTION THERAPY: CPT

## 2021-07-09 PROCEDURE — 97140 MANUAL THERAPY 1/> REGIONS: CPT

## 2021-07-09 PROCEDURE — 97110 THERAPEUTIC EXERCISES: CPT

## 2021-07-09 NOTE — PROGRESS NOTES
Suzanne Tay  : 1965  Payor: Torrie Ennis / [place] OF [address] [place] [address] Rd / Product Type: PPO /  Therapy Center at [facility] ARTIE BLAIR  [address] [facility], [address], [address] [facility]  [phone]   [phone]       OUTPATIENT PHYSICAL THERAPY: Daily Treatment Note 2021  Visit Count: 1     ICD-10: Treatment Diagnosis: Cervicalgia (M54.5)  Precautions/Allergies:   Patient has no known allergies. TREATMENT PLAN:  Effective Dates: 2021 TO 8/10/2021. Frequency/Duration: 1-2 visits per week for 8 weeks MEDICAL/REFERRING DIAGNOSIS:  neck pain   DATE OF ONSET: 3 months ago  REFERRING PHYSICIAN: Rosanna Dunbar*  MD Orders: Evaluate and treat  Return MD Appointment: TBD       Pre-treatment Symptoms/Complaints:  Headaches have been keeping her awake. Was referred by family doctor to pain management to address neck pain. Pain: Initial:  Not rated Post Session:  No VAS   Medications Last Reviewed:  2021    Updated Objective Findings:   None Today     TREATMENT:     THERAPEUTIC EXERCISE: (15 minutes) to reduce cervical discomfort. Chin tucks x 20 performed in supine with verbal cues and demonstration provided as needed. Scapular retractions 3 x 10 with blue theraband for improved scapular strength and posture. Stretches to upper trapezius to reduce tightness and discomfort. Cervical passive ranging into side-bending each direction. MANUAL THERAPY (15 minutes) to improve neck symptoms. Cervical paraspinal tightness and soft tissue mobilizations to bilateral upper trapezius. MECHANICAL TRACTION (10 minutes) to reduce neck discomfort. 20 lbs of continuous traction applied with patient in supine x 10 minutes. HEP: Added supine cervical retractions and scapular retractions to HEP. Treatment/Session Summary:    · Response to Treatment: Good performance with exercises. No adverse effects from mechanical traction reported .   · Communication/Consultation:  None today  · Equipment provided today:  None today  · Recommendations/Intent for next treatment session: Next visit will focus on reducing cervical discomfort.     Total Treatment Billable Duration:  40 minutes  PT Patient Time In/Time Out  Time In: 3628  Time Out: [address], PT    Future Appointments   Date Time Provider Jeannette Cordova   7/14/2021  4:30 PM Hermann Knott, PT [facility]   7/15/2021  4:15 PM [facility], [facility]   7/20/2021  4:00 PM [facility]   7/22/2021  4:00 PM June [facility]hatti, [facility]   7/26/2021  4:00 PM June Bhatti, [facility]   7/29/2021  4:45 PM Queen Adriana Jones, PT [facility]

## 2021-07-20 ENCOUNTER — HOSPITAL ENCOUNTER (OUTPATIENT)
Dept: PHYSICAL THERAPY | Age: 56
Discharge: HOME | End: 2021-07-20
Payer: COMMERCIAL

## 2021-07-20 PROCEDURE — 97140 MANUAL THERAPY 1/> REGIONS: CPT

## 2021-07-20 NOTE — PROGRESS NOTES
Maryfadia Orlando  : 1965  Payor: Stefano Martinez / Plan: [place] [facility] OF [address] / Product Type: PPO /  Therapy Center at [facility] ARTIE BLAIR  [address] [facility], [address], [place] [address].  [phone]   [phone]       OUTPATIENT PHYSICAL THERAPY: Daily Treatment Note 2021  Visit Count: 4     ICD-10: Treatment Diagnosis: Cervicalgia (M54.5)  Precautions/Allergies:   Patient has no known allergies. TREATMENT PLAN:  Effective Dates: 2021 TO 8/10/2021. Frequency/Duration: 1-2 visits per week for 8 weeks MEDICAL/REFERRING DIAGNOSIS:  neck pain   DATE OF ONSET: 3 months ago  REFERRING PHYSICIAN: Cristi Sandhoff, Aldon Lush* MD Orders: Evaluate and treat  Return MD Appointment: TBD       Pre-treatment Symptoms/Complaints:  Patient reports she saw Pain Management doctor yesterday. Got 2 new meds which helped her sleep, but not with the pain. The doctor suggested injections and said she may need surgery, but she isn't sure if she wants to do that. Pain: Initial: Pain Intensity 1: 9   Post Session: \"No change. The massage feels good, but it still hurts\"   Medications Last Reviewed:  2021 Tramadol and another med added (patient doesn't recall the name of the other new medicine)    Updated Objective Findings:   None Today     TREATMENT:     THERAPEUTIC EXERCISE: None today    MANUAL THERAPY (25 minutes) to improve neck symptoms. STM to B cervical paraspinals and B upper traps with patient supine on table. Then manual cervical traction x 10 minutes with patient supine on table. MECHANICAL TRACTION (  minutes)  none    HEP: continue current HEP    Treatment/Session Summary:    · Response to Treatment: No change of pain noted today, She reports that the [facility] feels good, but I still have pain\".    · Communication/Consultation:  None today  · Equipment provided today:  None today  · Recommendations/Intent for next treatment session: Next visit will focus on reducing cervical discomfort.     Total Treatment Billable Duration:  25 minutes  PT Patient Time In/Time Out  Time In: 8430  Time Out: AFSHIN/ David Johnson, JEFF    Future Appointments   Date Time Provider Jeannette Cordova   7/22/2021  4:00 PM Franny Vazquez [facility]   7/26/2021  4:00 PM Franny Vazquez, JEFF LACY [facility]   7/29/2021  4:45 PM Doreen Griffin, JEFF LACY [facility]

## 2021-07-22 ENCOUNTER — HOSPITAL ENCOUNTER (OUTPATIENT)
Dept: PHYSICAL THERAPY | Age: 56
Discharge: HOME | End: 2021-07-22
Payer: COMMERCIAL

## 2021-07-22 PROCEDURE — 97140 MANUAL THERAPY 1/> REGIONS: CPT

## 2021-07-22 PROCEDURE — 97035 APP MDLTY 1+ULTRASOUND EA 15: CPT

## 2021-07-22 NOTE — PROGRESS NOTES
Cristel Stiles  : 1965  Payor: Piper Goel / Plan: [facility] CROSS OF [address] / Product Type: PPO /  Therapy Center at [facility] ARTIE BLAIR  [address] [facility], [address], [place] [address].  [phone]   [phone]       OUTPATIENT PHYSICAL THERAPY: Daily Treatment Note 2021  Visit Count: 5     ICD-10: Treatment Diagnosis: Cervicalgia (M54.5)  Precautions/Allergies:   Patient has no known allergies. TREATMENT PLAN:  Effective Dates: 2021 TO 8/10/2021. Frequency/Duration: 1-2 visits per week for 8 weeks MEDICAL/REFERRING DIAGNOSIS:  neck pain   DATE OF ONSET: 3 months ago  REFERRING PHYSICIAN: Brynn Liu  MD Orders: Evaluate and treat  Return MD Appointment: TBD       Pre-treatment Symptoms/Complaints:  Patient reports she still has a lot of neck pain. Doesn't like taking the muscle relaxant the doctor gave her because it makes her too tired. Reports that she worked her main job earlier today and goes to [facility] at 6:00 to work her secondary job. Pain: Initial: Pain Intensity 1: 9 (\"About 8.5\")   Post Session: \"The massage feels good\" but pain level not quantified. Medications Last Reviewed:  2021 Tramadol and another med added (patient doesn't recall the name of the other new medicine)    Updated Objective Findings:   None Today     TREATMENT:     THERAPEUTIC EXERCISE: None today    Therapeutic Modalities: For pain. Cervical Spine Ultrasound  Delivery: Pulsed  Duty Cycle: 50 %  Frequency: 1 mHz  Intensity: 1.2 rodney/cm sq  Duration : 8 minutes (plus 2 minute set up)  Patient Position: Sitting                       MANUAL THERAPY (30 minutes) to improve neck symptoms. STM to B cervical paraspinals and B upper traps with patient supine on table. Then manual cervical traction x 10 minutes with patient supine on table.      MECHANICAL TRACTION (  minutes)  none    HEP: continue current HEP    Treatment/Session Summary:    · Response to Treatment: No change of pain quantified, but patient notes that the manual therapy to her neck always feels good. She is to monitor effect of US through the evening. · Communication/Consultation:  None today  · Equipment provided today:  None today  · Recommendations/Intent for next treatment session: Next visit will focus on reducing pain.      Total Treatment Billable Duration:  40 minutes  PT Patient Time In/Time Out  Time In: 1600  Time Out: 1642    Audrey Rosario, JEFF    Future Appointments   Date Time Provider Jeannette Cordova   7/26/2021  4:00 PM Nahun Lloyd [facility]   7/29/2021  4:45 PM Ed Burger, PT [facility]

## 2021-07-26 ENCOUNTER — HOSPITAL ENCOUNTER (OUTPATIENT)
Dept: PHYSICAL THERAPY | Age: 56
Discharge: HOME | End: 2021-07-26
Payer: COMMERCIAL

## 2021-07-26 PROCEDURE — 97140 MANUAL THERAPY 1/> REGIONS: CPT

## 2021-07-26 PROCEDURE — 97035 APP MDLTY 1+ULTRASOUND EA 15: CPT

## 2021-07-26 NOTE — PROGRESS NOTES
Prem Masterson  : 1965  Payor: Shae Gutierrez / Plan: [facility] OF [address] / Product Type: PPO /  Therapy Center at [facility] ARTIE BLAIR  [address] [facility], [address], [place] [address].  [phone]   [phone]       OUTPATIENT PHYSICAL THERAPY: Daily Treatment Note 2021  Visit Count: 6     ICD-10: Treatment Diagnosis: Cervicalgia (M54.5)  Precautions/Allergies:   Patient has no known allergies. TREATMENT PLAN:  Effective Dates: 2021 TO 8/10/2021. Frequency/Duration: 1-2 visits per week for 8 weeks MEDICAL/REFERRING DIAGNOSIS:  neck pain   DATE OF ONSET: 3 months ago  REFERRING PHYSICIAN: Sabrina Muniz*  MD Orders: Evaluate and treat  Return MD Appointment: TBD       Pre-treatment Symptoms/Complaints:  Patient reports she still has pain. Not sure that the US helped, but it felt good while it was being done. Pain management plans to do an injection if she still has pain when she returns (8/10)    Pain: Initial: Pain Intensity 1: 9 (\"8.5\")   Post Session: \"That felt really good\" but pain not numerically quantified. Medications Last Reviewed:  2021 Tramadol and muscle relaxant continued    Updated Objective Findings:   None Today     TREATMENT:     THERAPEUTIC EXERCISE: None today    Therapeutic Modalities: For pain. Cervical Spine Ultrasound  Delivery: Pulsed  Duty Cycle: 50 %  Frequency: 1 mHz  Intensity: 1.2 rodney/cm sq  Duration : 10 minutes (plus 2 minute set up)  Patient Position: Sitting                       MANUAL THERAPY (28 minutes) to improve neck symptoms. STM to B cervical paraspinals and B upper traps with patient supine on table. Then manual cervical traction x 10 minutes with patient supine on table. Sub-occipital release with patient supine.      MECHANICAL TRACTION ( 0 minutes)  none    HEP: continue current HEP    Treatment/Session Summary:    · Response to Treatment: patient reported that treatment feels \"really good\", but pain levels upon entry to PT clinic remain high as rated by patient. · Communication/Consultation:  None today  · Equipment provided today:  None today  · Recommendations/Intent for next treatment session: Next visit will focus on reducing pain.      Total Treatment Billable Duration:  40 minutes  PT Patient Time In/Time Out  Time In: 4052  Time Out: 1634    Audrey Rosario PT    Future Appointments   Date Time Provider Jeannette Cordova   7/29/2021  4:45 PM Raúl Nagy, PT [facility]

## 2021-07-29 ENCOUNTER — HOSPITAL ENCOUNTER (OUTPATIENT)
Dept: PHYSICAL THERAPY | Age: 56
Discharge: HOME | End: 2021-07-29
Payer: COMMERCIAL

## 2021-07-29 PROCEDURE — 97035 APP MDLTY 1+ULTRASOUND EA 15: CPT

## 2021-07-29 PROCEDURE — 97140 MANUAL THERAPY 1/> REGIONS: CPT

## 2021-07-29 NOTE — PROGRESS NOTES
Tanvi Hilliard  : 1965  Payor: Susan López / [place] OF [address] / Product Type: PPO /  Therapy Center at [facility] ARTIE BLAIR  [address] [place] [address], [address], [place] [address].  [phone]   [phone]       OUTPATIENT PHYSICAL THERAPY: Daily Treatment Note 2021  Visit Count: 6     ICD-10: Treatment Diagnosis: Cervicalgia (M54.5)  Precautions/Allergies:   Patient has no known allergies. TREATMENT PLAN:  Effective Dates: 2021 TO 8/10/2021. Frequency/Duration: 1-2 visits per week for 8 weeks MEDICAL/REFERRING DIAGNOSIS:  neck pain   DATE OF ONSET: 3 months ago  REFERRING PHYSICIAN: Leoncio Kelly MD Orders: Evaluate and treat  Return MD Appointment: TBD       Pre-treatment Symptoms/Complaints:  Patient reports she still has pain. Feels the combination of US and soft tissue work is helping get temporary relief so she can sleep afterward, but no overall improvement yet. Reports she is scheduled to get her neck injected on 8/10/21. Pain: Initial: Pain Intensity 1: 9 (\"still the same, 8.5\")   Post Session:  pain not numerically quantified. Medications Last Reviewed:  2021 Tramadol and muscle relaxant continued    Updated Objective Findings:   None Today     TREATMENT:     THERAPEUTIC EXERCISE: None today    Therapeutic Modalities: For pain. Cervical Spine Ultrasound  Delivery: Pulsed  Duty Cycle: 50 %  Frequency: 1 mHz  Intensity: 1.5 rodney/cm sq  Duration : 10 minutes (plus 2 minute set up)  Patient Position: Sitting                       MANUAL THERAPY (28 minutes) to improve neck symptoms. STM to B cervical paraspinals and B upper traps with patient supine on table. Then manual cervical traction x 12 minutes with patient supine on table. Sub-occipital release with patient supine.      MECHANICAL TRACTION ( 0 minutes)  none    HEP: continue current HEP    Treatment/Session Summary:    · Response to Treatment: patient reports that overall she is the same, although the treatment decreases pain temporarily. · Communication/Consultation:  None today  · Equipment provided today:  None today  · Recommendations/Intent for next treatment session: Next visit will focus on reducing pain.      Total Treatment Billable Duration:  40 minutes  PT Patient Time In/Time Out  Time In: 0944  Time Out: [address] [facility] [address] [address], PT    Future Appointments   Date Time Provider Jeannette Pateli   8/2/2021  4:45 PM Costa London, PT [facility]   8/5/2021  4:00 PM Costa London, PT [facility]   8/12/2021  4:00 PM Nahum Sue, PT [facility]

## 2021-08-02 ENCOUNTER — HOSPITAL ENCOUNTER (OUTPATIENT)
Dept: PHYSICAL THERAPY | Age: 56
Discharge: HOME | End: 2021-08-02
Payer: COMMERCIAL

## 2021-08-02 PROCEDURE — 97035 APP MDLTY 1+ULTRASOUND EA 15: CPT

## 2021-08-02 PROCEDURE — 97140 MANUAL THERAPY 1/> REGIONS: CPT

## 2021-08-02 NOTE — PROGRESS NOTES
Malaika Viktorisai  : 1965  Payor: Razia Johnson / Plan: [facility] CROSS OF [address] [place] [address] Rd / Product Type: PPO /  Therapy Center at [facility] ARTIE [facility]LAIR  [address] [facility], [address], [place] [address].  [phone]   [phone]       OUTPATIENT PHYSICAL THERAPY: Daily Treatment Note 2021  Visit Count: 6     ICD-10: Treatment Diagnosis: Cervicalgia (M54.5)  Precautions/Allergies:   Patient has no known allergies. TREATMENT PLAN:  Effective Dates: 2021 TO 8/10/2021. Frequency/Duration: 1-2 visits per week for 8 weeks MEDICAL/REFERRING DIAGNOSIS:  neck pain   DATE OF ONSET: 3 months ago  REFERRING PHYSICIAN: Macy Petit*  MD Orders: Evaluate and treat  Return MD Appointment: TBD       Pre-treatment Symptoms/Complaints:  Patient reports she still has pain, headaches and numbness in her L hand but the symptoms seem to decrease after PT for a little while. She returns to pain management next week for an injection. She will work a half day Tuesday and then be off on Wednesday. .  Pain: Initial: Pain Intensity 1: 9   Post Session:  pain not numerically quantified, but decreased per patient. Medications Last Reviewed:  2021 No change    Updated Objective Findings:   None Today     TREATMENT:     THERAPEUTIC EXERCISE: None today    Therapeutic Modalities: For pain. Cervical Spine Ultrasound  Delivery: Pulsed  Duty Cycle: 50 %  Frequency: 1 mHz  Intensity: 1.5 rodney/cm sq  Duration : 10 minutes (plus 2 minute set up)  Patient Position: Sitting                       MANUAL THERAPY (30 minutes) to improve neck symptoms. STM to B cervical paraspinals and B upper traps with patient supine on table. Then manual cervical traction x 12 minutes with patient supine on table. Sub-occipital release with patient supine. Positional release of R first rib.       MECHANICAL TRACTION ( 0 minutes)  none    HEP: continue current [facility]    Treatment/Session Summary:    · Response to Treatment: patient reports that overall she is the same. She plans to call doctor about the numbness in her fingers. · Communication/Consultation:  None today  · Equipment provided today:  None today  · Recommendations/Intent for next treatment session: Next visit will focus on reducing pain.      Total Treatment Billable Duration:  40 minutes  PT Patient Time In/Time Out  Time In: 8141  Time Out: 7438    Jammie Gavin, PT    Future Appointments   Date Time Provider Jeannette Cordova   8/5/2021  4:00 PM Giles Dakin, [facility]   8/12/2021  4:00 PM [facility] Marin[facility]

## 2021-08-05 ENCOUNTER — HOSPITAL ENCOUNTER (OUTPATIENT)
Dept: PHYSICAL THERAPY | Age: 56
Discharge: HOME | End: 2021-08-05
Payer: COMMERCIAL

## 2021-08-05 PROCEDURE — 97035 APP MDLTY 1+ULTRASOUND EA 15: CPT

## 2021-08-05 PROCEDURE — 97140 MANUAL THERAPY 1/> REGIONS: CPT

## 2021-08-05 NOTE — PROGRESS NOTES
Mari Mendieta  : 1965  Payor: Federico Roche / [place] OF [address] / Product Type: PPO /  Therapy Center at [facility] ARTIE BLAIR  [address] [facility], [address], [place] [address].  [phone]   [phone]       OUTPATIENT PHYSICAL THERAPY: Daily Treatment Note 2021  Visit Count: 6     ICD-10: Treatment Diagnosis: Cervicalgia (M54.5)  Precautions/Allergies:   Patient has no known allergies. TREATMENT PLAN:  Effective Dates: 2021 TO 8/10/2021. Frequency/Duration: 1-2 visits per week for 8 weeks MEDICAL/REFERRING DIAGNOSIS:  neck pain   DATE OF ONSET: 3 months ago  REFERRING PHYSICIAN: Jade Dhaliwal*  MD Orders: Evaluate and treat  Return MD Appointment: TBD       Pre-treatment Symptoms/Complaints:  Patient reports she still has pain, headaches and numbness in her L hand. She called the pain management office and the nurse said she would relay the information to the doctor. Patient is to have an injection next Tuesday. Pain: Initial: Pain Intensity 1: 9 (\"8 or 9\")   Post Session:  pain not numerically quantified, but decreased again per patient. Medications Last Reviewed:  2021 No change    Updated Objective Findings:   None Today     TREATMENT:     THERAPEUTIC EXERCISE: None today    Therapeutic Modalities: For pain. Cervical Spine Ultrasound  Delivery: Pulsed  Duty Cycle: 50 %  Frequency: 1 mHz  Intensity: 1.5 rodney/cm sq  Duration : 10 minutes (plus 2 minute set up)  Patient Position: Sitting                       MANUAL THERAPY (28 minutes) to improve neck symptoms. STM to B cervical paraspinals and B upper traps with patient supine on table. Then manual cervical traction x 12 minutes with patient supine on table. Sub-occipital release with patient supine.       MECHANICAL TRACTION ( 0 minutes)  none    HEP: continue current HEP    Treatment/Session Summary:    · Response to Treatment: patient reports that she gets temporary relief from PT, but the pain always comes back. She is to get injection next week. · Communication/Consultation:  None today  · Equipment provided today:  None today  · Recommendations/Intent for next treatment session: Next visit will focus on reducing pain.      Total Treatment Billable Duration:  40 minutes  PT Patient Time In/Time Out  Time In: 5101  Time Out: 1643    Liliana Lantigua PT    Future Appointments   Date Time Provider Jeannette Cordova   8/12/2021  4:00 PM Verneda Jeans, PT [facility]

## 2021-08-12 ENCOUNTER — HOSPITAL ENCOUNTER (OUTPATIENT)
Dept: PHYSICAL THERAPY | Age: 56
Discharge: HOME | End: 2021-08-12
Payer: COMMERCIAL

## 2021-08-12 NOTE — PROGRESS NOTES
Tolu Lewis  : 1965  Payor: Gaurang Hall / [place] [facility] / Product Type: PPO /  [address]  [facility] ARTIE BLAIR  [address] [place] [address], [address], [place] [address].  [phone]   [phone]       OUTPATIENT PHYSICAL THERAPY: Cancellation 2021        ICD-10: Treatment Diagnosis: Cervicalgia (M54.5)  Precautions/Allergies:   Patient has no known allergies. TREATMENT PLAN:  Effective Dates: 2021 TO 8/10/2021. Frequency/Duration: 1-2 visits per week for 8 weeks MEDICAL/REFERRING DIAGNOSIS:  neck pain   DATE OF ONSET: 3 months ago  REFERRING PHYSICIAN: Marleen Alvarenga*  MD Orders: Evaluate and treat  Return MD Appointment: TBD   Patient called to cancel scheduled appointment due to being very sore today.

## 2021-08-16 ENCOUNTER — HOSPITAL ENCOUNTER (OUTPATIENT)
Dept: PHYSICAL THERAPY | Age: 56
Discharge: HOME | End: 2021-08-16
Payer: COMMERCIAL

## 2021-08-16 PROCEDURE — 97140 MANUAL THERAPY 1/> REGIONS: CPT

## 2021-08-16 NOTE — PROGRESS NOTES
Suzanne Tay  : 1965  Payor: Torrie Ennis / [place] OF [address] [place] [address] Rd / Product Type: PPO /  Therapy Center at [facility] ARTIE BLAIR  [address] [facility], [address], [address] [facility]  [phone]   [phone]       OUTPATIENT PHYSICAL THERAPY: Daily Treatment Note 2021  Visit Count: 6     ICD-10: Treatment Diagnosis: Cervicalgia (M54.5)  Precautions/Allergies:   Patient has no known allergies. TREATMENT PLAN:  Effective Dates: 2021 TO 10/10/2021. Frequency/Duration: 1-2 visits per week for 60 days MEDICAL/REFERRING DIAGNOSIS:  neck pain   DATE OF ONSET: 3 months ago  REFERRING PHYSICIAN: Rosanna Dunbar*  MD Orders: Evaluate and treat  Return MD Appointment: TBD       Pre-treatment Symptoms/Complaints:  Patient reports she got an injection in her neck last week but it hasn't helped. She ran a fever for a few days after getting the shot so she had to cancel PT last Thursday. She tried calling the doctor's office on Wed and Thurs but they haven't returned her calls. Pain: Initial: Pain Intensity 1: 9 (8.5)   Post Session: \"It still hurts\"   Medications Last Reviewed:  2021 No change    Updated Objective Findings:   See recertification note     TREATMENT:     THERAPEUTIC EXERCISE: None today    Therapeutic Modalities:  None                                                                                                  MANUAL THERAPY (25 minutes) to improve neck symptoms. STM to B cervical paraspinals and B upper traps with patient supine on table. Then manual cervical traction x 12 minutes with patient supine on table. Sub-occipital release with patient supine. MECHANICAL TRACTION ( 0 minutes)  none    HEP: continue current HEP    Treatment/Session Summary:    · Response to Treatment: patient with no change in pain today and did not get any relief from injections.  She was instructed to follow up with referring MD, but she is not sure when her appointment is - she will call. · Communication/Consultation:  None today  · Equipment provided today:  None today  · Recommendations/Intent for next treatment session: Next visit will focus on reducing pain.      Total Treatment Billable Duration:   25 minutes  PT Patient Time In/Time Out  Time In: 4430  Time Out: 6102    Adolfo Dias PT    Future Appointments   Date Time Provider Jeannette Cordova   8/19/2021  4:45 PM Randy Marie, PT [facility]

## 2021-08-16 NOTE — THERAPY RECERTIFICATION
Napoleon Ruiz  : 1965  Primary: [facility]*  Secondary:  Therapy Center at [facility] ARTIE BLAIR  [address] [facility], [address] [place] [address],[address], [place] [address].  [phone]   [phone]          OUTPATIENT PHYSICAL THERAPY:Recertification [id number]   ICD-10: Treatment Diagnosis: Cervicalgia (M54.2)  Precautions/Allergies:   Patient has no known allergies. TREATMENT PLAN:  Effective Dates: 21 TO 10/10/21. Frequency/Duration: 1-2 visits per week for 60 days MEDICAL/REFERRING DIAGNOSIS:  neck pain   DATE OF ONSET: 3 months ago  REFERRING PHYSICIAN: Yeny Sun*  MD Orders: Evaluate and treat  Return MD Appointment: TBD     ASSESSMENT:  Ms. Annika Martines is a known patient to this clinic who returns secondary to neck pain. She exhibited increased muscle tightness, pain and stiffness with cervical flexion, and subjective reports of activity limitation secondary to neck pain. Patient has not shown significant long term improvement yet and she went to Pain management last week and did not get relief. Patient is to contact MD office for follow up but may benefit from continued PT in the interim for short term relief from symptoms. PROBLEM LIST (Impacting functional limitations):  1. Decreased ADL/Functional Activities  2. Increased Pain  3. Decreased Activity Tolerance  4. Decreased Flexibility/Joint Mobility  5. Decreased [place] with Home Exercise Program INTERVENTIONS PLANNED: (Treatment may consist of any combination of the following)  1. Home Exercise Program (HEP)  2. Manual Therapy  3. Range of Motion (ROM)  4. Therapeutic Exercise/Strengthening  5. Ultrasound (US)  6. Traction   7. Dry needling     GOALS: (Goals have been discussed and agreed upon with patient.)    Discharge Goals: Time Frame: 60 days  1. Patient will improve score on NDI to <30%. - Not MET yet  2. Patient will be able to sleep without interruption from neck pain.  - Not MET unless medicated  3. Patient will be able to perform cervical flexion, extension and rotation through available range of motion. - Not MET  4. Patient will be independent with HEP. - not tested    OUTCOME MEASURE:   Tool Used: Neck Disability Index (NDI)  Score:  Initial: 28/50 or 56%  Most Recent: 24/50  Or 48% (Date: 8/16/21 )   Interpretation of Score: The Neck Disability Index is a revised form of the Oswestry Low Back Pain Index and is designed to measure the activities of daily living in person's with neck pain. Each section is scored on a 0-5 scale, 5 representing the greatest disability. The scores of each section are added together for a total score of 50. MEDICAL NECESSITY:   · Skilled intervention continues to be required due to neck pain that limits her activity participation and quality of life. REASON FOR SERVICES/OTHER COMMENTS:  · Patient continues to require skilled intervention due to neck pain that restricts her quality of life. .    Rehabilitation Potential For Stated Goals: Good  Regarding Carlos Perea's therapy, I certify that the treatment plan above will be carried out by a therapist or under their direction. Thank you for this referral,  Katharina Shannon, PT     Referring Physician Signature: Pamela Karson CREWS* _______________________________ Date _____________      PAIN/SUBJECTIVE:   Initial: Pain Intensity 1: 9 (8.5)   Post Session:  No change   HISTORY:   History of Injury/Illness (Reason for Referral): From eval:  Patient reports that she began having neck pain approximately 3 months ago. She also has headaches, and neck pain and headaches interrupt her sleep. MD recommended injections or surgery but patient has declined these options at this time. Has occasional L thumb numbness and burning. Underwent L reverse shoulder arthroplasty in 2018. Has burning to L shoulder. Past Medical History/Comorbidities: per EMR  Ms. Bhargav Mcgee  has a past medical history of Adhesive capsulitis, Arthritis, Benign hypertension with CKD (chronic kidney disease) stage III ([address] Diabetes ([address] Dyslipidemia, Endometrial cancer ([address] History of anemia, and Morbid obesity ([address] She also has no past medical history of Aneurysm ([address] Arrhythmia, Asthma, Autoimmune disease ([address] CAD (coronary artery disease), Chronic obstructive pulmonary disease ([address] Chronic pain, Coagulation disorder ([address] Endocarditis, GERD (gastroesophageal reflux disease), Heart failure ([address] Liver disease, Nicotine vapor product user, Non-nicotine vapor product user, Psychiatric disorder, PUD (peptic ulcer disease), Rheumatic fever, Seizures ([address] Sleep apnea, Stroke ([address] Thromboembolus ([address] or Thyroid disease. Ms. Akil Rodney  has a past surgical history that includes hx gyn (2015); hx knee arthroscopy (Right, 2015); hx orthopaedic (Left); hx other surgical (Right); hx appendectomy (1979); and hx shoulder arthroscopy (Left, 10/04/2018). Social History/Living Environment:  From [facility]:   Patient lives in a one-story house with her son. Prior Level of Function/Work/Activity:  Patient works full-time at [employer]. Patient also works part time at [employer]. EXAMINATION:   From [facility]:    Observation/Orthostatic Postural Assessment:          Patient demonstrates forward head posture and kyphotic posture. Tender to cervical paraspinals and bilateral upper trapezius with palpable knots, particularly in R upper trapezius. Palpation:         Sensation to light touch symmetrical and intact to B UE dermatomes. ROM:          Cervical AROM WFL for all motions. Bilateral shoulder range of motion grossly within functional limits. Strength:          B shoulder flexion 5/5        B shoulder abduction 5/5  Special Tests:          Negative cervical compression and Spurling's to each side. Symptom relief with cervical distraction. 8/16/21 - No significant change in patient condition.

## 2021-10-21 NOTE — THERAPY DISCHARGE
Mara Molina  : 1965  Primary: Aileen Mcke[facility]*  Secondary:  Therapy Center at [address] [place] [address] Rd  [address] [facility], [address] [place] [address],[address], [facility].  [phone]   [phone]          OUTPATIENT PHYSICAL THERAPY:Discontinuation Summary 10/21/2021     ICD-10: Treatment Diagnosis: Cervicalgia (M54.2)  Precautions/Allergies:   Patient has no known allergies. TREATMENT PLAN: Discontinue PT  Patient attended 6 PT sessions from 21 to 21 with 1 missed session MEDICAL/REFERRING DIAGNOSIS:  neck pain   DATE OF ONSET: 3 months ago  REFERRING PHYSICIAN: Mark Horton*  MD Orders: Evaluate and treat  Return MD Appointment: TBD     ASSESSMENT:  Ms. Purnima Cunningham is a known patient to this clinic who returned secondary to neck pain. She exhibited increased muscle tightness, pain and stiffness with cervical flexion, and subjective reports of activity limitation secondary to neck pain. Patient had not shown significant long term improvement and she went to Pain management prior to last PT session and did not get relief. Patient was to contact MD office for follow up. She did not return to PT after that and she will now be discontinued from PT. Goal assessment as of 21  GOALS: (Goals have been discussed and agreed upon with patient.)    Discharge Goals: Time Frame: 60 days  1. Patient will improve score on NDI to <30%. - Not MET yet  2. Patient will be able to sleep without interruption from neck pain. - Not MET unless medicated  3. Patient will be able to perform cervical flexion, extension and rotation through available range of motion. - Not MET  4. Patient will be independent with HEP. - not tested    OUTCOME MEASURE:   Tool Used: Neck Disability Index (NDI)  Score:  Initial: 28/50 or 56%  Most Recent: 24/50  Or 48% (Date: 21 )   Interpretation of Score:  The Neck Disability Index is a revised form of the Oswestry Low Back Pain Index and is designed to measure the activities of daily living in person's with neck pain. Each section is scored on a 0-5 scale, 5 representing the greatest disability. The scores of each section are added together for a total score of 50. Data as of 8/16/21  PAIN/SUBJECTIVE:   Initial: Pain Intensity 1: 9 (8.5)   Post Session:  No change   HISTORY:   History of Injury/Illness (Reason for Referral): From [facility]:  Patient reports that she began having neck pain approximately 3 months ago. She also has headaches, and neck pain and headaches interrupt her sleep. MD recommended injections or surgery but patient has declined these options at this time. Has occasional L thumb numbness and burning. Underwent L reverse shoulder arthroplasty in 2018. Has burning to L shoulder. Past Medical History/Comorbidities: per EMR  Ms. Evette Razo  has a past medical history of Adhesive capsulitis, Arthritis, Benign hypertension with CKD (chronic kidney disease) stage III ([address] Diabetes ([address] Dyslipidemia, Endometrial cancer ([address] History of anemia, and Morbid obesity ([address] She also has no past medical history of Aneurysm ([address] Arrhythmia, Asthma, Autoimmune disease ([address] CAD (coronary artery disease), Chronic obstructive pulmonary disease ([address] Chronic pain, Coagulation disorder ([address] Endocarditis, GERD (gastroesophageal reflux disease), Heart failure ([address] Liver disease, Nicotine vapor product user, Non-nicotine vapor product user, Psychiatric disorder, PUD (peptic ulcer disease), Rheumatic fever, Seizures ([address] Sleep apnea, Stroke ([address] Thromboembolus ([address] or Thyroid disease. Ms. Evette Razo  has a past surgical history that includes hx gyn (2015); hx knee arthroscopy (Right, 2015); hx orthopaedic (Left); hx other surgical (Right); hx appendectomy (1979); and hx shoulder arthroscopy (Left, 10/04/2018). Social History/Living Environment:  From [facility]:   Patient lives in a one-story house with her son.   Prior Level of Function/Work/Activity:  Patient works full-time at [employer]. Patient also works part time at [employer]. EXAMINATION:   From Eval:    Observation/Orthostatic Postural Assessment:          Patient demonstrates forward head posture and kyphotic posture. Tender to cervical paraspinals and bilateral upper trapezius with palpable knots, particularly in R upper trapezius. Palpation:         Sensation to light touch symmetrical and intact to B UE dermatomes. ROM:          Cervical AROM WFL for all motions. Bilateral shoulder range of motion grossly within functional limits. Strength:          B shoulder flexion 5/5        B shoulder abduction 5/5  Special Tests:          Negative cervical compression and Spurling's to each side. Symptom relief with cervical distraction. 8/16/21 - No significant change in patient condition.

## 2022-03-19 PROBLEM — M75.01 ADHESIVE CAPSULITIS OF RIGHT SHOULDER: Status: ACTIVE | Noted: 2018-11-27

## 2022-03-19 PROBLEM — M75.02 ADHESIVE CAPSULITIS OF LEFT SHOULDER: Status: ACTIVE | Noted: 2018-11-27

## 2022-03-19 PROBLEM — M19.012 DEGENERATIVE JOINT DISEASE OF LEFT ACROMIOCLAVICULAR JOINT: Status: ACTIVE | Noted: 2018-09-30

## 2022-11-03 NOTE — PROGRESS NOTES
"  Family Medicine Note  [facility]    Chief Complaint   Patient presents with    [facility] [place] [facility]    Anxiety        HPI:  46 female diagnosed with seronegative RA 3 years ago.  Seems to be doing better - sees Dr. Vernon    Migraines - sees neuro, may be considering botox    Anxiety - has had long history of anxiety - has done well with exposure therapy in past (twice) and is now having issues with panic and feelings of isolation as children are leaving the nest    Diabetes controlled with A1C at goal - this was historically made quite worse with need for prednisone use    In this setting, does endorse poor diet and smoking as well    ROS: as above    Vitals:    11/03/22 1059   BP: 138/82   BP Location: Right arm   Patient Position: Sitting   Pulse: 99   SpO2: 96%   Weight: (!) 139.4 kg (307 lb 5.1 oz)   Height: 5' 4" (1.626 m)      Body mass index is 52.75 kg/m².      General:  AOx3, well nourished and developed in no acute distress  Eyes:  PERRLA, EOMI, vision intact grossly  ENT:  normal hearing, moist oral mucosa  Neck:  trachea midline with no masses or thyromegaly  Heart:  RRR, no murmurs.  No edema noted, extremities warm and well perfused  Lungs:  clear to auscultation bilaterally with symmetric chest movement  Abdomen:  Soft, nontender, nondistended.  Normal bowel sounds  Musculoskeletal:  wheel chair bound  Neurological:  CN II-XII grossly intact. Symmetric strength and sensation  Psych:  Normal mood and affect.  Able to demonstrate good judgement and personal insight.      Assessment/Plan:    Seronegative rheumatoid arthritis    Anxiety    Bilateral occipital neuralgia    Neuropathy    Secondary diabetes mellitus    Cigarette nicotine dependence without complication        Continue follow up with rheumatology  Worsening.  Start trazodone at night, and add clonopin during the day as this has been treatment in past. Follow up with therapist  Stable  Stable, will aim to reduce insulin " Meagan Johnny  : 1965  Primary: [facility] [place]*  Secondary:  Therapy Center at [facility] ARTIE BRUMFIELD[address], [address] [place] [address],[address], [facility] 91.  [phone]   [phone]          OUTPATIENT PHYSICAL THERAPY:Progress Report 2018   ICD-10: Treatment Diagnosis:  M75.01 Adhesive capsulitis of right shoulder, M75.02 Adhesive capsulitis of left shoulder, M19.012 Primary osteoarthritis, left shoulder, Pain in left shoulder (M25.512), Pain in right shoulder (M25.511)  Precautions/Allergies: NKDA      Fall Risk Score: 0 (? 5 = High Risk)  MD Orders: Eval and treat, HEP, ROM, strengthening, aggressive motion B shoulders, full motion, full strength  3x/wk for 3 weeks  Patient has attended 10 PT sessions from 10/9/18 to 18 with no missed sessions MEDICAL/REFERRING DIAGNOSIS:  L shoulder pain    DATE OF ONSET: 10/4/18  REFERRING PHYSICIAN: Martin Tejada MD  RETURN PHYSICIAN APPOINTMENT: 18     ASSESSMENT:  Ms. Estephania Jeffrey  Is a 46year old R hand dominant female s/p EUA and manipulation of R shoulder, EUA and manipulation of L shoulder and L SAD, ADCR, and LOREN. She presented shoulder pain, decreased ROM in B shoulders, decreased strength in L shoulder and decreased functional use of B shoulders. Her ROM is improving in both arms though she continues to have pain. Her DASH score is improved. She could benefit from continued PT to address deficits and work toward goals. PROBLEM LIST (Impacting functional limitations):  1. Decreased Strength  2. Decreased ADL/Functional Activities  3. Increased Pain  4. Decreased Flexibility/Joint Mobility INTERVENTIONS PLANNED:  1. Home Exercise Program (HEP)  2. Manual Therapy  3. Therapeutic Exercise/Strengthening  4. modals as needed   TREATMENT PLAN:  Effective Dates: 10/9/2018 TO 2019 (90 days).   Frequency/Duration: 2-3 times a week for 90 Days  GOALS: (Goals have been discussed and agreed upon with patient.)  Patient's stated goal is to be able to use her arms. Short-Term Functional Goals: Time Frame: 6 weeks  1. Patient to be independent with HEP - MET  2. Patient to increase PROM L shoulder flex to 135 and PROM L shoulder ER to 30 degrees Almost MET (11/1/18)  130 degrees flex, 30 degrees ER  3. Patient to report decrease in pain level to 5/10. - partially MET - improving score, but not yet 5. Discharge Goals: Time Frame: 90 days - all in progress  1. Patient to report no more than minimal shoulder pain with all activity  2. Patient to improve DASH score to 21 to demo improved use of B UEs  3. Patient to improve AROM B shoulder flex to 150 degrees for improved functional use. Rehabilitation Potential For Stated Goals: Good              The information in this section was collected on 10/9/18 (except where otherwise noted). HISTORY:   History of Present Injury/Illness (Reason for Referral):  Patient reports that shoulder pain started last year. She went to her family doctor and was sent to PT. They tried dry needling and MD injected her. She didn't get better and requested a second opinion. She was sent to Dr. Jayce Antonio and an MRI was done, and then she had surgery on 10/4/18. She was told to leave the dressings on until she returned to MD.   Past Medical History/Comorbidities: OA,  Benign hypertension with CKD (chronic kidney disease) stage III ([address] Diabetes ([address] Dyslipidemia; Endometrial cancer ([address] and Morbid obesity ([address] knee arthroscopy (Right, 2015); appendectomy (1979), L ankle surgery (2007) L index finger surgery (2005)  Social History/Living Environment:     Lives with family in one story home. 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    Dominant Side:         RIGHT  Previous Treatment Approaches:          PT prior to surgery  Current Medications:  GlipiZIDE, lisinopril, Norco    Date Last Reviewed:  10/29/18   EXAMINATION: use and replace with SGLT-2  Spent >5 minutes counseling patient on importance of smoking cessation through use of social networks, nicotine replacement therapy, and use of prescription medication if desire.  Will definitely benefit from smoking cessation and lifestyle change moving forward     Observation/Orthostatic Postural Assessment:          Patient wearing sling incorrectly upon entry to clinic. It was adjusted appropriately before patient left. Gauze and Tegaderm covering incision sites. ROM:          AROM R shoulder flex 135, Abduct 145, extn 50, ER 45 and IR to mid R buttock        PROM R shoulder flex 130, abduct 110, ER 35 at 90 degree abduct         PROM L shoulder flex 80, abduct 65, IR 50 (to abdomen) and ER -15 from neutral.           PROM R shoulder flex 160, abduct 140, ER 70 and IR 65 at 90 degree abduct (11/1/18)        PROM L shoulder flex 130, abduct 110, ER 30 and IR 50 at 90 degrees abduct (11/1/18)  Strength:          R shoulder 4+ to 5/5 all directions. L shoulder not tested. Neurological Screen:        Sensation: light touch intact in B UEs   Body Structures Involved:  1. Joints  2. Muscles Body Functions Affected:  1. Neuromusculoskeletal  2. Movement Related Activities and Participation Affected:  1. General Tasks and Demands  2. Self Care  3. Community, Social and Civic Life   CLINICAL DECISION MAKING:   Outcome Measure: Tool Used: Disabilities of the Arm, Shoulder and Hand (DASH) Questionnaire - Quick Version  Score:  Initial: 55/55  Most Recent: 41/55 (Date: 11/1/18 )   Interpretation of Score: The DASH is designed to measure the activities of daily living in person's with upper extremity dysfunction or pain. Each section is scored on a 1-5 scale, 5 representing the greatest disability. The scores of each section are added together for a total score of 55. Medical Necessity:   · Patient demonstrates good rehab potential due to higher previous functional level.   Reason for Services/Other Comments:  · Patient continues to require skilled intervention due to need to regain use of B UEs.            TREATMENT:   (In addition to Assessment/Re-Assessment sessions the following treatments were rendered)  Pre-treatment Symptoms/Complaints:  Patient reports she is doing okay. Wasn't feeling well yesterday and went to bed at 7:00. Pain: Initial:   Pain Intensity 1: 6   Post Session: patient rated pain as still a 6 at end of session. Therapeutic Exercise: (15 Minutes):  Exercises per grid below to improve mobility and strength. Required moderate visual and verbal cues to ensure correct performance. Progressed complexity of movement as indicated. Date:  10/18/18 Date:  10/22/19 Date:  10/29/18 Date  11/1/18   Activity/Exercise Parameters Parameters Parameters    B serratus punch next 2x10 2x10 1# 2x15   Wand flex 1x10 1x10 1x10 1x15   Wand ER next L 1x10 L 1x10 -   Wand abduct  L 1 x 10 L 1x10 -   B bicep curls   1# 2x15 1# 2x15   Wall sldies   B 1x10 -   IR rope stretch   B 1x10 -   IR with band     Yellow 1x15   ER with band     Yellow 1x15       HEP - continue current HEP  Manual Therapy ( 30 minutes) Grade 2 to 4 physio mobs B shoulder flex, abduct, IR and ER. Grade 2 to 4 inferior and posterior shoulder glides. Contract/relax technique to increase end range motion for L shoulder flex, IR and ER. Therapeutic Modalities: For pain. Left Shoulder Cold  Type: Cryocuff(with vasoneumatic compression. )  Duration : 15 minutes  Patient Position: Sitting                                                                      . Treatment/Session Assessment:    · Response to Treatment:  Patient with improving ROM. See full assessment above. · Compliance with Program/Exercises: yes  · Recommendations/Intent for next treatment session: \"Next visit will focus on motion and strength in B shoulders. \".     Total Treatment Duration: 60 minutes  PT Patient Time In/Time Out  Time In: 0947  Time Out: 350 Replaced by [facility]

## 2022-11-25 ENCOUNTER — HOSPITAL ENCOUNTER (EMERGENCY)
Age: 57
Discharge: HOME | End: 2022-11-25
Attending: EMERGENCY MEDICINE
Payer: COMMERCIAL

## 2022-11-25 ENCOUNTER — HOSPITAL ENCOUNTER (EMERGENCY)
Dept: GENERAL RADIOLOGY | Age: 57
Discharge: HOME | End: 2022-11-28
Payer: COMMERCIAL

## 2022-11-25 VITALS
RESPIRATION RATE: 14 BRPM | TEMPERATURE: 97.8 F | BODY MASS INDEX: 29.99 KG/M2 | DIASTOLIC BLOOD PRESSURE: 69 MMHG | OXYGEN SATURATION: 100 % | HEART RATE: 77 BPM | HEIGHT: 65 IN | WEIGHT: 180 LBS | SYSTOLIC BLOOD PRESSURE: 144 MMHG

## 2022-11-25 DIAGNOSIS — J11.1 INFLUENZA WITH RESPIRATORY MANIFESTATION OTHER THAN PNEUMONIA: ICD-10-CM

## 2022-11-25 DIAGNOSIS — R11.2 NAUSEA AND VOMITING, UNSPECIFIED VOMITING TYPE: Primary | ICD-10-CM

## 2022-11-25 DIAGNOSIS — E86.0 DEHYDRATION: ICD-10-CM

## 2022-11-25 DIAGNOSIS — R73.9 HYPERGLYCEMIA: ICD-10-CM

## 2022-11-25 DIAGNOSIS — R06.02 SHORTNESS OF BREATH: ICD-10-CM

## 2022-11-25 LAB
ALBUMIN SERPL-MCNC: 2.9 G/DL (ref 3.5–5)
ALBUMIN/GLOB SERPL: 0.6 {RATIO} (ref 0.4–1.6)
ALP SERPL-CCNC: 100 U/L (ref 50–136)
ALT SERPL-CCNC: 13 U/L (ref 12–65)
ANION GAP SERPL CALC-SCNC: 10 MMOL/L (ref 2–11)
APPEARANCE UR: CLEAR
AST SERPL-CCNC: 7 U/L (ref 15–37)
BACTERIA URNS QL MICRO: NEGATIVE /HPF
BASOPHILS # BLD: 0 K/UL (ref 0–0.2)
BASOPHILS NFR BLD: 0 % (ref 0–2)
BILIRUB SERPL-MCNC: 0.6 MG/DL (ref 0.2–1.1)
BILIRUB UR QL: NEGATIVE
BUN SERPL-MCNC: 30 MG/DL (ref 6–23)
CALCIUM SERPL-MCNC: 9.9 MG/DL (ref 8.3–10.4)
CASTS URNS QL MICRO: ABNORMAL /LPF
CHLORIDE SERPL-SCNC: 112 MMOL/L (ref 101–110)
CO2 SERPL-SCNC: 25 MMOL/L (ref 21–32)
COLOR UR: ABNORMAL
CREAT SERPL-MCNC: 1.34 MG/DL (ref 0.6–1)
DIFFERENTIAL METHOD BLD: ABNORMAL
EOSINOPHIL # BLD: 0 K/UL (ref 0–0.8)
EOSINOPHIL NFR BLD: 0 % (ref 0.5–7.8)
EPI CELLS #/AREA URNS HPF: ABNORMAL /HPF
ERYTHROCYTE [DISTWIDTH] IN BLOOD BY AUTOMATED COUNT: 13.2 % (ref 11.9–14.6)
GLOBULIN SER CALC-MCNC: 4.6 G/DL (ref 2.8–4.5)
GLUCOSE BLD STRIP.AUTO-MCNC: 213 MG/DL (ref 65–100)
GLUCOSE BLD STRIP.AUTO-MCNC: 279 MG/DL (ref 65–100)
GLUCOSE SERPL-MCNC: 313 MG/DL (ref 65–100)
GLUCOSE UR STRIP.AUTO-MCNC: >1000 MG/DL
HCT VFR BLD AUTO: 43.9 % (ref 35.8–46.3)
HGB BLD-MCNC: 13.9 G/DL (ref 11.7–15.4)
HGB UR QL STRIP: ABNORMAL
IMM GRANULOCYTES # BLD AUTO: 0 K/UL (ref 0–0.5)
IMM GRANULOCYTES NFR BLD AUTO: 0 % (ref 0–5)
KETONES UR QL STRIP.AUTO: 40 MG/DL
LEUKOCYTE ESTERASE UR QL STRIP.AUTO: ABNORMAL
LYMPHOCYTES # BLD: 1 K/UL (ref 0.5–4.6)
LYMPHOCYTES NFR BLD: 14 % (ref 13–44)
MAGNESIUM SERPL-MCNC: 2.8 MG/DL (ref 1.8–2.4)
MCH RBC QN AUTO: 29.2 PG (ref 26.1–32.9)
MCHC RBC AUTO-ENTMCNC: 31.7 G/DL (ref 31.4–35)
MCV RBC AUTO: 92.2 FL (ref 82–102)
MONOCYTES # BLD: 0.5 K/UL (ref 0.1–1.3)
MONOCYTES NFR BLD: 7 % (ref 4–12)
NEUTS SEG # BLD: 5.9 K/UL (ref 1.7–8.2)
NEUTS SEG NFR BLD: 79 % (ref 43–78)
NITRITE UR QL STRIP.AUTO: NEGATIVE
NRBC # BLD: 0 K/UL (ref 0–0.2)
PH UR STRIP: 5.5 [PH] (ref 5–9)
PLATELET # BLD AUTO: 268 K/UL (ref 150–450)
PMV BLD AUTO: 10.6 FL (ref 9.4–12.3)
POTASSIUM SERPL-SCNC: 4.2 MMOL/L (ref 3.5–5.1)
PROT SERPL-MCNC: 7.5 G/DL (ref 6.3–8.2)
PROT UR STRIP-MCNC: NEGATIVE MG/DL
RBC # BLD AUTO: 4.76 M/UL (ref 4.05–5.2)
RBC #/AREA URNS HPF: ABNORMAL /HPF
SERVICE CMNT-IMP: ABNORMAL
SERVICE CMNT-IMP: ABNORMAL
SODIUM SERPL-SCNC: 147 MMOL/L (ref 133–143)
SP GR UR REFRACTOMETRY: >1.035 (ref 1–1.02)
UROBILINOGEN UR QL STRIP.AUTO: 1 EU/DL (ref 0.2–1)
WBC # BLD AUTO: 7.5 K/UL (ref 4.3–11.1)
WBC URNS QL MICRO: ABNORMAL /HPF

## 2022-11-25 PROCEDURE — 6360000002 HC RX W HCPCS: Performed by: EMERGENCY MEDICINE

## 2022-11-25 PROCEDURE — 96361 HYDRATE IV INFUSION ADD-ON: CPT

## 2022-11-25 PROCEDURE — 85025 COMPLETE CBC W/AUTO DIFF WBC: CPT

## 2022-11-25 PROCEDURE — 71045 X-RAY EXAM CHEST 1 VIEW: CPT

## 2022-11-25 PROCEDURE — 6370000000 HC RX 637 (ALT 250 FOR IP): Performed by: EMERGENCY MEDICINE

## 2022-11-25 PROCEDURE — 99285 EMERGENCY DEPT VISIT HI MDM: CPT

## 2022-11-25 PROCEDURE — 80053 COMPREHEN METABOLIC PANEL: CPT

## 2022-11-25 PROCEDURE — 83735 ASSAY OF MAGNESIUM: CPT

## 2022-11-25 PROCEDURE — 2580000003 HC RX 258: Performed by: EMERGENCY MEDICINE

## 2022-11-25 PROCEDURE — 82962 GLUCOSE BLOOD TEST: CPT

## 2022-11-25 PROCEDURE — 96365 THER/PROPH/DIAG IV INF INIT: CPT

## 2022-11-25 PROCEDURE — 81001 URINALYSIS AUTO W/SCOPE: CPT

## 2022-11-25 PROCEDURE — 96375 TX/PRO/DX INJ NEW DRUG ADDON: CPT

## 2022-11-25 RX ORDER — 0.9 % SODIUM CHLORIDE 0.9 %
1000 INTRAVENOUS SOLUTION INTRAVENOUS ONCE
Status: COMPLETED | OUTPATIENT
Start: 2022-11-25 | End: 2022-11-25

## 2022-11-25 RX ORDER — ONDANSETRON 8 MG/1
8 TABLET, ORALLY DISINTEGRATING ORAL EVERY 8 HOURS PRN
Qty: 20 TABLET | Refills: 0 | Status: SHIPPED | OUTPATIENT
Start: 2022-11-25

## 2022-11-25 RX ORDER — CEFDINIR 300 MG/1
300 CAPSULE ORAL 2 TIMES DAILY
Qty: 14 CAPSULE | Refills: 0 | Status: SHIPPED | OUTPATIENT
Start: 2022-11-25 | End: 2022-12-02

## 2022-11-25 RX ORDER — KETOROLAC TROMETHAMINE 30 MG/ML
30 INJECTION, SOLUTION INTRAMUSCULAR; INTRAVENOUS ONCE
Status: COMPLETED | OUTPATIENT
Start: 2022-11-25 | End: 2022-11-25

## 2022-11-25 RX ORDER — DEXAMETHASONE SODIUM PHOSPHATE 10 MG/ML
10 INJECTION INTRAMUSCULAR; INTRAVENOUS
Status: COMPLETED | OUTPATIENT
Start: 2022-11-25 | End: 2022-11-25

## 2022-11-25 RX ORDER — ONDANSETRON 2 MG/ML
8 INJECTION INTRAMUSCULAR; INTRAVENOUS
Status: COMPLETED | OUTPATIENT
Start: 2022-11-25 | End: 2022-11-25

## 2022-11-25 RX ADMIN — CEFTRIAXONE SODIUM 1000 MG: 1 INJECTION, POWDER, FOR SOLUTION INTRAMUSCULAR; INTRAVENOUS at 17:50

## 2022-11-25 RX ADMIN — ONDANSETRON 8 MG: 2 INJECTION INTRAMUSCULAR; INTRAVENOUS at 15:12

## 2022-11-25 RX ADMIN — SODIUM CHLORIDE 1000 ML: 9 INJECTION, SOLUTION INTRAVENOUS at 15:12

## 2022-11-25 RX ADMIN — INSULIN HUMAN 6 UNITS: 100 INJECTION, SOLUTION PARENTERAL at 17:07

## 2022-11-25 RX ADMIN — SODIUM CHLORIDE 1000 ML: 9 INJECTION, SOLUTION INTRAVENOUS at 17:07

## 2022-11-25 RX ADMIN — DEXAMETHASONE SODIUM PHOSPHATE 10 MG: 10 INJECTION INTRAMUSCULAR; INTRAVENOUS at 15:12

## 2022-11-25 RX ADMIN — KETOROLAC TROMETHAMINE 30 MG: 30 INJECTION, SOLUTION INTRAMUSCULAR; INTRAVENOUS at 15:12

## 2022-11-25 ASSESSMENT — ENCOUNTER SYMPTOMS
ABDOMINAL PAIN: 1
VOMITING: 1
SHORTNESS OF BREATH: 1
CHEST TIGHTNESS: 1
NAUSEA: 1
COUGH: 1

## 2022-11-25 ASSESSMENT — PAIN SCALES - GENERAL
PAINLEVEL_OUTOF10: 8
PAINLEVEL_OUTOF10: 8
PAINLEVEL_OUTOF10: 5

## 2022-11-25 ASSESSMENT — PAIN DESCRIPTION - LOCATION
LOCATION: ABDOMEN
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
## 2022-11-25 NOTE — ED TRIAGE NOTES
Patient ambulatory to triage. Patient c\o shortness of breath, stating this began this morning. Patient states she tested positive for the flu on 11/20. Patient also c\o emesis.

## 2022-11-25 NOTE — ED NOTES
I have reviewed discharge instructions with the patient. The patient verbalized understanding. Patient left ED via Discharge Method: ambulatory to Home with family. Opportunity for questions and clarification provided. Patient given 2 scripts. To continue your aftercare when you leave the hospital, you may receive an automated call from our care team to check in on how you are doing. This is a free service and part of our promise to provide the best care and service to meet your aftercare needs.  If you have questions, or wish to unsubscribe from this service please call [phone]. Thank you for Choosing our [facility] Emergency Department.          Janette Perez, RN  11/25/22 6710

## 2022-11-25 NOTE — DISCHARGE INSTRUCTIONS
Drink plenty of fluids. Aggressively hydrate. Take Motrin and Tylenol as needed for body aches. Return the emergency department for any concerns.

## (undated) DEVICE — NEEDLE HYPO 18GA L1.5IN PNK S STL HUB POLYPR SHLD REG BVL

## (undated) DEVICE — PUDDLEVAC FLOOR SUCTION DEVICE: Brand: PUDDLEVAC

## (undated) DEVICE — SURGICAL PROCEDURE PACK BASIC ST FRANCIS

## (undated) DEVICE — NDL SPNE QNCKE 18GX3.5IN LF --

## (undated) DEVICE — INFLOW CASSETTE TUBING, DO NOT USE IF PACKAGE IS DAMAGED: Brand: CROSSFLOW

## (undated) DEVICE — (D)PREP SKN CHLRAPRP APPL 26ML -- CONVERT TO ITEM 371833

## (undated) DEVICE — [RESECTOR CUTTER, ARTHROSCOPIC SHAVER BLADE,  DO NOT RESTERILIZE,  DO NOT USE IF PACKAGE IS DAMAGED,  KEEP DRY,  KEEP AWAY FROM SUNLIGHT]: Brand: FORMULA

## (undated) DEVICE — BLADE SHV CUT MENIS AGG + 4MM --

## (undated) DEVICE — SHOULDER SUSPENSION KIT 6 PER BOX

## (undated) DEVICE — ABDOMINAL PAD: Brand: DERMACEA

## (undated) DEVICE — BUR SHV CUT HLLW 6 FLUT 5.5MM --

## (undated) DEVICE — CARDINAL HEALTH FLEXIBLE LIGHT HANDLE COVER: Brand: CARDINAL HEALTH

## (undated) DEVICE — SOLUTION IRRIG 3000ML 0.9% SOD CHL FLX CONT 0797208] ICU MEDICAL INC]

## (undated) DEVICE — SUTURE ETHLN SZ 2-0 L18IN NONABSORBABLE BLK L26MM PS 3/8 585H

## (undated) DEVICE — SLING ARM SWTH UNIV FOAM 1 SZ FITS MOST

## (undated) DEVICE — BANDAGE COMPR SELF ADH 5 YDX4 IN TAN STRL PREMIERPRO LF

## (undated) DEVICE — OUTFLOW CASSETTE TUBING, DO NOT USE IF PACKAGE IS DAMAGED: Brand: CROSSFLOW

## (undated) DEVICE — MEDI-VAC NON-CONDUCTIVE SUCTION TUBING: Brand: CARDINAL HEALTH

## (undated) DEVICE — GOWN,REINFORCED,POLY,AURORA,XXLARGE,STR: Brand: MEDLINE

## (undated) DEVICE — DRAPE,U/SHT,SPLIT,FILM,60X84,STERILE: Brand: MEDLINE

## (undated) DEVICE — MEDI-VAC YANKAUER SUCTION HANDLE W/BULBOUS TIP: Brand: CARDINAL HEALTH

## (undated) DEVICE — 3M™ STERI-DRAPE™ INCISE DRAPE 1050 (60CM X 45CM): Brand: STERI-DRAPE™

## (undated) DEVICE — PACK,SHOULDER,DRAPE,POUCH: Brand: MEDLINE

## (undated) DEVICE — SLING ARM AD ULT

## (undated) DEVICE — 90-S, SUCTION PROBE, NON-BENDABLE, MAX CUT LEVEL 11: Brand: SERFAS ENERGY